# Patient Record
Sex: MALE | Race: WHITE | Employment: OTHER | URBAN - METROPOLITAN AREA
[De-identification: names, ages, dates, MRNs, and addresses within clinical notes are randomized per-mention and may not be internally consistent; named-entity substitution may affect disease eponyms.]

---

## 2017-04-03 PROBLEM — I10 ESSENTIAL HYPERTENSION WITH GOAL BLOOD PRESSURE LESS THAN 130/85: Status: ACTIVE | Noted: 2017-04-03

## 2017-04-03 PROBLEM — R07.9 CHEST PAIN: Status: ACTIVE | Noted: 2017-04-03

## 2017-04-03 PROBLEM — I25.10 CORONARY ARTERY DISEASE INVOLVING NATIVE HEART: Status: ACTIVE | Noted: 2017-04-03

## 2017-04-03 PROBLEM — Z87.09 HISTORY OF COPD: Status: ACTIVE | Noted: 2017-04-03

## 2017-05-11 PROBLEM — I25.10 CORONARY ARTERY DISEASE INVOLVING NATIVE CORONARY ARTERY OF NATIVE HEART WITHOUT ANGINA PECTORIS: Status: ACTIVE | Noted: 2017-05-11

## 2017-11-21 PROBLEM — Z82.49 FAMILY HISTORY OF MI (MYOCARDIAL INFARCTION): Status: ACTIVE | Noted: 2017-11-21

## 2017-11-21 PROBLEM — E78.2 MIXED HYPERLIPIDEMIA: Status: ACTIVE | Noted: 2017-11-21

## 2017-11-21 PROBLEM — R09.89 RIGHT CAROTID BRUIT: Status: ACTIVE | Noted: 2017-11-21

## 2021-06-28 ENCOUNTER — HOSPITAL ENCOUNTER (OUTPATIENT)
Dept: PHYSICAL THERAPY | Age: 71
Discharge: HOME | End: 2021-06-28
Payer: MEDICARE

## 2021-06-28 DIAGNOSIS — M17.0 BILATERAL PRIMARY OSTEOARTHRITIS OF KNEE: ICD-10-CM

## 2021-06-28 PROCEDURE — 97161 PT EVAL LOW COMPLEX 20 MIN: CPT

## 2021-06-28 PROCEDURE — 97110 THERAPEUTIC EXERCISES: CPT

## 2021-06-28 NOTE — THERAPY EVALUATION
Heather Tang  : 1950  [place]  Secondary: [address] at [facility]  [address] [place], [address] [place] [address], [place], [address] [place] [address]  [phone]   [phone]       OUTPATIENT PHYSICAL THERAPY:Initial Assessment and Discharge 2021    ICD-10: Treatment Diagnosis: Bilateral primary osteoarthritis of knee [M17.0]                Treatment Diagnosis 2: Knee pain [M25.569]                Treatment Diagnosis 3: other abnormalities of gait and mobility (R26.89)   Precautions: Hypertension   Allergies: Bee sting [sting, bee]; Keflex [cephalexin]; Pcn [penicillins]; and Sulfa (sulfonamide antibiotics)   TREATMENT PLAN:  Effective Dates: 2021 TO Today (2021). Frequency/Duration: daily for 1 Day(s) MEDICAL/REFERRING DIAGNOSIS:  Bilateral primary osteoarthritis of knee [M17.0]   DATE OF ONSET: Chronic (years long)   REFERRING PHYSICIAN: Richie Joseph MD MD Orders: Evaluate and Treat   Return MD Appointment:       INITIAL ASSESSMENT AND DISCHARGE SUMMARY:  Mr. Eunice Hernandez is a 72 y.o. male presenting to physical therapy with complaints of bilateral knee pain and is preparing for R TKA 2021 to be followed with L TKA later this year. Patient presents with increased pain, decreased strength, decreased ROM, decreased flexibility, impaired gait, impaired transfer ability, decreased activity tolerance, and overall impaired functional mobility. Patient is a good candidate for skilled physical therapy to provide home exercise program and review technique for exercises prior to surgery. Patient is now independent in home exercise program and does not require additional skilled physical therapy. PROBLEM LIST (Impacting functional limitations):  1. Decreased Strength  2. Decreased ADL/Functional Activities  3. Decreased Transfer Abilities  4. Decreased Ambulation Ability/Technique  5. Increased Pain  6.  Decreased Flexibility/Joint Mobility  7. Decreased [place] with Home Exercise Program INTERVENTIONS PLANNED: (Treatment may consist of any combination of the following)  1. Home Exercise Program (HEP)  2. Range of Motion (ROM)  3. Therapeutic Exercise/Strengthening     GOALS: (Goals have been discussed and agreed upon with patient.)  Short Term/Discharge Goals: Time Frame: 6/28/2021 to 06/28/2021  1. Patient will demonstrate understanding and perform home exercise program without external cues. 2. Patient will demonstrate understanding importance, rationale and purpose behind home exercise program.     Outcome Measure: Tool Used: Lower Extremity Functional Scale (LEFS)  Score:  Initial: 48/80 Most Recent: X/80 (Date: -- )   Interpretation of Score: 20 questions each scored on a 5 point scale with 0 representing \"extreme difficulty or unable to perform\" and 4 representing \"no difficulty\". The lower the score, the greater the functional disability. 80/80 represents no disability. Minimal detectable change is 9 points. Medical Necessity:   · Patient is expected to demonstrate progress in strength, range of motion and ability to perform HEP independently. to prepare for TKA. .  Reason for Services/Other Comments:  · Patient continues to require skilled intervention due to need for cues, instruction on technique for exercise program..  Total Evaluation Duration: 15 minutes    Rehabilitation Potential For Stated Goals: Excellent  Regarding Marian Carrasco's therapy, I certify that the treatment plan above will be carried out by a therapist or under their direction. Thank you for this referral,  Agustina Ochoa PT, DPT   Referring Physician Signature: Robbin Marcelino MD _______________________________ Date _____________             PAIN/SUBJECTIVE:    Initial: Pain Intensity 1: 5  Pain Location 1: Knee  Post Session:  5/10    HISTORY:    History of Injury/Illness (Reason for Referral):  Mr. Nirali Klein is a 72 y.o. male presenting to physical therapy with complaints of bilateral knee pain and is preparing for R TKA 07/23/2021 to be followed with L TKA later this year. Patient presents with increased pain, decreased strength, decreased ROM, decreased flexibility, impaired gait, impaired transfer ability, decreased activity tolerance, and overall impaired functional mobility. Patient is a good candidate for skilled physical therapy to provide home exercise program and review technique for exercises prior to surgery. Patient is now independent in home exercise program and does not require additional skilled physical therapy.    Past Medical History/Comorbidities:   Mr. Sherryle Isaacs  has a past medical history of Abnormal blood sugar, Abnormality of cortisol-binding globulin ([address] Actinic keratosis, Acute right flank pain, Anaphylactic reaction to bee sting, Anxiety disorder, Arthritis, Arthritis of left shoulder region, Asthma (7/20/2016), Backache (7/20/2016), Bilateral otitis media, BPH (benign prostatic hyperplasia) (7/20/2016), CAD (coronary artery disease), Cervical neck pain with evidence of disc disease, Chronic renal insufficiency (7/20/2016), Controlled diabetes mellitus ([address] (7/20/2016), COPD (chronic obstructive pulmonary disease) ([address] (7/20/2016), Corneal foreign body (6/13/14), Depressive disorder (7/20/2016), Diarrhea, Disorders of calcium metabolism, Dumping syndrome, Dyspepsia and other specified disorders of function of stomach (8/29/2012), ED (erectile dysfunction) (7/20/2016), Edema, Elevated CPK, Elevated prolactin level, Essential hypertension, benign (8/29/2012), GERD (gastroesophageal reflux disease), Hand pain, right, Hematuria, Hyperlipidemia, Hyperprolactinemia ([address] Hypertension, Hypogonadism in male, Hypothyroidism, Knee effusion, Knee pain, Low serum testosterone level, Neoplasm of uncertain behavior of skin of neck, Neuralgia, Nevus, non-neoplastic, Numbness and tingling in left arm, Open wound of finger without complication (3/61/73), IRINA on CPAP (8/22/2014), Osteoarthrosis, unspecified whether generalized or localized, involving lower leg (8/29/2012), Osteoarthrosis, unspecified whether generalized or localized, lower leg (8/29/2012), Other disorder of calcium metabolism (8/29/2012), Other malaise and fatigue (11/27/2012), Pituitary tumor (12/11/2013), Prolactin increased, Puncture wound (6/11/12), Pure hypercholesterolemia (8/29/2012), Right shoulder pain, RLS (restless legs syndrome) (7/20/2016), Seborrheic keratosis, Seizures ([facility] Shingles (herpes zoster) polyneuropathy, Shingles rash, and Sinusitis. Mr. Javi Aguilera  has a past surgical history that includes hx orthopaedic; hx tonsillectomy; pr chest surgery procedure unlisted; hx carpal tunnel release (Bilateral); hx bunionectomy (Right); and hx cervical fusion. Social History/Living Environment:    Patient lives with his family in a private home. 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 Dominant Side:         RIGHT    Ambulatory/Rehab Services H2 Model Falls Risk Assessment    Risk Factors:       (1)  Gender [Male] Ability to Rise from Chair:       (1)  Pushes up, successful in one attempt    Falls Prevention Plan:       No modifications necessary    Total: (5 or greater = High Risk): 2    ©2010 [facility]. All Rights Reserved. [facility] Patent #[id number]. Federal Law prohibits the replication, distribution or use without written permission from [facility]    Current Medications:        Current Outpatient Medications:     metFORMIN (GLUCOPHAGE) 500 mg tablet, Take  by mouth two (2) times daily (with meals). , Disp: , Rfl:     meloxicam (MOBIC) 7.5 mg tablet, Take 1 Tablet by mouth daily. , Disp: 30 Tablet, Rfl: 0    LORazepam (ATIVAN) 1 mg tablet, Take 1 Tab by mouth four (4) times daily as needed. Max Daily Amount: 4 mg.  Indications: anxiety, Disp: 120 Tab, Rfl: 2 (not taking per patient 06/28/2021)    pravastatin (PRAVACHOL) 40 mg tablet, Take 1 Tab by mouth daily. Indications: hyperlipidemia, Disp: 90 Tab, Rfl: 3    levothyroxine (SYNTHROID) 100 mcg tablet, Take 1 Tab by mouth Daily (before breakfast). , Disp: 90 Tab, Rfl: 3    amLODIPine (NORVASC) 5 mg tablet, Take 1 Tab by mouth daily. , Disp: 90 Tab, Rfl: 3    omeprazole (PRILOSEC) 40 mg capsule, Take 1 Cap by mouth daily. Indications: gastroesophageal reflux disease, Disp: 90 Cap, Rfl: 3    nabumetone (RELAFEN) 750 mg tablet, Take 1 Tab by mouth two (2) times a day., Disp: 180 Tab, Rfl: 3    hydroCHLOROthiazide (HYDRODIURIL) 12.5 mg tablet, Take 1 Tab by mouth daily. , Disp: 90 Tab, Rfl: 3    lisinopril (PRINIVIL, ZESTRIL) 20 mg tablet, Take 1 Tab by mouth daily. , Disp: 30 Tab, Rfl: 11    BROMOCRIPTINE MESYLATE (PARLODEL PO), Take 10 mg by mouth two (2) times a day., Disp: , Rfl:     sildenafil citrate (VIAGRA) 100 mg tablet, Take 1 Tab by mouth as needed. , Disp: 18 Tab, Rfl: 10    cyanocobalamin 1,000 mcg tablet, Take 1,000 mcg by mouth two (2) times a day., Disp: , Rfl:     budesonide-formoterol (SYMBICORT) 160-4.5 mcg/actuation HFA inhaler, Take 2 Puffs by inhalation two (2) times a day., Disp: 1 Inhaler, Rfl: 11    nitroglycerin (NITROSTAT) 0.4 mg SL tablet, 1 Tab by SubLINGual route as needed (CAD). Indications: ANGINA, Disp: 25 Tab, Rfl: 11    diphenoxylate-atropine (LOMOTIL) 2.5-0.025 mg per tablet, Take 1 Tab by mouth four (4) times daily as needed for Diarrhea. Max Daily Amount: 4 Tabs., Disp: 20 Tab, Rfl: 0    EPINEPHrine (EPIPEN) 0.3 mg/0.3 mL injection, 0.3 mg by IntraMUSCular route as needed. , Disp: , Rfl:     TESTOSTERONE CYPIONATE IM, by IntraMUSCular route.  200MG/ML, 2 ml every 2 weeks, Disp: , Rfl:     SYRINGE, DISPOSABLE, 20 ML (BD LUER-ARTHUR BULK SYRINGE), BD Luer-Arthur Syringe 25G X 1-1/2\"3 ML, 1 misc every 2 weeks, Disp: , Rfl:     DEXAMETHASONE SOD PHOSPHATE (DEXAMETHASONE SODIUM PHOSPHATE INJECTION), by Injection route., Disp: , Rfl:     magnesium oxide (MAG-OX) 400 mg tablet, Take 400 mg by mouth two (2) times a day., Disp: , Rfl:     testosterone enanthate 200 mg/mL Syrg, 50 mg by IntraMUSCular route., Disp: , Rfl:     aspirin 81 mg tablet, Take 81 mg by mouth.  , Disp: , Rfl:     CALCIUM CARBONATE/VITAMIN D2 (CALCIUM 600 WITH VITAMIN D2 PO), Take 1 Tab by mouth two (2) times a day., Disp: , Rfl:     Date Last Reviewed:  6/28/2021    Number of Personal Factors/Comorbidities that affect the Plan of Care:   0: LOW COMPLEXITY    EXAMINATION:    Patient denies any LE paresthesia. Patient denies any increase of symptoms with cough, sneeze or valsalva. Patient denies any saddle paresthesia or bowel/bladder deficits. Observation/Orthostatic Postural Assessment:          Patient ambulates with mild antalgic gait; no significant deviations. Palpation:    Anthropometric Measurements (cm) Left Right   Knee joint line 36cm 38cm      ROM:    AROM/ PROM Left (degrees) Right (degrees)   Knee Flexion 125 124   Knee Extension -5 0   Ankle Dorsiflexion (DF) -   knee extended 5 5     Strength: Motion Tested Left   (*/5) Right  (*/5)   Knee Extension 5 5   Knee Flexion 4+ 4+   Hip Flexion 5 5   Ankle DF 5 5   Ankle PF 4 4       Passive Accessory Motion:         Patellas are hypomobile in all directions bilaterally. Neurological Screen:              Neurological screen unremarkable. Functional Mobility:   Patient performs transfers without difficulty. Uses BUEs to push up. Balance: WNL       Body Structures Involved:  1. Bones  2. Joints  3. Muscles Body Functions Affected:  1. Sensory/Pain  2. Neuromusculoskeletal  3. Movement Related Activities and Participation Affected:  1. General Tasks and Demands  2.  Mobility    Number of elements (examined above) that affect the Plan of Care: 1-2: LOW COMPLEXITY    CLINICAL PRESENTATION:    Presentation:   Stable and uncomplicated: LOW COMPLEXITY    CLINICAL DECISION MAKING:    Use of outcome tool(s) and clinical judgement create a POC that gives a: Clear prediction of patient's progress: LOW COMPLEXITY

## 2021-06-28 NOTE — PROGRESS NOTES
Mary Anne Mcmillan  : 1950  [place]  Secondary: [address] at [facility]  [address], [address] [place] [address], Street, [address] [place] [address]  [phone]   [phone]      OUTPATIENT PHYSICAL THERAPY: Daily Treatment Note 2021    ICD-10: Treatment Diagnosis: Bilateral primary osteoarthritis of knee [M17.0]                Treatment Diagnosis 2: Knee pain [M25.569]                Treatment Diagnosis 3: other abnormalities of gait and mobility (R26.89)   Precautions: Hypertension   Allergies: Bee sting [sting, bee]; Keflex [cephalexin]; Pcn [penicillins]; and Sulfa (sulfonamide antibiotics)   TREATMENT PLAN:  Effective Dates: 2021 TO Today (2021). Frequency/Duration: daily for 1 Day(s) MEDICAL/REFERRING DIAGNOSIS:  Bilateral primary osteoarthritis of knee [M17.0]   DATE OF ONSET: Chronic (years long)   REFERRING PHYSICIAN: Rafael Sandoval MD MD Orders: Evaluate and Treat   Return MD Appointment:       Pre-treatment Symptoms/Complaints: I'm ready. Pain: Initial:  10 Post Session:  5/10   Medications Last Reviewed:  2021  Updated Objective Findings:  See evaluation note from today   TREATMENT:   THERAPEUTIC EXERCISE: (25 minutes):  Exercises per grid below to improve mobility, strength and coordination. Required mod visual, verbal, manual and tactile cues to promote proper body alignment, promote proper body posture and promote proper body mechanics. Progressed resistance, range, repetitions and complexity of movement as indicated.      Date:  2021 Date:   Date:     Activity/Exercise Parameters Parameters Parameters   Heel Slide with Strap 1 x 10 5 sec hold      Hamstring Stretch  4 x 30 seconds     Heel Prop  2 mins bilateral 3lb weight      Calf Stretch  With strap seated 4 x 30 seconds bilateral     Quad Set 1 x 10 10 second hold and with SLR      Straight Leg Raise  1 x 10 bilateral     Long Arc Quad  1 x 10      Chair Slides 4 x 30 seconds bilateral     Standing calf stretch  4 x 30 seconds bilateral        Time spent with patient reviewing proper muscle recruitment and technique with exercises. MANUAL THERAPY: (0 minutes): Soft tissue mobilization was utilized and necessary because of the patient's loss of articular motion   None today     MODALITIES: (0 minutes):      None today     HEP: As above; handouts given to patient for all exercises. Treatment/Session Summary:    · Response to Treatment:  Patient demonstrating understanding of exercises. .  · Baseline vitals (6/28/2021): BP: 145/77, HR: 70, SpO: 95  · Communication/Consultation:  Education on home exercise program, expectations. · Equipment provided today:  HEP handout  · Recommendations/Intent for next treatment session: Discharge to [facility].      Total Treatment Billable Duration:  40 minutes: 15 evaluation, 25 therapeutic exercise   PT Patient Time In/Time Out  Time In: 7667  Time Out: LIZETT Gayle

## 2021-07-12 ENCOUNTER — HOSPITAL ENCOUNTER (OUTPATIENT)
Dept: PHYSICAL THERAPY | Age: 71
Discharge: HOME | End: 2021-07-12
Attending: ORTHOPAEDIC SURGERY
Payer: MEDICARE

## 2021-07-12 ENCOUNTER — HOSPITAL ENCOUNTER (OUTPATIENT)
Dept: SURGERY | Age: 71
Discharge: HOME | End: 2021-07-12
Attending: ORTHOPAEDIC SURGERY
Payer: MEDICARE

## 2021-07-12 VITALS
SYSTOLIC BLOOD PRESSURE: 145 MMHG | OXYGEN SATURATION: 95 % | DIASTOLIC BLOOD PRESSURE: 78 MMHG | TEMPERATURE: 98 F | BODY MASS INDEX: 27.52 KG/M2 | RESPIRATION RATE: 16 BRPM | HEIGHT: 70 IN | HEART RATE: 66 BPM | WEIGHT: 192.2 LBS

## 2021-07-12 LAB
ANION GAP SERPL CALC-SCNC: 8 MMOL/L (ref 7–16)
APTT PPP: 30.1 SEC (ref 24.1–35.1)
ATRIAL RATE: 60 BPM
BACTERIA SPEC CULT: ABNORMAL
BASOPHILS # BLD: 0.1 K/UL (ref 0–0.2)
BASOPHILS NFR BLD: 1 % (ref 0–2)
BUN SERPL-MCNC: 15 MG/DL (ref 8–23)
CALCIUM SERPL-MCNC: 9.8 MG/DL (ref 8.3–10.4)
CALCULATED P AXIS, ECG09: 12 DEGREES
CALCULATED R AXIS, ECG10: 42 DEGREES
CALCULATED T AXIS, ECG11: 45 DEGREES
CHLORIDE SERPL-SCNC: 104 MMOL/L (ref 98–107)
CO2 SERPL-SCNC: 26 MMOL/L (ref 21–32)
CREAT SERPL-MCNC: 0.9 MG/DL (ref 0.8–1.5)
DIAGNOSIS, 93000: NORMAL
DIFFERENTIAL METHOD BLD: NORMAL
EOSINOPHIL # BLD: 0.1 K/UL (ref 0–0.8)
EOSINOPHIL NFR BLD: 1 % (ref 0.5–7.8)
ERYTHROCYTE [DISTWIDTH] IN BLOOD BY AUTOMATED COUNT: 13.1 % (ref 11.9–14.6)
EST. AVERAGE GLUCOSE BLD GHB EST-MCNC: 117 MG/DL
GLUCOSE SERPL-MCNC: 94 MG/DL (ref 65–100)
HBA1C MFR BLD: 5.7 % (ref 4.2–6.3)
HCT VFR BLD AUTO: 43.3 % (ref 41.1–50.3)
HGB BLD-MCNC: 14.3 G/DL (ref 13.6–17.2)
IMM GRANULOCYTES # BLD AUTO: 0 K/UL (ref 0–0.5)
IMM GRANULOCYTES NFR BLD AUTO: 0 % (ref 0–5)
INR PPP: 1
LYMPHOCYTES # BLD: 1.9 K/UL (ref 0.5–4.6)
LYMPHOCYTES NFR BLD: 25 % (ref 13–44)
MCH RBC QN AUTO: 32 PG (ref 26.1–32.9)
MCHC RBC AUTO-ENTMCNC: 33 G/DL (ref 31.4–35)
MCV RBC AUTO: 96.9 FL (ref 79.6–97.8)
MONOCYTES # BLD: 0.7 K/UL (ref 0.1–1.3)
MONOCYTES NFR BLD: 9 % (ref 4–12)
NEUTS SEG # BLD: 4.9 K/UL (ref 1.7–8.2)
NEUTS SEG NFR BLD: 64 % (ref 43–78)
NRBC # BLD: 0 K/UL (ref 0–0.2)
P-R INTERVAL, ECG05: 160 MS
PLATELET # BLD AUTO: 239 K/UL (ref 150–450)
PMV BLD AUTO: 9.9 FL (ref 9.4–12.3)
POTASSIUM SERPL-SCNC: 4.4 MMOL/L (ref 3.5–5.1)
PROTHROMBIN TIME: 13.4 SEC (ref 12.5–14.7)
Q-T INTERVAL, ECG07: 402 MS
QRS DURATION, ECG06: 86 MS
QTC CALCULATION (BEZET), ECG08: 402 MS
RBC # BLD AUTO: 4.47 M/UL (ref 4.23–5.6)
SERVICE CMNT-IMP: ABNORMAL
SODIUM SERPL-SCNC: 138 MMOL/L (ref 136–145)
VENTRICULAR RATE, ECG03: 60 BPM
WBC # BLD AUTO: 7.6 K/UL (ref 4.3–11.1)

## 2021-07-12 PROCEDURE — 85730 THROMBOPLASTIN TIME PARTIAL: CPT

## 2021-07-12 PROCEDURE — 87641 MR-STAPH DNA AMP PROBE: CPT

## 2021-07-12 PROCEDURE — 77030012341 HC CHMB SPCR OPTC MDI VYRM -A

## 2021-07-12 PROCEDURE — 83036 HEMOGLOBIN GLYCOSYLATED A1C: CPT

## 2021-07-12 PROCEDURE — 97161 PT EVAL LOW COMPLEX 20 MIN: CPT

## 2021-07-12 PROCEDURE — 93005 ELECTROCARDIOGRAM TRACING: CPT

## 2021-07-12 PROCEDURE — 94664 DEMO&/EVAL PT USE INHALER: CPT

## 2021-07-12 PROCEDURE — 85610 PROTHROMBIN TIME: CPT

## 2021-07-12 PROCEDURE — 36415 COLL VENOUS BLD VENIPUNCTURE: CPT

## 2021-07-12 PROCEDURE — 80048 BASIC METABOLIC PNL TOTAL CA: CPT

## 2021-07-12 PROCEDURE — 94760 N-INVAS EAR/PLS OXIMETRY 1: CPT

## 2021-07-12 PROCEDURE — 77030027138 HC INCENT SPIROMETER -A

## 2021-07-12 PROCEDURE — 85025 COMPLETE CBC W/AUTO DIFF WBC: CPT

## 2021-07-12 RX ORDER — HYDROCORTISONE 5 MG/1
5 TABLET ORAL
COMMUNITY
Start: 2021-07-07

## 2021-07-12 RX ORDER — CEFAZOLIN SODIUM/WATER 2 G/20 ML
2 SYRINGE (ML) INTRAVENOUS ONCE
Status: CANCELLED | OUTPATIENT
Start: 2021-07-12 | End: 2021-07-12

## 2021-07-12 NOTE — PERIOP NOTES
Patient verified name and . Order for consent  found in EHR and matches case posting; patient verified. Type 3 surgery,  Joint camp assessment complete. Labs per surgeon: CBC,BMP, PT/PTT, Hgb A1c ; results within anesthesia guidelines; routed via fax to pcp, Dr. Murcia and to surgeon Dr. Oscar Barker for review. Labs per anesthesia protocol: no additional  EKG:completed today per protocol and within anesthesia guidelines; stress 17; cath report 11 available for anesthesia reference in Chart review. MRSA/MSSA swab collected; pharmacy to review and dose antibiotic as appropriate. Hospital approved surgical skin cleanser and instructions to return bottle on DOS given per hospital policy. Patient provided with handouts including Guide to Surgery, Pain Management, Hand Hygiene, Blood Transfusion Education, and [facility] Brochure. Patient answered medical/surgical history questions at their best of ability. All prior to admission medications documented in [facility]. Original medication prescription bottle  visualized during patient appointment. Patient instructed to hold all vitamins 3 weeks prior to surgery and NSAIDS 5 days prior to surgery. Patient teach back successful and patient demonstrates knowledge of instruction. No

## 2021-07-12 NOTE — PROGRESS NOTES
Feliciano Hernandez  : [birth year](68 y.o.) Joint Darryl Massey at [address], [facility].  [phone]       Physical Therapy Prehab Plan of Treatment and Evaluation Summary:2021    ICD-10: Treatment Diagnosis:   · Pain in Right Knee (M25.561)  · Stiffness of Right Knee, Not elsewhere classified (M25.661)  Precautions/Allergies:   Bee sting [sting, bee]; Keflex [cephalexin]; Pcn [penicillins]; and Sulfa (sulfonamide antibiotics)  MEDICAL/REFERRING DIAGNOSIS:  Unilateral primary osteoarthritis, right knee [M17.11]  REFERRING PHYSICIAN: Coby Pressley MD  DATE OF SURGERY: 21    Assessment:   Comments:  Scheduled for R TKA. Independent with gait and ADLs. Plans to discharge home with support of spouse. PROBLEM LIST (Impacting functional limitations):  Mr. Jenny Parks presents with the following right lower extremity(s) problems:  1. Gait  2. Home Exercise Program  3. Pain   INTERVENTIONS PLANNED:  1. Home Exercise Program  2. Educational Discussion      TREATMENT PLAN: Effective Dates: 2021 TO 2021. Frequency/Duration: Patient to continue to perform home exercise program at least twice per day up until his surgery. GOALS: (Goals have been discussed and agreed upon with patient.)  Discharge Goals: Time Frame: 1 Day  1. Patient will demonstrate independence with a home exercise program designed to increase functional technique and pain control to minimize functional deficits and optimize patient for total joint replacement. Rehabilitation Potential For Stated Goals: Good  Regarding Aranza Carrasco's therapy, I certify that the treatment plan above will be carried out by a therapist or under their direction.   Thank you for this referral,  Rory Dickens, PT               HISTORY:   Present Symptoms:  Pain Intensity 1: 4  Pain Location 1: Knee  Pain Orientation 1: Right   History of Present Injury/Illness (Reason for Referral):  Medical/Referring Diagnosis: Unilateral primary osteoarthritis, right knee [M17.11]   Past Medical History/Comorbidities:   Mr. Nirali Klein  has a past medical history of Abnormal blood sugar, Abnormality of cortisol-binding globulin ([facility] [address] Actinic keratosis, Acute right flank pain, Anaphylactic reaction to bee sting, Anxiety, Anxiety disorder, Arthritis, Arthritis of left shoulder region, Asthma (7/20/2016), Backache (7/20/2016), Bilateral otitis media, BPH (benign prostatic hyperplasia) (7/20/2016), CAD (coronary artery disease) (04/20/2011), Cervical neck pain with evidence of disc disease, Chronic renal insufficiency (7/20/2016), Controlled diabetes mellitus ([address] (7/20/2016), COPD (chronic obstructive pulmonary disease) ([facility] [address] (7/20/2016), Corneal foreign body (6/13/14), COVID-19 vaccine series completed (03/22/2021), Depressive disorder (7/20/2016), Diarrhea, Disorders of calcium metabolism, Dumping syndrome, Dyspepsia and other specified disorders of function of stomach (8/29/2012), ED (erectile dysfunction) (7/20/2016), Edema, Elevated CPK, Elevated prolactin level, Essential hypertension, benign (8/29/2012), GERD (gastroesophageal reflux disease), Hand pain, right, Hematuria, Hyperlipidemia, Hyperprolactinemia ([address] Hypertension, Hypogonadism in male, Hypothyroidism, Knee effusion, Knee pain, Low serum testosterone level, Neoplasm of uncertain behavior of skin of neck, Neuralgia, Nevus, non-neoplastic, Numbness and tingling in left arm, Open wound of finger without complication (9/19/36), IRINA on CPAP (8/22/2014), Osteoarthrosis, unspecified whether generalized or localized, involving lower leg (8/29/2012), Osteoarthrosis, unspecified whether generalized or localized, lower leg (8/29/2012), Other disorder of calcium metabolism (8/29/2012), Other malaise and fatigue (11/27/2012), Pituitary tumor (12/11/2013), Prolactin increased, Puncture wound (6/11/12), Pure hypercholesterolemia (8/29/2012), Right shoulder pain, RLS (restless legs syndrome) (7/20/2016), Seborrheic keratosis, Seizures ([facility] [address] Shingles (herpes zoster) polyneuropathy, Shingles rash, and Sinusitis. He also has no past medical history of Difficult intubation, Malignant hyperthermia due to anesthesia, Nausea & vomiting, or Pseudocholinesterase deficiency. Mr. Dewayne Monzon  has a past surgical history that includes hx orthopaedic; hx tonsillectomy; pr chest surgery procedure unlisted; hx carpal tunnel release (Bilateral); hx bunionectomy (Right); hx cervical fusion (11/2007); and hx heart catheterization (04/20/2011). Social History/Living Environment:   Home Environment: Private residence  # Steps to Enter: 1  Rails to Enter: No  One/Two Story Residence: One story  Living Alone: No  Support Systems: Spouse/Significant Other/Partner  Patient Expects to be Discharged to[de-identified] [employer]  Current DME Used/Available at Home: Cane, straight;Walker, rolling  Tub or Shower Type: Tub/Shower combination    Work/Activity:  retired  Dominant Side:  RIGHT  Current Medications:  See [address] note   Number of Personal Factors/Comorbidities that affect the Plan of Care: 1-2: MODERATE COMPLEXITY   EXAMINATION:   ADLs (Current Functional Status):   Ambulation:  [x] Independent  [] Walk Indoors Only  [] Walk Outdoors  [] Use Assistive Device  [] Use Wheelchair Only Dressing:  [x] [address] from Someone for:  [] Sock/Shoes  [] Pants  [] Everything   Bathing/Showering:   [x] Independent  [] Requires Assistance from Someone  [] [address]:  [x] Routine house and yard work  [] Light Housework Only  [] None   Observation/Orthostatic Postural Assessment:       ROM/Flexibility:   AROM: Within functional limits (R knee 0-115)                           Strength:   Strength:  Within functional limits                  Functional Mobility:         Stand to Sit: Independent  Sit to Stand: Independent  Distance (ft): 150 Feet (ft)  Ambulation - Level of Assistance: Independent  Speed/Pearl: Slow  Stance: Right decreased  Gait Abnormalities: Antalgic          Balance:    Sitting: Intact  Standing: Intact   Body Structures Involved:  1. Bones  2. Joints  3. Muscles Body Functions Affected:  1. Neuromusculoskeletal  2. Movement Related Activities and Participation Affected:  1. General Tasks and Demands  2. Mobility   Number of elements that affect the Plan of Care: 3: MODERATE COMPLEXITY   CLINICAL PRESENTATION:   Presentation: Stable and uncomplicated: LOW COMPLEXITY   CLINICAL DECISION MAKING:   Outcome Measure: Tool Used: Lower Extremity Functional Scale (LEFS)  Score:  Initial: 57/80 Most Recent: X/80 (Date: -- )   Interpretation of Score: 20 questions each scored on a 5 point scale with 0 representing \"extreme difficulty or unable to perform\" and 4 representing \"no difficulty\". The lower the score, the greater the functional disability. 80/80 represents no disability. Minimal detectable change is 9 points. Medical Necessity:   · Mr. Neyda Naylor is expected to optimize his lower extremity strength and ROM in preparation for joint replacement surgery. Reason for Services/Other Comments:  · Achieve baseline assesment of musculoskeletal system, functional mobility and home environment. , educate in PT HEP in preparation for surgery, educate in hospital plan of care. Use of outcome tool(s) and clinical judgement create a POC that gives a: Clear prediction of patient's progress: LOW COMPLEXITY   TREATMENT:   Treatment/Session Assessment:  Patient was instructed in PT- HEP to increase strength and ROM in LEs. Answered all questions. · Post session pain:  4  · Compliance with Program/Exercises: anticipate compliance.   Total Treatment Duration:  PT Patient Time In/Time Out  Time In: 1230  Time Out: [address]

## 2021-07-12 NOTE — PERIOP NOTES
PLEASE CONTINUE TAKING ALL PRESCRIPTION MEDICATIONS UP TO THE DAY OF SURGERY UNLESS OTHERWISE DIRECTED BELOW. DISCONTINUE all vitamins and supplements 21 days prior to surgery. DISCONTINUE Non-Steriodal Anti-Inflammatory (NSAIDS) such as Advil and Aleve 5 days prior to surgery. Home Medications to take  the day of surgery    Pravastatin                   Symbicort inhaler   Levothyroxine   Lorazepam, if needed     Home Medications   to Hold           Comments    Covid test  @ [facility] [place]    On the day before surgery please take Acetaminophen 1000mg in the morning and then again before bed. You may substitute for Tylenol 650 mg. BRING in original bottle:  Omeprazole, Bromocriptine, inhaler and CPAP machine       Please do not bring home medications with you on the day of surgery unless otherwise directed by your nurse. If you are instructed to bring home medications, please give them to your nurse as they will be administered by the nursing staff. If you have any questions, please call [facility] [phone] or [facility] [phone]. A copy of this note was provided to the patient for reference.

## 2021-07-12 NOTE — PROGRESS NOTES
21 1200   Oxygen Therapy   O2 Sat (%) 95 %   Pulse via Oximetry 78 beats per minute   O2 Device None (Room air)   Pre-Treatment   Breath Sounds Bilateral Clear;Diminished   Pre FEV1 (liters) 2.3 liters   % Predicted 75     Initial respiratory Assessment completed with pt. Pt was interviewed and evaluated in Joint camp prior to surgery. Patient ID:  Lexi Prather  [id number]  82 y.o.  1950  Surgeon: Dr. Kallie Walsh  Date of Surgery: 2021  Procedure:  Total Right Knee Arthroplasty  Primary Care Physician: Melida Hull [phone]  Specialists: [facility]- [facility] 2021- CLEARED FOR SURGERY    Pt taught proper COUGH technique  DIAPHRAGMATIC BREATHING EXERCISE INSTRUCTIONS GIVEN    History of smokin PPD FOR 42-42 YEARS                 Quit date:   2009      Secondhand smoke:PARENTS    Past procedures with Oxygen desaturation or delayed awakening:DENIES    Past Medical History:   Diagnosis Date    Abnormal blood sugar     Abnormality of cortisol-binding globulin (HCC)     Actinic keratosis     Acute right flank pain     Anaphylactic reaction to bee sting     Anxiety     takes Lorazepam prn    Anxiety disorder     Arthritis     Arthritis of left shoulder region     Asthma 2016    followed by pulmonary; uses inhaler    Backache 2016    Bilateral otitis media     BPH (benign prostatic hyperplasia) 2016    CAD (coronary artery disease) 2011    He had mild nonobstructive CAD at cath by Dr. Addis Ballard several years ago; cardiac cath:  11    Cervical neck pain with evidence of disc disease     Chronic renal insufficiency 2016    Controlled diabetes mellitus ([address] 2016 :  hgb A1c:  5.8; daily fasting sqbs:  102    COPD (chronic obstructive pulmonary disease) ([address] 2016    Corneal foreign body 14    COVID-19 vaccine series completed 2021    Moderna    Depressive disorder 2016    clinical depression~resolved    Diarrhea     Disorders of calcium metabolism     Dumping syndrome     Dyspepsia and other specified disorders of function of stomach 8/29/2012    ED (erectile dysfunction) 7/20/2016    Edema     Elevated CPK     Elevated prolactin level     Essential hypertension, benign 8/29/2012    GERD (gastroesophageal reflux disease)     Hand pain, right     Hematuria     Hyperlipidemia     Hyperprolactinemia ([address]     followed by endocrinologist    Hypertension     Hypogonadism in male     Hypothyroidism     takes levothyroxine    Knee effusion     Knee pain     Low serum testosterone level     Neoplasm of uncertain behavior of skin of neck     Neuralgia     Nevus, non-neoplastic     Numbness and tingling in left arm     Open wound of finger without complication 3/55/20    IRINA on CPAP 8/22/2014    uses CPAP    Osteoarthrosis, unspecified whether generalized or localized, involving lower leg 8/29/2012    Osteoarthrosis, unspecified whether generalized or localized, lower leg 8/29/2012    Other disorder of calcium metabolism 8/29/2012    Other malaise and fatigue 11/27/2012    Pituitary tumor 12/11/2013    Prolactin increased     Puncture wound 6/11/12    pt stepped on a sarbjit nail    Pure hypercholesterolemia 8/29/2012    Right shoulder pain     RLS (restless legs syndrome) 7/20/2016    Seborrheic keratosis     Seizures ([address]     30 years ago 1971    Shingles (herpes zoster) polyneuropathy     Shingles rash     Sinusitis       MILD COPD  Respiratory history:DENIES SOB                                 SOB  ON EXERTION                                    Respiratory meds:  SYMBICORT- PT HAD NOT BEEN USING . INSTRUCTED PT TO USE SYMBICORT 2 PUFFS BID STARTING TO DAY AND MORNING OF SURGERY  PT HAS SYMBICORT. MDI instructions given verbally & written along with spacer.   Pt to use home MDI's morning of surgery & bring to [id number]:             WIFE PAST SLEEP STUDY:        YES      2014               HX OF IRINA:                        YES                    IRINA assessment:                                               SLEEPS ON SIDE        PHYSICAL EXAM   Body mass index is 27.58 kg/m². Visit Vitals  BP (!) 145/78 (BP 1 Location: Right upper arm, BP Patient Position: Sitting)   Pulse 66   Temp 98 °F (36.7 °C)   Resp 16   Ht 5' 10\" (1.778 m)   Wt 87.2 kg (192 lb 3.2 oz)   SpO2 95%   BMI 27.58 kg/m²     Neck circumference:   44   cm    Loud snoring:                                                 YES             Witnessed apnea or wakening gasping or choking:            APNEA  Awakens with headaches:                                              YES  Morning or daytime tiredness/ sleepiness:                            TIRED  Dry mouth or sore throat in morning:            YES                                               Loo stage:  4                                   SACS score:42  Stop Bang   STOP-BANG  Does the patient snore loudly (louder than talking or loud enough to be heard through closed doors)?: Yes  Does the patient often feel tired, fatigued, or sleepy during the daytime, even after a \"good\" night's sleep?: Yes  Has anyone ever observed the patient stop breathing during their sleep? : Yes  Does the patient have or are they being treated for high blood pressure?: Yes  Is the patient's BMI greater than 35?: No  Is your neck circumference greater than 17 inches (Male) or 16 inches (Female)?: Yes  Is the patient older than 48?: Yes  Is the patient male?: Yes  IRINA Score: 7  Has the patient been referred to Sleep Medicine?: No  Has the patient previously been diagnosed with Obstructive Sleep Apnea?: Yes  Treated or Untreated?: Treated                            Pt. Is positive for IRINA and uses HOME CPAP and will bring to Hospital day of surgery. PT WILL NEED ASSISTANCE PLACING CPAP ON HS DURING HOSPITALIZATION.    ALBUTEROL Q6 PRN Referrals:    Pt. Phone Number:

## 2021-07-13 NOTE — ADVANCED PRACTICE NURSE
Total Joint Surgery Preoperative Chart Review      Patient ID:  Feliciano Hernandez  [id number]  28 y.o.  1950  Surgeon: Dr. Mikael Fulton  Date of Surgery: 7/23/2021  Procedure: Total Right Knee Arthroplasty  Primary Care Physician: Oneil Chen [phone]  Specialty Physician(s):      Subjective:   Feliciano Hernandez is a 72 y.o. WHITE/NON- male who presents for preoperative evaluation for Total Right Knee arthroplasty. This is a preoperative chart review note based on data collected by the nurse at the surgical Pre-Assessment visit.     Past Medical History:   Diagnosis Date    Abnormal blood sugar     Abnormality of cortisol-binding globulin (HCC)     Actinic keratosis     Acute right flank pain     Anaphylactic reaction to bee sting     Anxiety     takes Lorazepam prn    Anxiety disorder     Arthritis     Arthritis of left shoulder region     Asthma 7/20/2016    followed by pulmonary; uses inhaler    Backache 7/20/2016    Bilateral otitis media     BPH (benign prostatic hyperplasia) 7/20/2016    CAD (coronary artery disease) 04/20/2011    He had mild nonobstructive CAD at cath by Dr. Myrtle Amos several years ago; cardiac cath:  4/20/11    Cervical neck pain with evidence of disc disease     Chronic renal insufficiency 7/20/2016    Controlled diabetes mellitus ([address] 7/20/2016 6/30/21 :  hgb A1c:  5.8; daily fasting sqbs:  102    COPD (chronic obstructive pulmonary disease) ([facility] 7/20/2016    Corneal foreign body 6/13/14    COVID-19 vaccine series completed 03/22/2021    Moderna    Depressive disorder 7/20/2016    clinical depression~resolved    Diarrhea     Disorders of calcium metabolism     Dumping syndrome     Dyspepsia and other specified disorders of function of stomach 8/29/2012    ED (erectile dysfunction) 7/20/2016    Edema     Elevated CPK     Elevated prolactin level     Essential hypertension, benign 8/29/2012    GERD (gastroesophageal reflux disease)     Hand pain, right     Hematuria     Hyperlipidemia     Hyperprolactinemia ([address]     followed by endocrinologist    Hypertension     Hypogonadism in male     Hypothyroidism     takes levothyroxine    Knee effusion     Knee pain     Low serum testosterone level     Neoplasm of uncertain behavior of skin of neck     Neuralgia     Nevus, non-neoplastic     Numbness and tingling in left arm     Open wound of finger without complication     IRINA on CPAP 2014    uses CPAP    Osteoarthrosis, unspecified whether generalized or localized, involving lower leg 2012    Osteoarthrosis, unspecified whether generalized or localized, lower leg 2012    Other disorder of calcium metabolism 2012    Other malaise and fatigue 2012    Pituitary tumor 2013    Prolactin increased     Puncture wound 12    pt stepped on a sarbjit nail    Pure hypercholesterolemia 2012    Right shoulder pain     RLS (restless legs syndrome) 2016    Seborrheic keratosis     Seizures ([address]     30 years ago 1971    Shingles (herpes zoster) polyneuropathy     Shingles rash     Sinusitis       Past Surgical History:   Procedure Laterality Date    HX BUNIONECTOMY Right     HX CARPAL TUNNEL RELEASE Bilateral     HX CERVICAL FUSION  2007    C3-C4-has metal screws     HX HEART CATHETERIZATION  2011    HX ORTHOPAEDIC      bilat carpel tunnel, bunion repair,neck surg foot surg    HX TONSILLECTOMY      OH CHEST SURGERY PROCEDURE UNLISTED      pt denies     Family History   Problem Relation Age of Onset    Cancer Mother         Kidney    Cancer Father         Lymphoma    Diabetes Maternal Grandfather       Social History     Tobacco Use    Smoking status: Former Smoker     Packs/day: 0.50     Quit date: 2009     Years since quittin.2    Smokeless tobacco: Former User     Types: Chew     Quit date: 1986   Substance Use Topics    Alcohol use:  No Prior to Admission medications    Medication Sig Start Date End Date Taking? Authorizing Provider   hydrocortisone (CORTEF) 5 mg tablet Take 5 mg by mouth daily as needed. Indications: decreased function of the adrenal gland 7/7/21  Yes Provider, Historical   metFORMIN (GLUCOPHAGE) 500 mg tablet Take 500 mg by mouth two (2) times daily (with meals). 2 tablets BID  Indications: type 2 diabetes mellitus   Yes Provider, Historical   meloxicam (MOBIC) 7.5 mg tablet Take 1 Tablet by mouth daily. Patient taking differently: Take 7.5 mg by mouth nightly. Indications: joint damage causing pain and loss of function 6/22/21  Yes Mihir Dan MD   LORazepam (ATIVAN) 1 mg tablet Take 1 Tab by mouth four (4) times daily as needed. Max Daily Amount: 4 mg. Indications: anxiety  Patient taking differently: Take 1 mg by mouth four (4) times daily as needed. Take / use AM day of surgery  per anesthesia protocols, if needed  Indications: anxious 1/19/18  Yes Charlene Staples MD   pravastatin (PRAVACHOL) 40 mg tablet Take 1 Tab by mouth daily. Indications: hyperlipidemia  Patient taking differently: Take 40 mg by mouth daily. Take / use AM day of surgery  per anesthesia protocols. Indications: excessive fat in the blood 1/19/18  Yes Charlene Staples MD   levothyroxine (SYNTHROID) 100 mcg tablet Take 1 Tab by mouth Daily (before breakfast). Patient taking differently: Take 100 mcg by mouth Daily (before breakfast). Take / use AM day of surgery  per anesthesia protocols. Indications: a condition with low thyroid hormone levels 1/19/18  Yes Charlene Staples MD   amLODIPine (NORVASC) 5 mg tablet Take 1 Tab by mouth daily. Patient taking differently: Take 5 mg by mouth nightly. Indications: high blood pressure 1/19/18  Yes Charlene Staples MD   omeprazole (PRILOSEC) 40 mg capsule Take 1 Cap by mouth daily. Indications: gastroesophageal reflux disease  Patient taking differently: Take 40 mg by mouth nightly. Indications: gastroesophageal reflux disease 1/19/18  Yes Ric Hopkins MD   lisinopril (PRINIVIL, ZESTRIL) 20 mg tablet Take 1 Tab by mouth daily. Patient taking differently: Take 40 mg by mouth nightly. Indications: high blood pressure 1/19/18  Yes Ric Hopkins MD   BROMOCRIPTINE MESYLATE (PARLODEL PO) Take 5 mg by mouth nightly. Takes 4 tablets at bedtime   Yes Provider, Historical   cyanocobalamin 1,000 mcg tablet Take 1,000 mcg by mouth two (2) times a day. Yes Provider, Historical   budesonide-formoterol (SYMBICORT) 160-4.5 mcg/actuation HFA inhaler Take 2 Puffs by inhalation two (2) times a day. Patient taking differently: Take 2 Puffs by inhalation daily. Take / use AM day of surgery  per anesthesia protocols. Indications: bronchospasm prevention with COPD 3/30/17  Yes Ric Hopkins MD   nitroglycerin (NITROSTAT) 0.4 mg SL tablet 1 Tab by SubLINGual route as needed (CAD). Indications: ANGINA 12/22/16  Yes Ric Hopkins MD   EPINEPHrine [facility]) 0.3 mg/0.3 mL injection 0.3 mg by IntraMUSCular route as needed. Prn bee stings  Indications: person at risk of anaphylaxis   Yes Provider, Historical   TESTOSTERONE CYPIONATE IM 50 mg by IntraMUSCular route Once every 2 weeks. 200MG/ML, 0.5 ml every 2 weeks   Yes Provider, Historical   SYRINGE, DISPOSABLE, 20 ML (BD LUER-ARTHUR BULK SYRINGE) BD Luer-Arthur Syringe 25G X 1-1/2\"3 ML, 1 misc every 2 weeks   Yes Provider, Historical   aspirin 81 mg tablet Take 81 mg by mouth. Indications: treatment to prevent a heart attack   Yes Provider, Historical   nabumetone (RELAFEN) 750 mg tablet Take 1 Tab by mouth two (2) times a day. 1/19/18   Ric Hopkins MD   hydroCHLOROthiazide (HYDRODIURIL) 12.5 mg tablet Take 1 Tab by mouth daily. 1/19/18   Ric Hopkins MD   sildenafil citrate (VIAGRA) 100 mg tablet Take 1 Tab by mouth as needed.  7/18/17   Ric Hopkins MD   DEXAMETHASONE SOD PHOSPHATE (DEXAMETHASONE SODIUM PHOSPHATE INJECTION) by Injection route. Provider, Historical     Allergies   Allergen Reactions    Bee Sting [Sting, Bee] Swelling    Keflex [Cephalexin] Hives    Pcn [Penicillins] Other (comments)     States causes him to pass out    Sulfa (Sulfonamide Antibiotics) Rash          Objective:     Physical Exam:   Patient Vitals for the past 24 hrs:   Temp Pulse Resp BP SpO2   07/12/21 1326 98 °F (36.7 °C) 66 16 (!) 145/78 95 %   07/12/21 1200     95 %       ECG:    EKG Results     Procedure 720 Value Units Date/Time    EKG, 12 LEAD, INITIAL [[id number]] Collected: 07/12/21 1230    Order Status: Completed Updated: 07/12/21 1510     Ventricular Rate 60 BPM      Atrial Rate 60 BPM      P-R Interval 160 ms      QRS Duration 86 ms      Q-T Interval 402 ms      QTC Calculation (Bezet) 402 ms      Calculated P Axis 12 degrees      Calculated R Axis 42 degrees      Calculated T Axis 45 degrees      Diagnosis --     Normal sinus rhythm  Normal ECG  When compared with ECG of 03-JUN-2014 00:04,  T wave amplitude has increased in Lateral leads  Confirmed by Sae Castellano ([id number]) on 7/12/2021 3:10:14 PM            Data Review:   Labs:   Recent Results (from the past 24 hour(s))   CBC WITH AUTOMATED DIFF    Collection Time: 07/12/21 12:13 PM   Result Value Ref Range    WBC 7.6 4.3 - 11.1 K/uL    RBC 4.47 4.23 - 5.6 M/uL    HGB 14.3 13.6 - 17.2 g/dL    HCT 43.3 41.1 - 50.3 %    MCV 96.9 79.6 - 97.8 FL    MCH 32.0 26.1 - 32.9 PG    MCHC 33.0 31.4 - 35.0 g/dL    RDW 13.1 11.9 - 14.6 %    PLATELET 631 984 - 161 K/uL    MPV 9.9 9.4 - 12.3 FL    ABSOLUTE NRBC 0.00 0.0 - 0.2 K/uL    DF AUTOMATED      NEUTROPHILS 64 43 - 78 %    LYMPHOCYTES 25 13 - 44 %    MONOCYTES 9 4.0 - 12.0 %    EOSINOPHILS 1 0.5 - 7.8 %    BASOPHILS 1 0.0 - 2.0 %    IMMATURE GRANULOCYTES 0 0.0 - 5.0 %    ABS. NEUTROPHILS 4.9 1.7 - 8.2 K/UL    ABS. LYMPHOCYTES 1.9 0.5 - 4.6 K/UL    ABS. MONOCYTES 0.7 0.1 - 1.3 K/UL    ABS. EOSINOPHILS 0.1 0.0 - 0.8 K/UL    ABS.  BASOPHILS 0.1 0.0 - 0.2 K/UL    ABS. IMM. GRANS. 0.0 0.0 - 0.5 K/UL   PROTHROMBIN TIME + INR    Collection Time: 07/12/21 12:13 PM   Result Value Ref Range    Prothrombin time 13.4 12.5 - 14.7 sec    INR 1.0     PTT    Collection Time: 07/12/21 12:13 PM   Result Value Ref Range    aPTT 30.1 24.1 - 89.0 SEC   METABOLIC PANEL, BASIC    Collection Time: 07/12/21 12:13 PM   Result Value Ref Range    Sodium 138 136 - 145 mmol/L    Potassium 4.4 3.5 - 5.1 mmol/L    Chloride 104 98 - 107 mmol/L    CO2 26 21 - 32 mmol/L    Anion gap 8 7 - 16 mmol/L    Glucose 94 65 - 100 mg/dL    BUN 15 8 - 23 MG/DL    Creatinine 0.90 0.8 - 1.5 MG/DL    GFR est AA >60 >60 ml/min/1.73m2    GFR est non-AA >60 >60 ml/min/1.73m2    Calcium 9.8 8.3 - 10.4 MG/DL   HEMOGLOBIN A1C WITH EAG    Collection Time: 07/12/21 12:13 PM   Result Value Ref Range    Hemoglobin A1c 5.7 4.2 - 6.3 %    Est. average glucose 117 mg/dL   EKG, 12 LEAD, INITIAL    Collection Time: 07/12/21 12:30 PM   Result Value Ref Range    Ventricular Rate 60 BPM    Atrial Rate 60 BPM    P-R Interval 160 ms    QRS Duration 86 ms    Q-T Interval 402 ms    QTC Calculation (Bezet) 402 ms    Calculated P Axis 12 degrees    Calculated R Axis 42 degrees    Calculated T Axis 45 degrees    Diagnosis       Normal sinus rhythm  Normal ECG  When compared with ECG of 03-JUN-2014 00:04,  T wave amplitude has increased in Lateral leads  Confirmed by Flakito Waldrop [id number]) on 7/12/2021 3:10:14 PM     MSSA/MRSA SC BY PCR, NASAL SWAB    Collection Time: 07/12/21  1:17 PM    Specimen: Nasal swab   Result Value Ref Range    Special Requests: NO SPECIAL REQUESTS      Culture result: (A)       MRSA target DNA not detected, SA target DNA detected. A MRSA negative, SA positive test result does not preclude MRSA nasal colonization.          Problem List:  )  Patient Active Problem List   Diagnosis Code    Other disorder of calcium metabolism E83.59    Osteoarthrosis, unspecified whether generalized or localized, involving lower leg M17.10    Essential hypertension, benign I10    Pure hypercholesterolemia E78.00    Dyspepsia and other specified disorders of function of stomach K31.89, R10.13    Other malaise and fatigue R53.81, R53.83    Pituitary tumor D49.7    IRINA on CPAP G47.33, Z99.89    GERD (gastroesophageal reflux disease) K21.9    Hyperlipidemia E78.5    Hypothyroidism E03.9    Arthritis of left shoulder region M19.012    Hypogonadism in male E29.1    Anxiety disorder F41.9    CAD (coronary artery disease) I25.10    COPD (chronic obstructive pulmonary disease) ([facility]) J44.9    ED (erectile dysfunction) N52.9    Asthma J45.909    RLS (restless legs syndrome) G25.81    BPH (benign prostatic hyperplasia) N40.0    Depressive disorder F32.9    Controlled diabetes mellitus ([facility]) E11.9    Chronic renal insufficiency N18.9    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    Cervical neck pain with evidence of disc disease M50.90    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    Knee pain M25.569    Chest pain R07.9    Essential hypertension with goal blood pressure less than 130/85 I10    Coronary artery disease involving native heart I25.10    History of COPD Z87.09    Coronary artery disease involving native coronary artery of native heart without angina pectoris I25.10    Right carotid bruit R09.89    Mixed hyperlipidemia E78.2    Family history of MI (myocardial infarction) Z82.49       Total Joint Surgery Pre-Assessment Recommendations:           Patient is to wear home CPAP during hospitalization.      Signed By: WILL Maldonado    July 13, 2021

## 2021-07-13 NOTE — PERIOP NOTES
Recent Results (from the past 24 hour(s))   CBC WITH AUTOMATED DIFF    Collection Time: 07/12/21 12:13 PM   Result Value Ref Range    WBC 7.6 4.3 - 11.1 K/uL    RBC 4.47 4.23 - 5.6 M/uL    HGB 14.3 13.6 - 17.2 g/dL    HCT 43.3 41.1 - 50.3 %    MCV 96.9 79.6 - 97.8 FL    MCH 32.0 26.1 - 32.9 PG    MCHC 33.0 31.4 - 35.0 g/dL    RDW 13.1 11.9 - 14.6 %    PLATELET 953 175 - 009 K/uL    MPV 9.9 9.4 - 12.3 FL    ABSOLUTE NRBC 0.00 0.0 - 0.2 K/uL    DF AUTOMATED      NEUTROPHILS 64 43 - 78 %    LYMPHOCYTES 25 13 - 44 %    MONOCYTES 9 4.0 - 12.0 %    EOSINOPHILS 1 0.5 - 7.8 %    BASOPHILS 1 0.0 - 2.0 %    IMMATURE GRANULOCYTES 0 0.0 - 5.0 %    ABS. NEUTROPHILS 4.9 1.7 - 8.2 K/UL    ABS. LYMPHOCYTES 1.9 0.5 - 4.6 K/UL    ABS. MONOCYTES 0.7 0.1 - 1.3 K/UL    ABS. EOSINOPHILS 0.1 0.0 - 0.8 K/UL    ABS. BASOPHILS 0.1 0.0 - 0.2 K/UL    ABS. IMM.  GRANS. 0.0 0.0 - 0.5 K/UL   PROTHROMBIN TIME + INR    Collection Time: 07/12/21 12:13 PM   Result Value Ref Range    Prothrombin time 13.4 12.5 - 14.7 sec    INR 1.0     PTT    Collection Time: 07/12/21 12:13 PM   Result Value Ref Range    aPTT 30.1 24.1 - 64.8 SEC   METABOLIC PANEL, BASIC    Collection Time: 07/12/21 12:13 PM   Result Value Ref Range    Sodium 138 136 - 145 mmol/L    Potassium 4.4 3.5 - 5.1 mmol/L    Chloride 104 98 - 107 mmol/L    CO2 26 21 - 32 mmol/L    Anion gap 8 7 - 16 mmol/L    Glucose 94 65 - 100 mg/dL    BUN 15 8 - 23 MG/DL    Creatinine 0.90 0.8 - 1.5 MG/DL    GFR est AA >60 >60 ml/min/1.73m2    GFR est non-AA >60 >60 ml/min/1.73m2    Calcium 9.8 8.3 - 10.4 MG/DL   HEMOGLOBIN A1C WITH EAG    Collection Time: 07/12/21 12:13 PM   Result Value Ref Range    Hemoglobin A1c 5.7 4.2 - 6.3 %    Est. average glucose 117 mg/dL   EKG, 12 LEAD, INITIAL    Collection Time: 07/12/21 12:30 PM   Result Value Ref Range    Ventricular Rate 60 BPM    Atrial Rate 60 BPM    P-R Interval 160 ms    QRS Duration 86 ms    Q-T Interval 402 ms    QTC Calculation (Bezet) 402 ms Calculated P Axis 12 degrees    Calculated R Axis 42 degrees    Calculated T Axis 45 degrees    Diagnosis       Normal sinus rhythm  Normal ECG  When compared with ECG of 03-JUN-2014 00:04,  T wave amplitude has increased in Lateral leads  Confirmed by Naomy Haines ([id number]) on 7/12/2021 3:10:14 PM     MSSA/MRSA SC BY PCR, NASAL SWAB    Collection Time: 07/12/21  1:17 PM    Specimen: Nasal swab   Result Value Ref Range    Special Requests: NO SPECIAL REQUESTS      Culture result: (A)       MRSA target DNA not detected, SA target DNA detected. A MRSA negative, SA positive test result does not preclude MRSA nasal colonization.

## 2021-07-14 NOTE — H&P (VIEW-ONLY)
[id number] [facility]  Pre Operative History and Physical Exam    Patient ID:  Chirag Tidwell  [id number]  53 y.o.  1950    Today: July 14, 2021           CC:  Right knee pain    HPI:   The patient has end stage arthritis of the right knee. The patient was evaluated and examined during a consultation prior to this office visit. There have been no changes to the patient's orthopedic condition since the initial consultation. The patient has failed previous conservative treatment for this condition including antiinflammatories , and lifestyle modifications. The necessity for joint replacement is present.  The patient will be admitted the day of surgery for right knee replacement    Past Medical/Surgical History:  Past Medical History:   Diagnosis Date    Abnormal blood sugar     Abnormality of cortisol-binding globulin (HCC)     Actinic keratosis     Acute right flank pain     Anaphylactic reaction to bee sting     Anxiety     takes Lorazepam prn    Anxiety disorder     Arthritis     Arthritis of left shoulder region     Asthma 7/20/2016    followed by pulmonary; uses inhaler    Backache 7/20/2016    Bilateral otitis media     BPH (benign prostatic hyperplasia) 7/20/2016    CAD (coronary artery disease) 04/20/2011    He had mild nonobstructive CAD at cath by Dr. Rudi Sousa several years ago; cardiac cath:  4/20/11    Cervical neck pain with evidence of disc disease     Chronic renal insufficiency 7/20/2016    Controlled diabetes mellitus ([address] 7/20/2016 6/30/21 :  hgb A1c:  5.8; daily fasting sqbs:  102    COPD (chronic obstructive pulmonary disease) ([address] 7/20/2016    Corneal foreign body 6/13/14    COVID-19 vaccine series completed 03/22/2021    Moderna    Depressive disorder 7/20/2016    clinical depression~resolved    Diarrhea     Disorders of calcium metabolism     Dumping syndrome     Dyspepsia and other specified disorders of function of stomach 8/29/2012   [employer] [facility] (erectile dysfunction) 7/20/2016    Edema     Elevated CPK     Elevated prolactin level     Essential hypertension, benign 8/29/2012    GERD (gastroesophageal reflux disease)     Hand pain, right     Hematuria     Hyperlipidemia     Hyperprolactinemia ([address]     followed by endocrinologist    Hypertension     Hypogonadism in male     Hypothyroidism     takes levothyroxine    Knee effusion     Knee pain     Low serum testosterone level     Neoplasm of uncertain behavior of skin of neck     Neuralgia     Nevus, non-neoplastic     Numbness and tingling in left arm     Open wound of finger without complication 9/13/21    IRINA on CPAP 8/22/2014    uses CPAP    Osteoarthrosis, unspecified whether generalized or localized, involving lower leg 8/29/2012    Osteoarthrosis, unspecified whether generalized or localized, lower leg 8/29/2012    Other disorder of calcium metabolism 8/29/2012    Other malaise and fatigue 11/27/2012    Pituitary tumor 12/11/2013    Prolactin increased     Puncture wound 6/11/12    pt stepped on a sarbjit nail    Pure hypercholesterolemia 8/29/2012    Right shoulder pain     RLS (restless legs syndrome) 7/20/2016    Seborrheic keratosis     Seizures ([address]     30 years ago 1971    Shingles (herpes zoster) polyneuropathy     Shingles rash     Sinusitis      Past Surgical History:   Procedure Laterality Date    HX BUNIONECTOMY Right     HX CARPAL TUNNEL RELEASE Bilateral     HX CERVICAL FUSION  11/2007    C3-C4-has metal screws     HX HEART CATHETERIZATION  04/20/2011    HX ORTHOPAEDIC      bilat carpel tunnel, bunion repair,neck surg foot surg    HX TONSILLECTOMY      DC CHEST SURGERY PROCEDURE UNLISTED      pt denies        Allergies:    Allergies   Allergen Reactions    Bee Sting [Sting, Bee] Swelling    Keflex [Cephalexin] Hives    Pcn [Penicillins] Other (comments)     States causes him to pass out    Sulfa (Sulfonamide Antibiotics) Rash        Physical Exam:   General: NAD, Alert, Oriented, Appears their stated age     [de-identified]: NC/AT    Skin: No rashes, lesions or wounds seen      Psych: normal affect      Heart: Regular Rate, Rhythm     Lungs: unlabored respirations, no wheezing    Abdomen: Soft and non-distended     Ortho: Pain with limited ROM of the right knee    Neuro: no focal defects, moving extremities equally    Lymph: no lymphadenopathy     Meds:   Current Outpatient Medications   Medication Sig    hydrocortisone (CORTEF) 5 mg tablet Take 5 mg by mouth daily as needed. Indications: decreased function of the adrenal gland    metFORMIN (GLUCOPHAGE) 500 mg tablet Take 500 mg by mouth two (2) times daily (with meals). 2 tablets BID  Indications: type 2 diabetes mellitus    meloxicam (MOBIC) 7.5 mg tablet Take 1 Tablet by mouth daily. (Patient taking differently: Take 7.5 mg by mouth nightly. Indications: joint damage causing pain and loss of function)    LORazepam (ATIVAN) 1 mg tablet Take 1 Tab by mouth four (4) times daily as needed. Max Daily Amount: 4 mg. Indications: anxiety (Patient taking differently: Take 1 mg by mouth four (4) times daily as needed. Take / use AM day of surgery  per anesthesia protocols, if needed  Indications: anxious)    pravastatin (PRAVACHOL) 40 mg tablet Take 1 Tab by mouth daily. Indications: hyperlipidemia (Patient taking differently: Take 40 mg by mouth daily. Take / use AM day of surgery  per anesthesia protocols. Indications: excessive fat in the blood)    levothyroxine (SYNTHROID) 100 mcg tablet Take 1 Tab by mouth Daily (before breakfast). (Patient taking differently: Take 100 mcg by mouth Daily (before breakfast). Take / use AM day of surgery  per anesthesia protocols. Indications: a condition with low thyroid hormone levels)    amLODIPine (NORVASC) 5 mg tablet Take 1 Tab by mouth daily. (Patient taking differently: Take 5 mg by mouth nightly.  Indications: high blood pressure)    omeprazole (PRILOSEC) 40 mg capsule Take 1 Cap by mouth daily. Indications: gastroesophageal reflux disease (Patient taking differently: Take 40 mg by mouth nightly. Indications: gastroesophageal reflux disease)    nabumetone (RELAFEN) 750 mg tablet Take 1 Tab by mouth two (2) times a day.  hydroCHLOROthiazide (HYDRODIURIL) 12.5 mg tablet Take 1 Tab by mouth daily.  lisinopril (PRINIVIL, ZESTRIL) 20 mg tablet Take 1 Tab by mouth daily. (Patient taking differently: Take 40 mg by mouth nightly. Indications: high blood pressure)    BROMOCRIPTINE MESYLATE (PARLODEL PO) Take 5 mg by mouth nightly. Takes 4 tablets at bedtime    sildenafil citrate (VIAGRA) 100 mg tablet Take 1 Tab by mouth as needed.  cyanocobalamin 1,000 mcg tablet Take 1,000 mcg by mouth two (2) times a day.  budesonide-formoterol (SYMBICORT) 160-4.5 mcg/actuation HFA inhaler Take 2 Puffs by inhalation two (2) times a day. (Patient taking differently: Take 2 Puffs by inhalation daily. Take / use AM day of surgery  per anesthesia protocols. Indications: bronchospasm prevention with COPD)    nitroglycerin (NITROSTAT) 0.4 mg SL tablet 1 Tab by SubLINGual route as needed (CAD). Indications: ANGINA    EPINEPHrine (EPIPEN) 0.3 mg/0.3 mL injection 0.3 mg by IntraMUSCular route as needed. Prn bee stings  Indications: person at risk of anaphylaxis    TESTOSTERONE CYPIONATE IM 50 mg by IntraMUSCular route Once every 2 weeks. 200MG/ML, 0.5 ml every 2 weeks    SYRINGE, DISPOSABLE, 20 ML (BD LUER-ARTHUR BULK SYRINGE) BD Luer-Arthur Syringe 25G X 1-1/2\"3 ML, 1 misc every 2 weeks    DEXAMETHASONE SOD PHOSPHATE (DEXAMETHASONE SODIUM PHOSPHATE INJECTION) by Injection route.  aspirin 81 mg tablet Take 81 mg by mouth. Indications: treatment to prevent a heart attack     No current facility-administered medications for this visit.          Labs:  Hospital Outpatient Visit on 07/12/2021   Component Date Value Ref Range Status    WBC 07/12/2021 7.6  4.3 - 11.1 K/uL Final    RBC 07/12/2021 4.47  4.23 - 5.6 M/uL Final    HGB 07/12/2021 14.3  13.6 - 17.2 g/dL Final    HCT 07/12/2021 43.3  41.1 - 50.3 % Final    MCV 07/12/2021 96.9  79.6 - 97.8 FL Final    MCH 07/12/2021 32.0  26.1 - 32.9 PG Final    MCHC 07/12/2021 33.0  31.4 - 35.0 g/dL Final    RDW 07/12/2021 13.1  11.9 - 14.6 % Final    PLATELET 89/55/7455 634  150 - 450 K/uL Final    MPV 07/12/2021 9.9  9.4 - 12.3 FL Final    ABSOLUTE NRBC 07/12/2021 0.00  0.0 - 0.2 K/uL Final    **Note: Absolute NRBC parameter is now reported with Hemogram**    DF 07/12/2021 AUTOMATED    Final    NEUTROPHILS 07/12/2021 64  43 - 78 % Final    LYMPHOCYTES 07/12/2021 25  13 - 44 % Final    MONOCYTES 07/12/2021 9  4.0 - 12.0 % Final    EOSINOPHILS 07/12/2021 1  0.5 - 7.8 % Final    BASOPHILS 07/12/2021 1  0.0 - 2.0 % Final    IMMATURE GRANULOCYTES 07/12/2021 0  0.0 - 5.0 % Final    ABS. NEUTROPHILS 07/12/2021 4.9  1.7 - 8.2 K/UL Final    ABS. LYMPHOCYTES 07/12/2021 1.9  0.5 - 4.6 K/UL Final    ABS. MONOCYTES 07/12/2021 0.7  0.1 - 1.3 K/UL Final    ABS. EOSINOPHILS 07/12/2021 0.1  0.0 - 0.8 K/UL Final    ABS. BASOPHILS 07/12/2021 0.1  0.0 - 0.2 K/UL Final    ABS. IMM.  GRANS. 07/12/2021 0.0  0.0 - 0.5 K/UL Final    Prothrombin time 07/12/2021 13.4  12.5 - 14.7 sec Final    INR 07/12/2021 1.0    Final    Comment: Suggested therapeutic INR range:  Venous thrombosis and embolus  2.0-3.0  Prosthetic heart valve         2.5-3.5  ** Note new reference range and method **      aPTT 07/12/2021 30.1  24.1 - 35.1 SEC Final    Comment: Heparin Therapeutic Range = 74 - 123 seconds  In addition to factor deficiency, monitoring heparin therapy, etc., evaluation of a prolonged aPTT result should include consideration of preanalytic variables such as heparin flush contamination, specimen integrity issues, etc.      Sodium 07/12/2021 138  136 - 145 mmol/L Final    Potassium 07/12/2021 4.4  3.5 - 5.1 mmol/L Final    Chloride 07/12/2021 104  98 - 107 mmol/L Final    CO2 07/12/2021 26  21 - 32 mmol/L Final    Anion gap 07/12/2021 8  7 - 16 mmol/L Final    Glucose 07/12/2021 94  65 - 100 mg/dL Final    Comment: 47 - 60 mg/dl Consistent with, but not fully diagnostic of hypoglycemia. 101 - 125 mg/dl Impaired fasting glucose/consistent with pre-diabetes mellitus  > 126 mg/dl Fasting glucose consistent with overt diabetes mellitus      BUN 07/12/2021 15  8 - 23 MG/DL Final    Creatinine 07/12/2021 0.90  0.8 - 1.5 MG/DL Final    GFR est AA 07/12/2021 >60  >60 ml/min/1.73m2 Final    GFR est non-AA 07/12/2021 >60  >60 ml/min/1.73m2 Final    Comment: (NOTE)  Estimated GFR is calculated using the Modification of Diet in Renal   Disease (MDRD) Study equation, reported for both  Americans   (GFRAA) and non- Americans (GFRNA), and normalized to 1.73m2   body surface area. The physician must decide which value applies to   the patient. The MDRD study equation should only be used in   individuals age 25 or older. It has not been validated for the   following: pregnant women, patients with serious comorbid conditions,   or on certain medications, or persons with extremes of body size,   muscle mass, or nutritional status.  Calcium 07/12/2021 9.8  8.3 - 10.4 MG/DL Final    Hemoglobin A1c 07/12/2021 5.7  4.2 - 6.3 % Final    Est. average glucose 07/12/2021 117  mg/dL Final    Comment: (NOTE)  The eAG should be interpreted with patient characteristics in mind   since ethnicity, interindividual differences, red cell lifespan,   variation in rates of glycation, etc. may affect the validity of the   calculation.  Special Requests: 07/12/2021 NO SPECIAL REQUESTS    Final    Culture result: 07/12/2021 MRSA target DNA not detected, SA target DNA detected. A MRSA negative, SA positive test result does not preclude MRSA nasal colonization. *   Final    Ventricular Rate 07/12/2021 60  BPM Final    Atrial Rate 07/12/2021 60  BPM Final    P-R Interval 07/12/2021 160  ms Final    QRS Duration 07/12/2021 86  ms Final    Q-T Interval 07/12/2021 402  ms Final    QTC Calculation (Bezet) 07/12/2021 402  ms Final    Calculated P Axis 07/12/2021 12  degrees Final    Calculated R Axis 07/12/2021 42  degrees Final    Calculated T Axis 07/12/2021 45  degrees Final    Diagnosis 07/12/2021    Final                    Value:Normal sinus rhythm  Normal ECG  When compared with ECG of 03-JUN-2014 00:04,  T wave amplitude has increased in Lateral leads  Confirmed by Romi Domingo ([id number]) on 7/12/2021 3:10:14 PM                   Patient Active Problem List   Diagnosis Code    Other disorder of calcium metabolism E83.59    Osteoarthrosis, unspecified whether generalized or localized, involving lower leg M17.10    Essential hypertension, benign I10    Pure hypercholesterolemia E78.00    Dyspepsia and other specified disorders of function of stomach K31.89, R10.13    Other malaise and fatigue R53.81, R53.83    Pituitary tumor D49.7    IRINA on CPAP G47.33, Z99.89    GERD (gastroesophageal reflux disease) K21.9    Hyperlipidemia E78.5    Hypothyroidism E03.9    Arthritis of left shoulder region M19.012    Hypogonadism in male E29.1    Anxiety disorder F41.9    CAD (coronary artery disease) I25.10    COPD (chronic obstructive pulmonary disease) ([facility]) J44.9    ED (erectile dysfunction) N52.9    Asthma J45.909    RLS (restless legs syndrome) G25.81    BPH (benign prostatic hyperplasia) N40.0    Depressive disorder F32.9    Controlled diabetes mellitus ([facility]) E11.9    Chronic renal insufficiency N18.9    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    Cervical neck pain with evidence of disc disease M50.90    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    Knee pain M25.569    Chest pain R07.9    Essential hypertension with goal blood pressure less than 130/85 I10    Coronary artery disease involving native heart I25.10    History of COPD Z87.09    Coronary artery disease involving native coronary artery of native heart without angina pectoris I25.10    Right carotid bruit R09.89    Mixed hyperlipidemia E78.2    Family history of MI (myocardial infarction) Z82.49         Assessment:   1. Arthritis of the right knee      Plan:    1. Proceed with scheduled right knee replacement      The patient was counseled at length about the risks of rafi Covid-19 during their perioperative period and any recovery window from their procedure. The patient was made aware that rafi Covid-19  may worsen their prognosis for recovering from their procedure and lend to a higher morbidity and/or mortality risk. All material risks, benefits, and reasonable alternatives including postponing the procedure were discussed. The patient does  wish to proceed with the procedure at this time.          Signed By: LORIE Vieira  July 14, 2021

## 2021-07-22 ENCOUNTER — ANESTHESIA EVENT (OUTPATIENT)
Dept: SURGERY | Age: 71
DRG: 470 | End: 2021-07-22
Payer: MEDICARE

## 2021-07-23 ENCOUNTER — ANESTHESIA (OUTPATIENT)
Dept: SURGERY | Age: 71
DRG: 470 | End: 2021-07-23
Payer: MEDICARE

## 2021-07-23 ENCOUNTER — HOME HEALTH ADMISSION (OUTPATIENT)
Dept: HOME HEALTH SERVICES | Facility: HOME HEALTH | Age: 71
End: 2021-07-23
Payer: MEDICARE

## 2021-07-23 ENCOUNTER — HOSPITAL ENCOUNTER (INPATIENT)
Age: 71
LOS: 1 days | Discharge: HOME HEALTH | DRG: 470 | End: 2021-07-24
Attending: ORTHOPAEDIC SURGERY | Admitting: ORTHOPAEDIC SURGERY
Payer: MEDICARE

## 2021-07-23 DIAGNOSIS — Z96.651 STATUS POST TOTAL RIGHT KNEE REPLACEMENT: Primary | ICD-10-CM

## 2021-07-23 PROBLEM — M17.11 OSTEOARTHRITIS OF RIGHT KNEE: Status: ACTIVE | Noted: 2021-07-23

## 2021-07-23 LAB
GLUCOSE BLD STRIP.AUTO-MCNC: 102 MG/DL (ref 65–100)
GLUCOSE BLD STRIP.AUTO-MCNC: 316 MG/DL (ref 65–100)
HGB BLD-MCNC: 13.2 G/DL (ref 13.6–17.2)
SERVICE CMNT-IMP: ABNORMAL
SERVICE CMNT-IMP: ABNORMAL

## 2021-07-23 PROCEDURE — 74011000250 HC RX REV CODE- 250: Performed by: PHYSICIAN ASSISTANT

## 2021-07-23 PROCEDURE — 97530 THERAPEUTIC ACTIVITIES: CPT

## 2021-07-23 PROCEDURE — 77030035236 HC SUT PDS STRATFX BARB J&J -B: Performed by: ORTHOPAEDIC SURGERY

## 2021-07-23 PROCEDURE — 74011250636 HC RX REV CODE- 250/636: Performed by: ANESTHESIOLOGY

## 2021-07-23 PROCEDURE — 97161 PT EVAL LOW COMPLEX 20 MIN: CPT

## 2021-07-23 PROCEDURE — 3E0T33Z INTRODUCTION OF ANTI-INFLAMMATORY INTO PERIPHERAL NERVES AND PLEXI, PERCUTANEOUS APPROACH: ICD-10-PCS | Performed by: ANESTHESIOLOGY

## 2021-07-23 PROCEDURE — 76010010054 HC POST OP PAIN BLOCK: Performed by: ORTHOPAEDIC SURGERY

## 2021-07-23 PROCEDURE — 94660 CPAP INITIATION&MGMT: CPT

## 2021-07-23 PROCEDURE — 97535 SELF CARE MNGMENT TRAINING: CPT

## 2021-07-23 PROCEDURE — 74011250637 HC RX REV CODE- 250/637: Performed by: PHYSICIAN ASSISTANT

## 2021-07-23 PROCEDURE — 27447 TOTAL KNEE ARTHROPLASTY: CPT | Performed by: PHYSICIAN ASSISTANT

## 2021-07-23 PROCEDURE — 74011250636 HC RX REV CODE- 250/636: Performed by: PHYSICIAN ASSISTANT

## 2021-07-23 PROCEDURE — 74011000250 HC RX REV CODE- 250: Performed by: ORTHOPAEDIC SURGERY

## 2021-07-23 PROCEDURE — C1776 JOINT DEVICE (IMPLANTABLE): HCPCS | Performed by: ORTHOPAEDIC SURGERY

## 2021-07-23 PROCEDURE — 74011250636 HC RX REV CODE- 250/636: Performed by: NURSE ANESTHETIST, CERTIFIED REGISTERED

## 2021-07-23 PROCEDURE — 77030040922 HC BLNKT HYPOTHRM STRY -A: Performed by: ANESTHESIOLOGY

## 2021-07-23 PROCEDURE — 85018 HEMOGLOBIN: CPT

## 2021-07-23 PROCEDURE — 74011000250 HC RX REV CODE- 250: Performed by: NURSE ANESTHETIST, CERTIFIED REGISTERED

## 2021-07-23 PROCEDURE — 77030008462 HC STPLR SKN PROX J&J -A: Performed by: ORTHOPAEDIC SURGERY

## 2021-07-23 PROCEDURE — 77030019557 HC ELECTRD VES SEAL MEDT -F: Performed by: ORTHOPAEDIC SURGERY

## 2021-07-23 PROCEDURE — 3E0T3BZ INTRODUCTION OF ANESTHETIC AGENT INTO PERIPHERAL NERVES AND PLEXI, PERCUTANEOUS APPROACH: ICD-10-PCS | Performed by: ANESTHESIOLOGY

## 2021-07-23 PROCEDURE — 74011250636 HC RX REV CODE- 250/636: Performed by: ORTHOPAEDIC SURGERY

## 2021-07-23 PROCEDURE — 8E0Y0CZ ROBOTIC ASSISTED PROCEDURE OF LOWER EXTREMITY, OPEN APPROACH: ICD-10-PCS | Performed by: ORTHOPAEDIC SURGERY

## 2021-07-23 PROCEDURE — 2709999900 HC NON-CHARGEABLE SUPPLY: Performed by: ORTHOPAEDIC SURGERY

## 2021-07-23 PROCEDURE — 77030003602 HC NDL NRV BLK BBMI -B: Performed by: ANESTHESIOLOGY

## 2021-07-23 PROCEDURE — 77030002912 HC SUT ETHBND J&J -A: Performed by: ORTHOPAEDIC SURGERY

## 2021-07-23 PROCEDURE — 74011000258 HC RX REV CODE- 258: Performed by: ORTHOPAEDIC SURGERY

## 2021-07-23 PROCEDURE — 77030029828 HC FEM TIB CKPNT KT DISP STRY -B: Performed by: ORTHOPAEDIC SURGERY

## 2021-07-23 PROCEDURE — 76060000035 HC ANESTHESIA 2 TO 2.5 HR: Performed by: ORTHOPAEDIC SURGERY

## 2021-07-23 PROCEDURE — 77030012935 HC DRSG AQUACEL BMS -B: Performed by: ORTHOPAEDIC SURGERY

## 2021-07-23 PROCEDURE — 65270000029 HC RM PRIVATE

## 2021-07-23 PROCEDURE — 20985 CPTR-ASST DIR MS PX: CPT | Performed by: ORTHOPAEDIC SURGERY

## 2021-07-23 PROCEDURE — 74011250637 HC RX REV CODE- 250/637: Performed by: INTERNAL MEDICINE

## 2021-07-23 PROCEDURE — 76210000006 HC OR PH I REC 0.5 TO 1 HR: Performed by: ORTHOPAEDIC SURGERY

## 2021-07-23 PROCEDURE — 76010000171 HC OR TIME 2 TO 2.5 HR INTENSV-TIER 1: Performed by: ORTHOPAEDIC SURGERY

## 2021-07-23 PROCEDURE — 99218 HC RM OBSERVATION: CPT

## 2021-07-23 PROCEDURE — 77030029820: Performed by: ORTHOPAEDIC SURGERY

## 2021-07-23 PROCEDURE — 77030031139 HC SUT VCRL2 J&J -A: Performed by: ORTHOPAEDIC SURGERY

## 2021-07-23 PROCEDURE — 77030007880 HC KT SPN EPDRL BBMI -B: Performed by: ANESTHESIOLOGY

## 2021-07-23 PROCEDURE — 77030003665 HC NDL SPN BBMI -A: Performed by: ANESTHESIOLOGY

## 2021-07-23 PROCEDURE — 36415 COLL VENOUS BLD VENIPUNCTURE: CPT

## 2021-07-23 PROCEDURE — 77030038149 HC BLD SAW SAG STRY -D: Performed by: ORTHOPAEDIC SURGERY

## 2021-07-23 PROCEDURE — 77030006720 HC BLD PAT RMR ZIMM -B: Performed by: ORTHOPAEDIC SURGERY

## 2021-07-23 PROCEDURE — 74011636637 HC RX REV CODE- 636/637: Performed by: INTERNAL MEDICINE

## 2021-07-23 PROCEDURE — 0SRC0JA REPLACEMENT OF RIGHT KNEE JOINT WITH SYNTHETIC SUBSTITUTE, UNCEMENTED, OPEN APPROACH: ICD-10-PCS | Performed by: ORTHOPAEDIC SURGERY

## 2021-07-23 PROCEDURE — 82962 GLUCOSE BLOOD TEST: CPT

## 2021-07-23 PROCEDURE — 27447 TOTAL KNEE ARTHROPLASTY: CPT | Performed by: ORTHOPAEDIC SURGERY

## 2021-07-23 PROCEDURE — 76942 ECHO GUIDE FOR BIOPSY: CPT | Performed by: ORTHOPAEDIC SURGERY

## 2021-07-23 PROCEDURE — 94760 N-INVAS EAR/PLS OXIMETRY 1: CPT

## 2021-07-23 PROCEDURE — 74011000250 HC RX REV CODE- 250: Performed by: ANESTHESIOLOGY

## 2021-07-23 PROCEDURE — 74011250637 HC RX REV CODE- 250/637: Performed by: ANESTHESIOLOGY

## 2021-07-23 PROCEDURE — 97165 OT EVAL LOW COMPLEX 30 MIN: CPT

## 2021-07-23 PROCEDURE — 77030039760: Performed by: ORTHOPAEDIC SURGERY

## 2021-07-23 PROCEDURE — 77030034696 HC CATH URETH FOL 2W BARD -A: Performed by: ORTHOPAEDIC SURGERY

## 2021-07-23 PROCEDURE — C1713 ANCHOR/SCREW BN/BN,TIS/BN: HCPCS | Performed by: ORTHOPAEDIC SURGERY

## 2021-07-23 DEVICE — KNEE K2 TOT HEMI ADV CMTLS -- IMPL CAPPED K2: Type: IMPLANTABLE DEVICE | Status: FUNCTIONAL

## 2021-07-23 DEVICE — COMPONENT PAT DIA35MM THK10MM SUP INFERIOR ASYM TRIATHLON: Type: IMPLANTABLE DEVICE | Site: KNEE | Status: FUNCTIONAL

## 2021-07-23 DEVICE — COMPNT FEM CR TRIATHLN 5 R PA --: Type: IMPLANTABLE DEVICE | Site: KNEE | Status: FUNCTIONAL

## 2021-07-23 DEVICE — INSERT TIB CS 6 10 MM ARTC KNEE BEAR TECHNOLOGY TRIATHLON: Type: IMPLANTABLE DEVICE | Site: KNEE | Status: FUNCTIONAL

## 2021-07-23 DEVICE — CEMENT BNE 20GM HALF DOSE PMMA VISC RADPQ FAST: Type: IMPLANTABLE DEVICE | Site: KNEE | Status: FUNCTIONAL

## 2021-07-23 DEVICE — BASEPLATE TIB SZ 6 AP52MM ML77MM KNEE TRITANIUM 4 CRUCFRM: Type: IMPLANTABLE DEVICE | Site: KNEE | Status: FUNCTIONAL

## 2021-07-23 RX ORDER — SILDENAFIL 100 MG/1
100 TABLET, FILM COATED ORAL DAILY
Status: DISCONTINUED | OUTPATIENT
Start: 2021-07-24 | End: 2021-07-24 | Stop reason: HOSPADM

## 2021-07-23 RX ORDER — OXYCODONE HYDROCHLORIDE 5 MG/1
10 TABLET ORAL
Status: DISCONTINUED | OUTPATIENT
Start: 2021-07-23 | End: 2021-07-24 | Stop reason: HOSPADM

## 2021-07-23 RX ORDER — LORAZEPAM 0.5 MG/1
1 TABLET ORAL
Status: DISCONTINUED | OUTPATIENT
Start: 2021-07-23 | End: 2021-07-24 | Stop reason: HOSPADM

## 2021-07-23 RX ORDER — LIDOCAINE HYDROCHLORIDE 10 MG/ML
0.1 INJECTION INFILTRATION; PERINEURAL AS NEEDED
Status: DISCONTINUED | OUTPATIENT
Start: 2021-07-23 | End: 2021-07-23 | Stop reason: HOSPADM

## 2021-07-23 RX ORDER — LISINOPRIL 20 MG/1
40 TABLET ORAL DAILY
Status: CANCELLED | OUTPATIENT
Start: 2021-07-23

## 2021-07-23 RX ORDER — MIDAZOLAM HYDROCHLORIDE 1 MG/ML
2 INJECTION, SOLUTION INTRAMUSCULAR; INTRAVENOUS
Status: COMPLETED | OUTPATIENT
Start: 2021-07-23 | End: 2021-07-23

## 2021-07-23 RX ORDER — ACETAMINOPHEN 325 MG/1
975 TABLET ORAL ONCE
Status: DISCONTINUED | OUTPATIENT
Start: 2021-07-23 | End: 2021-07-23 | Stop reason: SDUPTHER

## 2021-07-23 RX ORDER — BUDESONIDE AND FORMOTEROL FUMARATE DIHYDRATE 160; 4.5 UG/1; UG/1
2 AEROSOL RESPIRATORY (INHALATION) DAILY
Status: DISCONTINUED | OUTPATIENT
Start: 2021-07-23 | End: 2021-07-24 | Stop reason: HOSPADM

## 2021-07-23 RX ORDER — SODIUM CHLORIDE 9 MG/ML
100 INJECTION, SOLUTION INTRAVENOUS CONTINUOUS
Status: DISCONTINUED | OUTPATIENT
Start: 2021-07-23 | End: 2021-07-24 | Stop reason: HOSPADM

## 2021-07-23 RX ORDER — TRANEXAMIC ACID 100 MG/ML
INJECTION, SOLUTION INTRAVENOUS AS NEEDED
Status: DISCONTINUED | OUTPATIENT
Start: 2021-07-23 | End: 2021-07-23 | Stop reason: HOSPADM

## 2021-07-23 RX ORDER — BROMOCRIPTINE MESYLATE 2.5 MG/1
20 TABLET ORAL DAILY
Status: DISCONTINUED | OUTPATIENT
Start: 2021-07-24 | End: 2021-07-24 | Stop reason: HOSPADM

## 2021-07-23 RX ORDER — HYDROCORTISONE 5 MG/1
5 TABLET ORAL
Status: DISCONTINUED | OUTPATIENT
Start: 2021-07-23 | End: 2021-07-24 | Stop reason: HOSPADM

## 2021-07-23 RX ORDER — ASPIRIN 81 MG/1
81 TABLET ORAL EVERY 12 HOURS
Status: DISCONTINUED | OUTPATIENT
Start: 2021-07-23 | End: 2021-07-24 | Stop reason: HOSPADM

## 2021-07-23 RX ORDER — FENTANYL CITRATE 50 UG/ML
100 INJECTION, SOLUTION INTRAMUSCULAR; INTRAVENOUS ONCE
Status: COMPLETED | OUTPATIENT
Start: 2021-07-23 | End: 2021-07-23

## 2021-07-23 RX ORDER — LISINOPRIL 20 MG/1
40 TABLET ORAL
Status: DISCONTINUED | OUTPATIENT
Start: 2021-07-23 | End: 2021-07-24 | Stop reason: HOSPADM

## 2021-07-23 RX ORDER — DEXAMETHASONE SODIUM PHOSPHATE 4 MG/ML
INJECTION, SOLUTION INTRA-ARTICULAR; INTRALESIONAL; INTRAMUSCULAR; INTRAVENOUS; SOFT TISSUE
Status: COMPLETED | OUTPATIENT
Start: 2021-07-23 | End: 2021-07-23

## 2021-07-23 RX ORDER — AMOXICILLIN 250 MG
2 CAPSULE ORAL DAILY
Status: DISCONTINUED | OUTPATIENT
Start: 2021-07-24 | End: 2021-07-24 | Stop reason: HOSPADM

## 2021-07-23 RX ORDER — SODIUM CHLORIDE, SODIUM LACTATE, POTASSIUM CHLORIDE, CALCIUM CHLORIDE 600; 310; 30; 20 MG/100ML; MG/100ML; MG/100ML; MG/100ML
50 INJECTION, SOLUTION INTRAVENOUS CONTINUOUS
Status: DISCONTINUED | OUTPATIENT
Start: 2021-07-23 | End: 2021-07-23 | Stop reason: HOSPADM

## 2021-07-23 RX ORDER — PROPOFOL 10 MG/ML
INJECTION, EMULSION INTRAVENOUS
Status: DISCONTINUED | OUTPATIENT
Start: 2021-07-23 | End: 2021-07-23 | Stop reason: HOSPADM

## 2021-07-23 RX ORDER — BUDESONIDE AND FORMOTEROL FUMARATE DIHYDRATE 160; 4.5 UG/1; UG/1
2 AEROSOL RESPIRATORY (INHALATION) DAILY
Status: CANCELLED | OUTPATIENT
Start: 2021-07-23

## 2021-07-23 RX ORDER — SODIUM CHLORIDE 0.9 % (FLUSH) 0.9 %
5-40 SYRINGE (ML) INJECTION EVERY 8 HOURS
Status: DISCONTINUED | OUTPATIENT
Start: 2021-07-23 | End: 2021-07-24 | Stop reason: HOSPADM

## 2021-07-23 RX ORDER — ALBUTEROL SULFATE 0.83 MG/ML
2.5 SOLUTION RESPIRATORY (INHALATION) AS NEEDED
Status: DISCONTINUED | OUTPATIENT
Start: 2021-07-23 | End: 2021-07-23 | Stop reason: HOSPADM

## 2021-07-23 RX ORDER — CEFAZOLIN SODIUM/WATER 2 G/20 ML
2 SYRINGE (ML) INTRAVENOUS ONCE
Status: COMPLETED | OUTPATIENT
Start: 2021-07-23 | End: 2021-07-23

## 2021-07-23 RX ORDER — INSULIN LISPRO 100 [IU]/ML
INJECTION, SOLUTION INTRAVENOUS; SUBCUTANEOUS
Status: DISCONTINUED | OUTPATIENT
Start: 2021-07-23 | End: 2021-07-24 | Stop reason: HOSPADM

## 2021-07-23 RX ORDER — OXYCODONE HYDROCHLORIDE 5 MG/1
5 TABLET ORAL
Status: DISCONTINUED | OUTPATIENT
Start: 2021-07-23 | End: 2021-07-23 | Stop reason: HOSPADM

## 2021-07-23 RX ORDER — EPHEDRINE SULFATE/0.9% NACL/PF 50 MG/5 ML
SYRINGE (ML) INTRAVENOUS AS NEEDED
Status: DISCONTINUED | OUTPATIENT
Start: 2021-07-23 | End: 2021-07-23 | Stop reason: HOSPADM

## 2021-07-23 RX ORDER — CEFAZOLIN SODIUM/WATER 2 G/20 ML
2 SYRINGE (ML) INTRAVENOUS EVERY 8 HOURS
Status: COMPLETED | OUTPATIENT
Start: 2021-07-23 | End: 2021-07-24

## 2021-07-23 RX ORDER — AMLODIPINE BESYLATE 5 MG/1
5 TABLET ORAL
Status: DISCONTINUED | OUTPATIENT
Start: 2021-07-23 | End: 2021-07-24 | Stop reason: HOSPADM

## 2021-07-23 RX ORDER — ACETAMINOPHEN 650 MG/1
650 SUPPOSITORY RECTAL ONCE
Status: DISCONTINUED | OUTPATIENT
Start: 2021-07-23 | End: 2021-07-23 | Stop reason: SDUPTHER

## 2021-07-23 RX ORDER — DEXAMETHASONE SODIUM PHOSPHATE 100 MG/10ML
10 INJECTION INTRAMUSCULAR; INTRAVENOUS ONCE
Status: DISCONTINUED | OUTPATIENT
Start: 2021-07-24 | End: 2021-07-24 | Stop reason: HOSPADM

## 2021-07-23 RX ORDER — ONDANSETRON 2 MG/ML
INJECTION INTRAMUSCULAR; INTRAVENOUS AS NEEDED
Status: DISCONTINUED | OUTPATIENT
Start: 2021-07-23 | End: 2021-07-23 | Stop reason: HOSPADM

## 2021-07-23 RX ORDER — OXYCODONE HYDROCHLORIDE 5 MG/1
5-10 TABLET ORAL
Qty: 60 TABLET | Refills: 0 | Status: SHIPPED | OUTPATIENT
Start: 2021-07-23 | End: 2021-07-28

## 2021-07-23 RX ORDER — HYDROMORPHONE HYDROCHLORIDE 1 MG/ML
1 INJECTION, SOLUTION INTRAMUSCULAR; INTRAVENOUS; SUBCUTANEOUS
Status: DISCONTINUED | OUTPATIENT
Start: 2021-07-23 | End: 2021-07-24 | Stop reason: HOSPADM

## 2021-07-23 RX ORDER — SODIUM CHLORIDE, SODIUM LACTATE, POTASSIUM CHLORIDE, CALCIUM CHLORIDE 600; 310; 30; 20 MG/100ML; MG/100ML; MG/100ML; MG/100ML
75 INJECTION, SOLUTION INTRAVENOUS CONTINUOUS
Status: DISCONTINUED | OUTPATIENT
Start: 2021-07-23 | End: 2021-07-23 | Stop reason: HOSPADM

## 2021-07-23 RX ORDER — LANOLIN ALCOHOL/MO/W.PET/CERES
1000 CREAM (GRAM) TOPICAL 2 TIMES DAILY
Status: DISCONTINUED | OUTPATIENT
Start: 2021-07-23 | End: 2021-07-24 | Stop reason: HOSPADM

## 2021-07-23 RX ORDER — MIDAZOLAM HYDROCHLORIDE 1 MG/ML
2 INJECTION, SOLUTION INTRAMUSCULAR; INTRAVENOUS ONCE
Status: DISCONTINUED | OUTPATIENT
Start: 2021-07-23 | End: 2021-07-23 | Stop reason: HOSPADM

## 2021-07-23 RX ORDER — ONDANSETRON 4 MG/1
4 TABLET, ORALLY DISINTEGRATING ORAL
Status: DISCONTINUED | OUTPATIENT
Start: 2021-07-23 | End: 2021-07-24 | Stop reason: HOSPADM

## 2021-07-23 RX ORDER — PANTOPRAZOLE SODIUM 40 MG/1
40 TABLET, DELAYED RELEASE ORAL
Status: DISCONTINUED | OUTPATIENT
Start: 2021-07-23 | End: 2021-07-24 | Stop reason: HOSPADM

## 2021-07-23 RX ORDER — ACETAMINOPHEN 500 MG
1000 TABLET ORAL EVERY 6 HOURS
Status: DISCONTINUED | OUTPATIENT
Start: 2021-07-23 | End: 2021-07-24 | Stop reason: HOSPADM

## 2021-07-23 RX ORDER — NALOXONE HYDROCHLORIDE 0.4 MG/ML
.2-.4 INJECTION, SOLUTION INTRAMUSCULAR; INTRAVENOUS; SUBCUTANEOUS
Status: DISCONTINUED | OUTPATIENT
Start: 2021-07-23 | End: 2021-07-24 | Stop reason: HOSPADM

## 2021-07-23 RX ORDER — LEVOTHYROXINE SODIUM 100 UG/1
100 TABLET ORAL
Status: DISCONTINUED | OUTPATIENT
Start: 2021-07-24 | End: 2021-07-24 | Stop reason: HOSPADM

## 2021-07-23 RX ORDER — PRAVASTATIN SODIUM 20 MG/1
40 TABLET ORAL DAILY
Status: DISCONTINUED | OUTPATIENT
Start: 2021-07-23 | End: 2021-07-24 | Stop reason: HOSPADM

## 2021-07-23 RX ORDER — SODIUM CHLORIDE, SODIUM LACTATE, POTASSIUM CHLORIDE, CALCIUM CHLORIDE 600; 310; 30; 20 MG/100ML; MG/100ML; MG/100ML; MG/100ML
INJECTION, SOLUTION INTRAVENOUS
Status: DISCONTINUED | OUTPATIENT
Start: 2021-07-23 | End: 2021-07-23 | Stop reason: HOSPADM

## 2021-07-23 RX ORDER — HYDROMORPHONE HYDROCHLORIDE 2 MG/ML
0.5 INJECTION, SOLUTION INTRAMUSCULAR; INTRAVENOUS; SUBCUTANEOUS
Status: DISCONTINUED | OUTPATIENT
Start: 2021-07-23 | End: 2021-07-23 | Stop reason: HOSPADM

## 2021-07-23 RX ORDER — KETOROLAC TROMETHAMINE 30 MG/ML
INJECTION, SOLUTION INTRAMUSCULAR; INTRAVENOUS AS NEEDED
Status: DISCONTINUED | OUTPATIENT
Start: 2021-07-23 | End: 2021-07-23 | Stop reason: HOSPADM

## 2021-07-23 RX ORDER — ACETAMINOPHEN 500 MG
1000 TABLET ORAL ONCE
Status: COMPLETED | OUTPATIENT
Start: 2021-07-23 | End: 2021-07-23

## 2021-07-23 RX ORDER — METFORMIN HYDROCHLORIDE 500 MG/1
500 TABLET ORAL 2 TIMES DAILY WITH MEALS
Status: DISCONTINUED | OUTPATIENT
Start: 2021-07-23 | End: 2021-07-24 | Stop reason: HOSPADM

## 2021-07-23 RX ORDER — ASPIRIN 81 MG/1
81 TABLET ORAL EVERY 12 HOURS
Qty: 70 TABLET | Refills: 0 | Status: SHIPPED | OUTPATIENT
Start: 2021-07-23 | End: 2021-08-27

## 2021-07-23 RX ORDER — DIPHENHYDRAMINE HCL 25 MG
25 CAPSULE ORAL
Status: DISCONTINUED | OUTPATIENT
Start: 2021-07-23 | End: 2021-07-24 | Stop reason: HOSPADM

## 2021-07-23 RX ORDER — ROPIVACAINE HYDROCHLORIDE 2 MG/ML
INJECTION, SOLUTION EPIDURAL; INFILTRATION; PERINEURAL AS NEEDED
Status: DISCONTINUED | OUTPATIENT
Start: 2021-07-23 | End: 2021-07-23 | Stop reason: HOSPADM

## 2021-07-23 RX ORDER — BUPIVACAINE HYDROCHLORIDE 7.5 MG/ML
INJECTION, SOLUTION INTRASPINAL
Status: COMPLETED | OUTPATIENT
Start: 2021-07-23 | End: 2021-07-23

## 2021-07-23 RX ORDER — SODIUM CHLORIDE 0.9 % (FLUSH) 0.9 %
5-40 SYRINGE (ML) INJECTION AS NEEDED
Status: DISCONTINUED | OUTPATIENT
Start: 2021-07-23 | End: 2021-07-24 | Stop reason: HOSPADM

## 2021-07-23 RX ADMIN — INSULIN LISPRO 8 UNITS: 100 INJECTION, SOLUTION INTRAVENOUS; SUBCUTANEOUS at 22:40

## 2021-07-23 RX ADMIN — PHENYLEPHRINE HYDROCHLORIDE 100 MCG: 10 INJECTION INTRAVENOUS at 08:44

## 2021-07-23 RX ADMIN — CEFAZOLIN SODIUM 2 G: 10 INJECTION, POWDER, FOR SOLUTION INTRAVENOUS at 17:28

## 2021-07-23 RX ADMIN — SODIUM CHLORIDE, SODIUM LACTATE, POTASSIUM CHLORIDE, AND CALCIUM CHLORIDE: 600; 310; 30; 20 INJECTION, SOLUTION INTRAVENOUS at 09:30

## 2021-07-23 RX ADMIN — PHENYLEPHRINE HYDROCHLORIDE 100 MCG: 10 INJECTION INTRAVENOUS at 09:30

## 2021-07-23 RX ADMIN — Medication 1 AMPULE: at 22:21

## 2021-07-23 RX ADMIN — SODIUM CHLORIDE, SODIUM LACTATE, POTASSIUM CHLORIDE, AND CALCIUM CHLORIDE 75 ML/HR: 600; 310; 30; 20 INJECTION, SOLUTION INTRAVENOUS at 06:38

## 2021-07-23 RX ADMIN — PHENYLEPHRINE HYDROCHLORIDE 100 MCG: 10 INJECTION INTRAVENOUS at 09:40

## 2021-07-23 RX ADMIN — TRANEXAMIC ACID 1000 MG: 100 INJECTION, SOLUTION INTRAVENOUS at 08:21

## 2021-07-23 RX ADMIN — OXYCODONE 10 MG: 5 TABLET ORAL at 14:53

## 2021-07-23 RX ADMIN — OXYCODONE 10 MG: 5 TABLET ORAL at 22:22

## 2021-07-23 RX ADMIN — PANTOPRAZOLE SODIUM 40 MG: 40 TABLET, DELAYED RELEASE ORAL at 22:22

## 2021-07-23 RX ADMIN — Medication 10 ML: at 17:32

## 2021-07-23 RX ADMIN — PHENYLEPHRINE HYDROCHLORIDE 100 MCG: 10 INJECTION INTRAVENOUS at 08:54

## 2021-07-23 RX ADMIN — Medication 10 ML: at 23:36

## 2021-07-23 RX ADMIN — PHENYLEPHRINE HYDROCHLORIDE 100 MCG: 10 INJECTION INTRAVENOUS at 09:12

## 2021-07-23 RX ADMIN — CEFAZOLIN 2 G: 1 INJECTION, POWDER, FOR SOLUTION INTRAVENOUS at 08:21

## 2021-07-23 RX ADMIN — LISINOPRIL 40 MG: 20 TABLET ORAL at 22:22

## 2021-07-23 RX ADMIN — DEXAMETHASONE SODIUM PHOSPHATE 4 MG: 4 INJECTION, SOLUTION INTRAMUSCULAR; INTRAVENOUS at 08:05

## 2021-07-23 RX ADMIN — PHENYLEPHRINE HYDROCHLORIDE 100 MCG: 10 INJECTION INTRAVENOUS at 08:57

## 2021-07-23 RX ADMIN — DEXAMETHASONE SODIUM PHOSPHATE 10 MG: 4 INJECTION, SOLUTION INTRAMUSCULAR; INTRAVENOUS at 08:36

## 2021-07-23 RX ADMIN — Medication 3 AMPULE: at 06:29

## 2021-07-23 RX ADMIN — PHENYLEPHRINE HYDROCHLORIDE 100 MCG: 10 INJECTION INTRAVENOUS at 09:04

## 2021-07-23 RX ADMIN — ONDANSETRON 4 MG: 2 INJECTION INTRAMUSCULAR; INTRAVENOUS at 08:36

## 2021-07-23 RX ADMIN — AMLODIPINE BESYLATE 5 MG: 5 TABLET ORAL at 22:22

## 2021-07-23 RX ADMIN — PHENYLEPHRINE HYDROCHLORIDE 100 MCG: 10 INJECTION INTRAVENOUS at 09:19

## 2021-07-23 RX ADMIN — Medication 81 MG: at 22:22

## 2021-07-23 RX ADMIN — Medication 5 MG: at 09:59

## 2021-07-23 RX ADMIN — PHENYLEPHRINE HYDROCHLORIDE 100 MCG: 10 INJECTION INTRAVENOUS at 09:05

## 2021-07-23 RX ADMIN — BROMOCRIPTINE MESYLATE 20 MG: 2.5 TABLET ORAL at 22:00

## 2021-07-23 RX ADMIN — ACETAMINOPHEN 1000 MG: 500 TABLET, FILM COATED ORAL at 06:29

## 2021-07-23 RX ADMIN — ROPIVACAINE HYDROCHLORIDE 20 ML: 2 INJECTION, SOLUTION EPIDURAL; INFILTRATION at 08:05

## 2021-07-23 RX ADMIN — CYANOCOBALAMIN TAB 1000 MCG 1000 MCG: 1000 TAB at 22:21

## 2021-07-23 RX ADMIN — BUPIVACAINE HYDROCHLORIDE IN DEXTROSE 15 MG: 7.5 INJECTION, SOLUTION SUBARACHNOID at 08:25

## 2021-07-23 RX ADMIN — MIDAZOLAM 2 MG: 1 INJECTION INTRAMUSCULAR; INTRAVENOUS at 08:03

## 2021-07-23 RX ADMIN — ACETAMINOPHEN 1000 MG: 500 TABLET, FILM COATED ORAL at 17:27

## 2021-07-23 RX ADMIN — PHENYLEPHRINE HYDROCHLORIDE 100 MCG: 10 INJECTION INTRAVENOUS at 09:47

## 2021-07-23 RX ADMIN — BUDESONIDE AND FORMOTEROL FUMARATE DIHYDRATE 2 PUFF: 160; 4.5 AEROSOL RESPIRATORY (INHALATION) at 14:51

## 2021-07-23 RX ADMIN — METFORMIN HYDROCHLORIDE 500 MG: 500 TABLET ORAL at 22:22

## 2021-07-23 RX ADMIN — OXYCODONE 10 MG: 5 TABLET ORAL at 18:40

## 2021-07-23 RX ADMIN — PHENYLEPHRINE HYDROCHLORIDE 100 MCG: 10 INJECTION INTRAVENOUS at 08:33

## 2021-07-23 RX ADMIN — FENTANYL CITRATE 50 MCG: 50 INJECTION INTRAMUSCULAR; INTRAVENOUS at 08:03

## 2021-07-23 RX ADMIN — Medication 10 MG: at 09:21

## 2021-07-23 RX ADMIN — ACETAMINOPHEN 1000 MG: 500 TABLET, FILM COATED ORAL at 22:39

## 2021-07-23 RX ADMIN — PROPOFOL 100 MCG/KG/MIN: 10 INJECTION, EMULSION INTRAVENOUS at 08:27

## 2021-07-23 RX ADMIN — PHENYLEPHRINE HYDROCHLORIDE 100 MCG: 10 INJECTION INTRAVENOUS at 09:59

## 2021-07-23 RX ADMIN — SODIUM CHLORIDE, SODIUM LACTATE, POTASSIUM CHLORIDE, AND CALCIUM CHLORIDE: 600; 310; 30; 20 INJECTION, SOLUTION INTRAVENOUS at 08:07

## 2021-07-23 NOTE — ANESTHESIA PROCEDURE NOTES
Spinal Block    Start time: 7/23/2021 8:18 AM  End time: 7/23/2021 8:25 AM  Performed by: Patricia Logan MD  Authorized by: Patricia Logan MD     Pre-procedure:   Indications: primary anesthetic  Preanesthetic Checklist: patient identified, risks and benefits discussed, anesthesia consent, patient being monitored and timeout performed    Timeout Time: 08:18 EDT          Spinal Block:   Patient Position:  Seated  Prep Region:  Lumbar  Prep: chlorhexidine and patient draped      Location:  L3-4  Technique:  Single shot    Local Dose (mL):  3    Needle:   Needle Type:  Pencan  Needle Gauge:  25 G  Attempts:  1      Events: CSF confirmed, no blood with aspiration and no paresthesia        Assessment:  Insertion:  Uncomplicated  Patient tolerance:  Patient tolerated the procedure well with no immediate complications

## 2021-07-23 NOTE — PROGRESS NOTES
Problem: Mobility Impaired (Adult and Pediatric)  Goal: *Acute Goals and Plan of Care (Insert Text)  Description: GOALS (1-4 days):  (1.)Mr. Nohemi Coto will move from supine to sit and sit to supine  in bed with INDEPENDENT. (2.)Mr. Nohemi Coto will transfer from bed to chair and chair to bed with SUPERVISION using the least restrictive device. (3.)Mr. Nohemi Coto will ambulate with SUPERVISION for 300 feet with the least restrictive device. (4.)Mr. Nohemi Coto will ambulate up/down 1 steps with No railing with STAND BY ASSIST with walker. (5.)Mr. Nohemi Coto will increase right knee ROM to 5°-80°.  ________________________________________________________________________________________________   Outcome: Progressing Towards Goal     PHYSICAL THERAPY JOINT CAMP TKA: Initial Assessment, Treatment Day: Day of Assessment, and PM 7/23/2021  OBSERVATION: Hospital Day: 1  [place] / Product Type: Medicare /      NAME/AGE/GENDER: Vesta Glover is a 72 y.o. male   PRIMARY DIAGNOSIS:  Primary osteoarthritis of right knee [M17.11]   Procedure(s) and Anesthesia Type:     * RIGHT DENEEN KNEE ARTHROPLASTY TOTAL ROBOTIC ASSISTED/ CLAYTON/ ADDUCTOR CANAL BLOCK - Spinal (Right)  ICD-10: Treatment Diagnosis:    · Pain in Right Knee (M25.561)  · Stiffness of Right Knee, Not elsewhere classified (M25.661)  · Difficulty in walking, Not elsewhere classified (R26.2)      ASSESSMENT:     Mr. Nohemi Coto presents with impaired strength & mobility s/p right TKA. Pt also had decreased stability during out of bed activity. This pt will benefit from follow up therapy to help restore safe function prior to returning home with caregiver. This section established at most recent assessment   PROBLEM LIST (Impairments causing functional limitations):  1. Decreased Strength  2. Decreased ADL/Functional Activities  3. Decreased Transfer Abilities  4. Decreased Ambulation Ability/Technique  5. Decreased Balance  6.  Increased Pain  7. Decreased Activity Tolerance  8. Decreased Flexibility/Joint Mobility  9. Decreased Seanor with Home Exercise Program   INTERVENTIONS PLANNED: (Benefits and precautions of physical therapy have been discussed with the patient.)  1. Bed Mobility  2. Cold  3. Gait Training  4. Home Exercise Program (HEP)  5. Range of Motion (ROM)  6. Therapeutic Activites  7. Therapeutic Exercise/Strengthening  8. Transfer Training     TREATMENT PLAN: Frequency/Duration: Follow patient BID for duration of hospital stay to address above goals. Rehabilitation Potential For Stated Goals: Good     RECOMMENDED REHABILITATION/EQUIPMENT: (at time of discharge pending progress): Continue Skilled Therapy and Home Health: Physical Therapy.               HISTORY:   History of Present Injury/Illness (Reason for Referral):  Right TKA  Past Medical History/Comorbidities:   Mr. Adelso Grant  has a past medical history of Abnormal blood sugar, Abnormality of cortisol-binding globulin ([facility] [address] Actinic keratosis, Acute right flank pain, Anaphylactic reaction to bee sting, Anxiety, Anxiety disorder, Arthritis, Arthritis of left shoulder region, Asthma (7/20/2016), Backache (7/20/2016), Bilateral otitis media, BPH (benign prostatic hyperplasia) (7/20/2016), CAD (coronary artery disease) (04/20/2011), Cervical neck pain with evidence of disc disease, Chronic renal insufficiency (7/20/2016), Controlled diabetes mellitus ([address] (7/20/2016), COPD (chronic obstructive pulmonary disease) ([facility] [address] (7/20/2016), Corneal foreign body (6/13/14), COVID-19 vaccine series completed (03/22/2021), Depressive disorder (7/20/2016), Diarrhea, Disorders of calcium metabolism, Dumping syndrome, Dyspepsia and other specified disorders of function of stomach (8/29/2012), ED (erectile dysfunction) (7/20/2016), Edema, Elevated CPK, Elevated prolactin level, Essential hypertension, benign (8/29/2012), GERD (gastroesophageal reflux disease), Hand pain, right, Hematuria, Hyperlipidemia, Hyperprolactinemia ([facility] [address] Hypertension, Hypogonadism in male, Hypothyroidism, Knee effusion, Knee pain, Low serum testosterone level, Neoplasm of uncertain behavior of skin of neck, Neuralgia, Nevus, non-neoplastic, Numbness and tingling in left arm, Open wound of finger without complication (9/51/14), IRINA on CPAP (8/22/2014), Osteoarthrosis, unspecified whether generalized or localized, involving lower leg (8/29/2012), Osteoarthrosis, unspecified whether generalized or localized, lower leg (8/29/2012), Other disorder of calcium metabolism (8/29/2012), Other malaise and fatigue (11/27/2012), Pituitary tumor (12/11/2013), Prolactin increased, Puncture wound (6/11/12), Pure hypercholesterolemia (8/29/2012), Right shoulder pain, RLS (restless legs syndrome) (7/20/2016), Seborrheic keratosis, Seizures ([facility] [address] Shingles (herpes zoster) polyneuropathy, Shingles rash, and Sinusitis. He also has no past medical history of Difficult intubation, Malignant hyperthermia due to anesthesia, Nausea & vomiting, or Pseudocholinesterase deficiency. Mr. Wendy Bartholomew  has a past surgical history that includes hx orthopaedic; hx tonsillectomy; pr chest surgery procedure unlisted; hx carpal tunnel release (Bilateral); hx bunionectomy (Right); hx cervical fusion (11/2007); and hx heart catheterization (04/20/2011).   Social History/Living Environment:   Home Environment: Private residence  # Steps to Enter: 1  Rails to Enter: No  One/Two Story Residence: One story  Living Alone: No  Support Systems: Spouse/Significant Other/Partner  Patient Expects to be Discharged to[de-identified] [employer]  Current DME Used/Available at Home: Walker, rolling  Tub or Shower Type: Tub/Shower combination    Prior Level of Function/Work/Activity:  Pt was independent without a device   Number of Personal Factors/Comorbidities that affect the Plan of Care: 3+: HIGH COMPLEXITY   EXAMINATION:   Most Recent Physical Functioning:      Gross Assessment  AROM: Within functional limits (left LE)  Strength: Within functional limits (left LE)  Coordination: Within functional limits (left LE)        RLE AROM  R Knee Flexion: 70 (~post op)  R Knee Extension: -10 (~post op)            Bed Mobility  Supine to Sit: Contact guard assistance  Sit to Supine:  (NT)  Scooting: Contact guard assistance    Transfers  Sit to Stand: Contact guard assistance  Stand to Sit: Contact guard assistance  Bed to Chair: Contact guard assistance (with walker)    Balance  Sitting: Intact; Without support  Standing: Impaired; With support (walker)              Weight Bearing Status  Right Side Weight Bearing: As tolerated  Distance (ft):  (additional 150ft after an initial 50ft gait assessment)  Ambulation - Level of Assistance: Contact guard assistance  Assistive Device: Walker, rolling  Speed/Pearl: Delayed  Step Length: Left shortened  Stance: Right decreased  Gait Abnormalities: Antalgic;Decreased step clearance        Braces/Orthotics: none    Right Knee Cold  Type: Cryocuff      Body Structures Involved:  1. Joints  2. Muscles Body Functions Affected:  1. Sensory/Pain  2. Movement Related Activities and Participation Affected:  1. General Tasks and Demands  2. Mobility   Number of elements that affect the Plan of Care: 4+: HIGH COMPLEXITY   CLINICAL PRESENTATION:   Presentation: Stable and uncomplicated: LOW COMPLEXITY   CLINICAL DECISION MAKING:   [employer] AM-PAC 6 Clicks   Basic Mobility Inpatient Short Form  How much difficulty does the patient currently have. .. Unable A Lot A Little None   1. Turning over in bed (including adjusting bedclothes, sheets and blankets)? [] 1   [] 2   [x] 3   [] 4   2. Sitting down on and standing up from a chair with arms ( e.g., wheelchair, bedside commode, etc.)   [] 1   [] 2   [x] 3   [] 4   3. Moving from lying on back to sitting on the side of the bed?    [] 1   [] 2   [x] 3   [] 4   How much help from another person does the patient currently need... Total A Lot A Little None   4. Moving to and from a bed to a chair (including a wheelchair)? [] 1   [] 2   [x] 3   [] 4   5. Need to walk in hospital room? [] 1   [] 2   [x] 3   [] 4   6. Climbing 3-5 steps with a railing? [x] 1   [] 2   [] 3   [] 4   © 2007, Trustees of [address] [place] [address] [address] [id number], under license to [employer]. All rights reserved     Score:  Initial: 16 Most Recent: X (Date: -- )    Interpretation of Tool:  Represents activities that are increasingly more difficult (i.e. Bed mobility, Transfers, Gait). Medical Necessity:     · Patient is expected to demonstrate progress in   · strength, range of motion, balance, coordination, and functional technique  ·  to   · decrease assistance required with with bed mobility, transfers & gait  · .  Reason for Services/Other Comments:  · Patient continues to require skilled intervention due to   · Pt not independent with functional mobility  · . Use of outcome tool(s) and clinical judgement create a POC that gives a: Clear prediction of patient's progress: LOW COMPLEXITY            TREATMENT:   (In addition to Assessment/Re-Assessment sessions the following treatments were rendered)     Pre-treatment Symptoms/Complaints:  none  Pain Initial: numeric scale  Pain Intensity 1: 2  Pain Location 1: Knee  Pain Orientation 1: Right  Pain Intervention(s) 1: Ambulation/Increased Activity, Cold pack  Post Session:  2/10     Therapeutic Activity: (    23 min (extra time to work through activity noted): Therapeutic activities including TKA exercises, progressive gait training an additional 150 ft after an initial 50 ft gait assessment to improve mobility, strength, balance, coordination, and dynamic movement of leg - right to improve functional endurance & stability .     Assessment     Date:  7/23 Date:   Date:     ACTIVITY/EXERCISE AM PM AM PM AM PM   GROUP THERAPY  []  []  []  []  []  []   Ankle Pumps  15       Quad Sets  15       Gluteal Sets  15       Hip ABd/ADduction  15       Straight Leg Raises  15       Knee Slides  15       Short Arc Quads  15       Long Arc Quads         Chair Slides                  B = bilateral; AA = active assistive; A = active; P = passive      Treatment/Session Assessment:     Response to Treatment:  tolerated well    Education:  [x] Home Exercises  [x] Fall Precautions  [x] Use of Cold Therapy Unit [x] D/C Instruction Review  [] Knee Prosthesis Review  [x] Walker Management/Safety [] Adaptive Equipment as Needed       Interdisciplinary Collaboration:   o Registered Nurse    After treatment position/precautions:   o Up in chair  o Bed/Chair-wheels locked  o Caregiver at bedside  o Call light within reach  o RN notified  o Family at bedside    Compliance with Program/Exercises: Will assess as treatment progresses. Recommendations/Intent for next treatment session:  Treatment next visit will focus on increasing Mr. Johan Pena independence with bed mobility, transfers, gait training, strength/ROM exercises, modalities for pain, and patient education.       Total Treatment Duration:  PT Patient Time In/Time Out  Time In: 1220  Time Out: [facility] Vanessa Dong, PT

## 2021-07-23 NOTE — INTERVAL H&P NOTE
Update History & Physical    The Patient's History and Physical of July 14,   H&P was reviewed with the patient and I examined the patient. There was no change. The surgical site was confirmed by the patient and me. Plan:  The risk, benefits, expected outcome, and alternative to the recommended procedure have been discussed with the patient. Patient understands and wants to proceed with the procedure.     Electronically signed by Michael Fulton MD on 7/23/2021 at 6:46 AM

## 2021-07-23 NOTE — PROGRESS NOTES
07/23/21 1454   Oxygen Therapy   O2 Sat (%) 95 %   Pulse via Oximetry 103 beats per minute   O2 Device None (Room air)   O2 Flow Rate (L/min) 0 l/min   Joint Camp Notes Reviewed. Pt working on IS. Pt encouraged to do 10 breaths per hour while awake on IS. Good NPC. No respiratory distress noted at this time. No complications noted at this time. Pt. Has home med and home CPAP[ at bedside and spacer.

## 2021-07-23 NOTE — PROGRESS NOTES
Problem: Self Care Deficits Care Plan (Adult)  Goal: *Acute Goals and Plan of Care (Insert Text)  Outcome: Progressing Towards Goal  Note: GOALS:   DISCHARGE GOALS (in preparation for going home/rehab):  3 days  1. Mr. Hermelinda Calvert will perform one lower body dressing activity with minimal assistance required to demonstrate improved functional mobility and safety. 2.  Mr. Hermelinda Calvert will perform one lower body bathing activity with minimal assistance required to demonstrate improved functional mobility and safety. 3.  Mr. Hermelinda Calvert will perform toileting/toilet transfer with contact guard assistance to demonstrate improved functional mobility and safety. 4.  Mr. Hermelinda Calvert will perform shower transfer with contact guard assistance to demonstrate improved functional mobility and safety. JOINT CAMP OCCUPATIONAL THERAPY TKA: Initial Assessment and PM 7/23/2021  OBSERVATION: Hospital Day: 1  [place] / Product Type: Medicare /      NAME/AGE/GENDER: Yosvany Wolf is a 72 y.o. male   PRIMARY DIAGNOSIS:  Primary osteoarthritis of right knee [M17.11]   Procedure(s) and Anesthesia Type:     * RIGHT DENEEN KNEE ARTHROPLASTY TOTAL ROBOTIC ASSISTED/ CLAYTON/ ADDUCTOR CANAL BLOCK - Spinal (Right)  ICD-10: Treatment Diagnosis:    Pain in Right Knee (M25.561)  Stiffness of Right Knee, Not elsewhere classified (M25.661)  Other lack of cordination (R27.8)  Difficulty in walking, Not elsewhere classified (R26.2)  Other abnormalities of gait and mobility (R26.89)      ASSESSMENT:     Mr. Hermelinda Calvert is s/p right TKA and presents with decreased weight bearing on right LE and decreased independence with functional mobility and activities of daily living as compared to baseline level of function and safety. Patient would benefit from skilled Occupational Therapy to maximize independence and safety with self-care task and functional mobility.   Pt would also benefit from education on adaptive equipment and safety precautions in preparation for going home. He dressed and was set up with all needs in reach. Educated on OT poc and TKA protocol. Wife and sister present. Will see in am for full ADL session. This section established at most recent assessment   PROBLEM LIST (Impairments causing functional limitations):  Decreased Strength  Decreased ADL/Functional Activities  Decreased Transfer Abilities  Increased Pain  Increased Fatigue  Decreased Flexibility/Joint Mobility  Decreased Knowledge of Precautions   INTERVENTIONS PLANNED: (Benefits and precautions of occupational therapy have been discussed with the patient.)  Activities of daily living training  Adaptive equipment training  Balance training  Clothing management  Donning&doffing training  Theraputic activity     TREATMENT PLAN: Frequency/Duration: Follow patient 1-2 times to address above goals. Rehabilitation Potential For Stated Goals: Good     RECOMMENDED REHABILITATION/EQUIPMENT: (at time of discharge pending progress): Continue Skilled Therapy. OCCUPATIONAL PROFILE AND HISTORY:   History of Present Injury/Illness (Reason for Referral): Pt presents this date s/p (right) TKA.     Past Medical History/Comorbidities:   Mr. Wendy Bartholomew  has a past medical history of Abnormal blood sugar, Abnormality of cortisol-binding globulin ([address] Actinic keratosis, Acute right flank pain, Anaphylactic reaction to bee sting, Anxiety, Anxiety disorder, Arthritis, Arthritis of left shoulder region, Asthma (7/20/2016), Backache (7/20/2016), Bilateral otitis media, BPH (benign prostatic hyperplasia) (7/20/2016), CAD (coronary artery disease) (04/20/2011), Cervical neck pain with evidence of disc disease, Chronic renal insufficiency (7/20/2016), Controlled diabetes mellitus ([address] (7/20/2016), COPD (chronic obstructive pulmonary disease) ([address] (7/20/2016), Corneal foreign body (6/13/14), COVID-19 vaccine series completed (03/22/2021), Depressive disorder (7/20/2016), Diarrhea, Disorders of calcium metabolism, Dumping syndrome, Dyspepsia and other specified disorders of function of stomach (8/29/2012), ED (erectile dysfunction) (7/20/2016), Edema, Elevated CPK, Elevated prolactin level, Essential hypertension, benign (8/29/2012), GERD (gastroesophageal reflux disease), Hand pain, right, Hematuria, Hyperlipidemia, Hyperprolactinemia ([facility] [address] Hypertension, Hypogonadism in male, Hypothyroidism, Knee effusion, Knee pain, Low serum testosterone level, Neoplasm of uncertain behavior of skin of neck, Neuralgia, Nevus, non-neoplastic, Numbness and tingling in left arm, Open wound of finger without complication (3/40/94), IRINA on CPAP (8/22/2014), Osteoarthrosis, unspecified whether generalized or localized, involving lower leg (8/29/2012), Osteoarthrosis, unspecified whether generalized or localized, lower leg (8/29/2012), Other disorder of calcium metabolism (8/29/2012), Other malaise and fatigue (11/27/2012), Pituitary tumor (12/11/2013), Prolactin increased, Puncture wound (6/11/12), Pure hypercholesterolemia (8/29/2012), Right shoulder pain, RLS (restless legs syndrome) (7/20/2016), Seborrheic keratosis, Seizures ([facility] [address] Shingles (herpes zoster) polyneuropathy, Shingles rash, and Sinusitis. He also has no past medical history of Difficult intubation, Malignant hyperthermia due to anesthesia, Nausea & vomiting, or Pseudocholinesterase deficiency. Mr. Dani Villafana  has a past surgical history that includes hx orthopaedic; hx tonsillectomy; pr chest surgery procedure unlisted; hx carpal tunnel release (Bilateral); hx bunionectomy (Right); hx cervical fusion (11/2007); and hx heart catheterization (04/20/2011).   Social History/Living Environment:   Home Environment: Private residence  # Steps to Enter: 1  Rails to Enter: No  One/Two Story Residence: One story  Living Alone: No  Support Systems: Spouse/Significant Other/Partner  Patient Expects to be Discharged to[employer]  Current DME Used/Available at Home: Walker, rolling  Tub or Shower Type: Tub/Shower combination    Prior Level of Function/Work/Activity:  Mod I     Number of Personal Factors/Comorbidities that affect the Plan of Care: Brief history (0):  LOW COMPLEXITY   ASSESSMENT OF OCCUPATIONAL PERFORMANCE[de-identified]   Most Recent Physical Functioning:   Balance  Sitting: Intact  Standing: With support       Gross Assessment  AROM: Within functional limits (left LE)  Strength: Within functional limits (left LE)  Coordination: Within functional limits (left LE)          LLE Assessment  LLE Assessment (WDL): Within defined limits Coordination  Fine Motor Skills-Upper: Left Intact; Right Intact  Gross Motor Skills-Upper: Left Intact; Right Intact         Mental Status  Neurologic State: Alert; Appropriate for age  Orientation Level: Appropriate for age  Cognition: Appropriate decision making; Appropriate for age attention/concentration; Appropriate safety awareness; Follows commands  Perception: Appears intact  Perseveration: No perseveration noted  Safety/Judgement: Awareness of environment; Fall prevention          RLE Assessment  RLE Assessment (WDL): Exceptions to WDL  RLE AROM  R Knee Flexion: 70 (~post op)  R Knee Extension: -10 (~post op)     Basic ADLs (From Assessment) Complex ADLs (From Assessment)   Basic ADL  Feeding: Independent  Oral Facial Hygiene/Grooming: Supervision  Bathing: Minimum assistance  Upper Body Dressing: Supervision  Lower Body Dressing: Minimum assistance, Moderate assistance  Toileting: Contact guard assistance, Minimum assistance     Grooming/Bathing/Dressing Activities of Daily Living     Cognitive Retraining  Safety/Judgement: Awareness of environment; Fall prevention                 Functional Transfers  Toilet Transfer : Contact guard assistance;Minimum assistance  Shower Transfer: Contact guard assistance;Minimum assistance     Bed/Mat Mobility  Supine to Sit: Contact guard assistance  Sit to Supine:  (NT)  Sit to Stand: Contact guard assistance  Stand to Sit: Contact guard assistance  Bed to Chair: Contact guard assistance (with walker)  Scooting: Contact guard assistance         Physical Skills Involved:  Balance  Strength  Activity Tolerance Cognitive Skills Affected (resulting in the inability to perform in a timely and safe manner):  none Psychosocial Skills Affected:  none   Number of elements that affect the Plan of Care: 1-3:  LOW COMPLEXITY   CLINICAL DECISION MAKING:   [facility] AM-PAC 6 Clicks   Daily Activity Inpatient Short Form  How much help from another person does the patient currently need. .. Total A Lot A Little None   1. Putting on and taking off regular lower body clothing? [] 1   [x] 2   [] 3   [] 4   2. Bathing (including washing, rinsing, drying)? [] 1   [] 2   [x] 3   [] 4   3. Toileting, which includes using toilet, bedpan or urinal?   [] 1   [] 2   [x] 3   [] 4   4. Putting on and taking off regular upper body clothing? [] 1   [] 2   [] 3   [x] 4   5. Taking care of personal grooming such as brushing teeth? [] 1   [] 2   [] 3   [x] 4   6. Eating meals? [] 1   [] 2   [] 3   [x] 4   © 2007, Trustees of [facility], under license to [employer]. All rights reserved     Score:  Initial:20 Most Recent: X (Date: -- )    Interpretation of Tool:  Represents activities that are increasingly more difficult (i.e. Bed mobility, Transfers, Gait).    Medical Necessity:     · Patient is expected to demonstrate progress in   · Self care skills and functional mobility  ·     Reason for Services/Other Comments:  · Patient continues to require skilled intervention due to   · Above listed deficits     Use of outcome tool(s) and clinical judgement create a POC that gives a: LOW COMPLEXITY            TREATMENT:   (In addition to Assessment/Re-Assessment sessions the following treatments were rendered)     Pre-treatment Symptoms/Complaints:    Pain: Initial:   Pain Intensity 1: 2  Pain Location 1: Knee  Pain Orientation 1: Right  Pain Intervention(s) 1: Repositioned  Post Session:  2/10 iceman in place, educated to call nursing for pain meds     Self Care: (10): Procedure(s) (per grid) utilized to improve and/or restore self-care/home management as related to dressing and functional transfers . Required minimal visual, verbal, manual, and tactile cueing to facilitate activities of daily living skills and compensatory activities. Assessment/Reassessment complete    Treatment/Session Assessment:     Response to Treatment:  tolerated well. Education:  [] Home Exercises  [x] Fall Precautions  [] Hip Precautions [] Going Home Video  [x] Knee/Hip Prosthesis Review  [x] Walker Management/Safety [x] Adaptive Equipment as Needed       Interdisciplinary Collaboration:   Physical Therapist  Occupational Therapist  Registered Nurse    After treatment position/precautions:   Up in chair  Bed/Chair-wheels locked  Bed in low position  Call light within reach  RN notified  Family at bedside     Compliance with Program/Exercises: Will assess as treatment progresses. Recommendations/Intent for next treatment session:  Treatment next visit will focus on increasing Mr. Valerie Martin independence with bed mobility, transfers, self care, functional mobility, modalities for pain, and patient education.       Total Treatment Duration:10  OT Patient Time In/Time Out  Time In: 0822  Time Out: [facility],

## 2021-07-23 NOTE — ANESTHESIA PREPROCEDURE EVALUATION
Relevant Problems   No relevant active problems       Anesthetic History   No history of anesthetic complications            Review of Systems / Medical History  Patient summary reviewed, nursing notes reviewed and pertinent labs reviewed    Pulmonary    COPD: mild    Sleep apnea: CPAP    Asthma        Neuro/Psych         Psychiatric history     Cardiovascular    Hypertension: well controlled          CAD and hyperlipidemia    Exercise tolerance: >4 METS  Comments: Cath 2011- normal EF, mild non-0bstructive dz   GI/Hepatic/Renal     GERD: well controlled           Endo/Other    Diabetes: well controlled, type 2  Hypothyroidism: well controlled  Arthritis     Other Findings              Physical Exam    Airway  Mallampati: II    Neck ROM: normal range of motion   Mouth opening: Normal     Cardiovascular  Regular rate and rhythm,  S1 and S2 normal,  no murmur, click, rub, or gallop             Dental  No notable dental hx       Pulmonary  Breath sounds clear to auscultation               Abdominal         Other Findings            Anesthetic Plan    ASA: 3  Anesthesia type: spinal - femoral single shot      Post-op pain plan if not by surgeon: peripheral nerve block single    Induction: Intravenous  Anesthetic plan and risks discussed with: Patient, Spouse and Family

## 2021-07-23 NOTE — PROGRESS NOTES
Care Management Interventions  PCP Verified by CM: Yes  Mode of Transport at Discharge: Self  Transition of Care Consult (CM Consult): [address]: Yes  Discharge Durable Medical Equipment: No  Physical Therapy Consult: Yes  Occupational Therapy Consult: Yes  Current Support Network: Own Home, Lives with Spouse  Confirm Follow Up Transport: Family  The Plan for Transition of Care is Related to the Following Treatment Goals : Return to independent functionl. The Patient and/or Patient Representative was Provided with a Choice of Provider and Agrees with the Discharge Plan?: Yes  Freedom of Choice List was Provided with Basic Dialogue that Supports the Patient's Individualized Plan of Care/Goals, Treatment Preferences and Shares the Quality Data Associated with the Providers?: Yes  Discharge Location  Discharge Placement: Home with home health    Patient is a 72y.o. year old male admitted for Right TKA . Patient plans to return home on discharge. Order received to arrange home health. Patient without preference towards agency. Referral sent to [facility]. Patient denies any equipment needs as patient has a walker. He plans on borrowing a BSC from a friend. Will follow until discharge.

## 2021-07-23 NOTE — ANESTHESIA POSTPROCEDURE EVALUATION
Procedure(s):  RIGHT DENEEN KNEE ARTHROPLASTY TOTAL ROBOTIC ASSISTED/ CLAYTON/ ADDUCTOR CANAL BLOCK.    spinal    Anesthesia Post Evaluation      Multimodal analgesia: multimodal analgesia used between 6 hours prior to anesthesia start to PACU discharge  Patient location during evaluation: PACU  Patient participation: complete - patient participated  Level of consciousness: awake  Pain management: adequate  Airway patency: patent  Anesthetic complications: no  Cardiovascular status: acceptable  Respiratory status: acceptable, spontaneous ventilation and nonlabored ventilation  Hydration status: acceptable  Post anesthesia nausea and vomiting:  none      INITIAL Post-op Vital signs:   Vitals Value Taken Time   /68 07/23/21 1040   Temp 36.7 °C (98 °F) 07/23/21 1023   Pulse 86 07/23/21 1041   Resp 16 07/23/21 1040   SpO2 93 % 07/23/21 1041   Vitals shown include unvalidated device data.

## 2021-07-23 NOTE — PROGRESS NOTES
TRANSFER - IN REPORT:    Verbal report received from [facility]) on [facility]  being received from pacu(unit) for routine post - op      Report consisted of patients Situation, Background, Assessment and   Recommendations(SBAR). Information from the following report(s) SBAR and Procedure Summary was reviewed with the receiving nurse. Opportunity for questions and clarification was provided. Assessment completed upon patients arrival to unit and care assumed.

## 2021-07-23 NOTE — CONSULTS
[id number] [facility]       Name:  Dede Joaquin  Age:70 y.o. Sex:male   :  1950    MRN:  [id number]   PCP:  Gaby Chung MD      Admit Date:  2021  5:43 AM     Reason for Admission:   Primary osteoarthritis of right knee [M17.11]  Osteoarthritis of right knee [M17.11]    Reason for Consult:  I was asked to consult on this patient at the kind request of Ancel Duane, MD for medical mgmt    Assessment & Plan:     #Right TKA  - doing well postoperatively. - px mgmt, dvt prophy and post op mgmt as per primary, orthopedics    # pituitary tumor  - resume bromocriptine    # adrenal insuff  - takes cortef prn \"stressful activity\"  - scheduled daily while postop. Likely ok to stop at DC    # HTN  - monitor on lisinopril, norvasc    # DM2  - glucophage  - add SSI while IP  - Diabetic diet    # hypothyroidism  - synthroid      History of Presenting Illness:     Dede Joaquin is a 72 y.o. male with medical history of pituitary tumor, adrenal insufficiency, hypertension, hypothyroidism, DM type II non-insulin-dependent who presented to [facility] for elective right TKA. No postoperative complications today. Hospitalist consulted for medical management. Patient denies chest pain, pressure, dyspnea, nausea, vomiting, chills, palpitations or pain apart from right knee. Review of Systems:  A 14 point review of systems was taken and pertinent positive as per HPI.         Past Medical History:   Diagnosis Date    Abnormal blood sugar     Abnormality of cortisol-binding globulin (HCC)     Actinic keratosis     Acute right flank pain     Anaphylactic reaction to bee sting     Anxiety     takes Lorazepam prn    Anxiety disorder     Arthritis     Arthritis of left shoulder region     Asthma 2016    followed by pulmonary; uses inhaler    Backache 2016    Bilateral otitis media     BPH (benign prostatic hyperplasia) 2016    CAD (coronary artery disease) 04/20/2011    He had mild nonobstructive CAD at cath by Dr. Anna García several years ago; cardiac cath:  4/20/11    Cervical neck pain with evidence of disc disease     Chronic renal insufficiency 7/20/2016    Controlled diabetes mellitus ([address] 7/20/2016 6/30/21 :  hgb A1c:  5.8; daily fasting sqbs:  102    COPD (chronic obstructive pulmonary disease) ([address] 7/20/2016    Corneal foreign body 6/13/14    COVID-19 vaccine series completed 03/22/2021    Moderna    Depressive disorder 7/20/2016    clinical depression~resolved    Diarrhea     Disorders of calcium metabolism     Dumping syndrome     Dyspepsia and other specified disorders of function of stomach 8/29/2012    ED (erectile dysfunction) 7/20/2016    Edema     Elevated CPK     Elevated prolactin level     Essential hypertension, benign 8/29/2012    GERD (gastroesophageal reflux disease)     Hand pain, right     Hematuria     Hyperlipidemia     Hyperprolactinemia ([address]     followed by endocrinologist    Hypertension     Hypogonadism in male     Hypothyroidism     takes levothyroxine    Knee effusion     Knee pain     Low serum testosterone level     Neoplasm of uncertain behavior of skin of neck     Neuralgia     Nevus, non-neoplastic     Numbness and tingling in left arm     Open wound of finger without complication 3/07/28    [place] on CPAP 8/22/2014    uses CPAP    Osteoarthrosis, unspecified whether generalized or localized, involving lower leg 8/29/2012    Osteoarthrosis, unspecified whether generalized or localized, lower leg 8/29/2012    Other disorder of calcium metabolism 8/29/2012    Other malaise and fatigue 11/27/2012    Pituitary tumor 12/11/2013    Prolactin increased     Puncture wound 6/11/12    pt stepped on a sarbjit nail    Pure hypercholesterolemia 8/29/2012    Right shoulder pain     RLS (restless legs syndrome) 7/20/2016    Seborrheic keratosis     Seizures ([address]     30 years ago 1971    Shingles (herpes zoster) polyneuropathy     Shingles rash     Sinusitis        Past Surgical History:   Procedure Laterality Date    HX BUNIONECTOMY Right     HX CARPAL TUNNEL RELEASE Bilateral     HX CERVICAL FUSION  2007    C3-C4-has metal screws     HX HEART CATHETERIZATION  2011    HX ORTHOPAEDIC      bilat carpel tunnel, bunion repair,neck surg foot surg    HX TONSILLECTOMY      WA CHEST SURGERY PROCEDURE UNLISTED      pt denies       Family History : reviewed  Family History   Problem Relation Age of Onset    Cancer Mother         Kidney    Cancer Father         Lymphoma    Diabetes Maternal Grandfather         Social History     Tobacco Use    Smoking status: Former Smoker     Packs/day: 0.50     Quit date: 2009     Years since quittin.2    Smokeless tobacco: Former User     Types: Chew     Quit date: 1986   Substance Use Topics    Alcohol use: No       Allergies   Allergen Reactions    Bee Sting [Sting, Bee] Swelling    Keflex [Cephalexin] Hives    Pcn [Penicillins] Other (comments)     States causes him to pass out    Sulfa (Sulfonamide Antibiotics) Rash       Immunization History   Administered Date(s) Administered    Influenza High Dose Vaccine PF 2016    Influenza Vaccine 10/22/2015    Pneumococcal Conjugate (PCV-13) 2017    Td 2012         PTA Medications:  Current Outpatient Medications   Medication Instructions    amLODIPine (NORVASC) 5 mg, Oral, DAILY    aspirin delayed-release 81 mg, Oral, EVERY 12 HOURS    aspirin 81 mg, Oral    BROMOCRIPTINE MESYLATE (PARLODEL PO) 5 mg, Oral, EVERY BEDTIME, Takes 4 tablets at bedtime    budesonide-formoterol (SYMBICORT) 160-4.5 mcg/actuation HFA inhaler 2 Puffs, Inhalation, 2 TIMES DAILY    cyanocobalamin 1,000 mcg, Oral, 2 TIMES DAILY    DEXAMETHASONE SOD PHOSPHATE (DEXAMETHASONE SODIUM PHOSPHATE INJECTION) Injection    EPINEPHrine (EPIPEN) 0.3 mg, AS NEEDED    hydroCHLOROthiazide (HYDRODIURIL) 12.5 mg, Oral, DAILY    hydrocortisone (CORTEF) 5 mg, DAILY AS NEEDED    levothyroxine (SYNTHROID) 100 mcg, Oral, DAILY BEFORE BREAKFAST    lisinopriL (PRINIVIL, ZESTRIL) 20 mg, Oral, DAILY    LORazepam (ATIVAN) 1 mg, Oral, 4 TIMES DAILY AS NEEDED    meloxicam (MOBIC) 7.5 mg, Oral, DAILY    metFORMIN (GLUCOPHAGE) 500 mg, Oral, 2 TIMES DAILY WITH MEALS, 2 tablets BID    nabumetone (RELAFEN) 750 mg, Oral, 2 TIMES DAILY    nitroglycerin (NITROSTAT) 0.4 mg, SubLINGual, AS NEEDED    omeprazole (PRILOSEC) 40 mg, Oral, DAILY    oxyCODONE IR (ROXICODONE) 5-10 mg, Oral, EVERY 4 HOURS AS NEEDED    pravastatin (PRAVACHOL) 40 mg, Oral, DAILY    sildenafil citrate (VIAGRA) 100 mg, Oral, AS NEEDED    SYRINGE, DISPOSABLE, 20 ML (BD LUER-ARTHUR BULK SYRINGE) BD Luer-Arthur Syringe 25G X 1-1/2\"3 ML, 1 misc every 2 weeks     TESTOSTERONE CYPIONATE IM 50 mg, IntraMUSCular, EVERY 2 WEEKS, 200MG/ML, 0.5 ml every 2 weeks       Objective:     Patient Vitals for the past 24 hrs:   Temp Pulse Resp BP SpO2   07/23/21 1531 98.2 °F (36.8 °C) 98 18 128/76 90 %   07/23/21 1454     95 %   07/23/21 1141 98 °F (36.7 °C) 84 18 129/84 93 %   07/23/21 1055  81 18 (!) 115/56 96 %   07/23/21 1053  80 18 (!) 120/57 95 %   07/23/21 1050  84 18 (!) 120/57 95 %   07/23/21 1045  84 18 128/64 95 %   07/23/21 1040  79 16 126/68 93 %   07/23/21 1035  81 16 123/70 92 %   07/23/21 1030  85 16 122/68 94 %   07/23/21 1025  91 16 120/62 93 %   07/23/21 1023 98 °F (36.7 °C) 92 16 129/63 92 %   07/23/21 0805  74 16 125/66 96 %   07/23/21 0737  82 16 (!) 152/73 96 %   07/23/21 0555 97.9 °F (36.6 °C) 87 18 (!) 157/84 94 %       Oxygen Therapy  O2 Sat (%): 90 % (07/23/21 1531)  Pulse via Oximetry: 103 beats per minute (07/23/21 1454)  O2 Device: None (Room air) (07/23/21 1454)  O2 Flow Rate (L/min): 0 l/min (07/23/21 1454)    Body mass index is 27.69 kg/m².     Physical Exam:    General:  No acute distress, speaking in full sentences  HEENT:  Pupils equal and reactive to light and accommodation, oropharynx is clear   Neck:   Supple, no lymphadenopathy, no JVD   Lungs:  Clear to auscultation bilaterally   CV:   Regular rate and rhythm with normal S1 and S2   Abdomen:  Soft, nontender, nondistended, normoactive bowel sounds   Extremities:  No cyanosis clubbing or edema right knee in immobilizer  Neuro:  Nonfocal, A&O x3   Psych:  Normal mood and affect       Data Reviewed: I have reviewed all labs, meds, and studies. Recent Results (from the past 24 hour(s))   GLUCOSE, POC    Collection Time: 07/23/21  6:40 AM   Result Value Ref Range    Glucose (POC) 102 (H) 65 - 100 mg/dL    Performed by Bj        EKG Results     None          All Micro Results     None          Other Studies:  No results found.       Medications:  Medications Administered      Medications Administered     acetaminophen (TYLENOL) tablet 1,000 mg     Admin Date  07/23/2021 Action  Given Dose  1,000 mg Route  Oral Administered By  Niko Chavez RN           Admin Date  07/23/2021 Action  Given Dose  1,000 mg Route  Oral Administered By  Alexander Lynch RN          alcohol 62% (NOZIN) nasal  3 Ampule     Admin Date  07/23/2021 Action  Given Dose  3 Ampule Route  Topical Administered By  Niko Cahvez RN          budesonide-formoteroL (SYMBICORT) 160-4.5 mcg/actuation HFA inhaler 2 Puff     Admin Date  07/23/2021 Action  Patient/Family Admin Dose  2 Puff Route  Inhalation Administered By  Derik FINN          ceFAZolin (ANCEF) 2 g/20 mL in sterile water IV syringe     Admin Date  07/23/2021 Action  New Bag Dose  2 g Route  IntraVENous Administered By  Alexander Lynch RN          fentaNYL citrate (PF) injection 100 mcg     Admin Date  07/23/2021 Action  Given Dose  50 mcg Route  IntraVENous Administered By  Jen Kim RN          lactated Ringers infusion     Admin Date  07/23/2021 Action  New Bag Dose  75 mL/hr Rate  75 mL/hr Route  IntraVENous Administered By  Alcon Collins RN          midazolam (VERSED) injection 2 mg     Admin Date  07/23/2021 Action  Given Dose  2 mg Route  IntraVENous Administered By  Mariam Goodwin RN          oxyCODONE IR (ROXICODONE) tablet 10 mg     Admin Date  07/23/2021 Action  Given Dose  10 mg Route  Oral Administered By  Gopi Dooley RN          sodium chloride (NS) flush 5-40 mL     Admin Date  07/23/2021 Action  Given Dose  10 mL Route  IntraVENous Administered By  Gopi Dooley RN                  Problem List:     Hospital Problems as of 7/23/2021 Date Reviewed: 6/28/2021        Codes Class Noted - Resolved POA    Osteoarthritis of right knee ICD-10-CM: M17.11  ICD-9-CM: 715.96  7/23/2021 - Present Unknown        * (Principal) Status post right knee replacement ICD-10-CM: M34.240  ICD-9-CM: V43.65  7/23/2021 - Present Unknown             Thank you Michael Fulton MD for allowing us to participate in the care of this interesting patient. We shall follow with you.     Signed By: DO Cliff MontanoUNM [facility]    July 23, 2021  4:22 PM

## 2021-07-23 NOTE — PERIOP NOTES
TRANSFER - OUT REPORT:    Verbal report given to JoseG Gonzalez RN on Yosvany Wolf  being transferred to  for routine post - op       Report consisted of patients Situation, Background, Assessment and   Recommendations(SBAR). Information from the following report(s) SBAR, OR Summary, Intake/Output and MAR was reviewed with the receiving nurse. Lines:   Peripheral IV 07/23/21 Posterior;Right Hand (Active)   Site Assessment Clean, dry, & intact 07/23/21 1053   Phlebitis Assessment 0 07/23/21 1053   Infiltration Assessment 0 07/23/21 1053   Dressing Status Clean, dry, & intact; Occlusive 07/23/21 1053   Dressing Type Transparent;Tape 07/23/21 1053        Opportunity for questions and clarification was provided. Patient transported with:   O2 @ 2 liters  Tech    VTE prophylaxis orders have been written for Yosvany Wolf. Patient and family given floor number and nurses name. Family updated re: pt status after security code verified.

## 2021-07-23 NOTE — ANESTHESIA PROCEDURE NOTES
Peripheral Block    Start time: 7/23/2021 8:03 AM  End time: 7/23/2021 8:05 AM  Performed by: Mak Cowan MD  Authorized by: Mak Cowan MD       Pre-procedure: Indications: at surgeon's request and post-op pain management    Preanesthetic Checklist: patient identified, risks and benefits discussed, site marked, timeout performed, anesthesia consent given and patient being monitored    Timeout Time: 08:03 EDT          Block Type:   Block Type: Adductor canal  Laterality:  Right  Monitoring:  Responsive to questions, continuous pulse ox, oxygen, frequent vital sign checks and heart rate  Injection Technique:  Single shot  Procedures: ultrasound guided    Patient Position: supine  Prep: chlorhexidine    Location:  Upper thigh  Needle Type:  Stimuplex  Needle Gauge:  20 G  Needle Localization:  Ultrasound guidance  Medication Injected:  Ropivacaine 0.2% with epinephrine 1:200,000 injection, 20 mL (Mixture components: ropivacaine 2 mg/mL (0.2 %) Soln, 1 mL; EPINEPHrine HCl (PF) 1 mg/mL (1 mL) Soln, . 005 mL)  dexamethasone (DECADRON) 4 mg/mL injection, 4 mg  Med Admin Time: 7/23/2021 8:05 AM    Assessment:  Number of attempts:  1  Injection Assessment:  Incremental injection every 5 mL, negative aspiration for CSF, no paresthesia, ultrasound image on chart, no intravascular symptoms, negative aspiration for blood and local visualized surrounding nerve on ultrasound  Patient tolerance:  Patient tolerated the procedure well with no immediate complications

## 2021-07-24 VITALS
TEMPERATURE: 98.2 F | HEART RATE: 63 BPM | OXYGEN SATURATION: 95 % | WEIGHT: 193 LBS | SYSTOLIC BLOOD PRESSURE: 138 MMHG | RESPIRATION RATE: 16 BRPM | DIASTOLIC BLOOD PRESSURE: 74 MMHG | HEIGHT: 70 IN | BODY MASS INDEX: 27.63 KG/M2

## 2021-07-24 LAB
GLUCOSE BLD STRIP.AUTO-MCNC: 138 MG/DL (ref 65–100)
HGB BLD-MCNC: 12.8 G/DL (ref 13.6–17.2)
SERVICE CMNT-IMP: ABNORMAL

## 2021-07-24 PROCEDURE — 85018 HEMOGLOBIN: CPT

## 2021-07-24 PROCEDURE — 97535 SELF CARE MNGMENT TRAINING: CPT

## 2021-07-24 PROCEDURE — 74011250637 HC RX REV CODE- 250/637: Performed by: INTERNAL MEDICINE

## 2021-07-24 PROCEDURE — 97116 GAIT TRAINING THERAPY: CPT

## 2021-07-24 PROCEDURE — 74011250636 HC RX REV CODE- 250/636: Performed by: PHYSICIAN ASSISTANT

## 2021-07-24 PROCEDURE — 82962 GLUCOSE BLOOD TEST: CPT

## 2021-07-24 PROCEDURE — 74011250637 HC RX REV CODE- 250/637: Performed by: PHYSICIAN ASSISTANT

## 2021-07-24 PROCEDURE — 36415 COLL VENOUS BLD VENIPUNCTURE: CPT

## 2021-07-24 PROCEDURE — 97110 THERAPEUTIC EXERCISES: CPT

## 2021-07-24 PROCEDURE — 99024 POSTOP FOLLOW-UP VISIT: CPT | Performed by: ORTHOPAEDIC SURGERY

## 2021-07-24 RX ADMIN — CEFAZOLIN SODIUM 2 G: 10 INJECTION, POWDER, FOR SOLUTION INTRAVENOUS at 01:00

## 2021-07-24 RX ADMIN — ACETAMINOPHEN 1000 MG: 500 TABLET, FILM COATED ORAL at 06:25

## 2021-07-24 RX ADMIN — LEVOTHYROXINE SODIUM 100 MCG: 0.1 TABLET ORAL at 06:25

## 2021-07-24 RX ADMIN — PRAVASTATIN SODIUM 40 MG: 20 TABLET ORAL at 09:13

## 2021-07-24 RX ADMIN — Medication 10 ML: at 06:26

## 2021-07-24 RX ADMIN — CYANOCOBALAMIN TAB 1000 MCG 1000 MCG: 1000 TAB at 09:13

## 2021-07-24 RX ADMIN — HYDROCORTISONE 5 MG: 5 TABLET ORAL at 06:25

## 2021-07-24 RX ADMIN — METFORMIN HYDROCHLORIDE 500 MG: 500 TABLET ORAL at 09:13

## 2021-07-24 RX ADMIN — OXYCODONE 10 MG: 5 TABLET ORAL at 06:25

## 2021-07-24 RX ADMIN — Medication 81 MG: at 09:13

## 2021-07-24 RX ADMIN — DOCUSATE SODIUM 50MG AND SENNOSIDES 8.6MG 2 TABLET: 8.6; 5 TABLET, FILM COATED ORAL at 09:13

## 2021-07-24 RX ADMIN — BUDESONIDE AND FORMOTEROL FUMARATE DIHYDRATE 2 PUFF: 160; 4.5 AEROSOL RESPIRATORY (INHALATION) at 09:00

## 2021-07-24 RX ADMIN — OXYCODONE 10 MG: 5 TABLET ORAL at 10:27

## 2021-07-24 RX ADMIN — Medication 1 AMPULE: at 09:24

## 2021-07-24 RX ADMIN — HYDROMORPHONE HYDROCHLORIDE 1 MG: 1 INJECTION, SOLUTION INTRAMUSCULAR; INTRAVENOUS; SUBCUTANEOUS at 03:40

## 2021-07-24 NOTE — PROGRESS NOTES
Problem: Mobility Impaired (Adult and Pediatric)  Goal: *Acute Goals and Plan of Care (Insert Text)  Description: GOALS (1-4 days):  (1.)Mr. Leo Willett will move from supine to sit and sit to supine  in bed with INDEPENDENT. (2.)Mr. Leo Willett will transfer from bed to chair and chair to bed with SUPERVISION using the least restrictive device. (3.)Mr. Leo Willett will ambulate with SUPERVISION for 300 feet with the least restrictive device. Met 7/24/21  (4.)Mr. Leo Willett will ambulate up/down 1 steps with No railing with STAND BY ASSIST with walker. Met 7/24/21  (5.)Mr. Leo Willett will increase right knee ROM to 5°-80°. Met 7/24/21  ________________________________________________________________________________________________   Outcome: Progressing Towards Goal     PHYSICAL THERAPY JOINT CAMP TKA: Daily Note and AM 7/24/2021  INPATIENT: Hospital Day: 2  [place] / [place] / Product Type: Medicare /      NAME/AGE/GENDER: Vaishnavi Gonzalez is a 72 y.o. male   PRIMARY DIAGNOSIS:  Primary osteoarthritis of right knee [M17.11]   Procedure(s) and Anesthesia Type:     * RIGHT DENEEN KNEE ARTHROPLASTY TOTAL ROBOTIC ASSISTED/ CLAYTON/ ADDUCTOR CANAL BLOCK - Spinal (Right)  ICD-10: Treatment Diagnosis:    · Pain in Right Knee (M25.561)  · Stiffness of Right Knee, Not elsewhere classified (M25.661)  · Difficulty in walking, Not elsewhere classified (R26.2)      ASSESSMENT:     Mr. Leo Willett presents with impaired strength & mobility s/p right TKA. Pt also had decreased stability during out of bed activity. This pt will benefit from follow up therapy to help restore safe function prior to returning home with caregiver. Patient participated well this am.  Good demonstration of all exercises with great ROM. Ambulating well and able to negotiate steps without difficulty. No concerns prior to d/c home.     This section established at most recent assessment   PROBLEM LIST (Impairments causing functional limitations):  1. Decreased Strength  2. Decreased ADL/Functional Activities  3. Decreased Transfer Abilities  4. Decreased Ambulation Ability/Technique  5. Decreased Balance  6. Increased Pain  7. Decreased Activity Tolerance  8. Decreased Flexibility/Joint Mobility  9. Decreased [place] with Home Exercise Program   INTERVENTIONS PLANNED: (Benefits and precautions of physical therapy have been discussed with the patient.)  1. Bed Mobility  2. Cold  3. Gait Training  4. Home Exercise Program (HEP)  5. Range of Motion (ROM)  6. Therapeutic Activites  7. Therapeutic Exercise/Strengthening  8. Transfer Training     TREATMENT PLAN: Frequency/Duration: Follow patient BID for duration of hospital stay to address above goals. Rehabilitation Potential For Stated Goals: Good     RECOMMENDED REHABILITATION/EQUIPMENT: (at time of discharge pending progress): Continue Skilled Therapy and Home Health: Physical Therapy.               HISTORY:   History of Present Injury/Illness (Reason for Referral):  Right TKA  Past Medical History/Comorbidities:   Mr. Americo Rios  has a past medical history of Abnormal blood sugar, Abnormality of cortisol-binding globulin ([address] Actinic keratosis, Acute right flank pain, Anaphylactic reaction to bee sting, Anxiety, Anxiety disorder, Arthritis, Arthritis of left shoulder region, Asthma (7/20/2016), Backache (7/20/2016), Bilateral otitis media, BPH (benign prostatic hyperplasia) (7/20/2016), CAD (coronary artery disease) (04/20/2011), Cervical neck pain with evidence of disc disease, Chronic renal insufficiency (7/20/2016), Controlled diabetes mellitus ([address] (7/20/2016), COPD (chronic obstructive pulmonary disease) ([address] (7/20/2016), Corneal foreign body (6/13/14), COVID-19 vaccine series completed (03/22/2021), Depressive disorder (7/20/2016), Diarrhea, Disorders of calcium metabolism, Dumping syndrome, Dyspepsia and other specified disorders of function of stomach (8/29/2012), ED (erectile dysfunction) (7/20/2016), Edema, Elevated CPK, Elevated prolactin level, Essential hypertension, benign (8/29/2012), GERD (gastroesophageal reflux disease), Hand pain, right, Hematuria, Hyperlipidemia, Hyperprolactinemia ([facility] [address] Hypertension, Hypogonadism in male, Hypothyroidism, Knee effusion, Knee pain, Low serum testosterone level, Neoplasm of uncertain behavior of skin of neck, Neuralgia, Nevus, non-neoplastic, Numbness and tingling in left arm, Open wound of finger without complication (2/86/78), IRINA on CPAP (8/22/2014), Osteoarthrosis, unspecified whether generalized or localized, involving lower leg (8/29/2012), Osteoarthrosis, unspecified whether generalized or localized, lower leg (8/29/2012), Other disorder of calcium metabolism (8/29/2012), Other malaise and fatigue (11/27/2012), Pituitary tumor (12/11/2013), Prolactin increased, Puncture wound (6/11/12), Pure hypercholesterolemia (8/29/2012), Right shoulder pain, RLS (restless legs syndrome) (7/20/2016), Seborrheic keratosis, Seizures ([facility] [address] Shingles (herpes zoster) polyneuropathy, Shingles rash, and Sinusitis. He also has no past medical history of Difficult intubation, Malignant hyperthermia due to anesthesia, Nausea & vomiting, or Pseudocholinesterase deficiency. Mr. Dewayne Monzon  has a past surgical history that includes hx orthopaedic; hx tonsillectomy; pr chest surgery procedure unlisted; hx carpal tunnel release (Bilateral); hx bunionectomy (Right); hx cervical fusion (11/2007); and hx heart catheterization (04/20/2011).   Social History/Living Environment:   Home Environment: Private residence  # Steps to Enter: 1  Rails to Enter: No  One/Two Story Residence: One story  Living Alone: No  Support Systems: Spouse/Significant Other/Partner  Patient Expects to be Discharged to[de-identified] [employer]  Current DME Used/Available at Home: Walker, rolling  Tub or Shower Type: Tub/Shower combination    Prior Level of Function/Work/Activity:  Pt was independent without a device Number of Personal Factors/Comorbidities that affect the Plan of Care: 3+: HIGH COMPLEXITY   EXAMINATION:   Most Recent Physical Functioning:      Gross Assessment  AROM: Within functional limits (L LE)  Strength: Within functional limits (L LE)  Coordination: Within functional limits        RLE AROM  R Knee Flexion: 107  R Knee Extension: 1       RLE Strength  R Hip Flexion: 2+  R Knee Extension: 3-  R Ankle Dorsiflexion: 4    Bed Mobility  Supine to Sit: Supervision    Transfers  Sit to Stand: Supervision  Stand to Sit: Supervision  Bed to Chair: Supervision    Balance  Sitting: Intact  Standing: With support              Weight Bearing Status  Right Side Weight Bearing: As tolerated  Distance (ft): 400 Feet (ft)  Ambulation - Level of Assistance: Supervision  Assistive Device: Walker, rolling  Speed/Pearl: Pace decreased (<100 feet/min)  Step Length: Left shortened;Right shortened  Stance: Right decreased  Gait Abnormalities: Antalgic  Number of Stairs Trained: 5  Stairs - Level of Assistance: Stand-by assistance  Rail Use: Both (or RW)  Interventions: Safety awareness training;Verbal cues     Braces/Orthotics: none    Right Knee Cold  Type: Cryocuff      Body Structures Involved:  1. Joints  2. Muscles Body Functions Affected:  1. Sensory/Pain  2. Movement Related Activities and Participation Affected:  1. General Tasks and Demands  2. Mobility   Number of elements that affect the Plan of Care: 4+: HIGH COMPLEXITY   CLINICAL PRESENTATION:   Presentation: Stable and uncomplicated: LOW COMPLEXITY   CLINICAL DECISION MAKIN [facility] [address] [id number] AM-PAC 6 Clicks   Basic Mobility Inpatient Short Form  How much difficulty does the patient currently have. .. Unable A Lot A Little None   1. Turning over in bed (including adjusting bedclothes, sheets and blankets)? [] 1   [] 2   [x] 3   [] 4   2.   Sitting down on and standing up from a chair with arms ( e.g., wheelchair, bedside commode, etc.)   [] 1   [] 2   [x] 3   [] 4   3. Moving from lying on back to sitting on the side of the bed? [] 1   [] 2   [x] 3   [] 4   How much help from another person does the patient currently need. .. Total A Lot A Little None   4. Moving to and from a bed to a chair (including a wheelchair)? [] 1   [] 2   [x] 3   [] 4   5. Need to walk in hospital room? [] 1   [] 2   [x] 3   [] 4   6. Climbing 3-5 steps with a railing? [x] 1   [] 2   [] 3   [] 4   © 2007, Trustees of [address] [place] [address] [address] [id number], under license to [employer]. All rights reserved     Score:  Initial: 16 Most Recent: X (Date: -- )    Interpretation of Tool:  Represents activities that are increasingly more difficult (i.e. Bed mobility, Transfers, Gait). Medical Necessity:     · Patient is expected to demonstrate progress in   · strength, range of motion, balance, coordination, and functional technique  ·  to   · decrease assistance required with with bed mobility, transfers & gait  · .  Reason for Services/Other Comments:  · Patient continues to require skilled intervention due to   · Pt not independent with functional mobility  · . Use of outcome tool(s) and clinical judgement create a POC that gives a: Clear prediction of patient's progress: LOW COMPLEXITY            TREATMENT:   (In addition to Assessment/Re-Assessment sessions the following treatments were rendered)     Pre-treatment Symptoms/Complaints:  Patient agreeable. Pain Initial: numeric scale  Pain Intensity 1: 3  Post Session:  3/10     Gait Training (10 Minutes):  Gait training to improve and/or restore physical functioning as related to mobility, balance and coordination. Ambulated 400 Feet (ft) with Supervision using a Walker, rolling and minimal Safety awareness training;Verbal cues related to their stance phase and stride length to promote proper body alignment. Instruction in performance of stairs to correct sequencing.   Therapeutic Exercise: (15 Minutes):  Exercises per grid below to improve strength. Required minimal verbal and tactile cues to promote proper body alignment. Progressed range and repetitions as indicated. Date:  7/23 Date:  7/24/21 Date:     ACTIVITY/EXERCISE AM PM AM PM AM PM   GROUP THERAPY  []  []  []  []  []  []   Ankle Pumps  15 20      Quad Sets  15 20      Gluteal Sets  15 20      Hip ABd/ADduction  15 20      Straight Leg Raises  15 20      Knee Slides  15 20      Short Arc Quads  15 20      Long Arc Quads         Chair Slides   20               B = bilateral; AA = active assistive; A = active; P = passive      Treatment/Session Assessment:     Response to Treatment:  Participated well and moving very well. Education:  [x] Home Exercises  [x] Fall Precautions  [x] Use of Cold Therapy Unit [x] D/C Instruction Review  [] Knee Prosthesis Review  [x] Walker Management/Safety [] Adaptive Equipment as Needed       Interdisciplinary Collaboration:   o Physical Therapist  o Registered Nurse    After treatment position/precautions:   o Up in chair  o Bed/Chair-wheels locked  o Caregiver at bedside  o Call light within reach    Compliance with Program/Exercises: Will assess as treatment progresses. Recommendations/Intent for next treatment session:  Treatment next visit will focus on increasing Mr. Radha Mata independence with bed mobility, transfers, gait training, strength/ROM exercises, modalities for pain, and patient education.       Total Treatment Duration:  PT Patient Time In/Time Out  Time In: 0835  Time Out: 0900    Som Askew PT

## 2021-07-24 NOTE — PROGRESS NOTES
Problem: Patient Education: Go to Patient Education Activity  Goal: Patient/Family Education  7/24/2021 0603 by Ivis Mitchell  Outcome: Progressing Towards Goal  7/24/2021 0603 by Ivis Mitchell  Outcome: Progressing Towards Goal     Problem: Patient Education: Go to Patient Education Activity  Goal: Patient/Family Education  7/24/2021 0603 by Ivis Mitchell  Outcome: Progressing Towards Goal  7/24/2021 0603 by Ivis Mitchell  Outcome: Progressing Towards Goal     Problem: Falls - Risk of  Goal: *Absence of Falls  Description: Document Edith Guadarrama Fall Risk and appropriate interventions in the flowsheet.   7/24/2021 0603 by Ivis Mitchell  Outcome: Progressing Towards Goal  Note: Fall Risk Interventions:  Mobility Interventions: Communicate number of staff needed for ambulation/transfer, OT consult for ADLs, Patient to call before getting OOB, PT Consult for mobility concerns, PT Consult for assist device competence, Strengthening exercises (ROM-active/passive), Utilize walker, cane, or other assistive device, Bed/chair exit alarm         Medication Interventions: Patient to call before getting OOB, Teach patient to arise slowly    Elimination Interventions: Call light in reach, Elevated toilet seat, Patient to call for help with toileting needs, Urinal in reach           7/24/2021 0603 by Ivis Mitchell  Outcome: Progressing Towards Goal  Note: Fall Risk Interventions:  Mobility Interventions: Communicate number of staff needed for ambulation/transfer, OT consult for ADLs, Patient to call before getting OOB, PT Consult for mobility concerns, PT Consult for assist device competence, Strengthening exercises (ROM-active/passive), Utilize walker, cane, or other assistive device, Bed/chair exit alarm         Medication Interventions: Patient to call before getting OOB, Teach patient to arise slowly    Elimination Interventions: Call light in reach, Elevated toilet seat, Patient to call for help with toileting needs, Urinal in reach              Problem: Patient Education: Go to Patient Education Activity  Goal: Patient/Family Education  7/24/2021 0603 by March Boxer  Outcome: Progressing Towards Goal  7/24/2021 0603 by March Boxer  Outcome: Progressing Towards Goal     Problem: Patient Education: Go to Patient Education Activity  Goal: Patient/Family Education  7/24/2021 0603 by March Boxer  Outcome: Progressing Towards Goal  7/24/2021 0603 by March Boxer  Outcome: Progressing Towards Goal     Problem: Knee Replacement: Day of Surgery/Unit  Goal: Off Pathway (Use only if patient is Off Pathway)  7/24/2021 0603 by March Boxer  Outcome: Progressing Towards Goal  7/24/2021 0603 by March Boxer  Outcome: Progressing Towards Goal  Goal: Activity/Safety  7/24/2021 0603 by March Boxer  Outcome: Progressing Towards Goal  7/24/2021 0603 by March Boxer  Outcome: Progressing Towards Goal  Goal: Consults, if ordered  7/24/2021 0603 by March Boxer  Outcome: Progressing Towards Goal  7/24/2021 0603 by March Boxer  Outcome: Progressing Towards Goal  Goal: Diagnostic Test/Procedures  7/24/2021 0603 by March Boxer  Outcome: Progressing Towards Goal  7/24/2021 0603 by March Boxer  Outcome: Progressing Towards Goal  Goal: Nutrition/Diet  7/24/2021 0603 by March Boxer  Outcome: Progressing Towards Goal  7/24/2021 0603 by March Boxer  Outcome: Progressing Towards Goal  Goal: Medications  7/24/2021 0603 by March Boxer  Outcome: Progressing Towards Goal  7/24/2021 0603 by March Boxer  Outcome: Progressing Towards Goal  Goal: Respiratory  7/24/2021 0603 by March Boxer  Outcome: Progressing Towards Goal  7/24/2021 0603 by March Boxer  Outcome: Progressing Towards Goal  Goal: Treatments/Interventions/Procedures  7/24/2021 0603 by March Boxer  Outcome: Progressing Towards Goal  7/24/2021 0603 by March Boxer  Outcome: Progressing Towards Goal  Goal: Psychosocial  7/24/2021 0603 by Rashid Perdomo  Outcome: Progressing Towards Goal  7/24/2021 0603 by Rashid Perdomo  Outcome: Progressing Towards Goal  Goal: *Initiate mobility  7/24/2021 0603 by Rashid Perdomo  Outcome: Progressing Towards Goal  7/24/2021 0603 by Rashid Perdomo  Outcome: Progressing Towards Goal  Goal: *Optimal pain control at patient's stated goal  7/24/2021 0603 by Rashid Perdomo  Outcome: Progressing Towards Goal  7/24/2021 0603 by Rashid Perdomo  Outcome: Progressing Towards Goal  Goal: *Hemodynamically stable  7/24/2021 0603 by Rashid Perdomo  Outcome: Progressing Towards Goal  7/24/2021 0603 by Rashid Perdomo  Outcome: Progressing Towards Goal     Problem: Knee Replacement: Post-Op Day 1  Goal: Off Pathway (Use only if patient is Off Pathway)  7/24/2021 0603 by Rashid Perdomo  Outcome: Progressing Towards Goal  7/24/2021 0603 by Rashid Perdomo  Outcome: Progressing Towards Goal  Goal: Activity/Safety  7/24/2021 0603 by Rashid Perdomo  Outcome: Progressing Towards Goal  7/24/2021 0603 by Rashid Perdomo  Outcome: Progressing Towards Goal  Goal: Diagnostic Test/Procedures  7/24/2021 0603 by Rashid Perdomo  Outcome: Progressing Towards Goal  7/24/2021 0603 by Rashid Perdomo  Outcome: Progressing Towards Goal  Goal: Nutrition/Diet  7/24/2021 0603 by Rashid Perdomo  Outcome: Progressing Towards Goal  7/24/2021 0603 by Rashid Perdomo  Outcome: Progressing Towards Goal  Goal: Medications  7/24/2021 0603 by Rashid Perdomo  Outcome: Progressing Towards Goal  7/24/2021 0603 by Rashid Perdomo  Outcome: Progressing Towards Goal  Goal: Respiratory  7/24/2021 0603 by Rashid Perdomo  Outcome: Progressing Towards Goal  7/24/2021 0603 by Rashid Perdomo  Outcome: Progressing Towards Goal  Goal: Treatments/Interventions/Procedures  7/24/2021 0603 by Rashid Perdomo  Outcome: Progressing Towards Goal  7/24/2021 0603 by Rashid Perdomo  Outcome: Progressing Towards Goal  Goal: Psychosocial  7/24/2021 0603 by Kelsie Hatchet, [place]  Outcome: Progressing Towards Goal  7/24/2021 0603 by Dianna Hatchet  Outcome: Progressing Towards Goal  Goal: Discharge Planning  7/24/2021 0603 by Dianna Hatchet  Outcome: Progressing Towards Goal  7/24/2021 0603 by Dianna Hatchet  Outcome: Progressing Towards Goal  Goal: *Demonstrates progressive activity  7/24/2021 0603 by Dianna Hatchet  Outcome: Progressing Towards Goal  7/24/2021 0603 by Dianna Hatchet  Outcome: Progressing Towards Goal  Goal: *Optimal pain control at patient's stated goal  7/24/2021 0603 by Dianna Hatchet  Outcome: Progressing Towards Goal  7/24/2021 0603 by Dianna Hatchet  Outcome: Progressing Towards Goal  Goal: *Hemodynamically stable  7/24/2021 0603 by Dianna Hatchet  Outcome: Progressing Towards Goal  7/24/2021 0603 by Dianna Hatchet  Outcome: Progressing Towards Goal  Goal: *Discharge plan identified  7/24/2021 0603 by Dianna Hatchet  Outcome: Progressing Towards Goal  7/24/2021 0603 by Dianna Hatchet  Outcome: Progressing Towards Goal     Problem: Knee Replacement: Post-Op Day 2  Goal: Off Pathway (Use only if patient is Off Pathway)  7/24/2021 0603 by Dianna Hatchet  Outcome: Progressing Towards Goal  7/24/2021 0603 by Dianna Hatchet  Outcome: Progressing Towards Goal  Goal: Activity/Safety  7/24/2021 0603 by Dianna Hatchet  Outcome: Progressing Towards Goal  7/24/2021 0603 by Dianna Hatchet  Outcome: Progressing Towards Goal  Goal: Diagnostic Test/Procedures  7/24/2021 0603 by Dianna Hatchet  Outcome: Progressing Towards Goal  7/24/2021 0603 by Dianna Hatchet  Outcome: Progressing Towards Goal  Goal: Medications  7/24/2021 0603 by Dianna Hatchet  Outcome: Progressing Towards Goal  7/24/2021 0603 by Dianna Hatchet  Outcome: Progressing Towards Goal  Goal: Respiratory  7/24/2021 0603 by Dianna Hatchet  Outcome: Progressing Towards Goal  7/24/2021 0603 by Dianna Hatchet  Outcome: Progressing Towards Goal  Goal: Treatments/Interventions/Procedures  7/24/2021 1919 by Brennan Mccormicks  Outcome: Progressing Towards Goal  7/24/2021 0603 by Brennan Aures  Outcome: Progressing Towards Goal  Goal: Psychosocial  7/24/2021 0603 by Brennan Mccormicks  Outcome: Progressing Towards Goal  7/24/2021 0603 by Brennan Aures  Outcome: Progressing Towards Goal  Goal: *Met physical therapy criteria for discharge to the next level of care  7/24/2021 0603 by Brennan Aures  Outcome: Progressing Towards Goal  7/24/2021 0603 by Brennan Mccormicks  Outcome: Progressing Towards Goal  Goal: *Optimal pain control with oral analgesia  7/24/2021 0603 by Brennan Aures  Outcome: Progressing Towards Goal  7/24/2021 0603 by Brennan Mccormicks  Outcome: Progressing Towards Goal  Goal: *Hemodynamically stable  7/24/2021 0603 by Brennan Aures  Outcome: Progressing Towards Goal  7/24/2021 0603 by Brennan Mccormicks  Outcome: Progressing Towards Goal  Goal: *Tolerating diet  7/24/2021 0603 by Brennan Mccormicks  Outcome: Progressing Towards Goal  7/24/2021 0603 by Brennan Mccormicks  Outcome: Progressing Towards Goal  Goal: *Patient verbalizes understanding of discharge instructions  7/24/2021 0603 by Brennan Mccormicks  Outcome: Progressing Towards Goal  7/24/2021 0603 by Brennan Mccormicks  Outcome: Progressing Towards Goal

## 2021-07-24 NOTE — DISCHARGE INSTRUCTIONS
[facility]   Patient Discharge Instructions    Mary Anne Mcmillan / [id number] : 1950    Admitted 2021 Discharged: 2021     IF YOU HAVE ANY PROBLEMS ONCE YOU ARE AT HOME CALL THE FOLLOWING NUMBERS:   Main office number: [phone]      Medications    · The medications you are to continue on are listed on the medication reconciliation sheet. · Narcotic pain medications as well as supplemental iron can cause constipation. If this occurs try stopping the narcotic pain medication and/or the iron. · It is important that you take the medication exactly as they are prescribed. · Medications which increase your risk of blood clots are listed to stop for 5 weeks after surgery as well as medications or supplements which increase your risk of bleeding complications. · Keep your medication in the bottles provided by the pharmacist and keep a list of the medication names, dosages, and times to be taken in your wallet. · Do not take other medications without consulting your doctor. Important Information    Do NOT smoke as this will greatly increase your risk of infection! Resume your prehospital diet. If you have excessive nausea or vomitting call your doctor's office     Leg swelling and warmth is normal for 6 months after surgery. If you experience swelling in your leg elevate you leg while laying down with your toes above your heart. If you have sudden onset severe swelling with leg pain call our office. Use Keith Hose stockings until we see you in the office for your follow up appointment. The stitches deep inside take approximately 6 months to dissolve. There will be sharp shooting, stinging and burning pain. This is normal and will resolve between 3-6 months after surgery. Difficulty sleeping is normal following total Knee and Hip replacement. You may try melatonin, an over-the-counter sleep aid or benadryl to help with sleep.  Most patients will resume sleeping through the night 8 weeks after surgery. Home Physical Therapy is arranged. Home Health will contact you within 48 hrs of discharge that you have chosen. If you have not received a call within this time frame please contact that provider you chose. You should be given this information before you leave the hospital.     You are at a risk for falls. Use the rolling walker when walking. Patients who have had a joint replacement should not drive if they are still taking narcotic pain mediation during the daytime hours. Most patients wean themselves off of pain medication within 2-5 weeks after surgery. When to Call the office    - If you have a temperature greater then 101  - Uncontrolled vomiting   - Loose control of your bladder or bowel function  - Are unable to bear any wieght   - Need a pain medication refill     Information obtained by :  I understand that if any problems occur once I am at home I am to contact my physician. I understand and acknowledge receipt of the instructions indicated above. [de-identified] or R.N.'s Signature                                                                  Date/Time                                                                                                                                              Patient or Representative Signature                                                          Date/Time    Patient Education        Total Knee Replacement: What to Expect at [address] [place] [address] Drive had a total knee replacement. The doctor replaced the worn ends of the bones that connect to your knee (thighbone and lower leg bone) with plastic and metal parts. When you leave the hospital, you should be able to move around with a walker or crutches.  But you will need someone to help you at home for the next few weeks or until you have more energy and can move around better. You will go home with a bandage and stitches, staples, skin glue, or tape strips. Change the bandage as your doctor tells you to. If you have stitches or staples, your doctor will remove them 10 to 21 days after your surgery. Glue or tape strips will fall off on their own over time. You may still have some mild pain, and the area may be swollen for 3 to 6 months after surgery. Your knee will continue to improve for 6 to 12 months. You will probably use a walker for 1 to 3 weeks and then use crutches. When you are ready, you can use a cane. You will probably be able to walk on your own in 4 to 8 weeks. You will need to do months of physical rehabilitation (rehab) after a knee replacement. Rehab will help you strengthen the muscles of the knee and help you regain movement. After you recover, your artificial knee will allow you to do normal daily activities with less pain or no pain at all. You may be able to hike, dance, ride a bike, and play golf. Talk to your doctor about whether you can do more strenuous activities. Always tell your caregivers that you have an artificial knee. How long it will take to walk on your own, return to normal activities, and go back to work depends on your health and how well your rehabilitation (rehab) program goes. The better you do with your rehab exercises, the quicker you will get your strength and movement back. This care sheet gives you a general idea about how long it will take for you to recover. But each person recovers at a different pace. Follow the steps below to get better as quickly as possible. How can you care for yourself at home? Activity    · Rest when you feel tired. You may take a nap, but don't stay in bed all day. When you sit, use a chair with arms. You can use the arms to help you stand up.     · Work with your physical therapist to find the best way to exercise.  What you can do as your knee heals will depend on whether your new knee is cemented or uncemented. You may not be able to do certain things for a while if your new knee is uncemented.     · After your knee has healed enough, you can do more strenuous activities with caution. ? You can golf, but use a golf cart. And don't wear shoes with spikes. ? You can bike on a flat road or on a stationary bike. Avoid biking up hills. ? Your doctor may suggest that you stay away from activities that put stress on your knee. These include tennis, badminton, contact sports like football, jumping (such as in basketball), jogging, and running. ? Avoid activities where you might fall.     · Do not sit for more than 1 hour at a time. Get up and walk around for a while before you sit again. If you must sit for a long time, prop up your leg with a chair or footstool. This will help you avoid swelling.     · Ask your doctor when you can drive again. It may take up to 8 weeks after knee replacement surgery before it's safe for you to drive.     · When you get into a car, sit on the edge of the seat. Then pull in your legs, and turn to face the front.     · You should be able to do many everyday activities 3 to 6 weeks after your surgery. You will probably need to take 4 to 16 weeks off from work. When you can go back to work depends on the type of work you do and how you feel.     · Ask your doctor when it is okay for you to have sex.     · For 12 weeks, do not lift anything heavier than 10 pounds and do not lift weights. Diet    · By the time you leave the hospital, you should be eating your normal diet. If your stomach is upset, try bland, low-fat foods like plain rice, broiled chicken, toast, and yogurt. Your doctor may suggest that you take iron and vitamin supplements.     · Drink plenty of fluids (unless your doctor tells you not to).   · Eat healthy foods, and watch your portion sizes. Try to stay at your ideal weight.  Too much weight puts more stress on your new knee.     · You may notice that your bowel movements are not regular right after your surgery. This is common. Try to avoid constipation and straining with bowel movements. You may want to take a fiber supplement every day. If you have not had a bowel movement after a couple of days, ask your doctor about taking a mild laxative. Medicines    · Your doctor will tell you if and when you can restart your medicines. You will also get instructions about taking any new medicines.     · If you take aspirin or some other blood thinner, ask your doctor if and when to start taking it again. Make sure that you understand exactly what your doctor wants you to do.     · Your doctor may give you a blood-thinning medicine to prevent blood clots. If you take a blood thinner, be sure you get instructions about how to take your medicine safely. Blood thinners can cause serious bleeding problems. This medicine could be in pill form or as a shot (injection). If a shot is needed, your doctor will tell you how to do this.     · Be safe with medicines. Take pain medicines exactly as directed. ? If the doctor gave you a prescription medicine for pain, take it as prescribed. ? If you are not taking a prescription pain medicine, ask your doctor if you can take an over-the-counter medicine. ? Plan to take your pain medicine 30 minutes before exercises. It is easier to prevent pain before it starts than to stop it after it has started.     · If you think your pain medicine is making you sick to your stomach:  ? Take your medicine after meals (unless your doctor has told you not to). ? Ask your doctor for a different pain medicine.     · If your doctor prescribed antibiotics, take them as directed. Do not stop taking them just because you feel better. You need to take the full course of antibiotics. Incision care    · If your doctor told you how to care for your cut (incision), follow your doctor's instructions.  You will have a dressing over the cut. A dressing helps the incision heal and protects it. Your doctor will tell you how to take care of this.     · If you did not get instructions, follow this general advice:  ? If you have strips of tape on the cut the doctor made, leave the tape on for a week or until it falls off.  ? If you have stitches or staples, your doctor will tell you when to come back to have them removed. ? If you have skin glue on the cut, leave it on until it falls off. Skin glue is also called skin adhesive or liquid stitches. ? Change the bandage every day. ? Wash the area daily with warm water, and pat it dry. Don't use hydrogen peroxide or alcohol. They can slow healing. ? You may cover the area with a gauze bandage if it oozes fluid or rubs against clothing. ? You may shower 24 to 48 hours after surgery. Pat the incision dry. Don't swim or take a bath for the first 2 weeks, or until your doctor tells you it is okay. Exercise    · Your rehab program will give you a number of exercises to do to help you get back your knee's range of motion and strength. Always do them as your therapist tells you. Ice    · For pain and swelling, put ice or a cold pack on the area for 10 to 20 minutes at a time. Put a thin cloth between the ice and your skin. Other instructions    · Keep wearing your compression stockings as your doctor says. These help to prevent blood clots. How long you'll have to wear them depends on your activity level and the amount of swelling.     · Carry a medical alert card that says you have an artificial joint. You have metal pieces in your knee. These may set off some airport metal detectors. Follow-up care is a key part of your treatment and safety. Be sure to make and go to all appointments, and call your doctor if you are having problems. It's also a good idea to know your test results and keep a list of the medicines you take. When should you call for help?    Call 911 anytime you think you may need emergency care. For example, call if:    · You passed out (lost consciousness).     · You have severe trouble breathing.     · You have sudden chest pain and shortness of breath, or you cough up blood. Call your doctor now or seek immediate medical care if:    · You have signs of infection, such as:  ? Increased pain, swelling, warmth, or redness. ? Red streaks leading from the incision. ? Pus draining from the incision. ? A fever.     · You have signs of a blood clot, such as:  ? Pain in your calf, back of the knee, thigh, or groin. ? Redness and swelling in your leg or groin.     · Your incision comes open and begins to bleed, or the bleeding increases.     · You have pain that does not get better after you take pain medicine. Watch closely for changes in your health, and be sure to contact your doctor if:    · You do not have a bowel movement after taking a laxative. Where can you learn more? Go to http://www.gray.com/  Enter T054 in the search box to learn more about \"Total Knee Replacement: What to Expect at Home. \"  Current as of: November 16, 2020               Content Version: 12.8  © 8859-0492 [employer]. Care instructions adapted under license by [employer] (which disclaims liability or warranty for this information). If you have questions about a medical condition or this instruction, always ask your healthcare professional. [place] [address] any warranty or liability for your use of this information.

## 2021-07-24 NOTE — PROGRESS NOTES
Care Management Interventions  PCP Verified by CM: Yes  Mode of Transport at Discharge: Self  Transition of Care Consult (CM Consult): [address]: Yes  Discharge Durable Medical Equipment: No  Physical Therapy Consult: Yes  Occupational Therapy Consult: Yes  Current Support Network: Own Home, Lives with Spouse  Confirm Follow Up Transport: Family  The Plan for Transition of Care is Related to the Following Treatment Goals : Return to independent functionl.   The Patient and/or Patient Representative was Provided with a Choice of Provider and Agrees with the Discharge Plan?: Yes  Freedom of Choice List was Provided with Basic Dialogue that Supports the Patient's Individualized Plan of Care/Goals, Treatment Preferences and Shares the Quality Data Associated with the Providers?: Yes  Discharge Location  Discharge Placement: Home with home health

## 2021-07-24 NOTE — PROGRESS NOTES
[id number] [facility]       Name:  Contreras Ashraf  Age:70 y.o. Sex:male   :  1950    MRN:  [id number]   PCP:  Mendy Minaya MD      Admit Date:  2021  5:43 AM     Reason for Admission:   Primary osteoarthritis of right knee [M17.11]  Osteoarthritis of right knee [M17.11]    Reason for Consult:  I was asked to consult on this patient at the kind request of David Light MD for medical mgmt    Assessment & Plan: This is a 60-year-old male with:     #Right TKA- post-op day 1  - doing well postoperatively. - px mgmt, dvt prophy and post op mgmt as per primary, orthopedics    # pituitary tumor  - resume bromocriptine    # adrenal insuff  - takes cortef prn \"stressful activity\" prior to admission.  -Home dosing Cortef as needed    # HTN  - monitor on lisinopril, norvasc    # DM2  - glucophage  - add SSI while IP  - Diabetic diet    # hypothyroidism  - synthroid    Disposition Home today per primary orthopedics. History of Presenting Illness:     Contreras Ashraf is a 72 y.o. male with medical history of pituitary tumor, adrenal insufficiency, hypertension, hypothyroidism, DM type II non-insulin-dependent who presented to [facility] for elective right TKA. No postoperative complications today. Hospitalist consulted for medical management. Patient denies chest pain, pressure, dyspnea, nausea, vomiting, chills, palpitations or pain apart from right knee. Subjective   patient is doing well postop. Pain in the right knee improved. No fever no chills. No nausea no vomiting. Review of Systems:  A 14 point review of systems was taken and pertinent positive as per HPI.         Past Medical History:   Diagnosis Date    Abnormal blood sugar     Abnormality of cortisol-binding globulin (HCC)     Actinic keratosis     Acute right flank pain     Anaphylactic reaction to bee sting     Anxiety     takes Lorazepam prn    Anxiety disorder     Arthritis     Arthritis of left shoulder region     Asthma 7/20/2016    followed by pulmonary; uses inhaler    Backache 7/20/2016    Bilateral otitis media     BPH (benign prostatic hyperplasia) 7/20/2016    CAD (coronary artery disease) 04/20/2011    He had mild nonobstructive CAD at cath by Dr. Jayson Merlin several years ago; cardiac cath:  4/20/11    Cervical neck pain with evidence of disc disease     Chronic renal insufficiency 7/20/2016    Controlled diabetes mellitus ([address] 7/20/2016 6/30/21 :  hgb A1c:  5.8; daily fasting sqbs:  102    COPD (chronic obstructive pulmonary disease) ([address] 7/20/2016    Corneal foreign body 6/13/14    COVID-19 vaccine series completed 03/22/2021    Moderna    Depressive disorder 7/20/2016    clinical depression~resolved    Diarrhea     Disorders of calcium metabolism     Dumping syndrome     Dyspepsia and other specified disorders of function of stomach 8/29/2012    ED (erectile dysfunction) 7/20/2016    Edema     Elevated CPK     Elevated prolactin level     Essential hypertension, benign 8/29/2012    GERD (gastroesophageal reflux disease)     Hand pain, right     Hematuria     Hyperlipidemia     Hyperprolactinemia ([address]     followed by endocrinologist    Hypertension     Hypogonadism in male     Hypothyroidism     takes levothyroxine    Knee effusion     Knee pain     Low serum testosterone level     Neoplasm of uncertain behavior of skin of neck     Neuralgia     Nevus, non-neoplastic     Numbness and tingling in left arm     Open wound of finger without complication 9/08/89    IRINA on CPAP 8/22/2014    uses CPAP    Osteoarthrosis, unspecified whether generalized or localized, involving lower leg 8/29/2012    Osteoarthrosis, unspecified whether generalized or localized, lower leg 8/29/2012    Other disorder of calcium metabolism 8/29/2012    Other malaise and fatigue 11/27/2012    Pituitary tumor 12/11/2013    Prolactin increased     Puncture wound 6/11/12    pt stepped on a sarbjit nail    Pure hypercholesterolemia 2012    Right shoulder pain     RLS (restless legs syndrome) 2016    Seborrheic keratosis     Seizures ([facility] [address]     30 years ago 1971    Shingles (herpes zoster) polyneuropathy     Shingles rash     Sinusitis        Past Surgical History:   Procedure Laterality Date    HX BUNIONECTOMY Right     HX CARPAL TUNNEL RELEASE Bilateral     HX CERVICAL FUSION  2007    C3-C4-has metal screws     HX HEART CATHETERIZATION  2011    HX ORTHOPAEDIC      bilat carpel tunnel, bunion repair,neck surg foot surg    HX TONSILLECTOMY      [place] CHEST SURGERY PROCEDURE UNLISTED      pt denies       Family History : reviewed  Family History   Problem Relation Age of Onset    Cancer Mother         Kidney    Cancer Father         Lymphoma    Diabetes Maternal Grandfather         Social History     Tobacco Use    Smoking status: Former Smoker     Packs/day: 0.50     Quit date: 2009     Years since quittin.2    Smokeless tobacco: Former User     Types: Chew     Quit date: 1986   Substance Use Topics    Alcohol use: No       Allergies   Allergen Reactions    Bee Sting [Sting, Bee] Swelling    Keflex [Cephalexin] Hives    Pcn [Penicillins] Other (comments)     States causes him to pass out    Sulfa (Sulfonamide Antibiotics) Rash       Immunization History   Administered Date(s) Administered    Influenza High Dose Vaccine PF 2016    Influenza Vaccine 10/22/2015    Pneumococcal Conjugate (PCV-13) 2017    Td 2012         PTA Medications:  Current Outpatient Medications   Medication Instructions    amLODIPine (NORVASC) 5 mg, Oral, DAILY    aspirin delayed-release 81 mg, Oral, EVERY 12 HOURS    BROMOCRIPTINE MESYLATE (PARLODEL PO) 5 mg, Oral, EVERY BEDTIME, Takes 4 tablets at bedtime    budesonide-formoterol (SYMBICORT) 160-4.5 mcg/actuation HFA inhaler 2 Puffs, Inhalation, 2 TIMES DAILY    cyanocobalamin 1,000 mcg, Oral, 2 TIMES DAILY    DEXAMETHASONE SOD PHOSPHATE (DEXAMETHASONE SODIUM PHOSPHATE INJECTION) Injection    EPINEPHrine (EPIPEN) 0.3 mg, AS NEEDED    hydroCHLOROthiazide (HYDRODIURIL) 12.5 mg, Oral, DAILY    hydrocortisone (CORTEF) 5 mg, DAILY AS NEEDED    levothyroxine (SYNTHROID) 100 mcg, Oral, DAILY BEFORE BREAKFAST    lisinopriL (PRINIVIL, ZESTRIL) 20 mg, Oral, DAILY    LORazepam (ATIVAN) 1 mg, Oral, 4 TIMES DAILY AS NEEDED    meloxicam (MOBIC) 7.5 mg, Oral, DAILY    metFORMIN (GLUCOPHAGE) 500 mg, Oral, 2 TIMES DAILY WITH MEALS, 2 tablets BID    nitroglycerin (NITROSTAT) 0.4 mg, SubLINGual, AS NEEDED    omeprazole (PRILOSEC) 40 mg, Oral, DAILY    oxyCODONE IR (ROXICODONE) 5-10 mg, Oral, EVERY 4 HOURS AS NEEDED    pravastatin (PRAVACHOL) 40 mg, Oral, DAILY    sildenafil citrate (VIAGRA) 100 mg, Oral, AS NEEDED    SYRINGE, DISPOSABLE, 20 ML (BD LUER-ARTHUR BULK SYRINGE) BD Luer-Arthur Syringe 25G X 1-1/2\"3 ML, 1 misc every 2 weeks     TESTOSTERONE CYPIONATE IM 50 mg, IntraMUSCular, EVERY 2 WEEKS, 200MG/ML, 0.5 ml every 2 weeks       Objective:     Patient Vitals for the past 24 hrs:   Temp Pulse Resp BP SpO2   07/24/21 0837 98.2 °F (36.8 °C) 63 16 138/74 95 %   07/24/21 0252 97.7 °F (36.5 °C) 68 16 136/72 95 %   07/23/21 2308 97.7 °F (36.5 °C) 74 16 128/74 93 %   07/23/21 2143     94 %   07/23/21 1902 97.4 °F (36.3 °C) 94 16 133/73 91 %       Oxygen Therapy  O2 Sat (%): 95 % (07/24/21 0837)  Pulse via Oximetry: 66 beats per minute (07/24/21 0837)  O2 Device: None (Room air) (07/24/21 0837)  O2 Flow Rate (L/min): 0 l/min (07/23/21 1454)    Body mass index is 27.69 kg/m².     Physical Exam:    General:  No acute distress, speaking in full sentences  HEENT:  Pupils equal and reactive to light and accommodation, oropharynx is clear   Neck:   Supple, no lymphadenopathy, no JVD   Lungs:  Clear to auscultation bilaterally   CV:   Regular rate and rhythm with normal S1 and S2   Abdomen:  Soft, nontender, nondistended, normoactive bowel sounds   Extremities:  No cyanosis clubbing or edema right knee in immobilizer  Neuro:  Nonfocal, A&O x3   Psych:  Normal mood and affect       Data Reviewed: I have reviewed all labs, meds, and studies. Recent Results (from the past 24 hour(s))   HEMOGLOBIN    Collection Time: 07/23/21  7:25 PM   Result Value Ref Range    HGB 13.2 (L) 13.6 - 17.2 g/dL   GLUCOSE, POC    Collection Time: 07/23/21  9:46 PM   Result Value Ref Range    Glucose (POC) 316 (H) 65 - 100 mg/dL    Performed by Mik    HEMOGLOBIN    Collection Time: 07/24/21  5:36 AM   Result Value Ref Range    HGB 12.8 (L) 13.6 - 17.2 g/dL   GLUCOSE, POC    Collection Time: 07/24/21  6:30 AM   Result Value Ref Range    Glucose (POC) 138 (H) 65 - 100 mg/dL    Performed by Dayanna        EKG Results     None          All Micro Results     None          Other Studies:  No results found.       Medications:  Medications Administered      Medications Administered     acetaminophen (TYLENOL) tablet 1,000 mg     Admin Date  07/23/2021 Action  Given Dose  1,000 mg Route  Oral Administered By  Didi You RN           Admin Date  07/23/2021 Action  Given Dose  1,000 mg Route  Oral Administered By  Praful Lentz RN          alcohol 62% (NOZIN) nasal  3 Ampule     Admin Date  07/23/2021 Action  Given Dose  3 Ampule Route  Topical Administered By  Didi You RN          budesonide-formoteroL (SYMBICORT) 160-4.5 mcg/actuation HFA inhaler 2 Puff     Admin Date  07/23/2021 Action  Patient/Family Admin Dose  2 Puff Route  Inhalation Administered By  Luis FINN          ceFAZolin (ANCEF) 2 g/20 mL in sterile water IV syringe     Admin Date  07/23/2021 Action  New Bag Dose  2 g Route  IntraVENous Administered By  Praful Lentz RN          fentaNYL citrate (PF) injection 100 mcg     Admin Date  07/23/2021 Action  Given Dose  50 mcg Route  IntraVENous Administered By  Katiana Heard RN lactated Ringers infusion     Admin Date  07/23/2021 Action  New Bag Dose  75 mL/hr Rate  75 mL/hr Route  IntraVENous Administered By  Adelia Delacruz RN          midazolam (VERSED) injection 2 mg     Admin Date  07/23/2021 Action  Given Dose  2 mg Route  IntraVENous Administered By  Antony Roque RN          oxyCODONE IR (ROXICODONE) tablet 10 mg     Admin Date  07/23/2021 Action  Given Dose  10 mg Route  Oral Administered By  Bridget Strauss RN          sodium chloride (NS) flush 5-40 mL     Admin Date  07/23/2021 Action  Given Dose  10 mL Route  IntraVENous Administered By  Bridget Strauss RN                  Problem List:     Hospital Problems as of 7/24/2021 Date Reviewed: 6/28/2021        Codes Class Noted - Resolved POA    Osteoarthritis of right knee ICD-10-CM: M17.11  ICD-9-CM: 715.96  7/23/2021 - Present Unknown        * (Principal) Status post right knee replacement ICD-10-CM: Z25.733  ICD-9-CM: V43.65  7/23/2021 - Present Unknown        RLS (restless legs syndrome) ICD-10-CM: G25.81  ICD-9-CM: 333.94  7/20/2016 - Present Yes        IRINA on CPAP ICD-10-CM: G47.33, Z99.89  ICD-9-CM: 327.23, V46.8  8/22/2014 - Present Yes        Pituitary tumor ICD-10-CM: D49.7  ICD-9-CM: 239.7  12/11/2013 - Present Yes        Pure hypercholesterolemia ICD-10-CM: E78.00  ICD-9-CM: 272.0  8/29/2012 - Present Yes             Thank you Marylu Figueroa MD for allowing us to participate in the care of this interesting patient    Signed By: Miguel Trevino MD   [facility] - [facility] Hospitalist Service    July 24, 2021

## 2021-07-24 NOTE — PROGRESS NOTES
2021         Post Op day: 1 Day Post-OpProcedure(s) (LRB):  RIGHT DENEEN KNEE ARTHROPLASTY TOTAL ROBOTIC ASSISTED/ CLAYTON/ ADDUCTOR CANAL BLOCK (Right)      Admit Date: 2021  Admit Diagnosis: Primary osteoarthritis of right knee [M17.11]; Osteoarthritis of right knee [M17.11]    LAB:    Recent Results (from the past 24 hour(s))   HEMOGLOBIN    Collection Time: 21  7:25 PM   Result Value Ref Range    HGB 13.2 (L) 13.6 - 17.2 g/dL   GLUCOSE, POC    Collection Time: 21  9:46 PM   Result Value Ref Range    Glucose (POC) 316 (H) 65 - 100 mg/dL    Performed by Mik    HEMOGLOBIN    Collection Time: 21  5:36 AM   Result Value Ref Range    HGB 12.8 (L) 13.6 - 17.2 g/dL   GLUCOSE, POC    Collection Time: 21  6:30 AM   Result Value Ref Range    Glucose (POC) 138 (H) 65 - 100 mg/dL    Performed by Dayanna      Vital Signs:    Patient Vitals for the past 8 hrs:   BP Temp Pulse Resp SpO2   21 0252 136/72 97.7 °F (36.5 °C) 68 16 95 %     Temp (24hrs), Av.8 °F (36.6 °C), Min:97.4 °F (36.3 °C), Max:98.2 °F (36.8 °C)    Body mass index is 27.69 kg/m².   Pain Control:   Pain Assessment  Pain Scale 1: Numeric (0 - 10)  Pain Intensity 1: 5  Pain Location 1: Knee  Pain Orientation 1: Right  Pain Intervention(s) 1: Medication (see MAR), Accupressure    Subjective: Doing well, No complaints, No SOB, No Chest Pain, No nausea or vomiting     Objective: Vital Signs are Stable, No Acute Distress, Alert and Oriented, Dressing is dry,  Neurovascular exam is normal.       PT/OT:            Assistive Device: Walker (comment), Fall prevention device  RLE AROM  R Knee Flexion: 70 (~post op)  R Knee Extension: -10 (~post op)             Wieght Bearing Status: WBAT    Meds:  [unfilled]  [unfilled]  [unfilled]    Assessment:   Patient Active Problem List   Diagnosis Code    Other disorder of calcium metabolism E83.59    Osteoarthrosis, unspecified whether generalized or localized, involving lower leg M17.10    Essential hypertension, benign I10    Pure hypercholesterolemia E78.00    Dyspepsia and other specified disorders of function of stomach K31.89, R10.13    Other malaise and fatigue R53.81, R53.83    Pituitary tumor D49.7    IRINA on CPAP G47.33, Z99.89    GERD (gastroesophageal reflux disease) K21.9    Hyperlipidemia E78.5    Hypothyroidism E03.9    Arthritis of left shoulder region M19.012    Hypogonadism in male E29.1    Anxiety disorder F41.9    CAD (coronary artery disease) I25.10    COPD (chronic obstructive pulmonary disease) ([facility]) J44.9    ED (erectile dysfunction) N52.9    Asthma J45.909    RLS (restless legs syndrome) G25.81    BPH (benign prostatic hyperplasia) N40.0    Depressive disorder F32.9    Controlled diabetes mellitus ([facility]) E11.9    Chronic renal insufficiency N18.9    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    Cervical neck pain with evidence of disc disease M50.90    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    Knee pain M25.569    Chest pain R07.9    Essential hypertension with goal blood pressure less than 130/85 I10    Coronary artery disease involving native heart I25.10    History of COPD Z87.09    Coronary artery disease involving native coronary artery of native heart without angina pectoris I25.10    Right carotid bruit R09.89    Mixed hyperlipidemia E78.2    Family history of MI (myocardial infarction) Z82.49    Osteoarthritis of right knee M17.11    Status post right knee replacement Z96.651             Plan: Continue Physical Therapy, Monitor labs, home today        Signed By: Tj Almonte MD

## 2021-07-24 NOTE — PROGRESS NOTES
07/23/21 2404   Oxygen Therapy   O2 Sat (%) 94 %   Pulse via Oximetry 76 beats per minute   O2 Device None (Room air)     Pt cpap at bed side and ready for use.

## 2021-07-24 NOTE — PROGRESS NOTES
Problem: Self Care Deficits Care Plan (Adult)  Goal: *Acute Goals and Plan of Care (Insert Text)  Outcome: Progressing Towards Goal  Note: GOALS:   DISCHARGE GOALS (in preparation for going home/rehab):  3 days  1. Mr. Severiano Garces will perform one lower body dressing activity with minimal assistance required to demonstrate improved functional mobility and safety. GOAL MET 7/24/2021  2. Mr. Severiano Garces will perform one lower body bathing activity with minimal assistance required to demonstrate improved functional mobility and safety. GOAL MET 7/24/2021  3. Mr. Severiano Garces will perform toileting/toilet transfer with contact guard assistance to demonstrate improved functional mobility and safety. GOAL MET 7/24/2021  4. Mr. Severiano Garces will perform shower transfer with contact guard assistance to demonstrate improved functional mobility and safety. GOAL MET 7/24/2021       JOINT CAMP OCCUPATIONAL THERAPY TKA: Daily Note, Discharge, Treatment Day: 2nd and AM 7/24/2021  INPATIENT: Hospital Day: 2  [place] / [place] / Product Type: Medicare /      NAME/AGE/GENDER: Candice Vuong is a 72 y.o. male   PRIMARY DIAGNOSIS:  Primary osteoarthritis of right knee [M17.11]   Procedure(s) and Anesthesia Type:     * RIGHT DENEEN KNEE ARTHROPLASTY TOTAL ROBOTIC ASSISTED/ CLAYTON/ ADDUCTOR CANAL BLOCK - Spinal (Right)  ICD-10: Treatment Diagnosis:    · Pain in Right Knee (M25.561)  · Stiffness of Right Knee, Not elsewhere classified (M25.661)  · Other lack of cordination (R27.8)  · Difficulty in walking, Not elsewhere classified (R26.2)  · Other abnormalities of gait and mobility (R26.89)      ASSESSMENT:      Mr. Severiano Garces is s/p R TKA and presents with decreased weight bearing on R LE and decreased independence with functional mobility and activities of daily living. Patient completed shower and dressing as charted below in ADL grid and is ambulating with rolling walker with supervision.   Patient has met 4/4 goals and plans to return home with good family support. Family able to provide patient with appropriate level of assistance at this time. OT reviewed safety precautions throughout session and therapy schedule for the remainder of today. Patient instructed to call for assistance when needing to get up from recliner and all needs in reach. Patient verbalized understanding of call light. This section established at most recent assessment   PROBLEM LIST (Impairments causing functional limitations):  1. Decreased Strength  2. Decreased ADL/Functional Activities  3. Decreased Transfer Abilities  4. Increased Pain  5. Increased Fatigue  6. Decreased Flexibility/Joint Mobility  7. Decreased Knowledge of Precautions   INTERVENTIONS PLANNED: (Benefits and precautions of occupational therapy have been discussed with the patient.)  1. Activities of daily living training  2. Adaptive equipment training  3. Balance training  4. Clothing management  5. Donning&doffing training  6. Theraputic activity     TREATMENT PLAN: Frequency/Duration: Follow patient 1-2 times to address above goals. Rehabilitation Potential For Stated Goals: Good     RECOMMENDED REHABILITATION/EQUIPMENT: (at time of discharge pending progress): Continue Skilled Therapy. OCCUPATIONAL PROFILE AND HISTORY:   History of Present Injury/Illness (Reason for Referral): Pt presents this date s/p (right) TKA.     Past Medical History/Comorbidities:   Mr. Nirali Klein  has a past medical history of Abnormal blood sugar, Abnormality of cortisol-binding globulin ([facility] [address] Actinic keratosis, Acute right flank pain, Anaphylactic reaction to bee sting, Anxiety, Anxiety disorder, Arthritis, Arthritis of left shoulder region, Asthma (7/20/2016), Backache (7/20/2016), Bilateral otitis media, BPH (benign prostatic hyperplasia) (7/20/2016), CAD (coronary artery disease) (04/20/2011), Cervical neck pain with evidence of disc disease, Chronic renal insufficiency (7/20/2016), Controlled diabetes mellitus ([facility] [address] (7/20/2016), COPD (chronic obstructive pulmonary disease) ([address] (7/20/2016), Corneal foreign body (6/13/14), COVID-19 vaccine series completed (03/22/2021), Depressive disorder (7/20/2016), Diarrhea, Disorders of calcium metabolism, Dumping syndrome, Dyspepsia and other specified disorders of function of stomach (8/29/2012), ED (erectile dysfunction) (7/20/2016), Edema, Elevated CPK, Elevated prolactin level, Essential hypertension, benign (8/29/2012), GERD (gastroesophageal reflux disease), Hand pain, right, Hematuria, Hyperlipidemia, Hyperprolactinemia ([address] Hypertension, Hypogonadism in male, Hypothyroidism, Knee effusion, Knee pain, Low serum testosterone level, Neoplasm of uncertain behavior of skin of neck, Neuralgia, Nevus, non-neoplastic, Numbness and tingling in left arm, Open wound of finger without complication (8/07/19), IRINA on CPAP (8/22/2014), Osteoarthrosis, unspecified whether generalized or localized, involving lower leg (8/29/2012), Osteoarthrosis, unspecified whether generalized or localized, lower leg (8/29/2012), Other disorder of calcium metabolism (8/29/2012), Other malaise and fatigue (11/27/2012), Pituitary tumor (12/11/2013), Prolactin increased, Puncture wound (6/11/12), Pure hypercholesterolemia (8/29/2012), Right shoulder pain, RLS (restless legs syndrome) (7/20/2016), Seborrheic keratosis, Seizures ([facility] [address] Shingles (herpes zoster) polyneuropathy, Shingles rash, and Sinusitis. He also has no past medical history of Difficult intubation, Malignant hyperthermia due to anesthesia, Nausea & vomiting, or Pseudocholinesterase deficiency. Mr. Paddy Pina  has a past surgical history that includes hx orthopaedic; hx tonsillectomy; pr chest surgery procedure unlisted; hx carpal tunnel release (Bilateral); hx bunionectomy (Right); hx cervical fusion (11/2007); and hx heart catheterization (04/20/2011).   Social History/Living Environment:   Home Environment: Private residence  # Steps to Enter: 1  Rails to [facility]: No  One/Two Story Residence: One story  Living Alone: No  Support Systems: Spouse/Significant Other/Partner  Patient Expects to be Discharged to[de-identified] [employer]  Current DME Used/Available at Home: Walker, rolling  Tub or Shower Type: Tub/Shower combination    Prior Level of Function/Work/Activity:  Mod I     Number of Personal Factors/Comorbidities that affect the Plan of Care: Brief history (0):  LOW COMPLEXITY   ASSESSMENT OF OCCUPATIONAL PERFORMANCE[de-identified]   Most Recent Physical Functioning:   Balance  Sitting: Intact  Standing: Intact                              Mental Status  Neurologic State: Alert  Orientation Level: Oriented X4  Cognition: Appropriate decision making; Appropriate for age attention/concentration  Perception: Appears intact  Perseveration: No perseveration noted  Safety/Judgement: Fall prevention                Basic ADLs (From Assessment) Complex ADLs (From Assessment)   Basic ADL  Feeding: Independent  Oral Facial Hygiene/Grooming: Supervision  Bathing: Minimum assistance  Type of Bath: Chlorhexidine (CHG), Full, Shower  Upper Body Dressing: Supervision  Lower Body Dressing: Minimum assistance, Moderate assistance  Toileting: Contact guard assistance, Minimum assistance     Grooming/Bathing/Dressing Activities of Daily Living   Grooming  Washing Face: Independent Cognitive Retraining  Safety/Judgement: Fall prevention   Upper Body Bathing  Bathing Assistance: Modified independent  Position Performed: Seated in chair  Adaptive Equipment: Shower chair     Lower Body Bathing  Bathing Assistance: Modified independent  Perineal  : Independent  Lower Body : Modified independent  Position Performed: Seated in chair     Upper Body Dressing Assistance  Dressing Assistance: Independent  Pullover Shirt: Independent Functional Transfers  Toilet Transfer : Modified independent  Shower Transfer: Modified independent   Lower Body Dressing Assistance  Dressing Assistance: Minimum assistance  Underpants: Independent  Pants With Elastic Waist: Independent  Socks: Independent  Shoes with Cloth Laces: Minimum assistance Bed/Mat Mobility  Supine to Sit: Supervision  Sit to Stand: Supervision  Bed to Chair: Supervision         Physical Skills Involved:  1. Balance  2. Strength  3. Activity Tolerance Cognitive Skills Affected (resulting in the inability to perform in a timely and safe manner): 1. none Psychosocial Skills Affected:  1. none   Number of elements that affect the Plan of Care: 1-3:  LOW COMPLEXITY   CLINICAL DECISION MAKING:   M [employer] AM-PAC 6 Clicks   Daily Activity Inpatient Short Form  How much help from another person does the patient currently need. .. Total A Lot A Little None   1. Putting on and taking off regular lower body clothing? [] 1   [x] 2   [] 3   [] 4   2. Bathing (including washing, rinsing, drying)? [] 1   [] 2   [x] 3   [] 4   3. Toileting, which includes using toilet, bedpan or urinal?   [] 1   [] 2   [x] 3   [] 4   4. Putting on and taking off regular upper body clothing? [] 1   [] 2   [] 3   [x] 4   5. Taking care of personal grooming such as brushing teeth? [] 1   [] 2   [] 3   [x] 4   6. Eating meals? [] 1   [] 2   [] 3   [x] 4   © 2007, Trustees of [facility] [employer], under license to [employer]. All rights reserved     Score:  Initial:20 Most Recent: X (Date: -- )    Interpretation of Tool:  Represents activities that are increasingly more difficult (i.e. Bed mobility, Transfers, Gait).         Use of outcome tool(s) and clinical judgement create a POC that gives a: LOW COMPLEXITY            TREATMENT:   (In addition to Assessment/Re-Assessment sessions the following treatments were rendered)     Pre-treatment Symptoms/Complaints:    Pain: Initial:   Pain Intensity 1: 0  Post Session:  2/10 iceman in place, educated to call nursing for pain meds     Self Care: (25): Procedure(s) (per grid) utilized to improve and/or restore self-care/home management as related to dressing, bathing, toileting, grooming and functional transfers. Required minimal visual, verbal, manual, and tactile cueing to facilitate activities of daily living skills and compensatory activities. Treatment/Session Assessment:     Response to Treatment:  tolerated well. Education:  [] Home Exercises  [x] Fall Precautions  [] Hip Precautions [] Going Home Video  [x] Knee/Hip Prosthesis Review  [x] Walker Management/Safety [x] Adaptive Equipment as Needed       Interdisciplinary Collaboration:   o Physical Therapist  o Certified Occupational Therapy Assistant  o Registered Nurse    After treatment position/precautions:   o Up in chair  o Bed/Chair-wheels locked  o Bed in low position  o Call light within reach  o RN notified     Compliance with Program/Exercises: Will assess as treatment progresses. Pt doing well all goals met and will do well at home with support from family. Patient will be discharged home with home health PT. No further Occupational Therapy warranted, will discharge Occupational Therapy services.       Total Treatment Duration:  OT Patient Time In/Time Out  Time In: 0643  Time Out: 7[address]

## 2021-07-25 ENCOUNTER — HOME CARE VISIT (OUTPATIENT)
Dept: SCHEDULING | Facility: HOME HEALTH | Age: 71
End: 2021-07-25
Payer: MEDICARE

## 2021-07-25 VITALS
HEART RATE: 84 BPM | SYSTOLIC BLOOD PRESSURE: 150 MMHG | TEMPERATURE: 98.9 F | RESPIRATION RATE: 16 BRPM | DIASTOLIC BLOOD PRESSURE: 70 MMHG

## 2021-07-25 PROCEDURE — 400018 HH-NO PAY CLAIM PROCEDURE

## 2021-07-25 PROCEDURE — 3331090002 HH PPS REVENUE DEBIT

## 2021-07-25 PROCEDURE — 3331090001 HH PPS REVENUE CREDIT

## 2021-07-25 PROCEDURE — G0151 HHCP-SERV OF PT,EA 15 MIN: HCPCS

## 2021-07-25 PROCEDURE — 400013 HH SOC

## 2021-07-26 PROCEDURE — 3331090001 HH PPS REVENUE CREDIT

## 2021-07-26 PROCEDURE — 3331090002 HH PPS REVENUE DEBIT

## 2021-07-26 NOTE — ADDENDUM NOTE
Addendum  created 07/26/21 0718 by Sabrina Urban CRNA    Flowsheet accepted, Intraprocedure Flowsheets edited

## 2021-07-27 PROCEDURE — 3331090001 HH PPS REVENUE CREDIT

## 2021-07-27 PROCEDURE — 3331090002 HH PPS REVENUE DEBIT

## 2021-07-28 PROCEDURE — 3331090002 HH PPS REVENUE DEBIT

## 2021-07-28 PROCEDURE — 3331090001 HH PPS REVENUE CREDIT

## 2021-07-29 ENCOUNTER — HOME CARE VISIT (OUTPATIENT)
Dept: SCHEDULING | Facility: HOME HEALTH | Age: 71
End: 2021-07-29
Payer: MEDICARE

## 2021-07-29 VITALS
SYSTOLIC BLOOD PRESSURE: 180 MMHG | HEART RATE: 90 BPM | RESPIRATION RATE: 18 BRPM | TEMPERATURE: 98.6 F | DIASTOLIC BLOOD PRESSURE: 88 MMHG

## 2021-07-29 PROCEDURE — G0151 HHCP-SERV OF PT,EA 15 MIN: HCPCS

## 2021-07-29 PROCEDURE — 3331090002 HH PPS REVENUE DEBIT

## 2021-07-29 PROCEDURE — 3331090001 HH PPS REVENUE CREDIT

## 2021-07-30 ENCOUNTER — HOME CARE VISIT (OUTPATIENT)
Dept: SCHEDULING | Facility: HOME HEALTH | Age: 71
End: 2021-07-30
Payer: MEDICARE

## 2021-07-30 VITALS
OXYGEN SATURATION: 96 % | TEMPERATURE: 98.4 F | SYSTOLIC BLOOD PRESSURE: 148 MMHG | RESPIRATION RATE: 15 BRPM | DIASTOLIC BLOOD PRESSURE: 70 MMHG | HEART RATE: 91 BPM

## 2021-07-30 PROCEDURE — 3331090001 HH PPS REVENUE CREDIT

## 2021-07-30 PROCEDURE — G0157 HHC PT ASSISTANT EA 15: HCPCS

## 2021-07-30 PROCEDURE — 3331090002 HH PPS REVENUE DEBIT

## 2021-07-30 NOTE — CASE COMMUNICATION
Please update the medication profile with the following medication prescribed by DR Estrella Jacobson on 7.23.21. Oxycodone 5 mg tablets. Instructions to take 1 to 2 tablets by mouth every 4 hours as needed for pain. Maximum daily amount 60 mg.

## 2021-07-31 PROCEDURE — 3331090002 HH PPS REVENUE DEBIT

## 2021-07-31 PROCEDURE — 3331090001 HH PPS REVENUE CREDIT

## 2021-08-01 PROCEDURE — 3331090002 HH PPS REVENUE DEBIT

## 2021-08-01 PROCEDURE — 3331090001 HH PPS REVENUE CREDIT

## 2021-08-02 ENCOUNTER — HOME CARE VISIT (OUTPATIENT)
Dept: SCHEDULING | Facility: HOME HEALTH | Age: 71
End: 2021-08-02
Payer: MEDICARE

## 2021-08-02 VITALS
RESPIRATION RATE: 15 BRPM | HEART RATE: 93 BPM | OXYGEN SATURATION: 95 % | SYSTOLIC BLOOD PRESSURE: 138 MMHG | TEMPERATURE: 98 F | DIASTOLIC BLOOD PRESSURE: 70 MMHG

## 2021-08-02 PROCEDURE — G0157 HHC PT ASSISTANT EA 15: HCPCS

## 2021-08-02 PROCEDURE — 3331090002 HH PPS REVENUE DEBIT

## 2021-08-02 PROCEDURE — 3331090001 HH PPS REVENUE CREDIT

## 2021-08-03 ENCOUNTER — HOME CARE VISIT (OUTPATIENT)
Dept: SCHEDULING | Facility: HOME HEALTH | Age: 71
End: 2021-08-03
Payer: MEDICARE

## 2021-08-03 VITALS
SYSTOLIC BLOOD PRESSURE: 138 MMHG | HEART RATE: 86 BPM | DIASTOLIC BLOOD PRESSURE: 70 MMHG | TEMPERATURE: 98.2 F | RESPIRATION RATE: 15 BRPM

## 2021-08-03 PROBLEM — I25.10 CORONARY ARTERY DISEASE INVOLVING NATIVE HEART: Status: RESOLVED | Noted: 2017-04-03 | Resolved: 2021-08-03

## 2021-08-03 PROCEDURE — 3331090002 HH PPS REVENUE DEBIT

## 2021-08-03 PROCEDURE — G0157 HHC PT ASSISTANT EA 15: HCPCS

## 2021-08-03 PROCEDURE — 3331090001 HH PPS REVENUE CREDIT

## 2021-08-03 NOTE — CASE COMMUNICATION
Email messgae sent to Sanju Fernández RN Ortho Navigator for Outpatient order to be faxed to [address] at  [facility]. Patient will need appointment for 8.16.21.

## 2021-08-04 ENCOUNTER — HOME CARE VISIT (OUTPATIENT)
Dept: HOME HEALTH SERVICES | Facility: HOME HEALTH | Age: 71
End: 2021-08-04
Payer: MEDICARE

## 2021-08-04 PROCEDURE — 3331090002 HH PPS REVENUE DEBIT

## 2021-08-04 PROCEDURE — 3331090001 HH PPS REVENUE CREDIT

## 2021-08-04 NOTE — CASE COMMUNICATION
Adriana Hartman Ortho Nurse Navigator faxed Outpatient order to [facility] on 8.2.21 to [phone]. Patient will appointment for 8.16.21. Phone number is [phone].

## 2021-08-05 PROCEDURE — 3331090001 HH PPS REVENUE CREDIT

## 2021-08-05 PROCEDURE — 3331090002 HH PPS REVENUE DEBIT

## 2021-08-06 ENCOUNTER — HOME CARE VISIT (OUTPATIENT)
Dept: SCHEDULING | Facility: HOME HEALTH | Age: 71
End: 2021-08-06
Payer: MEDICARE

## 2021-08-06 VITALS
DIASTOLIC BLOOD PRESSURE: 76 MMHG | OXYGEN SATURATION: 96 % | TEMPERATURE: 98.7 F | RESPIRATION RATE: 15 BRPM | HEART RATE: 87 BPM | SYSTOLIC BLOOD PRESSURE: 158 MMHG

## 2021-08-06 PROCEDURE — 3331090001 HH PPS REVENUE CREDIT

## 2021-08-06 PROCEDURE — G0151 HHCP-SERV OF PT,EA 15 MIN: HCPCS

## 2021-08-06 PROCEDURE — 3331090002 HH PPS REVENUE DEBIT

## 2021-08-07 PROCEDURE — 3331090002 HH PPS REVENUE DEBIT

## 2021-08-07 PROCEDURE — 3331090001 HH PPS REVENUE CREDIT

## 2021-08-08 PROCEDURE — 3331090001 HH PPS REVENUE CREDIT

## 2021-08-08 PROCEDURE — 3331090002 HH PPS REVENUE DEBIT

## 2021-08-09 ENCOUNTER — HOME CARE VISIT (OUTPATIENT)
Dept: HOME HEALTH SERVICES | Facility: HOME HEALTH | Age: 71
End: 2021-08-09
Payer: MEDICARE

## 2021-08-09 ENCOUNTER — HOME CARE VISIT (OUTPATIENT)
Dept: SCHEDULING | Facility: HOME HEALTH | Age: 71
End: 2021-08-09
Payer: MEDICARE

## 2021-08-09 VITALS
SYSTOLIC BLOOD PRESSURE: 160 MMHG | DIASTOLIC BLOOD PRESSURE: 80 MMHG | HEART RATE: 93 BPM | OXYGEN SATURATION: 94 % | RESPIRATION RATE: 15 BRPM | TEMPERATURE: 98.6 F

## 2021-08-09 PROCEDURE — G0157 HHC PT ASSISTANT EA 15: HCPCS

## 2021-08-09 PROCEDURE — 3331090002 HH PPS REVENUE DEBIT

## 2021-08-09 PROCEDURE — 3331090001 HH PPS REVENUE CREDIT

## 2021-08-10 PROCEDURE — 3331090002 HH PPS REVENUE DEBIT

## 2021-08-10 PROCEDURE — 3331090001 HH PPS REVENUE CREDIT

## 2021-08-10 NOTE — CASE COMMUNICATION
1117am - Email message sent to Briana Means RN [facility] nurse navigator) concerning patients complaints of difficulty sleeping and need for pain medication refill. Also to report patients increased BP of 160/80 on arrival. After 5 minutes of seated rest /80.    1245pm - Received email message in return from [address] Reports patient is going to try Melatonin 5mg for sleep. Also request for pain medication refill sent to Dr Tatiana Alpers.

## 2021-08-11 PROCEDURE — 3331090002 HH PPS REVENUE DEBIT

## 2021-08-11 PROCEDURE — 3331090001 HH PPS REVENUE CREDIT

## 2021-08-12 PROCEDURE — 3331090001 HH PPS REVENUE CREDIT

## 2021-08-12 PROCEDURE — 3331090002 HH PPS REVENUE DEBIT

## 2021-08-13 ENCOUNTER — HOME CARE VISIT (OUTPATIENT)
Dept: SCHEDULING | Facility: HOME HEALTH | Age: 71
End: 2021-08-13
Payer: MEDICARE

## 2021-08-13 VITALS
SYSTOLIC BLOOD PRESSURE: 158 MMHG | RESPIRATION RATE: 15 BRPM | DIASTOLIC BLOOD PRESSURE: 70 MMHG | TEMPERATURE: 98.1 F | HEART RATE: 90 BPM

## 2021-08-13 PROCEDURE — G0151 HHCP-SERV OF PT,EA 15 MIN: HCPCS

## 2021-08-13 PROCEDURE — 3331090002 HH PPS REVENUE DEBIT

## 2021-08-13 PROCEDURE — 3331090001 HH PPS REVENUE CREDIT

## 2021-08-16 ENCOUNTER — HOSPITAL ENCOUNTER (OUTPATIENT)
Dept: PHYSICAL THERAPY | Age: 71
Discharge: HOME | End: 2021-08-16
Payer: MEDICARE

## 2021-08-16 DIAGNOSIS — Z96.651 S/P TOTAL KNEE ARTHROPLASTY, RIGHT: ICD-10-CM

## 2021-08-16 PROCEDURE — 97110 THERAPEUTIC EXERCISES: CPT

## 2021-08-16 PROCEDURE — 97140 MANUAL THERAPY 1/> REGIONS: CPT

## 2021-08-16 PROCEDURE — 97162 PT EVAL MOD COMPLEX 30 MIN: CPT

## 2021-08-16 NOTE — PROGRESS NOTES
Contreras Pascale  : 1950  [place]  Secondary:  [facility] at [facility]  [address], [address], [place], [address]  [phone]   [phone]      OUTPATIENT PHYSICAL THERAPY: Daily Treatment Note 2021    ICD-10: Treatment Diagnosis: S/P total knee arthroplasty, right (z96.651)              Treatment Diagnosis 2: Muscle Weakness (M62.81)               Treatment Diagnosis 3: Pain in L knee (M25.562)               Treatment Diagnosis 4: other abnormalities of gait and mobility (R26.89)    Precautions: None   Allergies: Bee sting [sting, bee]; Keflex [cephalexin]; Pcn [penicillins]; and Sulfa (sulfonamide antibiotics)   TREATMENT PLAN:  Effective Dates: 2021 TO 10/15/2021 (60 days). Frequency/Duration: 1-2x for 60 Day(s) MEDICAL/REFERRING DIAGNOSIS:  S/P total knee arthroplasty, right [Z96.651]  DATE OF ONSET: Patient is s/p right total knee arthroplasty on 2021  REFERRING PHYSICIAN: Daphne Quintanilla MD MD Orders: Evaluate and Treat   Return MD Appointment: 2021     Pre-treatment Symptoms/Complaints:  Patient is eager to ambulate independently and to continue working with therapy. Pain: Initial: Pain Intensity 1: 3  Pain Location 1: Knee  Pain Orientation 1: Right  Post Session:  2/10   Medications Last Reviewed:  2021  Updated Objective Findings:  See evaluation note from today   TREATMENT:   THERAPEUTIC EXERCISE: (15 minutes):  Exercises per grid below to improve mobility, strength and balance. Required minimal verbal cues to promote proper body alignment. Progressed repetitions as indicated.      Date:  2021 Date:   Date:     Activity/Exercise Parameters Parameters Parameters   Heel Prop 3x30 seconds  Cuing for deep breathing     Calf Stretch 3x30 seconds cuing for terminal extension     Quad Set x20      Leg raise flexion 3x12 cuing to initiate with quad contraction     Heel Slide x25 cuing for deep breathing     Sit to stand 3x10       Time spent with patient reviewing proper muscle recruitment and technique with exercises. MANUAL THERAPY: (10 minutes): Joint mobilization was utilized and necessary because of the patient's restricted joint motion   10 minutes grade 2-3 mobilization of patellofemoral joint     MODALITIES: (0 minutes):      None added     HEP: As above; handouts given to patient for all exercises. Treatment/Session Summary:    · Response to Treatment:  Patient tolerated session well and was able to integrade some closed chain strengthening with sit to stand. Patient responded well to manual interventions reporting decreased discomfort. .  · Communication/Consultation:  Patient provided HEP. Discussed POC with patient which he was able to verbalize understanding.    · Equipment provided today:  None today  · Recommendations/Intent for next treatment session: Next visit will focus on continued progression of range of motion exercises, strength, balance, and ambulation through graded exercise program.    Total Treatment Billable Duration:  55 minutes: 30 evaluation, 15 therapeutic exercise, 10 manual interventions   PT Patient Time In/Time Out  Time In: 1101  Time Out: [address], PT

## 2021-08-16 NOTE — THERAPY EVALUATION
Vaishnavi Gonzalez  : 1950  [place]  Secondary:  [facility] [place] [id number]  [employer] [place], [address] [place] [address], Jason castro, [address] [place] [address]  [phone]   [phone]       OUTPATIENT PHYSICAL THERAPY:Initial Assessment 2021    ICD-10: Treatment Diagnosis: S/P total knee arthroplasty, right (z96.651)              Treatment Diagnosis 2: Muscle Weakness (M62.81)               Treatment Diagnosis 3: Pain in L knee (M25.562)               Treatment Diagnosis 4: other abnormalities of gait and mobility (R26.89)    Precautions: None   Allergies: Bee sting [sting, bee]; Keflex [cephalexin]; Pcn [penicillins]; and Sulfa (sulfonamide antibiotics)   TREATMENT PLAN:  Effective Dates: 2021 TO 10/15/2021 (60 days). Frequency/Duration: 1-2x for 60 Day(s) MEDICAL/REFERRING DIAGNOSIS:  S/P total knee arthroplasty, right [Z96.651]  DATE OF ONSET: Patient is s/p right total knee arthroplasty on 2021  REFERRING PHYSICIAN: Enid Conway MD MD Orders: Evaluate and Treat   Return MD Appointment: 2021     INITIAL ASSESSMENT:  Mr. Leo Willett is a 72 y.o. male presenting to physical therapy with complaints of R knee pain s/p total knee arthroplasty that occurred on 2021. Patient reports chronic pain dating back 3-4 years in bilateral knees with R being greater than left. Patient had surgery on his R knee on 2021 but reports he will likely need to do a knee replacement on his L side as well in the near future. Patient has been working with home health physical therapy twice a week and is currently pleased with his progress but still ambulated with use of single point cane. Patient is eager to return to ambulating without assistive device as well as being able to squat and raise 25 pounds in order to help him work on a car with his grandson.  Patient presents with increased pain, decreased strength, decreased ROM, decreased flexibility, impaired gait, impaired posture, impaired transfer ability, decreased activity tolerance, and overall impaired functional mobility. Patient is a good candidate for skilled physical therapy interventions to include manual therapy, therapeutic exercise, balance training, gait training, transfer training, postural re-education, body mechanics training, and pain modalities as needed in order to address deficits and improve functional independence. PROBLEM LIST (Impacting functional limitations):  1. Decreased Strength  2. Decreased ADL/Functional Activities  3. Decreased Transfer Abilities  4. Decreased Ambulation Ability/Technique  5. Decreased Balance  6. Increased Pain  7. Decreased Activity Tolerance  8. Decreased Flexibility/Joint Mobility  9. Edema/Girth  10. Decreased [place] with Home Exercise Program INTERVENTIONS PLANNED: (Treatment may consist of any combination of the following)  1. Balance Exercise  2. Bed Mobility  3. Cold  4. Cryotherapy  5. Electrical Stimulation  6. Gait Training  7. Heat  8. Home Exercise Program (HEP)  9. Manual Therapy  10. Neuromuscular Re-education/Strengthening  11. Range of Motion (ROM)  12. Therapeutic Activites  13. Therapeutic Exercise/Strengthening  14. Transcutaneous Electrical Nerve Stimulation (TENS)  15. Transfer Training     GOALS: (Goals have been discussed and agreed upon with patient.)  Short-Term Functional Goals: Time Frame: 8/16/2021 to 9/13/2021  1. Patient demonstrates independence with home exercise program without verbal cueing provided by therapist.   2. Patient will report no more than 2/10 knee pain at rest in order to demonstrate improved self pain control and tolerance. 3. Patient will reach 0 degrees knee extension in order to demonstrate improved ambulation. 4. Patient will be educated in and demonstrate proper squat lift technique in order to reduce stress on on his LE, improve safety, and reduce risk of injury.   Discharge Goals: Time Frame: 8/16/2021 to 10/15/2021  1. Patient will demonstrate 120 degrees of knee flexion in L knee in order to be able to get in and out of the bathtub. 2. Patient will improve Lower Extremity Functional Scale score to greater than 70/80 from 41/80. 3. Patient will demonstrate 5/5 gross LE strength in order to squat and raise 25 pounds while working on cars with his grandchildren. 4. Patient will ambulate greater than 30 minutes without pain in order to shop around the grocery store without pain or limitation. Outcome Measure: Tool Used: Lower Extremity Functional Scale (LEFS)  Score:  Initial: 41/80 Most Recent: X/80 (Date: -- )   Interpretation of Score: 20 questions each scored on a 5 point scale with 0 representing \"extreme difficulty or unable to perform\" and 4 representing \"no difficulty\". The lower the score, the greater the functional disability. 80/80 represents no disability. Minimal detectable change is 9 points. Medical Necessity:   · Patient is expected to demonstrate progress in strength, range of motion, balance and coordination to be able to perform all ADLs, ambulate independently, and squat and raise weight while working on cars with his family. · Skilled intervention continues to be required due to deficits in strength, range of motion, balance as well as increased pain. Reason for Services/Other Comments:  · Patient continues to require skilled intervention due to increasing complexity of exercises. Total Evaluation Duration: 30 minutes    Rehabilitation Potential For Stated Goals: Good  Regarding Gilmar Carrasco's therapy, I certify that the treatment plan above will be carried out by a therapist or under their direction.   Thank you for this referral,  Bimal Salazar PT     Referring Physician Signature: Richie Joseph MD _______________________________ Date _____________             PAIN/SUBJECTIVE:    Initial: Pain Intensity 1: 3  Pain Location 1: Knee  Pain Orientation 1: Right Post Session:  2/10    HISTORY:    History of Injury/Illness (Reason for Referral):  Mr. Wendy Bartholomew is a 72 y.o. male presenting to physical therapy with complaints of R knee pain s/p total knee arthroplasty that occurred on 7/23/2021. Patient reports chronic pain dating back 3-4 years in bilateral knees with R being greater than left. Patient had surgery on his R knee on 7/23/2021 but reports he will likely need to do a knee replacement on his L side as well in the near future. Patient has been working with home health physical therapy twice a week and is currently pleased with his progress but still ambulated with use of single point cane. Patient is eager to return to ambulating without assistive device as well as being able to squat and raise 25 pounds in order to help him work on a car with his grandson.    Past Medical History/Comorbidities:   Mr. Wendy Bartholomew  has a past medical history of Abnormal blood sugar, Abnormality of cortisol-binding globulin ([facility] Actinic keratosis, Acute right flank pain, Anaphylactic reaction to bee sting, Anxiety, Anxiety disorder, Arthritis, Arthritis of left shoulder region, Asthma (7/20/2016), Backache (7/20/2016), Bilateral otitis media, BPH (benign prostatic hyperplasia) (7/20/2016), CAD (coronary artery disease) (04/20/2011), Cervical neck pain with evidence of disc disease, Chronic renal insufficiency (7/20/2016), Controlled diabetes mellitus ([address] (7/20/2016), COPD (chronic obstructive pulmonary disease) ([address] (7/20/2016), Corneal foreign body (6/13/14), COVID-19 vaccine series completed (03/22/2021), Depressive disorder (7/20/2016), Diarrhea, Disorders of calcium metabolism, Dumping syndrome, Dyspepsia and other specified disorders of function of stomach (8/29/2012), ED (erectile dysfunction) (7/20/2016), Edema, Elevated CPK, Elevated prolactin level, Essential hypertension, benign (8/29/2012), GERD (gastroesophageal reflux disease), Hand pain, right, Hematuria, Hyperlipidemia, Hyperprolactinemia ([facility] [address] Hypertension, Hypogonadism in male, Hypothyroidism, Knee effusion, Knee pain, Low serum testosterone level, Neoplasm of uncertain behavior of skin of neck, Neuralgia, Nevus, non-neoplastic, Numbness and tingling in left arm, Open wound of finger without complication (1/79/34), IRINA on CPAP (8/22/2014), Osteoarthrosis, unspecified whether generalized or localized, involving lower leg (8/29/2012), Osteoarthrosis, unspecified whether generalized or localized, lower leg (8/29/2012), Other disorder of calcium metabolism (8/29/2012), Other malaise and fatigue (11/27/2012), Pituitary tumor (12/11/2013), Prolactin increased, Puncture wound (6/11/12), Pure hypercholesterolemia (8/29/2012), Right shoulder pain, RLS (restless legs syndrome) (7/20/2016), Seborrheic keratosis, Seizures ([facility] Shingles (herpes zoster) polyneuropathy, Shingles rash, and Sinusitis. He also has no past medical history of Difficult intubation, Malignant hyperthermia due to anesthesia, Nausea & vomiting, or Pseudocholinesterase deficiency. Mr. Dewayne Monzon  has a past surgical history that includes hx orthopaedic; hx tonsillectomy; pr chest surgery procedure unlisted; hx carpal tunnel release (Bilateral); hx bunionectomy (Right); hx cervical fusion (11/2007); and hx heart catheterization (04/20/2011). Social History/Living Environment:    Patient lives with his wife and has one step to enter his home. Prior Level of Function/Work/Activity:  Patient was able to perform all ADLs without pain, ambulate independently, and squat and raise weight while working on vehicles. Dominant Side:         RIGHT    Ambulatory/Rehab Services H2 Model Falls Risk Assessment    Risk Factors:       (1)  Gender [Male] Ability to Rise from Chair:       (0)  Ability to rise in a single movement    Falls Prevention Plan:       No modifications necessary    Total: (5 or greater = High Risk): 1    ©2010 [employer]. All Rights Reserved.  [place] Patent #[id number]. Federal Law prohibits the replication, distribution or use without written permission from [facility]    Current Medications:        Current Outpatient Medications:     acetaminophen (TYLENOL) 500 mg tablet, Take 1-2 Tablets by mouth every six (6) hours as needed for Pain., Disp: , Rfl:     docusate sodium (Stool Softener) 100 mg tab, Take 2 Tablets by mouth daily as needed (constipation). , Disp: , Rfl:     aspirin delayed-release 81 mg tablet, Take 1 Tablet by mouth every twelve (12) hours for 35 days. Indications: DVT prophylaxis, Disp: 70 Tablet, Rfl: 0    hydrocortisone (CORTEF) 5 mg tablet, Take 5 mg by mouth daily as needed (adrenal deficiency). Indications: decreased function of the adrenal gland, Disp: , Rfl:     metFORMIN (GLUCOPHAGE) 500 mg tablet, Take 1,000 mg by mouth two (2) times daily (with meals). 2 tablets BID  Indications: type 2 diabetes mellitus, Disp: , Rfl:     meloxicam (MOBIC) 7.5 mg tablet, Take 1 Tablet by mouth daily. (Patient taking differently: Take 7.5 mg by mouth nightly. Indications: joint damage causing pain and loss of function), Disp: 30 Tablet, Rfl: 0    LORazepam (ATIVAN) 1 mg tablet, Take 1 Tab by mouth four (4) times daily as needed. Max Daily Amount: 4 mg. Indications: anxiety (Patient taking differently: Take 1 mg by mouth four (4) times daily as needed for Anxiety. Indications: anxious), Disp: 120 Tab, Rfl: 2    pravastatin (PRAVACHOL) 40 mg tablet, Take 1 Tab by mouth daily. Indications: hyperlipidemia, Disp: 90 Tab, Rfl: 3    levothyroxine (SYNTHROID) 100 mcg tablet, Take 1 Tab by mouth Daily (before breakfast). (Patient taking differently: Take 100 mcg by mouth Daily (before breakfast). Indications: a condition with low thyroid hormone levels), Disp: 90 Tab, Rfl: 3    amLODIPine (NORVASC) 5 mg tablet, Take 1 Tab by mouth daily. (Patient taking differently: Take 5 mg by mouth nightly.  Indications: high blood pressure), Disp: 90 Tab, Rfl: 3    omeprazole (PRILOSEC) 40 mg capsule, Take 1 Cap by mouth daily. Indications: gastroesophageal reflux disease (Patient taking differently: Take 40 mg by mouth nightly. Indications: gastroesophageal reflux disease), Disp: 90 Cap, Rfl: 3    hydroCHLOROthiazide (HYDRODIURIL) 12.5 mg tablet, Take 1 Tab by mouth daily. (Patient not taking: Reported on 7/23/2021), Disp: 90 Tab, Rfl: 3    lisinopril (PRINIVIL, ZESTRIL) 20 mg tablet, Take 1 Tab by mouth daily. (Patient taking differently: Take 40 mg by mouth nightly. Indications: high blood pressure), Disp: 30 Tab, Rfl: 11    BROMOCRIPTINE MESYLATE (PARLODEL PO), Take 5 mg by mouth nightly. Takes 4 tablets at bedtime, Disp: , Rfl:     sildenafil citrate (VIAGRA) 100 mg tablet, Take 1 Tab by mouth as needed. , Disp: 18 Tab, Rfl: 10    budesonide-formoterol (SYMBICORT) 160-4.5 mcg/actuation HFA inhaler, Take 2 Puffs by inhalation two (2) times a day. (Patient taking differently: Take 2 Puffs by inhalation daily. Indications: bronchospasm prevention with COPD), Disp: 1 Inhaler, Rfl: 11    nitroglycerin (NITROSTAT) 0.4 mg SL tablet, 1 Tab by SubLINGual route as needed (CAD). Indications: ANGINA (Patient taking differently: 1 Tablet by SubLINGual route as needed (CAD). max doses of 3  Indications: angina, a type of chest pain), Disp: 25 Tab, Rfl: 11    EPINEPHrine (EPIPEN) 0.3 mg/0.3 mL injection, 0.3 mg by IntraMUSCular route as needed. Prn bee stings  Indications: person at risk of anaphylaxis (Patient not taking: Reported on 7/23/2021), Disp: , Rfl:     TESTOSTERONE CYPIONATE IM, 50 mg by IntraMUSCular route Once every 2 weeks.  200MG/ML, 0.5 ml every 2 weeks, Disp: , Rfl:     SYRINGE, DISPOSABLE, 20 ML (BD LUER-ARTHUR BULK SYRINGE), BD Luer-Arthur Syringe 25G X 1-1/2\"3 ML, 1 misc every 2 weeks, Disp: , Rfl:     DEXAMETHASONE SOD PHOSPHATE (DEXAMETHASONE SODIUM PHOSPHATE INJECTION), by Injection route., Disp: , Rfl:     Date Last Reviewed:  8/16/2021 Number of Personal Factors/Comorbidities that affect the Plan of Care:  Patient is moderate complexity secondary to Comorbidities. 1-2: MODERATE COMPLEXITY    EXAMINATION:    Patient denies any LE paresthesia. Patient denies any increase of symptoms with cough, sneeze or valsalva. Patient denies any saddle paresthesia or bowel/bladder deficits. Observation/Orthostatic Postural Assessment:          Patient stands with slight forward flexion and uses single point cane for ambulation. Incision appears to be healing well with minimal erythema and no bruising. No signs of infection or DVT noted       at this time. Palpation:          Patient tender to palpation in distal quad tendon as well as diffusely through anterior knee. ROM:            AROM/ PROM Left (degrees) Right (degrees)   Knee Flexion 132 115   Knee Extension 3 degrees deficit 5 degrees deficit    Ankle Dorsiflexion (DF) -   knee extended 10 5   Ankle Dorsiflexion (DF) - knee flexed 12 7   Ankle Plantarflexion (PF) 30 30     Strength: Motion Tested Left   (*/5) Right  (*/5)   Knee Extension 5 4   Knee Flexion 4 4   Hip Flexion 4 4   Hip Extension 4 3   Hip Abduction 4 3   Ankle DF 5 4   Ankle PF 4 4       Passive Accessory Motion:         Decreased patellofemoral mobility on R LE  Neurological Screen:              Myotomes: Key muscle strength testing through bilateral LE is [place]. Dermatomes: Sensation to light touch for bilateral LE is intact and WFL. Reflexes: Patellar (L3/ L4): 2                 Achilles (S1/ S2): 2           Functional Mobility:         Gait/Ambulation:  Patient ambulates with use of single point cane and decreased stance on R LE        Transfers:  Patient able to transfer from sit to stand independently but reports pain with movement. Balance:          Patient unable to stand greater than 10 seconds in single limb stance bilaterally. Body Structures Involved:  1. Bones  2. Joints  3.  Muscles Body Functions Affected:  1. Neuromusculoskeletal  2. Movement Related Activities and Participation Affected:  1. Mobility  2. Interpersonal Interactions and Relationships  3.  Community, Social and [place]    Number of elements (examined above) that affect the Plan of Care: 3: MODERATE COMPLEXITY    CLINICAL PRESENTATION:    Presentation:   Evolving clinical presentation with changing clinical characteristics: MODERATE COMPLEXITY    CLINICAL DECISION MAKING:    Use of outcome tool(s) and clinical judgement create a POC that gives a: Questionable prediction of patient's progress: MODERATE COMPLEXITY

## 2021-08-18 ENCOUNTER — HOSPITAL ENCOUNTER (OUTPATIENT)
Dept: PHYSICAL THERAPY | Age: 71
Discharge: HOME | End: 2021-08-18
Payer: MEDICARE

## 2021-08-18 NOTE — PROGRESS NOTES
[address]  at [facility] 8/18/2021    Patient entered facility at 11:00 and reported feeling as though his heart had been racing for two days. Vital were taken twice and recorded at 171/92 mmhg blood pressure and 128 bpm restring heart rate as well as 160/92 and 129 bpm resting heart rate. Heart rate was also taken manually and found to be 125 bpm. Patient reported new onset of diarrhea starting today. Patient denies any shortness of breath, difficulty breathing, or new pain anywhere in the body. Physical Therapist Fito Núñez called PCP and an appointment was scheduled for him to follow up with his nurse practitioner at 1:00pm on 8/18/2021. Patient was educated to monitor any changes and alert medical professional if anything changed prior to appointment this afternoon.

## 2021-08-23 ENCOUNTER — HOSPITAL ENCOUNTER (OUTPATIENT)
Dept: PHYSICAL THERAPY | Age: 71
Discharge: HOME | End: 2021-08-23
Payer: MEDICARE

## 2021-08-23 PROCEDURE — 97140 MANUAL THERAPY 1/> REGIONS: CPT

## 2021-08-23 PROCEDURE — 97016 VASOPNEUMATIC DEVICE THERAPY: CPT

## 2021-08-23 PROCEDURE — 97110 THERAPEUTIC EXERCISES: CPT

## 2021-08-23 NOTE — PROGRESS NOTES
Doy Dys  : 1950  [place]  Secondary:  [facility]  [email or web address], [address] [facility], [address]  [phone]   [phone]      OUTPATIENT PHYSICAL THERAPY: Daily Treatment Note 2021    ICD-10: Treatment Diagnosis: S/P total knee arthroplasty, right (z96.651)              Treatment Diagnosis 2: Muscle Weakness (M62.81)               Treatment Diagnosis 3: Pain in L knee (M25.562)               Treatment Diagnosis 4: other abnormalities of gait and mobility (R26.89)    Precautions: None   Allergies: Bee sting [sting, bee]; Keflex [cephalexin]; Pcn [penicillins]; and Sulfa (sulfonamide antibiotics)   TREATMENT PLAN:  Effective Dates: 2021 TO 10/15/2021 (60 days). Frequency/Duration: 1-2x for 60 Day(s) MEDICAL/REFERRING DIAGNOSIS:  S/P total knee arthroplasty, right [Z96.651]  DATE OF ONSET: Patient is s/p right total knee arthroplasty on 2021  REFERRING PHYSICIAN: Essie Silverman MD MD Orders: Evaluate and Treat   Return MD Appointment: 2021     Pre-treatment Symptoms/Complaints:  Patient's chief complaint was his right knee felt tight, achy, and swollen. Pt. Reported performing HEP 2-3 times a day followed by elevating and icing knee. Pain: Initial: Pain Intensity 1: 4  Pain Location 1: Knee  Pain Orientation 1: Right  Post Session:  2/10 less pain and tightness   Medications Last Reviewed:  2021  Updated Objective Findings:  Pt.'s initial blood pressure 147/84 pulse 97. TREATMENT:   THERAPEUTIC EXERCISE: (25 minutes):  Exercises per grid below to improve mobility, strength and balance. Required minimal verbal cues to promote proper body alignment. Progressed repetitions as indicated.      Date:  2021 Date:  21 Date:     Activity/Exercise Parameters Parameters Parameters   Nu step  X 10 mins level 4    Standing hip flexion   X 20 reps at bar BLE's     Standing hamstring curls  X 20 reps at bar BLE's     Stranding hip abduction   2x10 reps standing at bar BLE's     Standing heel toe raises  X 20 reps BLE's standing at bar          Heel Prop 3x30 seconds  Cuing for deep breathing 3x30 sec hold     Calf Stretch 3x30 seconds cuing for terminal extension 4x30 sec hold on incline    Quad Set x20  X 20 reps     Leg raise flexion 3x12 cuing to initiate with quad contraction X 20 reps with quad set    Heel Slide x25 cuing for deep breathing X 25 reps    Sit to stand 3x10 3x10     Chair slides  X 10 reps x 10 sec hold       Time spent with patient reviewing proper muscle recruitment and technique with exercises. MANUAL THERAPY: (15 minutes): Joint mobilization was utilized and necessary because of the patient's restricted joint motion   Pt. Received soft tissue mobilization to right knee to decrease pain and tightness in right knee. MODALITIES: (15 minutes):      Pt. received vaso pneumatic compression device with elevation on incline to decrease swelling     HEP: As above; handouts given to patient for all exercises. Treatment/Session Summary:    · Response to Treatment:  Pt.'s right knee range of motion was 0-115 degrees. Pt. was compliant with all exercises and reported less pain after session. · Communication/Consultation:  Patient provided HEP. Discussed POC with patient which he was able to verbalize understanding.    · Equipment provided today:  None today  · Recommendations/Intent for next treatment session: Next visit will focus on continued progression of range of motion exercises, strength, balance, and ambulation through graded exercise program.    Total Treatment Billable Duration:  55 minutes  PT Patient Time In/Time Out  Time In: 0900  Time Out: [facility]

## 2021-08-25 ENCOUNTER — HOSPITAL ENCOUNTER (OUTPATIENT)
Dept: PHYSICAL THERAPY | Age: 71
Discharge: HOME | End: 2021-08-25
Payer: MEDICARE

## 2021-08-25 PROCEDURE — 97140 MANUAL THERAPY 1/> REGIONS: CPT

## 2021-08-25 PROCEDURE — 97110 THERAPEUTIC EXERCISES: CPT

## 2021-08-25 NOTE — PROGRESS NOTES
Candice Vuong  : 1950  [place]  Secondary:  [facility]  [address], [address], [place], [address]  [phone]   [phone]      OUTPATIENT PHYSICAL THERAPY: Daily Treatment Note 2021    ICD-10: Treatment Diagnosis: S/P total knee arthroplasty, right (z96.651)              Treatment Diagnosis 2: Muscle Weakness (M62.81)               Treatment Diagnosis 3: Pain in L knee (M25.562)               Treatment Diagnosis 4: other abnormalities of gait and mobility (R26.89)    Precautions: None   Allergies: Bee sting [sting, bee]; Keflex [cephalexin]; Pcn [penicillins]; and Sulfa (sulfonamide antibiotics)   TREATMENT PLAN:  Effective Dates: 2021 TO 10/15/2021 (60 days). Frequency/Duration: 1-2x for 60 Day(s) MEDICAL/REFERRING DIAGNOSIS:  S/P total knee arthroplasty, right [Z96.651]  DATE OF ONSET: Patient is s/p right total knee arthroplasty on 2021  REFERRING PHYSICIAN: Vishnu Ibarra MD MD Orders: Evaluate and Treat   Return MD Appointment: 2021     Pre-treatment Symptoms/Complaints:  Patient feels like he is progressing well. He reports having a sharp pain yesterday     Pain: Initial: Pain Intensity 1: 4  Pain Orientation 1: Right  Post Session:  2/10 less pain and tightness   Medications Last Reviewed:  2021  Updated Objective Findings:  Pt.'s initial blood pressure 137/90 pulse 110. TREATMENT:   THERAPEUTIC EXERCISE: (45 minutes):  Exercises per grid below to improve mobility, strength and balance. Required minimal verbal cues to promote proper body alignment. Progressed repetitions as indicated.      Date:  2021 Date:  21 Date:  21   Activity/Exercise Parameters Parameters Parameters   Nu step -- X 10 mins level 4 --   Standing hip flexion  -- X 20 reps at bar BLE's  2x15   Standing hamstring curls -- X 20 reps at bar BLE's  2x15   Stranding hip abduction  -- 2x10 reps standing at bar BLE's  2x15 red loop   Standing heel toe raises -- X 20 reps BLE's standing at bar 2x15   Heel Prop 3x30 seconds  Cuing for deep breathing 3x30 sec hold  3x60 seconds    Calf Stretch 3x30 seconds cuing for terminal extension 4x30 sec hold on incline 4x30 sec hold with strap   Quad Set x20  X 20 reps  x10   Leg raise flexion 3x12 cuing to initiate with quad contraction X 20 reps with quad set 3x15 with quad set    Heel Slide x25 cuing for deep breathing X 25 reps 2x20 on physio ball   Sit to stand 3x10 3x10  3x10   Chair slides -- X 10 reps x 10 sec hold  --   Shuttle Press -- -- 3x15 with 2 black and 1 tan band bilateral press     Time spent with patient reviewing proper muscle recruitment and technique with exercises. MANUAL THERAPY: (15 minutes): Joint mobilization was utilized and necessary because of the patient's restricted joint motion   Pt. Received soft tissue mobilization to right knee to decrease pain and tightness in right knee. MODALITIES: (0):      None Today    HEP: As above; handouts given to patient for all exercises. Treatment/Session Summary:    · Response to Treatment:  Patient continues to demonstrate improved activity tolerance with ability to increase volume of exercises today. Added resistance to hip abduction exercise as well as added shuttle press with cuing for terminal extension. · Communication/Consultation:  Patient provided HEP. Discussed POC with patient which he was able to verbalize understanding. · Equipment provided today:  None today  · Recommendations/Intent for next treatment session: Next visit will focus on continued progression of range of motion exercises, strength, balance, and ambulation through graded exercise program. Consider adding single leg stance on airrex pad to progress single leg weight bearing as well as terminal extension.      Total Treatment Billable Duration:  60 minutes  PT Patient Time In/Time Out  Time In: 1101  Time Out: Lala. Mayte 21

## 2021-08-30 ENCOUNTER — HOSPITAL ENCOUNTER (OUTPATIENT)
Dept: PHYSICAL THERAPY | Age: 71
Discharge: HOME | End: 2021-08-30
Payer: MEDICARE

## 2021-08-30 PROCEDURE — 97140 MANUAL THERAPY 1/> REGIONS: CPT

## 2021-08-30 PROCEDURE — 97110 THERAPEUTIC EXERCISES: CPT

## 2021-08-30 NOTE — PROGRESS NOTES
Naomi Ramirez  : 1950  [place]  Secondary:  [facility]  [address], [address], [place], [address]  [phone]   [phone]      OUTPATIENT PHYSICAL THERAPY: Daily Treatment Note 2021    ICD-10: Treatment Diagnosis: S/P total knee arthroplasty, right (z96.651)              Treatment Diagnosis 2: Muscle Weakness (M62.81)               Treatment Diagnosis 3: Pain in L knee (M25.562)               Treatment Diagnosis 4: other abnormalities of gait and mobility (R26.89)    Precautions: None   Allergies: Bee sting [sting, bee]; Keflex [cephalexin]; Pcn [penicillins]; and Sulfa (sulfonamide antibiotics)   TREATMENT PLAN:  Effective Dates: 2021 TO 10/15/2021 (60 days). Frequency/Duration: 1-2x for 60 Day(s) MEDICAL/REFERRING DIAGNOSIS:  S/P total knee arthroplasty, right [Z96.651]  DATE OF ONSET: Patient is s/p right total knee arthroplasty on 2021  REFERRING PHYSICIAN: Andi Portillo MD MD Orders: Evaluate and Treat   Return MD Appointment: 2021     Pre-treatment Symptoms/Complaints:  Patient reported not as many sharp pains during the day. Pt. Stated having trouble sleeping due to not being able to get comfortable. Pain: Initial: Pain Intensity 1: 2  Pain Location 1: Knee  Pain Orientation 1: Right  Post Session:  0/10 less pain and tightness   Medications Last Reviewed:  2021  Updated Objective Findings:  Right knee range of motion 0-120 degrees   TREATMENT:   THERAPEUTIC EXERCISE: (45 minutes):  Exercises per grid below to improve mobility, strength and balance. Required minimal verbal cues to promote proper body alignment. Progressed repetitions as indicated. Date:  2021 Date:  21 Date:  21   Activity/Exercise Parameters Parameters Parameters   Nu step X 10 mins level 4  X 10 mins level 4 --   Standing hip flexion  2 lb wt. s x 20 reps  X 20 reps at bar BLE's  2x15 Standing hamstring curls 2 lb wt. s x 20 reps  X 20 reps at bar BLE's  2x15   Stranding hip abduction  2lb wt. s x 20  2x10 reps standing at bar BLE's  2x15 red loop   Standing heel toe raises X 2 lb wt. s BLE's  X 20 reps BLE's standing at bar 2x15   Heel Prop 3x60 seconds  Cuing for deep breathing 3x30 sec hold  3x60 seconds    Calf Stretch 3x30 seconds cuing for terminal extension 4x30 sec hold on incline 4x30 sec hold with strap   Quad Set x20  X 20 reps  x10   Leg raise flexion 3x12 cuing to initiate with quad contraction X 20 reps with quad set 3x15 with quad set    Heel Slide x25 cuing for deep breathing X 25 reps 2x20 on physio ball   Sit to stand 3x10 3x10  3x10   Shuttle Press 62 lbs bilateral press 3x15 reps  -- 3x15 with 2 black and 1 tan band bilateral press     Time spent with patient reviewing proper muscle recruitment and technique with exercises. MANUAL THERAPY: (15 minutes): Joint mobilization was utilized and necessary because of the patient's restricted joint motion   Pt. Received soft tissue mobilization to right knee anteriorly and posteriorly to decrease tightness    MODALITIES: (0):      None Today    HEP: As above; handouts given to patient for all exercises. Treatment/Session Summary:    · Response to Treatment:  Patient was compliant with all exercises and presents increased knee range of motion. · Communication/Consultation:  Patient provided HEP. Discussed POC with patient which he was able to verbalize understanding. · Equipment provided today:  None today  · Recommendations/Intent for next treatment session: Next visit will focus on continued progression of range of motion exercises, strength, balance, and ambulation through graded exercise program. Consider adding single leg stance on airrex pad to progress single leg weight bearing as well as terminal extension.      Total Treatment Billable Duration:  60 minutes  PT Patient Time In/Time Out  Time In: 1100  Time Out: [address], PTA

## 2021-08-30 NOTE — PROGRESS NOTES
Pepper Renteria  : 1950 [facility]  [address], [address]  [phone]   [phone]      OUTPATIENT PHYSICAL THERAPY: PHYSICIAN COMMUNICATION    REFERRING PHYSICIAN: Peter Virk MD  Return Physician Appointment:   MEDICAL/REFERRING DIAGNOSIS:  · Presence of right artificial knee joint [Z96.651]  ATTENDANCE: Pepper Renteria has attended 4 sessions of therapy from 21 to 21    ASSESSMENT:DATE: 2021    PROGRESS: Pepper Renteria Pt. Is independent with gait and exercises. Pt. Ambulates with no assisted device independently. Pt.'s right knee range of motion 0-120 degrees. Pt.'s gross right LE strength 4+/5. Pt.'s average pain 2/10. Pt. Did report having difficulty sleeping at night due to not being able to get comfortable. Please advise any specific activities or exercises you would like patient to perform or avoid. RECOMMENDATIONS: Continue therapy two times a week through certification period. Thank you for this referral, and please do not hesitate to contact me at the number listed above if you have any questions.     Elodia Harris PTA,

## 2021-09-01 ENCOUNTER — HOSPITAL ENCOUNTER (OUTPATIENT)
Dept: PHYSICAL THERAPY | Age: 71
Discharge: HOME | End: 2021-09-01
Payer: MEDICARE

## 2021-09-01 PROCEDURE — 97110 THERAPEUTIC EXERCISES: CPT

## 2021-09-01 PROCEDURE — 97140 MANUAL THERAPY 1/> REGIONS: CPT

## 2021-09-01 NOTE — PROGRESS NOTES
Pepper Renteria  : 1950  [place]  Secondary:  [facility] [place] [id number] [place], [address] [place] [address], [place], [address] [place] [address]  [phone]   [phone]      OUTPATIENT PHYSICAL THERAPY: Daily Treatment Note 2021    ICD-10: Treatment Diagnosis: S/P total knee arthroplasty, right (z96.651)              Treatment Diagnosis 2: Muscle Weakness (M62.81)               Treatment Diagnosis 3: Pain in L knee (M25.562)               Treatment Diagnosis 4: other abnormalities of gait and mobility (R26.89)    Precautions: None   Allergies: Bee sting [sting, bee]; Keflex [cephalexin]; Pcn [penicillins]; and Sulfa (sulfonamide antibiotics)   TREATMENT PLAN:  Effective Dates: 2021 TO 10/15/2021 (60 days). Frequency/Duration: 1-2x for 60 Day(s) MEDICAL/REFERRING DIAGNOSIS:  S/P total knee arthroplasty, right [Z96.651]  DATE OF ONSET: Patient is s/p right total knee arthroplasty on 2021  REFERRING PHYSICIAN: Peter Virk MD MD Orders: Evaluate and Treat   Return MD Appointment: Unknown at this time. Pre-treatment Symptoms/Complaints:  Patient reports continued progress at home and reports his appointment with MD went well yesterday. He is pleased with progress and would like to start ambulating at home. Pain: Initial: Pain Intensity 1: 2  Pain Location 1: Knee  Pain Orientation 1: Right  Post Session:  0/10 less pain and tightness   Medications Last Reviewed:  2021  Updated Objective Findings:  Right knee range of motion 0-123 degrees   TREATMENT:   THERAPEUTIC EXERCISE: (45 minutes):  Exercises per grid below to improve mobility, strength and balance. Required minimal verbal cues to promote proper body alignment. Progressed repetitions as indicated.      Date:  2021  Date:  2021 Date:  21   Activity/Exercise Parameters Parameters Parameters   Nu step -- X 10 mins level 4  X 10 mins level 4   Standing hip flexion  -- 2 lb wt. s x 20 reps  X 20 reps at bar BLE's    Standing hamstring curls -- 2 lb wt. s x 20 reps  X 20 reps at bar BLE's    Stranding hip abduction  3x15 with yellow loop 2lb wt. s x 20  2x10 reps standing at bar BLE's    Standing heel toe raises 3x20 X 2 lb wt. s BLE's  X 20 reps BLE's standing at bar   Heel Prop 3x60 seconds  3x60 seconds  Cuing for deep breathing 3x30 sec hold    Calf Stretch 3x30 seconds on physio ball cuing for deep breathing at terminal extension 3x30 seconds cuing for terminal extension 4x30 sec hold on incline   Quad Set -- x20  X 20 reps    Leg raise flexion 3x12 cuing for isometric quad contracttion 3x12 cuing to initiate with quad contraction X 20 reps with quad set   Heel Slide x30 cuing for deep breathing  x25 cuing for deep breathing X 25 reps   Sit to stand 3x15 with 10 pound weight 3x10 3x10    Shuttle Press 3x20 with 75 pounds 62 lbs bilateral press 3x15 reps  --   Step Up 3x10 on 8 inch step cuing for terminal extension and quad contraction  -- --   Single limb stance 2x30 seconds  performed on airrex pad -- --     Time spent with patient reviewing proper muscle recruitment and technique with exercises. MANUAL THERAPY: (12 minutes): Joint mobilization was utilized and necessary because of the patient's restricted joint motion   Patient received soft tissue mobilization to right knee anteriorly and posteriorly to decrease tightness with emphasis on distal quad tendon     MODALITIES: (0):      None Today    HEP: As above; handouts given to patient for all exercises. Treatment/Session Summary:    · Response to Treatment:  Patient tolerated session well with ability to increase resistance and volume in session today. Patient also improving with ability to demonstrate improved knee flexion 0-123 degrees today. Progressed squatting motion to be performed per sit to stand with resistance as well as progressed balance to perform single limb stance on airrex. · Communication/Consultation:  Patient provided HEP. Discussed POC with patient which he was able to verbalize understanding. · Equipment provided today:  None today  · Recommendations/Intent for next treatment session: Next visit will focus on continued progression of range of motion exercises, strength, balance, and ambulation through graded exercise program. Continue with step ups as well as add volume to single limb stand at the end of session.      Total Treatment Billable Duration:  57 minutes  PT Patient Time In/Time Out  Time In: 1000  Time Out: [address], PT

## 2021-09-08 ENCOUNTER — HOSPITAL ENCOUNTER (OUTPATIENT)
Dept: PHYSICAL THERAPY | Age: 71
Discharge: HOME | End: 2021-09-08
Payer: MEDICARE

## 2021-09-08 PROCEDURE — 97110 THERAPEUTIC EXERCISES: CPT

## 2021-09-08 PROCEDURE — 97140 MANUAL THERAPY 1/> REGIONS: CPT

## 2021-09-08 NOTE — PROGRESS NOTES
Ashli Oh  : 1950  [place]  Secondary:  [facility] [place] [facility] [id number], [address] [place] [address], [place], [address] [place] [address]  [phone]   [phone]      OUTPATIENT PHYSICAL THERAPY: Daily Treatment Note 2021    ICD-10: Treatment Diagnosis: S/P total knee arthroplasty, right (z96.651)              Treatment Diagnosis 2: Muscle Weakness (M62.81)               Treatment Diagnosis 3: Pain in L knee (M25.562)               Treatment Diagnosis 4: other abnormalities of gait and mobility (R26.89)    Precautions: None   Allergies: Bee sting [sting, bee]; Keflex [cephalexin]; Pcn [penicillins]; and Sulfa (sulfonamide antibiotics)   TREATMENT PLAN:  Effective Dates: 2021 TO 10/15/2021 (60 days). Frequency/Duration: 1-2x for 60 Day(s) MEDICAL/REFERRING DIAGNOSIS:  S/P total knee arthroplasty, right [Z96.651]  DATE OF ONSET: Patient is s/p right total knee arthroplasty on 2021  REFERRING PHYSICIAN: Maikol Mohamud MD MD Orders: Evaluate and Treat   Return MD Appointment: Unknown at this time. Pre-treatment Symptoms/Complaints:  Patient reports feeling sore in his posterior knee this morning. Pain: Initial: Pain Intensity 1: 3  Pain Location 1: Knee  Pain Orientation 1: Right, Posterior  Post Session:  3/10    Medications Last Reviewed:  2021  Updated Objective Findings:  Right knee range of motion 0-120 degrees  Vital signs: 116/67mmHg 95% SpO2 102 bpm    TREATMENT:   THERAPEUTIC EXERCISE: (45 minutes):  Exercises per grid below to improve mobility, strength and balance. Required minimal verbal cues to promote proper body alignment. Progressed repetitions as indicated. Date:  2021  Date:  2021 Date:  21   Activity/Exercise Parameters Parameters Parameters   Nu step -- X 10 mins level 4  X 10 mins level 4   Standing hip flexion  -- 2 lb wt. s x 20 reps  ---   Standing hamstring curls -- 2 lb wt. s x 20 reps 3 x 10 R only    Standing hip abduction  3x15 with yellow loop 2lb wt. s x 20  3 x 10 Red bilateral     Standing heel toe raises 3x20 X 2 lb wt. s BLE's  3 x 10 incline    Heel Prop 3x60 seconds  3x60 seconds  Cuing for deep breathing 5 minutes with manual   Calf Stretch 3x30 seconds on physio ball cuing for deep breathing at terminal extension 3x30 seconds cuing for terminal extension 4 x 30 sec hold on incline   Quad Set -- x20  With SLR    Leg raise flexion 3x12 cuing for isometric quad contracttion 3x12 cuing to initiate with quad contraction 3 x 10 with quad set    Heel Slide x30 cuing for deep breathing  x25 cuing for deep breathing 1 x 10 strap 5 second holds    1 x 10 active no strap    Sit to stand 3x15 with 10 pound weight 3x10 3 x 10 15lb kettlebell from [employer] 3x20 with 75 pounds 62 lbs bilateral press 3x15 reps  125 pounds 3 x 15   Step Up 3x10 on 8 inch step cuing for terminal extension and quad contraction  -- 3 x 10 8 inch step unilateral UE support    Single limb stance 2x30 seconds  performed on airrex pad -- 3 x 30 sec airex      Time spent with patient reviewing proper muscle recruitment and technique with exercises. MANUAL THERAPY: (10 minutes): Joint mobilization was utilized and necessary because of the patient's restricted joint motion   Patient received soft tissue mobilization to right knee anteriorly and posteriorly to decrease tightness with emphasis on distal quad tendon and iliotibial band. MODALITIES: (0):      None Today    HEP: As above; handouts given to patient for all exercises. Treatment/Session Summary:    · Response to Treatment:  Patient tolerated exercise progressions well; cues to needed to avoid locking knees on shuttle press. · Communication/Consultation:  Patient provided HEP. Discussed POC with patient which he was able to verbalize understanding.    · Equipment provided today:  None today  · Recommendations/Intent for next treatment session: Next visit will focus on continued progression of range of motion exercises, strength, balance, and ambulation through graded exercise program. Continue with step ups as well as add volume to single limb stand at the end of session.      Total Treatment Billable Duration:  55 minutes  PT Patient Time In/Time Out  Time In: 1053  Time Out: [address], PT

## 2021-09-10 ENCOUNTER — HOSPITAL ENCOUNTER (OUTPATIENT)
Dept: PHYSICAL THERAPY | Age: 71
Discharge: HOME | End: 2021-09-10
Payer: MEDICARE

## 2021-09-10 PROCEDURE — 97140 MANUAL THERAPY 1/> REGIONS: CPT

## 2021-09-10 PROCEDURE — 97110 THERAPEUTIC EXERCISES: CPT

## 2021-09-10 NOTE — PROGRESS NOTES
Alejandra Weinberg  : 1950  [place]  Secondary:  [facility] [place] [facility] [id number] [place], [address] [place] [address], [address]  [phone]   [phone]      OUTPATIENT PHYSICAL THERAPY: Daily Treatment Note 9/10/2021    ICD-10: Treatment Diagnosis: S/P total knee arthroplasty, right (z96.651)              Treatment Diagnosis 2: Muscle Weakness (M62.81)               Treatment Diagnosis 3: Pain in L knee (M25.562)               Treatment Diagnosis 4: other abnormalities of gait and mobility (R26.89)    Precautions: None   Allergies: Bee sting [sting, bee]; Keflex [cephalexin]; Pcn [penicillins]; and Sulfa (sulfonamide antibiotics)   TREATMENT PLAN:  Effective Dates: 2021 TO 10/15/2021 (60 days). Frequency/Duration: 1-2x for 60 Day(s) MEDICAL/REFERRING DIAGNOSIS:  S/P total knee arthroplasty, right [Z96.651]  DATE OF ONSET: Patient is s/p right total knee arthroplasty on 2021  REFERRING PHYSICIAN: Tha Salas MD MD Orders: Evaluate and Treat   Return MD Appointment: Unknown at this time. Pre-treatment Symptoms/Complaints:  Patient reported right knee tightness posteriorly and laterally with mild edema     Pain: Initial: Pain Intensity 1: 3  Pain Location 1: Knee  Pain Orientation 1: Lateral, Posterior, Right  Post Session:  2/10    Medications Last Reviewed:  9/10/2021  Updated Objective Findings:  Right knee range of motion 0-125 degrees     TREATMENT:   THERAPEUTIC EXERCISE: (45 minutes):  Exercises per grid below to improve mobility, strength and balance. Required minimal verbal cues to promote proper body alignment. Progressed repetitions as indicated. Date:  9/10/2021  Date:  2021 Date:  21   Activity/Exercise Parameters Parameters Parameters   Nu step X 12 mins level 4  X 10 mins level 4  X 10 mins level 4   Standing hip flexion  2.5 lb wt. s x 30 reps  2 lb wt. s x 20 reps  ---   Standing hamstring curls 2.5 lb wt. s x 30 reps  2 lb wt. s x 20 reps  3 x 10 R only    Standing hip abduction  2.5 lb wt. s x 30 reps 2lb wt. s x 20  3 x 10 Red bilateral     Standing heel toe raises 3x10 reps incline X 2 lb wt. s BLE's  3 x 10 incline    Heel Prop 3x60 seconds  3x60 seconds  Cuing for deep breathing 5 minutes with manual   Calf Stretch 4x30 sec hold on incline  3x30 seconds cuing for terminal extension 4 x 30 sec hold on incline   Quad Set X 20 reps with SLR x20  With SLR    Leg raise flexion 3x12 cuing for isometric quad contracttion 3x12 cuing to initiate with quad contraction 3 x 10 with quad set    Heel Slide x30 cuing for deep breathing  x25 cuing for deep breathing 1 x 10 strap 5 second holds    1 x 10 active no strap    Sit to stand 3x15 with 10 pound weight 3x10 3 x 10 15 lb kettlebell from [employer] 3x15 reps with 125 lbs 62 lbs bilateral press 3x15 reps  125 pounds 3 x 15   Step Up 3x10 on 8 inch step  -- 3 x 10 8 inch step unilateral UE support    Single limb stance 2x30 seconds  performed on airrex pad -- 3 x 30 sec airex      Time spent with patient reviewing proper muscle recruitment and technique with exercises. MANUAL THERAPY: (10 minutes): Joint mobilization was utilized and necessary because of the patient's restricted joint motion   Patient received soft tissue mobilization to right knee anteriorly and posteriorly to decrease tightness with emphasis on distal quad tendon and iliotibial band. MODALITIES: (0):      None Today    HEP: As above; handouts given to patient for all exercises. Treatment/Session Summary:    · Response to Treatment:  Patient was compliant with all exercises and had increased knee flexion   · Communication/Consultation:  Patient provided HEP. Discussed POC with patient which he was able to verbalize understanding.    · Equipment provided today:  None today  · Recommendations/Intent for next treatment session: Next visit will focus on continued progression of range of motion exercises, strength, balance, and ambulation through graded exercise program. Continue with step ups as well as add volume to single limb stand at the end of session.      Total Treatment Billable Duration:  55 minutes  PT Patient Time In/Time Out  Time In: 1100  Time Out: [address] [facility]

## 2021-09-15 ENCOUNTER — HOSPITAL ENCOUNTER (OUTPATIENT)
Dept: PHYSICAL THERAPY | Age: 71
Discharge: HOME | End: 2021-09-15
Payer: MEDICARE

## 2021-09-15 PROCEDURE — 97140 MANUAL THERAPY 1/> REGIONS: CPT

## 2021-09-15 PROCEDURE — 97110 THERAPEUTIC EXERCISES: CPT

## 2021-09-15 NOTE — PROGRESS NOTES
Jeyson Dhaliwalfern  : 1950  [place]  Secondary:  Therapy Center at [address] [place] [facility], [address] [place] [address], [address] [place] [address], [address] [place] [address]  [phone]   [phone]       OUTPATIENT PHYSICAL THERAPY:Progress Report 9/15/2021    ICD-10: Treatment Diagnosis: S/P total knee arthroplasty, right (z96.651)              Treatment Diagnosis 2: Muscle Weakness (M62.81)               Treatment Diagnosis 3: Pain in L knee (M25.562)               Treatment Diagnosis 4: other abnormalities of gait and mobility (R26.89)    Precautions: None   Allergies: Bee sting [sting, bee]; Keflex [cephalexin]; Pcn [penicillins]; and Sulfa (sulfonamide antibiotics)   TREATMENT PLAN:  Effective Dates: 2021 TO 10/15/2021 (60 days). Frequency/Duration: 1-2 times a week for 60 Day(s) MEDICAL/REFERRING DIAGNOSIS:  Presence of right artificial knee joint [Z96.651]  DATE OF ONSET: Patient is s/p right total knee arthroplasty on 2021  REFERRING PHYSICIAN: Renay Chaney MD MD Orders: Evaluate and Treat   Return MD Appointment: Unknown. INITIAL ASSESSMENT:  Mr. Douglas Palma is a 80 y.o. male presenting to physical therapy with complaints of R knee pain s/p total knee arthroplasty that occurred on 2021. Patient reports chronic pain dating back 3-4 years in bilateral knees with R being greater than left. Patient had surgery on his R knee on 2021 but reports he will likely need to do a knee replacement on his L side as well in the near future. Patient has been working with home health physical therapy twice a week and is currently pleased with his progress but still ambulated with use of single point cane. Patient is eager to return to ambulating without assistive device as well as being able to squat and raise 25 pounds in order to help him work on a car with his grandson.  Patient presents with increased pain, decreased strength, decreased ROM, decreased flexibility, impaired gait, impaired posture, impaired transfer ability, decreased activity tolerance, and overall impaired functional mobility. Patient is a good candidate for skilled physical therapy interventions to include manual therapy, therapeutic exercise, balance training, gait training, transfer training, postural re-education, body mechanics training, and pain modalities as needed in order to address deficits and improve functional independence. PROGRESS UPDATE (9/15/2021): Patient was seen for 8 sessions of Physical Therapy from 8/16/2021 to 9/15/2021. Patient reports feeling 50% better with pain in his R LE s/p R TKA on 7/23/2021. Patient has demonstrated improvement in range of motion in R LE per goniometer, strength in R LE per MMT, and activity tolerance per exercise selection and progression as well as standardized outcome measure. Despite improvements, patient continues to be functionally limited in strength of R LE, balance, and activity tolerance impacting ability to ambulate as well as perform all ADLs independently without pain, fear, or hesitation. Patient will benefit from continued skilled Physical Therapy in order to address remaining deficits and progress functional independence. PROBLEM LIST (Impacting functional limitations):  1. Decreased Strength  2. Decreased ADL/Functional Activities  3. Decreased Transfer Abilities  4. Decreased Ambulation Ability/Technique  5. Decreased Balance  6. Increased Pain  7. Decreased Activity Tolerance  8. Decreased Flexibility/Joint Mobility  9. Edema/Girth  10. Decreased Mark with Home Exercise Program INTERVENTIONS PLANNED: (Treatment may consist of any combination of the following)  1. Balance Exercise  2. Bed Mobility  3. Cold  4. Cryotherapy  5. Electrical Stimulation  6. Gait Training  7. Heat  8. Home Exercise Program (HEP)  9. Manual Therapy  10. Neuromuscular Re-education/Strengthening  11. Range of Motion (ROM)  12.  Therapeutic Activites  13. Therapeutic Exercise/Strengthening  14. Transcutaneous Electrical Nerve Stimulation (TENS)  15. Transfer Training     GOALS: (Goals have been discussed and agreed upon with patient.)  Short-Term Functional Goals: Time Frame: 8/16/2021 to 9/13/2021  1. Patient demonstrates independence with home exercise program without verbal cueing provided by therapist. (GOAL MET)  2. Patient will report no more than 2/10 knee pain at rest in order to demonstrate improved self pain control and tolerance. (GOAL MET)  3. Patient will reach 0 degrees knee extension in order to demonstrate improved ambulation. (GOAL MET)  4. Patient will be educated in and demonstrate proper squat lift technique in order to reduce stress on on his LE, improve safety, and reduce risk of injury. (GOAL MET)  Discharge Goals: Time Frame: 8/16/2021 to 10/15/2021  1. Patient will demonstrate 120 degrees of knee flexion in L knee in order to be able to get in and out of the bathtub. (GOAL MET)  2. Patient will improve Lower Extremity Functional Scale score to greater than 70/80 from 41/80. (ONGOING)  3. Patient will demonstrate 5/5 gross LE strength in order to squat and raise 25 pounds while working on cars with his grandchildren. (ONGOING)  4. Patient will ambulate greater than 30 minutes without pain in order to shop around the grocery store without pain or limitation. (ONGOING)    Outcome Measure: Tool Used: Lower Extremity Functional Scale (LEFS)  Score:  Initial: 41/80 Most Recent: 58/80 (Date: 9/16/2021 )   Interpretation of Score: 20 questions each scored on a 5 point scale with 0 representing \"extreme difficulty or unable to perform\" and 4 representing \"no difficulty\". The lower the score, the greater the functional disability. 80/80 represents no disability. Minimal detectable change is 9 points. Patient denies any LE paresthesia. Patient denies any increase of symptoms with cough, sneeze or valsalva.  Patient denies any saddle paresthesia or bowel/bladder deficits. Observation/Orthostatic Postural Assessment:          Patient stands with slight forward flexion and uses single point cane for ambulation. Incision appears to be healing well with minimal erythema and no bruising. No signs of infection or DVT noted at evaluation. Patient improving with ambulation without use of cane at progress, no sign of DVT and minimal swelling at progress note. Palpation:          Patient tender to palpation in distal quad tendon as well as diffusely through anterior knee at progress and evaluation. ROM:            AROM/ PROM Left (degrees) Right (degrees)   Knee Flexion 132 125 (from 115)   Knee Extension 3 degrees deficit 0 (from 5 degrees deficit)    Ankle Dorsiflexion (DF) -   knee extended 10 5 at progress and eval   Ankle Dorsiflexion (DF) - knee flexed 12 7 at progress and eval   Ankle Plantarflexion (PF) 30 30     Strength: Motion Tested Left   (*/5) Right  (*/5)   Knee Extension 5 4+ (from 4)   Knee Flexion 4+ (from 4) 4+ (from 4)   Hip Flexion 4+ (from 4) 4+ (from 4)   Hip Extension 4+ (from 4) 4 (from 3)   Hip Abduction 4 at progress and eval 4 (from 3)   Ankle DF 5 4 + (from 4)   Ankle PF 4 at progress and eval 4 at progress and eval     Passive Accessory Motion:         Decreased patellofemoral mobility on R LE at eval. Unremarkable at progress. Neurological Screen:              Myotomes: Key muscle strength testing through bilateral LE is [facility]. Dermatomes: Sensation to light touch for bilateral LE is intact and WFL. Reflexes: Patellar (L3/ L4): 2                 Achilles (S1/ S2): 2           Functional Mobility:         Gait/Ambulation:  Patient ambulates with use of single point cane and decreased stance on R LE at evaluation. Patient ambulating without cane but carries it for apprehension at progress.           Transfers:  Patient able to transfer from sit to stand independently but reports pain with movement at progress and eval.   Balance:          Patient unable to stand greater than 10 seconds in single limb stance bilaterally at progress and eval.            Medical Necessity:   · Patient is expected to demonstrate progress in strength, range of motion, balance and coordination to be able to perform all ADLs, ambulate independently, and squat and raise weight while working on cars with his family. · Skilled intervention continues to be required due to deficits in strength, range of motion, balance as well as increased pain. Reason for Services/Other Comments:  · Patient continues to require skilled intervention due to increasing complexity of exercises. Rehabilitation Potential For Stated Goals: Good  Regarding Aubrey Carrasco's therapy, I certify that the treatment plan above will be carried out by a therapist or under their direction.   Thank you for this referral,  David Carnes, PT

## 2021-09-15 NOTE — PROGRESS NOTES
Contreras Pascale  : 1950  [place]  Secondary:  [facility] at [place] [id number] [facility] [employer] [place], [address], [place] castro, [address] [place] [address]  [phone]   [phone]      OUTPATIENT PHYSICAL THERAPY: Daily Treatment Note 9/15/2021    ICD-10: Treatment Diagnosis: S/P total knee arthroplasty, right (z96.651)              Treatment Diagnosis 2: Muscle Weakness (M62.81)               Treatment Diagnosis 3: Pain in L knee (M25.562)               Treatment Diagnosis 4: other abnormalities of gait and mobility (R26.89)    Precautions: None   Allergies: Bee sting [sting, bee]; Keflex [cephalexin]; Pcn [penicillins]; and Sulfa (sulfonamide antibiotics)   TREATMENT PLAN:  Effective Dates: 2021 TO 10/15/2021 (60 days). Frequency/Duration: 1-2 times a week for 60 Day(s) MEDICAL/REFERRING DIAGNOSIS:  S/P total knee arthroplasty, right [Z96.651]  DATE OF ONSET: Patient is s/p right total knee arthroplasty on 2021  REFERRING PHYSICIAN: Daphne Quintanilla MD MD Orders: Evaluate and Treat   Return MD Appointment: Unknown at this time. Pre-treatment Symptoms/Complaints:  Patient reported some increased swelling but he was pleased with progress. Pain: Initial: Pain Intensity 1: 1  Pain Location 1: Knee  Pain Orientation 1: Right  Post Session:  1/10    Medications Last Reviewed:  9/15/2021  Updated Objective Findings:  Right knee range of motion 0-125 degrees     TREATMENT:   THERAPEUTIC EXERCISE: (45 minutes):  Exercises per grid below to improve mobility, strength and balance. Required minimal verbal cues to promote proper body alignment. Progressed repetitions as indicated. Date:  9/15/2021 Date:  9/10/2021  Date:  21   Activity/Exercise Parameters Parameters Parameters   Nu step X 10 min level 4  X 12 mins level 4  X 10 mins level 4   Standing hip flexion  -- 2.5 lb wt. s x 30 reps  ---   Standing hamstring curls -- 2.5 lb wt. s x 30 reps  3 x 10 R only    Standing hip abduction  -- 2.5 lb wt. s x 30 reps 3 x 10 Red bilateral     Standing heel toe raises -- 3x10 reps incline 3 x 10 incline    Heel Prop 4x60 sec 3x60 seconds  5 minutes with manual   Calf Stretch 3x45 sec 4x30 sec hold on incline  4 x 30 sec hold on incline   Quad Set -- X 20 reps with SLR With SLR    Leg raise flexion 3x12 cuing for isometric quad contraction 3x12 cuing for isometric quad contracttion 3 x 10 with quad set    Heel Slide x30 cuing for deep breathing x30 cuing for deep breathing  1 x 10 strap 5 second holds    1 x 10 active no strap    Sit to stand 3x15 with 10 pound weight 3x15 with 10 pound weight 3 x 10 15 lb kettlebell from [employer] 3x20 with 125 lbs  3x15 reps with 125 lbs 125 pounds 3 x 15   Step Up 3x12 on 8 inch step  3x10 on 8 inch step  3 x 10 8 inch step unilateral UE support    Single limb stance 3x45 seconds on airrex pad  2x30 seconds  performed on airrex pad 3 x 30 sec airex    Bridge 3x12 -- --   Hip Abduction 3x10 steps side step with red theraband loop -- --     Time spent with patient reviewing proper muscle recruitment and technique with exercises. MANUAL THERAPY: (14 minutes): Joint mobilization was utilized and necessary because of the patient's restricted joint motion   Patient received soft tissue mobilization to right knee anteriorly and posteriorly to decrease tightness with emphasis on distal quad tendon and iliotibial band. MODALITIES: (0):      None Today    HEP: As above; handouts given to patient for all exercises. Treatment/Session Summary:    · Response to Treatment:  Patient was compliant with all exercises and was able to progress overall volume in session today. Added sidestepping to progress hip abduction strength as well as single limb stance to progress balance tolerance. · Communication/Consultation:  Patient provided HEP. Discussed POC with patient which he was able to verbalize understanding.    · Equipment provided today:  None today  · Recommendations/Intent for next treatment session: Next visit will focus on continued progression of range of motion exercises, strength, balance, and ambulation through graded exercise program. Continue to emphasize unilateral loading with single limb shuttle press.      Total Treatment Billable Duration:  58 minutes  PT Patient Time In/Time Out  Time In: 1103  Time Out: 15-A [address],

## 2021-09-17 ENCOUNTER — HOSPITAL ENCOUNTER (OUTPATIENT)
Dept: PHYSICAL THERAPY | Age: 71
Discharge: HOME | End: 2021-09-17
Payer: MEDICARE

## 2021-09-17 PROCEDURE — 97110 THERAPEUTIC EXERCISES: CPT

## 2021-09-17 PROCEDURE — 97140 MANUAL THERAPY 1/> REGIONS: CPT

## 2021-09-17 NOTE — PROGRESS NOTES
Nabil Fragoso  : 1950  [place]  Secondary:  [facility] at [place] [id number]  [employer] [place], [address] [place] [address], [place] castro, [address] [place] [address]  [phone]   [phone]      OUTPATIENT PHYSICAL THERAPY: Daily Treatment Note 2021    ICD-10: Treatment Diagnosis: S/P total knee arthroplasty, right (z96.651)              Treatment Diagnosis 2: Muscle Weakness (M62.81)               Treatment Diagnosis 3: Pain in L knee (M25.562)               Treatment Diagnosis 4: other abnormalities of gait and mobility (R26.89)    Precautions: None   Allergies: Bee sting [sting, bee]; Keflex [cephalexin]; Pcn [penicillins]; and Sulfa (sulfonamide antibiotics)   TREATMENT PLAN:  Effective Dates: 2021 TO 10/15/2021 (60 days). Frequency/Duration: 1-2 times a week for 60 Day(s) MEDICAL/REFERRING DIAGNOSIS:  S/P total knee arthroplasty, right [Z96.651]  DATE OF ONSET: Patient is s/p right total knee arthroplasty on 2021  REFERRING PHYSICIAN: Jude Lopez MD MD Orders: Evaluate and Treat   Return MD Appointment: Unknown at this time. Pre-treatment Symptoms/Complaints:  Patient reports main C/O is posterior knee pain with sit to stand     Pain: Initial: Pain Intensity 1: 2  Pain Location 1: Knee  Pain Orientation 1: Right, Posterior  Post Session:  1/10    Medications Last Reviewed:  2021  Updated Objective Findings:  Right knee range of motion 0-125 degrees     TREATMENT:   THERAPEUTIC EXERCISE: (45 minutes):  Exercises per grid below to improve mobility, strength and balance. Required minimal verbal cues to promote proper body alignment. Progressed repetitions as indicated. Date:  9/15/2021 Date:  9/10/2021  Date:  21   Activity/Exercise Parameters Parameters Parameters   Nu step X 10 min level 4  X 12 mins level 4  X 10 mins level 4   Standing hip flexion  -- 2.5 lb wt. s x 30 reps  ---   Standing hamstring curls -- 2.5 lb wt. s x 30 reps Standing hip abduction  -- 2.5 lb wt. s x 30 reps    Standing heel toe raises -- 3x10 reps incline 3 x 10 incline    Heel Prop 4x60 sec 3x60 seconds  10 minutes with manual   Calf Stretch 3x45 sec 4x30 sec hold on incline  5 x 30 sec hold on incline   Quad Set -- X 20 reps with SLR    Leg raise flexion 3x12 cuing for isometric quad contraction 3x12 cuing for isometric quad contracttion 2 x 20 with quad set   1 lb 1 x 20   Heel Slide x30 cuing for deep breathing x30 cuing for deep breathing     Sit to stand 3x15 with 10 pound weight 3x15 with 10 pound weight 1 x 10 0 lbs  3 x 15 15 lb kettlebell chair/2 pads    Shuttle Press 3x20 with 125 lbs  3x15 reps with 125 lbs 125 pounds 2 x 15  1 x 20   Step Up 3x12 on 8 inch step  3x10 on 8 inch step  3 x 10 8 inch FWD  2 x 10 6 inch lateral   Single limb stance 3x45 seconds on airrex pad  2x30 seconds  performed on airrex pad 3 x 45 sec airex    Bridge 3x12 -- --   Hip Abduction 3x10 steps side step with red theraband loop -- 3 x 30 ft red loop     Time spent with patient reviewing proper muscle recruitment and technique with exercises. MANUAL THERAPY: (10 minutes): Joint mobilization was utilized and necessary because of the patient's restricted joint motion   Patient received soft tissue mobilization to right knee anteriorly and posteriorly to decrease tightness with emphasis on distal quad tendon and iliotibial band. MODALITIES: (0):      None Today    HEP: As above; handouts given to patient for all exercises. Treatment/Session Summary:    · Response to Treatment:  Patient was compliant with all exercises and was able to progress overall volume in session today. Added sidestepping to progress hip abduction strength as well as single limb stance to progress balance tolerance.     · Communication/Consultation:  HEP  · Equipment provided today:  None today  · Recommendations/Intent for next treatment session: Next visit will focus on continued progression of range of motion exercises, strength, balance, and ambulation through graded exercise program. Continue to emphasize unilateral loading with single limb shuttle press.      Total Treatment Billable Duration:  55 minutes  PT Patient Time In/Time Out  Time In: 1058  Time Out: Mikhail 79, PTA

## 2021-09-20 ENCOUNTER — HOSPITAL ENCOUNTER (OUTPATIENT)
Dept: PHYSICAL THERAPY | Age: 71
Discharge: HOME | End: 2021-09-20
Payer: MEDICARE

## 2021-09-20 PROCEDURE — 97110 THERAPEUTIC EXERCISES: CPT

## 2021-09-20 NOTE — PROGRESS NOTES
Richard Alas  : 1950  [place]  Secondary:  [facility] at [place] [id number]  [employer] [place], [address] [place] [address], [place] castro, [address] [place] [address]  [phone]   [phone]      OUTPATIENT PHYSICAL THERAPY: Daily Treatment Note 2021    ICD-10: Treatment Diagnosis: S/P total knee arthroplasty, right (z96.651)              Treatment Diagnosis 2: Muscle Weakness (M62.81)               Treatment Diagnosis 3: Pain in L knee (M25.562)               Treatment Diagnosis 4: other abnormalities of gait and mobility (R26.89)    Precautions: None   Allergies: Bee sting [sting, bee]; Keflex [cephalexin]; Pcn [penicillins]; and Sulfa (sulfonamide antibiotics)   TREATMENT PLAN:  Effective Dates: 2021 TO 10/15/2021 (60 days). Frequency/Duration: 1-2 times a week for 60 Day(s) MEDICAL/REFERRING DIAGNOSIS:  S/P total knee arthroplasty, right [Z96.651]  DATE OF ONSET: Patient is s/p right total knee arthroplasty on 2021  REFERRING PHYSICIAN: Jackie Vazquez MD MD Orders: Evaluate and Treat   Return MD Appointment: Unknown at this time. Pre-treatment Symptoms/Complaints:  Patient reported no pain today and stated he had a 50% improvement with functional mobility and strength. Pain: Initial: Pain Intensity 1: 0  Pain Location 1: Knee  Pain Orientation 1: Right  Post Session:  0/10 no pain   Medications Last Reviewed:  2021  Updated Objective Findings:  Right knee range of motion 0-125 degrees. TREATMENT:   THERAPEUTIC EXERCISE: (55 minutes):  Exercises per grid below to improve mobility, strength and balance. Required minimal verbal cues to promote proper body alignment. Progressed repetitions as indicated. Date:  9/15/2021 Date:  2021  Date:  21   Activity/Exercise Parameters Parameters Parameters   Nu step X 10 min level 4  X 12 mins level 4  X 10 mins level 4   Standing hip flexion  -- 3 lb wt. s x 30 reps  ---   Standing hamstring curls -- 3 lb wt. s x 30 reps     Standing hip abduction  -- 3 lb wt. s x 30 reps    Standing heel toe raises -- 3x10 reps incline 3 x 10 incline    Heel Prop 4x60 sec 3x60 seconds  10 minutes with manual   Calf Stretch 3x45 sec 4x30 sec hold on incline  5 x 30 sec hold on incline   Quad Set -- X 20 reps with SLR    Leg raise flexion 3x12 cuing for isometric quad contraction X 30 reps  2 x 20 with quad set   1 lb 1 x 20   Heel Slide x30 cuing for deep breathing x30 cuing for deep breathing     Sit to stand 3x15 with 10 pound weight 3x15 with 10 pound weight with no cushion 1 x 10 0 lbs  3 x 15 15 lb kettlebell chair/2 pads    Shuttle Press 3x20 with 125 lbs  90 reps with 125 lbs 125 pounds 3x15   Step Up 3x12 on 8 inch step  3x10 on 8 inch step fwd & lateral 3 x 10 8 inch FWD   Single limb stance 3x45 seconds on airrex pad  3x45 seconds  performed on air jorge luis pad 3 x 45 sec airex    Bridge 3x12 3x12 reps  --   Hip Abduction 3x10 steps side step with red theraband loop  3 x 30 ft red loop     Time spent with patient reviewing proper muscle recruitment and technique with exercises. MANUAL THERAPY: (0 minutes): Joint mobilization was utilized and necessary because of the patient's restricted joint motion   None today    MODALITIES: (0):      None Today    HEP: As above; handouts given to patient for all exercises. Treatment/Session Summary:    · Response to Treatment:  Patient was compliant with all exercises and continues to progress with range of motion, strength, and mobility. · Communication/Consultation:  HEP  · Equipment provided today:  None today  · Recommendations/Intent for next treatment session: Next visit will focus on continued progression of range of motion exercises, strength, balance, and ambulation through graded exercise program. Continue to emphasize unilateral loading with single limb shuttle press.      Total Treatment Billable Duration:  55 minutes  PT Patient Time In/Time Out  Time In: 1100  Time Out: 3200 [facility], [facility]

## 2021-09-23 ENCOUNTER — HOSPITAL ENCOUNTER (OUTPATIENT)
Dept: PHYSICAL THERAPY | Age: 71
Discharge: HOME | End: 2021-09-23
Payer: MEDICARE

## 2021-09-23 PROCEDURE — 97110 THERAPEUTIC EXERCISES: CPT

## 2021-09-23 NOTE — PROGRESS NOTES
Feliciano Hernandez  : 1950  [place]  Secondary:  [facility]  [address] [place], [address] [place] [address], [place], [address] [place] [address]  [phone]   [phone]      OUTPATIENT PHYSICAL THERAPY: Daily Treatment Note 2021    ICD-10: Treatment Diagnosis: S/P total knee arthroplasty, right (z96.651)              Treatment Diagnosis 2: Muscle Weakness (M62.81)               Treatment Diagnosis 3: Pain in L knee (M25.562)               Treatment Diagnosis 4: other abnormalities of gait and mobility (R26.89)    Precautions: None   Allergies: Bee sting [sting, bee]; Keflex [cephalexin]; Pcn [penicillins]; and Sulfa (sulfonamide antibiotics)   TREATMENT PLAN:  Effective Dates: 2021 TO 10/15/2021 (60 days). Frequency/Duration: 1-2 times a week for 60 Day(s) MEDICAL/REFERRING DIAGNOSIS:  S/P total knee arthroplasty, right [Z96.651]  DATE OF ONSET: Patient is s/p right total knee arthroplasty on 2021  REFERRING PHYSICIAN: Coby Pressley MD MD Orders: Evaluate and Treat   Return MD Appointment: Unknown at this time. Pre-treatment Symptoms/Complaints:  Patient reported minimal knee discomfort today. Pt. Reported getting along well with no issues. Pain: Initial: Pain Intensity 1: 1  Pain Location 1: Knee  Pain Orientation 1: Right  Post Session:  0/10 no pain   Medications Last Reviewed:  2021  Updated Objective Findings:  Right knee range of motion 0-125 degrees. TREATMENT:   THERAPEUTIC EXERCISE: (55 minutes):  Exercises per grid below to improve mobility, strength and balance. Required minimal verbal cues to promote proper body alignment. Progressed repetitions as indicated. Date:  9/15/2021 Date:  2021  Date:  21   Activity/Exercise Parameters Parameters Parameters   Nu step X 10 min level 4  X 12 mins level 4  X 10 mins level 4   Standing hip flexion  -- 3 lb wt. s x 30 reps  X 30 reps 3 lb wt. s    Standing hamstring curls -- 3 lb wt. s x 30 reps  X 30 reps 3 lb wt. s    Standing hip abduction  -- 3 lb wt. s x 30 reps X 30 reps 3 lb wt. s   Standing heel toe raises -- 3x10 reps incline 3 x 10 incline    Heel Prop 4x60 sec 3x60 seconds  10 minutes with manual   Calf Stretch 3x45 sec 4x30 sec hold on incline  4 x 30 sec hold on incline   Quad Set -- X 20 reps with SLR X 20 reps with SLR   Leg raise flexion 3x12 cuing for isometric quad contraction X 30 reps  2 x 20 with quad set   1 lb 1 x 20   Heel Slide x30 cuing for deep breathing x30 cuing for deep breathing  X 30 reps   Sit to stand 3x15 with 10 pound weight 3x15 with 10 pound weight with no cushion 3 x 15 15 lb kettlebell chair one pad     Shuttle Press 3x20 with 125 lbs  90 reps with 125 lbs 125 pounds 2x45 reps    Step Up 3x12 on 8 inch step  3x10 on 8 inch step fwd & lateral 3 x 10 8 inch FWD   Single limb stance 3x45 seconds on airrex pad  3x45 seconds  performed on air jorge luis pad 3 x 45 sec airex    Bridge 3x12 3x12 reps  3x15 reps    Hip Abduction 3x10 steps side step with red theraband loop  X 30 reps 3 lb wt.s      Time spent with patient reviewing proper muscle recruitment and technique with exercises. MANUAL THERAPY: (0 minutes): Joint mobilization was utilized and necessary because of the patient's restricted joint motion   None today    MODALITIES: (0):      None Today    HEP: As above; handouts given to patient for all exercises. Treatment/Session Summary:    · Response to Treatment:  Patient was compliant with all exercises and continues to progress with range of motion, strength, and mobility. · Communication/Consultation:  HEP  · Equipment provided today:  None today  · Recommendations/Intent for next treatment session: Next visit will focus on continued progression of range of motion exercises, strength, balance, and ambulation through graded exercise program. Continue to emphasize unilateral loading with single limb shuttle press.      Total Treatment Billable Duration:  55 minutes  PT Patient Time In/Time Out  Time In: 1100  Time Out: 3200 [facility]

## 2021-09-27 ENCOUNTER — HOSPITAL ENCOUNTER (OUTPATIENT)
Dept: PHYSICAL THERAPY | Age: 71
Discharge: HOME | End: 2021-09-27
Payer: MEDICARE

## 2021-09-27 PROCEDURE — 97110 THERAPEUTIC EXERCISES: CPT

## 2021-09-27 PROCEDURE — 97140 MANUAL THERAPY 1/> REGIONS: CPT

## 2021-09-27 NOTE — PROGRESS NOTES
Nicole Crabtree  : 1950  [place]  Secondary:  [facility] [place] [id number]  [address] [place], [address] [place] [address], [place], [address] [place] [address]  [phone]   [phone]      OUTPATIENT PHYSICAL THERAPY: Daily Treatment Note 2021    ICD-10: Treatment Diagnosis: S/P total knee arthroplasty, right (z96.651)              Treatment Diagnosis 2: Muscle Weakness (M62.81)               Treatment Diagnosis 3: Pain in L knee (M25.562)               Treatment Diagnosis 4: other abnormalities of gait and mobility (R26.89)    Precautions: None   Allergies: Bee sting [sting, bee]; Keflex [cephalexin]; Pcn [penicillins]; and Sulfa (sulfonamide antibiotics)   TREATMENT PLAN:  Effective Dates: 2021 TO 10/15/2021 (60 days). Frequency/Duration: 1-2 times a week for 60 Day(s) MEDICAL/REFERRING DIAGNOSIS:  S/P total knee arthroplasty, right [Z96.651]  DATE OF ONSET: Patient is s/p right total knee arthroplasty on 2021  REFERRING PHYSICIAN: Romina Bojorquez MD MD Orders: Evaluate and Treat   Return MD Appointment: Unknown at this time. Pre-treatment Symptoms/Complaints:  Patient reports some tightness behind his knee but overall feels good. Pain: Initial: Pain Intensity 1: 1  Pain Location 1: Knee  Pain Orientation 1: Right  Post Session:  0/10 no pain   Medications Last Reviewed:  2021  Updated Objective Findings:  Right knee range of motion 0-120 degrees with tenderness in distal R hamstring. TREATMENT:   THERAPEUTIC EXERCISE: (43 minutes):  Exercises per grid below to improve mobility, strength and balance. Required minimal verbal cues to promote proper body alignment. Progressed repetitions as indicated. Date:  2021  Date:  21 Date:  2021   Activity/Exercise Parameters Parameters Parameters    Nu step X 12 mins level 4  X 10 mins level 4 --   Standing hip flexion  3 lb wt. s x 30 reps  X 30 reps 3 lb wt. s  --   Standing hamstring curls 3 lb wt. s x 30 reps  X 30 reps 3 lb wt. s  --   Standing hip abduction  3 lb wt. s x 30 reps X 30 reps 3 lb wt. s --   Standing heel toe raises 3x10 reps incline 3 x 10 incline  3x10 elevated heel raise   Heel Prop 3x60 seconds  10 minutes with manual 5 minutes with manual overpressure   Calf Stretch 4x30 sec hold on incline  4 x 30 sec hold on incline 3x60 seconds on incline    Quad Set X 20 reps with SLR X 20 reps with SLR --   Leg raise flexion X 30 reps  2 x 20 with quad set   1 lb 1 x 20 --   Heel Slide x30 cuing for deep breathing  X 30 reps x20 reps   Sit to stand 3x15 with 10 pound weight with no cushion 3 x 15 15 lb kettlebell chair one pad   3x15 with 15 lb kettlebell from chair with one pad    Shuttle Press 90 reps with 125 lbs 125 pounds 2x45 reps  125 pounds 4x25 reps   Step Up 3x10 on 8 inch step fwd & lateral 3 x 10 8 inch FWD 3x12 on 8 inch step forward    Single limb stance 3x45 seconds  performed on air jorge luis pad 3 x 45 sec airex  3x60 seconds airrex pad    Bridge 3x12 reps  3x15 reps  3x15 cuing for more hamstring dominant movement with foot position   Hip Abduction  X 30 reps 3 lb wt. s  3x15 with yellow loop     Time spent with patient reviewing proper muscle recruitment and technique with exercises. MANUAL THERAPY: (14 minutes): Joint mobilization was utilized and necessary because of the patient's restricted joint motion   Soft tissue mobilization to distal quad and hamstring tendon    MODALITIES: (0):      None Today    HEP: As above; handouts given to patient for all exercises. Treatment/Session Summary:    · Response to Treatment:  Patient continues to demonstrate improvement with activity tolerance. Progressed squatting movement pattern and step up with additional volume and range of motion.    · Communication/Consultation:  HEP  · Equipment provided today:  None today  · Recommendations/Intent for next treatment session: Next visit will focus on continued progression of range of motion exercises, strength, balance, and ambulation through graded exercise program. Continue to emphasize unilateral loading with single limb shuttle press.      Total Treatment Billable Duration:  57 minutes  PT Patient Time In/Time Out  Time In: 1100  Time Out: [address] PT

## 2021-09-29 ENCOUNTER — HOSPITAL ENCOUNTER (OUTPATIENT)
Dept: PHYSICAL THERAPY | Age: 71
Discharge: HOME | End: 2021-09-29
Payer: MEDICARE

## 2021-09-29 PROCEDURE — 97110 THERAPEUTIC EXERCISES: CPT

## 2021-09-29 PROCEDURE — 97140 MANUAL THERAPY 1/> REGIONS: CPT

## 2021-09-29 NOTE — PROGRESS NOTES
Julia Velasquez  : 1950  [place]  Secondary:  [facility] [place] [id number]  [address] [place], [address] [place] [address], [place], [address] [place] [address]  [phone]   [phone]      OUTPATIENT PHYSICAL THERAPY: Daily Treatment Note 2021    ICD-10: Treatment Diagnosis: S/P total knee arthroplasty, right (z96.651)              Treatment Diagnosis 2: Muscle Weakness (M62.81)               Treatment Diagnosis 3: Pain in L knee (M25.562)               Treatment Diagnosis 4: other abnormalities of gait and mobility (R26.89)    Precautions: None   Allergies: Bee sting [sting, bee]; Keflex [cephalexin]; Pcn [penicillins]; and Sulfa (sulfonamide antibiotics)   TREATMENT PLAN:  Effective Dates: 2021 TO 10/15/2021 (60 days). Frequency/Duration: 1-2 times a week for 60 Day(s) MEDICAL/REFERRING DIAGNOSIS:  S/P total knee arthroplasty, right [Z96.651]  DATE OF ONSET: Patient is s/p right total knee arthroplasty on 2021  REFERRING PHYSICIAN: Kush Barnes MD MD Orders: Evaluate and Treat   Return MD Appointment: Unknown at this time. Pre-treatment Symptoms/Complaints:  Patient reports feeling good but is a little apprehensive about handling a motorcycle. He leaves - for a few days in [place] where he will be riding motorcycle. Pain: Initial: Pain Intensity 1: 0  Pain Location 1: Knee  Pain Orientation 1: Right  Post Session:  0/10 no pain   Medications Last Reviewed:  2021  Updated Objective Findings:  Right knee range of motion 0-120 degrees with tenderness in distal R hamstring. TREATMENT:   THERAPEUTIC EXERCISE: (45 minutes):  Exercises per grid below to improve mobility, strength and balance. Required minimal verbal cues to promote proper body alignment. Progressed repetitions as indicated.      Date:  21 Date:  2021 Date:  2021   Activity/Exercise Parameters Parameters  Parameters    Nu step X 10 mins level 4 -- --   Standing hip flexion  X 30 reps 3 lb wt. s  -- --   Standing hamstring curls X 30 reps 3 lb wt. s  -- --   Standing hip abduction  X 30 reps 3 lb wt. s -- --   Standing heel toe raises 3 x 10 incline  3x10 elevated heel raise    Heel Prop 10 minutes with manual 5 minutes with manual overpressure 5 min with manual over pressure   Calf Stretch 4 x 30 sec hold on incline 3x60 seconds on incline     Leg raise flexion 2 x 20 with quad set   1 lb 1 x 20 --    Heel Slide X 30 reps x20 reps    Sit to stand 3 x 15 15 lb kettlebell chair one pad   3x15 with 15 lb kettlebell from chair with one pad  3x12 with 20 pound dumbbell from plinth    Shuttle Press 125 pounds 2x45 reps  125 pounds 4x25 reps 125 pounds 3x20 reps    75 pounds 3x20 single leg    Step Up 3 x 10 8 inch FWD 3x12 on 8 inch step forward  3x10 with 8 inch step and 10 pound weight in contralateral UE   Single limb stance 3 x 45 sec airex  3x60 seconds airrex pad  3x60 seconds airrex pad   Bridge 3x15 reps  3x15 cuing for more hamstring dominant movement with foot position 3x10 gradually progressing into straight leg bridge with each set    Hip Abduction X 30 reps 3 lb wt. s  3x15 with yellow loop 4x15 with yellow loop   Hamstring Stretch -- -- x20 with strap in supine    x10 with addition of IT band in supine with strap     Time spent with patient reviewing proper muscle recruitment and technique with exercises. MANUAL THERAPY: (15 minutes): Joint mobilization was utilized and necessary because of the patient's restricted joint motion   Soft tissue mobilization to distal quad and hamstring tendon    MODALITIES: (0):      None Today    HEP: As above; handouts given to patient for all exercises. Treatment/Session Summary:    · Response to Treatment:  Continued to progress resistance and intensity throughout session. Patient is responding well to treatment with ability to demonstrate improved ROM and activity tolerance.    · Communication/Consultation: HEP  · Equipment provided today:  None today  · Recommendations/Intent for next treatment session: Next visit will focus on continued progression of range of motion exercises, strength, balance, and ambulation through graded exercise program. Continue to emphasize unilateral loading, add unilateral hinge movement.      Total Treatment Billable Duration:  60 minutes  PT Patient Time In/Time Out  Time In: 1100  Time Out: [facility],

## 2021-10-05 ENCOUNTER — HOSPITAL ENCOUNTER (OUTPATIENT)
Dept: PHYSICAL THERAPY | Age: 71
Discharge: HOME | End: 2021-10-05
Payer: MEDICARE

## 2021-10-05 PROCEDURE — 97140 MANUAL THERAPY 1/> REGIONS: CPT

## 2021-10-05 PROCEDURE — 97110 THERAPEUTIC EXERCISES: CPT

## 2021-10-05 NOTE — PROGRESS NOTES
Sena Sabillon  : 1950  [place]  Secondary:  [facility] [place] [id number] [place], [address] [place] [address], [place], [address] [place] [address]  [phone]   [phone]      OUTPATIENT PHYSICAL THERAPY: Daily Treatment Note 10/5/2021    ICD-10: Treatment Diagnosis: S/P total knee arthroplasty, right (z96.651)              Treatment Diagnosis 2: Muscle Weakness (M62.81)               Treatment Diagnosis 3: Pain in L knee (M25.562)               Treatment Diagnosis 4: other abnormalities of gait and mobility (R26.89)    Precautions: None   Allergies: Bee sting [sting, bee]; Keflex [cephalexin]; Pcn [penicillins]; and Sulfa (sulfonamide antibiotics)   TREATMENT PLAN:  Effective Dates: 2021 TO 10/15/2021 (60 days). Frequency/Duration: 1-2 times a week for 60 Day(s) MEDICAL/REFERRING DIAGNOSIS:  S/P total knee arthroplasty, right [Z96.651]  DATE OF ONSET: Patient is s/p right total knee arthroplasty on 2021  REFERRING PHYSICIAN: Annetta Ward MD MD Orders: Evaluate and Treat   Return MD Appointment: Unknown at this time. Pre-treatment Symptoms/Complaints:  Patient reported getting along well. Pain: Initial: Pain Intensity 1: 0  Pain Location 1: Knee  Pain Orientation 1: Right  Post Session:  0/10 no pain   Medications Last Reviewed:  10/5/2021  Updated Objective Findings:  Right knee range of motion 0-125 degrees. TREATMENT:   THERAPEUTIC EXERCISE: (45 minutes):  Exercises per grid below to improve mobility, strength and balance. Required minimal verbal cues to promote proper body alignment. Progressed repetitions as indicated. Date:  21 Date:  10/5/2021 Date:  2021   Activity/Exercise Parameters Parameters  Parameters    Nu step X 10 mins level 4 Level 5 x 10 mins  --   Standing hip flexion  X 30 reps 3 lb wt. s  X 40 reps 3 lb wt. s --   Standing hamstring curls X 30 reps 3 lb wt. s  X 40 reps 3 lb wt. s  --   Standing hip abduction  X 30 reps 3 lb wt. s X 40 reps 3 lb wt. s  --   Standing heel toe raises 3 x 10 incline  3x10 elevated heel raise    Heel Prop 10 minutes with manual 5 minutes with manual overpressure 5 min with manual over pressure   Calf Stretch 4 x 30 sec hold on incline 3x60 seconds on incline     Leg raise flexion 2 x 20 with quad set   1 lb 1 x 20 ----------    Heel Slide X 30 reps -----------    Sit to stand 3 x 15 15 lb kettlebell chair one pad   3x15 with 15 lb kettlebell from chair with one pad  3x12 with 20 pound dumbbell from plinth    Shuttle Press 125 pounds 2x45 reps  150 pounds 2x30  reps 125 pounds 3x20 reps    75 pounds 3x20 single leg    Step Up 3 x 10 8 inch FWD 3x15 on 8 inch step forward  3x10 with 8 inch step and 10 pound weight in contralateral UE   Single limb stance 3 x 45 sec airex  3x60 seconds airrex pad  3x60 seconds airrex pad   Bridge 3x15 reps  3x15 cuing for more hamstring dominant movement with foot position 3x10 gradually progressing into straight leg bridge with each set    Hip Abduction X 30 reps 3 lb wt. s  3x15 with yellow loop 4x15 with yellow loop   Hamstring Stretch -- -- x20 with strap in supine    x10 with addition of IT band in supine with strap     Time spent with patient reviewing proper muscle recruitment and technique with exercises. MANUAL THERAPY: (15 minutes): Joint mobilization was utilized and necessary because of the patient's restricted joint motion   Soft tissue mobilization to distal quad and hamstring tendon    MODALITIES: (0):      None Today    HEP: As above; handouts given to patient for all exercises. Treatment/Session Summary:    · Response to Treatment:  Pt. Continues to demonstrate improvements with strength and range of motion.    · Communication/Consultation:  HEP  · Equipment provided today:  None today  · Recommendations/Intent for next treatment session: Next visit will focus on continued progression of range of motion exercises, strength, balance, and ambulation through graded exercise program. Continue to emphasize unilateral loading, add unilateral hinge movement.      Total Treatment Billable Duration:  60 minutes  PT Patient Time In/Time Out  Time In: 1100  Time Out: 3[address] [facility]

## 2021-10-07 ENCOUNTER — HOSPITAL ENCOUNTER (OUTPATIENT)
Dept: PHYSICAL THERAPY | Age: 71
Discharge: HOME | End: 2021-10-07
Payer: MEDICARE

## 2021-10-07 PROCEDURE — 97140 MANUAL THERAPY 1/> REGIONS: CPT

## 2021-10-07 PROCEDURE — 97110 THERAPEUTIC EXERCISES: CPT

## 2021-10-07 NOTE — PROGRESS NOTES
Azeb Wisdom  : 1950  [place]  Secondary:  [facility]  [address], [address], [place], [address]  [phone]   [phone]      OUTPATIENT PHYSICAL THERAPY: Daily Treatment Note 10/7/2021    ICD-10: Treatment Diagnosis: S/P total knee arthroplasty, right (z96.651)              Treatment Diagnosis 2: Muscle Weakness (M62.81)               Treatment Diagnosis 3: Pain in L knee (M25.562)               Treatment Diagnosis 4: other abnormalities of gait and mobility (R26.89)    Precautions: None   Allergies: Bee sting [sting, bee]; Keflex [cephalexin]; Pcn [penicillins]; and Sulfa (sulfonamide antibiotics)   TREATMENT PLAN:  Effective Dates: 2021 TO 10/15/2021 (60 days). Frequency/Duration: 1-2 times a week for 60 Day(s) MEDICAL/REFERRING DIAGNOSIS:  S/P total knee arthroplasty, right [Z96.651]  DATE OF ONSET: Patient is s/p right total knee arthroplasty on 2021  REFERRING PHYSICIAN: Steffany Roberts MD MD Orders: Evaluate and Treat   Return MD Appointment: Unknown at this time. Pre-treatment Symptoms/Complaints:  Patient feels great and feels like he is ready to discharge and continue treatment per self with HEP. Pain: Initial: Pain Intensity 1: 0  Pain Location 1: Knee  Pain Orientation 1: Right  Post Session:  0/10 no pain   Medications Last Reviewed:  10/7/2021  Updated Objective Findings:  Right knee range of motion 0-125 degrees. TREATMENT:   THERAPEUTIC EXERCISE: (43 minutes):  Exercises per grid below to improve mobility, strength and balance. Required minimal verbal cues to promote proper body alignment. Progressed repetitions as indicated. Date:  10/7/2021 Date:  10/5/2021 Date:  2021   Activity/Exercise Parameters Parameters  Parameters    Nu step Level 3.5 8 min Level 5 x 10 mins  --   Standing hip flexion  -- X 40 reps 3 lb wt. s --   Standing hamstring curls -- X 40 reps 3 lb wt. s  --   Standing hip abduction  5x10 with green loop X 40 reps 3 lb wt. s  --   Standing heel toe raises 4x10 with UE support  3x10 elevated heel raise    Heel Prop -- 5 minutes with manual overpressure 5 min with manual over pressure   Calf Stretch 4 x 30 sec hold on incline 3x60 seconds on incline  --   Leg raise flexion 3x5  ---------- --   Sit to stand 3 x 15 15 lb kettlebell chair one pad   3x15 with 15 lb kettlebell from chair with one pad  3x12 with 20 pound dumbbell from plint    Shuttle Press 150 pounds     3x25 reps  150 pounds 2x30  reps 125 pounds 3x20 reps    75 pounds 3x20 single leg    Step Up 3x12 on 8 inch step 3x15 on 8 inch step forward  3x10 with 8 inch step and 10 pound weight in contralateral UE   Single limb stance 3 x 60 sec airex  3x60 seconds airrex pad  3x60 seconds airrex pad   Bridge 3x15 reps  3x15 cuing for more hamstring dominant movement with foot position 3x10 gradually progressing into straight leg bridge with each set    Hamstring Stretch -- -- x20 with strap in supine    x10 with addition of IT band in supine with strap     Time spent with patient reviewing proper muscle recruitment and technique with exercises. MANUAL THERAPY: (15 minutes): Joint mobilization was utilized and necessary because of the patient's restricted joint motion   Soft tissue mobilization to distal quad and hamstring tendon   Joint mobilization grade 2-3 at patellofemoral joint     MODALITIES: (0):      None Today    HEP: As above; handouts given to patient for all exercises. Treatment/Session Summary:    · Response to Treatment:  Patient continues to tolerate treatment well with increasing volume and intensity of exercises. Patient reporting no difficulty with exercises as well as no difficulty performing tasks at home. Reviewed HEP with patient which he was able to verbalize and demonstrate understanding.   · Communication/Consultation:  HEP  · Equipment provided today:  None today  · Recommendations/Intent for next treatment session: Patient is discharging after session today.      Total Treatment Billable Duration:  58 minutes  PT Patient Time In/Time Out  Time In: 1330  Time Out: [address] [facility],

## 2021-10-07 NOTE — THERAPY DISCHARGE
Kailey Irby  : 1950  [place]  Secondary:  [facility] [id number]  [facility], [address] [place] [address], [address] [place] [address] Street  [phone]   [phone]       OUTPATIENT PHYSICAL THERAPY:Discharge 10/7/2021    ICD-10: Treatment Diagnosis: S/P total knee arthroplasty, right (z96.651)              Treatment Diagnosis 2: Muscle Weakness (M62.81)               Treatment Diagnosis 3: Pain in L knee (M25.562)               Treatment Diagnosis 4: other abnormalities of gait and mobility (R26.89)    Precautions: None   Allergies: Bee sting [sting, bee]; Keflex [cephalexin]; Pcn [penicillins]; and Sulfa (sulfonamide antibiotics)   TREATMENT PLAN:  Effective Dates: 2021 TO 10/15/2021 (60 days). Frequency/Duration: 1-2 times a week for 60 Day(s) MEDICAL/REFERRING DIAGNOSIS:  Presence of right artificial knee joint [Z96.651]  DATE OF ONSET: Patient is s/p right total knee arthroplasty on 2021  REFERRING PHYSICIAN: Kailee Alfaro MD MD Orders: Evaluate and Treat   Return MD Appointment: Unknown. INITIAL ASSESSMENT:  Mr. Julio Banks is a 80 y.o. male presenting to physical therapy with complaints of R knee pain s/p total knee arthroplasty that occurred on 2021. Patient reports chronic pain dating back 3-4 years in bilateral knees with R being greater than left. Patient had surgery on his R knee on 2021 but reports he will likely need to do a knee replacement on his L side as well in the near future. Patient has been working with home health physical therapy twice a week and is currently pleased with his progress but still ambulated with use of single point cane. Patient is eager to return to ambulating without assistive device as well as being able to squat and raise 25 pounds in order to help him work on a car with his grandson.  Patient presents with increased pain, decreased strength, decreased ROM, decreased flexibility, impaired gait, impaired posture, impaired transfer ability, decreased activity tolerance, and overall impaired functional mobility. Patient is a good candidate for skilled physical therapy interventions to include manual therapy, therapeutic exercise, balance training, gait training, transfer training, postural re-education, body mechanics training, and pain modalities as needed in order to address deficits and improve functional independence. DISCHARGE UPDATE (10/7/2021): Patient was seen for 15 sessions of Physical Therapy from 8/16/2021 to 10/7/2021. Patient reports feeling 90-95% better with pain in his R LE s/p R TKA on 7/23/2021. Patient has demonstrated improvements in strength per MMT, ROM per goniometer, and activity tolerance per exercise selection and tolerance as well as standardized outcome assessment. Patient is now fully independent and compliant with walking program as well as HEP and is being discharged at this time with instruction to continue HEP. Patient is also being discharged as he has met all predetermined goals at this time. PROBLEM LIST (Impacting functional limitations):  1. Decreased Strength  2. Decreased ADL/Functional Activities  3. Decreased Transfer Abilities  4. Decreased Ambulation Ability/Technique  5. Decreased Balance  6. Increased Pain  7. Decreased Activity Tolerance  8. Decreased Flexibility/Joint Mobility  9. Edema/Girth  10. Decreased Heidrick with Home Exercise Program INTERVENTIONS PLANNED: (Treatment may consist of any combination of the following)  1. Balance Exercise  2. Bed Mobility  3. Cold  4. Cryotherapy  5. Electrical Stimulation  6. Gait Training  7. Heat  8. Home Exercise Program (HEP)  9. Manual Therapy  10. Neuromuscular Re-education/Strengthening  11. Range of Motion (ROM)  12. Therapeutic Activites  13. Therapeutic Exercise/Strengthening  14. Transcutaneous Electrical Nerve Stimulation (TENS)  15.  Transfer Training     GOALS: (Goals have been discussed and agreed upon with patient.)  Short-Term Functional Goals: Time Frame: 8/16/2021 to 9/13/2021  1. Patient demonstrates independence with home exercise program without verbal cueing provided by therapist. (GOAL MET)  2. Patient will report no more than 2/10 knee pain at rest in order to demonstrate improved self pain control and tolerance. (GOAL MET)  3. Patient will reach 0 degrees knee extension in order to demonstrate improved ambulation. (GOAL MET)  4. Patient will be educated in and demonstrate proper squat lift technique in order to reduce stress on on his LE, improve safety, and reduce risk of injury. (GOAL MET)  Discharge Goals: Time Frame: 8/16/2021 to 10/15/2021  1. Patient will demonstrate 120 degrees of knee flexion in L knee in order to be able to get in and out of the bathtub. (GOAL MET)  2. Patient will improve Lower Extremity Functional Scale score to greater than 70/80 from 41/80. (GOAL MET)  3. Patient will demonstrate 5/5 gross LE strength in order to squat and raise 25 pounds while working on cars with his grandchildren. (GOAL MET)  4. Patient will ambulate greater than 30 minutes without pain in order to shop around the grocery store without pain or limitation. (GOAL)    Outcome Measure: Tool Used: Lower Extremity Functional Scale (LEFS)  Score:  Initial: 41/80 Most Recent: 70/80 (Date: 10/7/2021 )   Interpretation of Score: 20 questions each scored on a 5 point scale with 0 representing \"extreme difficulty or unable to perform\" and 4 representing \"no difficulty\". The lower the score, the greater the functional disability. 80/80 represents no disability. Minimal detectable change is 9 points. Patient denies any LE paresthesia. Patient denies any increase of symptoms with cough, sneeze or valsalva. Patient denies any saddle paresthesia or bowel/bladder deficits.      Observation/Orthostatic Postural Assessment:         Patient stands independently without assistive device with reciprocal loading through bilat LE. (from Patient stands with slight forward flexion and uses single point cane for ambulation no sign of DVT and minimal swelling)  Palpation:          Unremarkable (from Patient tender to palpation in distal quad tendon as well as diffusely through anterior knee at progress and evaluation)    ROM:            AROM/ PROM Left (degrees) Right (degrees)   Knee Flexion 132 125 (from 115)   Knee Extension 3 degrees deficit 0 (from 5 degrees deficit)    Ankle Dorsiflexion (DF) -   knee extended 10 10 (from 5)   Ankle Dorsiflexion (DF) - knee flexed 12 8 (from 7)   Ankle Plantarflexion (PF) 30 30     Strength: Motion Tested Left   (*/5) Right  (*/5)   Knee Extension 5 5 (from 4)   Knee Flexion 4+ (from 4) 5 (from 4)   Hip Flexion 4+ (from 4) 5 (from 4)   Hip Extension 4+ (from 4) 5 (from 3)   Hip Abduction 4+ (from 4) 5 (from 3)   Ankle DF 5 5 (from 4)   Ankle PF 4+ (from 4) 5 (from 4)     Passive Accessory Motion:         Unremarkable (from Decreased patellofemoral mobility on R LE)  Neurological Screen:              Myotomes: Key muscle strength testing through bilateral LE is [facility]. Dermatomes: Sensation to light touch for bilateral LE is intact and WFL.    Reflexes: Patellar (L3/ L4): 2+                 Achilles (S1/ S2): 2 +          Functional Mobility:         Gait/Ambulation: Patient ambulating with reciprocal loading pattern and no assistive device (from Patient ambulates with use of single point cane and decreased stance on R LE)         Transfers: Unremarkable (from patient able to transfer from sit to stand independently but reports pain with movement at progress and eval)  Balance:          Patient able to perform single limb stance greater than 10 seconds (form Patient unable to stand greater than 10 seconds in single limb stance bilaterally at progress and eval)       Reason for Services/Other Comments:  · Patient is being discharged at this time secondary to independence with HEP and all goals being met within POC. Rehabilitation Potential For Stated Goals: Good  Regarding Sharon Carrasco's therapy, I certify that the treatment plan above will be carried out by a therapist or under their direction.   Thank you for this referral,  Hemant Calvillo, PT

## 2022-03-18 PROBLEM — R07.9 CHEST PAIN: Status: ACTIVE | Noted: 2017-04-03

## 2022-03-18 PROBLEM — Z87.09 HISTORY OF COPD: Status: ACTIVE | Noted: 2017-04-03

## 2022-03-19 PROBLEM — E78.2 MIXED HYPERLIPIDEMIA: Status: ACTIVE | Noted: 2017-11-21

## 2022-03-19 PROBLEM — I25.10 CORONARY ARTERY DISEASE INVOLVING NATIVE CORONARY ARTERY OF NATIVE HEART WITHOUT ANGINA PECTORIS: Status: ACTIVE | Noted: 2017-05-11

## 2022-03-19 PROBLEM — R09.89 RIGHT CAROTID BRUIT: Status: ACTIVE | Noted: 2017-11-21

## 2022-03-19 PROBLEM — Z82.49 FAMILY HISTORY OF MI (MYOCARDIAL INFARCTION): Status: ACTIVE | Noted: 2017-11-21

## 2022-03-19 PROBLEM — I10 ESSENTIAL HYPERTENSION WITH GOAL BLOOD PRESSURE LESS THAN 130/85: Status: ACTIVE | Noted: 2017-04-03

## 2022-03-19 PROBLEM — Z96.651 STATUS POST RIGHT KNEE REPLACEMENT: Status: ACTIVE | Noted: 2021-07-23

## 2022-03-20 PROBLEM — M17.11 OSTEOARTHRITIS OF RIGHT KNEE: Status: ACTIVE | Noted: 2021-07-23

## 2022-06-01 ENCOUNTER — APPOINTMENT (OUTPATIENT)
Dept: CT IMAGING | Age: 72
DRG: 086 | End: 2022-06-01
Payer: MEDICARE

## 2022-06-01 ENCOUNTER — HOSPITAL ENCOUNTER (INPATIENT)
Age: 72
LOS: 2 days | Discharge: HOME | DRG: 086 | End: 2022-06-03
Attending: EMERGENCY MEDICINE | Admitting: STUDENT IN AN ORGANIZED HEALTH CARE EDUCATION/TRAINING PROGRAM
Payer: MEDICARE

## 2022-06-01 ENCOUNTER — HOSPITAL ENCOUNTER (EMERGENCY)
Dept: CT IMAGING | Age: 72
Discharge: HOME | DRG: 086 | End: 2022-06-04
Payer: MEDICARE

## 2022-06-01 DIAGNOSIS — R55 SYNCOPE AND COLLAPSE: Primary | ICD-10-CM

## 2022-06-01 DIAGNOSIS — S02.30XA ORBITAL FLOOR (BLOW-OUT) CLOSED FRACTURE (HCC): ICD-10-CM

## 2022-06-01 DIAGNOSIS — S01.81XA FACIAL LACERATION, INITIAL ENCOUNTER: ICD-10-CM

## 2022-06-01 DIAGNOSIS — S01.512A LACERATION OF TONGUE, INITIAL ENCOUNTER: ICD-10-CM

## 2022-06-01 DIAGNOSIS — S02.831A CLOSED FRACTURE OF MEDIAL WALL OF RIGHT ORBIT, INITIAL ENCOUNTER (HCC): ICD-10-CM

## 2022-06-01 DIAGNOSIS — S02.401A CLOSED FRACTURE OF MAXILLARY SINUS, INITIAL ENCOUNTER (HCC): ICD-10-CM

## 2022-06-01 PROBLEM — I10 ESSENTIAL HYPERTENSION WITH GOAL BLOOD PRESSURE LESS THAN 130/85: Chronic | Status: ACTIVE | Noted: 2017-04-03

## 2022-06-01 PROBLEM — I10 ESSENTIAL HYPERTENSION WITH GOAL BLOOD PRESSURE LESS THAN 130/85: Status: ACTIVE | Noted: 2017-04-03

## 2022-06-01 PROBLEM — S02.85XA CLOSED FRACTURE OF ORBIT (HCC): Status: ACTIVE | Noted: 2022-06-01

## 2022-06-01 LAB
ALBUMIN SERPL-MCNC: 3.9 G/DL (ref 3.2–4.6)
ALBUMIN/GLOB SERPL: 1.1 {RATIO} (ref 1.2–3.5)
ALP SERPL-CCNC: 54 U/L (ref 50–136)
ALT SERPL-CCNC: 48 U/L (ref 12–65)
ANION GAP SERPL CALC-SCNC: 7 MMOL/L (ref 7–16)
AST SERPL-CCNC: 26 U/L (ref 15–37)
BILIRUB SERPL-MCNC: 0.5 MG/DL (ref 0.2–1.1)
BUN SERPL-MCNC: 17 MG/DL (ref 8–23)
CALCIUM SERPL-MCNC: 9.8 MG/DL (ref 8.3–10.4)
CHLORIDE SERPL-SCNC: 110 MMOL/L (ref 98–107)
CO2 SERPL-SCNC: 23 MMOL/L (ref 21–32)
CREAT SERPL-MCNC: 1.1 MG/DL (ref 0.8–1.5)
ERYTHROCYTE [DISTWIDTH] IN BLOOD BY AUTOMATED COUNT: 13.2 % (ref 11.9–14.6)
GLOBULIN SER CALC-MCNC: 3.5 G/DL (ref 2.3–3.5)
GLUCOSE BLD STRIP.AUTO-MCNC: 113 MG/DL (ref 65–100)
GLUCOSE BLD STRIP.AUTO-MCNC: 116 MG/DL (ref 65–100)
GLUCOSE BLD-MCNC: 113 MG/DL
GLUCOSE SERPL-MCNC: 120 MG/DL (ref 65–100)
HCT VFR BLD AUTO: 42.9 % (ref 41.1–50.3)
HGB BLD-MCNC: 14.2 G/DL (ref 13.6–17.2)
MCH RBC QN AUTO: 31.6 PG (ref 26.1–32.9)
MCHC RBC AUTO-ENTMCNC: 33.1 G/DL (ref 31.4–35)
MCV RBC AUTO: 95.5 FL (ref 79.6–97.8)
NRBC # BLD: 0 K/UL (ref 0–0.2)
PLATELET # BLD AUTO: 208 K/UL (ref 150–450)
PMV BLD AUTO: 10.9 FL (ref 9.4–12.3)
POTASSIUM SERPL-SCNC: 4.1 MMOL/L (ref 3.5–5.1)
PROT SERPL-MCNC: 7.4 G/DL (ref 6.3–8.2)
RBC # BLD AUTO: 4.49 M/UL (ref 4.23–5.6)
SERVICE CMNT-IMP: ABNORMAL
SERVICE CMNT-IMP: ABNORMAL
SODIUM SERPL-SCNC: 140 MMOL/L (ref 136–145)
WBC # BLD AUTO: 9.1 K/UL (ref 4.3–11.1)

## 2022-06-01 PROCEDURE — 0HQ1XZZ REPAIR FACE SKIN, EXTERNAL APPROACH: ICD-10-PCS | Performed by: EMERGENCY MEDICINE

## 2022-06-01 PROCEDURE — 70486 CT MAXILLOFACIAL W/O DYE: CPT

## 2022-06-01 PROCEDURE — 90714 TD VACC NO PRESV 7 YRS+ IM: CPT | Performed by: EMERGENCY MEDICINE

## 2022-06-01 PROCEDURE — 1100000003 HC PRIVATE W/ TELEMETRY

## 2022-06-01 PROCEDURE — 2580000003 HC RX 258: Performed by: INTERNAL MEDICINE

## 2022-06-01 PROCEDURE — 94640 AIRWAY INHALATION TREATMENT: CPT

## 2022-06-01 PROCEDURE — 12014 RPR F/E/E/N/L/M 5.1-7.5 CM: CPT

## 2022-06-01 PROCEDURE — 6370000000 HC RX 637 (ALT 250 FOR IP): Performed by: INTERNAL MEDICINE

## 2022-06-01 PROCEDURE — 70450 CT HEAD/BRAIN W/O DYE: CPT

## 2022-06-01 PROCEDURE — 85027 COMPLETE CBC AUTOMATED: CPT

## 2022-06-01 PROCEDURE — 1100000000 HC RM PRIVATE

## 2022-06-01 PROCEDURE — 5A09357 ASSISTANCE WITH RESPIRATORY VENTILATION, LESS THAN 24 CONSECUTIVE HOURS, CONTINUOUS POSITIVE AIRWAY PRESSURE: ICD-10-PCS | Performed by: INTERNAL MEDICINE

## 2022-06-01 PROCEDURE — 0CQ7XZZ REPAIR TONGUE, EXTERNAL APPROACH: ICD-10-PCS | Performed by: EMERGENCY MEDICINE

## 2022-06-01 PROCEDURE — 80053 COMPREHEN METABOLIC PANEL: CPT

## 2022-06-01 PROCEDURE — 96374 THER/PROPH/DIAG INJ IV PUSH: CPT

## 2022-06-01 PROCEDURE — 99285 EMERGENCY DEPT VISIT HI MDM: CPT

## 2022-06-01 PROCEDURE — 96375 TX/PRO/DX INJ NEW DRUG ADDON: CPT

## 2022-06-01 PROCEDURE — 82962 GLUCOSE BLOOD TEST: CPT

## 2022-06-01 PROCEDURE — 90471 IMMUNIZATION ADMIN: CPT | Performed by: EMERGENCY MEDICINE

## 2022-06-01 PROCEDURE — 94760 N-INVAS EAR/PLS OXIMETRY 1: CPT

## 2022-06-01 PROCEDURE — 6360000002 HC RX W HCPCS: Performed by: EMERGENCY MEDICINE

## 2022-06-01 RX ORDER — SODIUM CHLORIDE 0.9 % (FLUSH) 0.9 %
5-40 SYRINGE (ML) INJECTION EVERY 12 HOURS SCHEDULED
Status: DISCONTINUED | OUTPATIENT
Start: 2022-06-01 | End: 2022-06-03 | Stop reason: HOSPADM

## 2022-06-01 RX ORDER — ACETAMINOPHEN 325 MG/1
650 TABLET ORAL EVERY 6 HOURS PRN
Status: DISCONTINUED | OUTPATIENT
Start: 2022-06-01 | End: 2022-06-03 | Stop reason: HOSPADM

## 2022-06-01 RX ORDER — LEVOTHYROXINE SODIUM 0.05 MG/1
100 TABLET ORAL
Status: DISCONTINUED | OUTPATIENT
Start: 2022-06-02 | End: 2022-06-03 | Stop reason: HOSPADM

## 2022-06-01 RX ORDER — POTASSIUM CHLORIDE 7.45 MG/ML
10 INJECTION INTRAVENOUS PRN
Status: DISCONTINUED | OUTPATIENT
Start: 2022-06-01 | End: 2022-06-03 | Stop reason: HOSPADM

## 2022-06-01 RX ORDER — SODIUM PHOSPHATE, DIBASIC AND SODIUM PHOSPHATE, MONOBASIC 7; 19 G/133ML; G/133ML
1 ENEMA RECTAL AS NEEDED
Status: DISCONTINUED | OUTPATIENT
Start: 2022-06-01 | End: 2022-06-02

## 2022-06-01 RX ORDER — LISINOPRIL 20 MG/1
20 TABLET ORAL DAILY
Status: DISCONTINUED | OUTPATIENT
Start: 2022-06-01 | End: 2022-06-03 | Stop reason: HOSPADM

## 2022-06-01 RX ORDER — BISACODYL 10 MG
10 SUPPOSITORY, RECTAL RECTAL DAILY PRN
Status: DISCONTINUED | OUTPATIENT
Start: 2022-06-01 | End: 2022-06-03 | Stop reason: HOSPADM

## 2022-06-01 RX ORDER — MORPHINE SULFATE 4 MG/ML
4 INJECTION INTRAVENOUS
Status: COMPLETED | OUTPATIENT
Start: 2022-06-01 | End: 2022-06-01

## 2022-06-01 RX ORDER — MAGNESIUM HYDROXIDE/ALUMINUM HYDROXICE/SIMETHICONE 120; 1200; 1200 MG/30ML; MG/30ML; MG/30ML
30 SUSPENSION ORAL EVERY 6 HOURS PRN
Status: DISCONTINUED | OUTPATIENT
Start: 2022-06-01 | End: 2022-06-03 | Stop reason: HOSPADM

## 2022-06-01 RX ORDER — FAMOTIDINE 20 MG/1
10 TABLET, FILM COATED ORAL DAILY PRN
Status: DISCONTINUED | OUTPATIENT
Start: 2022-06-01 | End: 2022-06-03 | Stop reason: HOSPADM

## 2022-06-01 RX ORDER — INSULIN LISPRO 100 [IU]/ML
0-4 INJECTION, SOLUTION INTRAVENOUS; SUBCUTANEOUS NIGHTLY
Status: DISCONTINUED | OUTPATIENT
Start: 2022-06-01 | End: 2022-06-03 | Stop reason: HOSPADM

## 2022-06-01 RX ORDER — AMLODIPINE BESYLATE 10 MG/1
5 TABLET ORAL DAILY
Status: DISCONTINUED | OUTPATIENT
Start: 2022-06-01 | End: 2022-06-03 | Stop reason: HOSPADM

## 2022-06-01 RX ORDER — HYDROCODONE BITARTRATE AND ACETAMINOPHEN 5; 325 MG/1; MG/1
1 TABLET ORAL EVERY 4 HOURS PRN
Status: DISCONTINUED | OUTPATIENT
Start: 2022-06-01 | End: 2022-06-03 | Stop reason: HOSPADM

## 2022-06-01 RX ORDER — GUAIFENESIN/DEXTROMETHORPHAN 100-10MG/5
10 SYRUP ORAL EVERY 4 HOURS PRN
Status: DISCONTINUED | OUTPATIENT
Start: 2022-06-01 | End: 2022-06-03 | Stop reason: HOSPADM

## 2022-06-01 RX ORDER — PRAVASTATIN SODIUM 20 MG
40 TABLET ORAL DAILY
Status: DISCONTINUED | OUTPATIENT
Start: 2022-06-01 | End: 2022-06-03 | Stop reason: HOSPADM

## 2022-06-01 RX ORDER — ONDANSETRON 2 MG/ML
4 INJECTION INTRAMUSCULAR; INTRAVENOUS EVERY 6 HOURS PRN
Status: DISCONTINUED | OUTPATIENT
Start: 2022-06-01 | End: 2022-06-03 | Stop reason: HOSPADM

## 2022-06-01 RX ORDER — POTASSIUM CHLORIDE 20 MEQ/1
40 TABLET, EXTENDED RELEASE ORAL PRN
Status: DISCONTINUED | OUTPATIENT
Start: 2022-06-01 | End: 2022-06-03 | Stop reason: HOSPADM

## 2022-06-01 RX ORDER — DEXTROSE MONOHYDRATE 50 MG/ML
100 INJECTION, SOLUTION INTRAVENOUS PRN
Status: DISCONTINUED | OUTPATIENT
Start: 2022-06-01 | End: 2022-06-03 | Stop reason: HOSPADM

## 2022-06-01 RX ORDER — ONDANSETRON 2 MG/ML
4 INJECTION INTRAMUSCULAR; INTRAVENOUS
Status: COMPLETED | OUTPATIENT
Start: 2022-06-01 | End: 2022-06-01

## 2022-06-01 RX ORDER — LANOLIN ALCOHOL/MO/W.PET/CERES
6 CREAM (GRAM) TOPICAL NIGHTLY PRN
Status: DISCONTINUED | OUTPATIENT
Start: 2022-06-01 | End: 2022-06-03 | Stop reason: HOSPADM

## 2022-06-01 RX ORDER — HYDRALAZINE HYDROCHLORIDE 20 MG/ML
20 INJECTION INTRAMUSCULAR; INTRAVENOUS EVERY 4 HOURS PRN
Status: DISCONTINUED | OUTPATIENT
Start: 2022-06-01 | End: 2022-06-03 | Stop reason: HOSPADM

## 2022-06-01 RX ORDER — HYDROCODONE BITARTRATE AND ACETAMINOPHEN 10; 325 MG/1; MG/1
1 TABLET ORAL EVERY 4 HOURS PRN
Status: DISCONTINUED | OUTPATIENT
Start: 2022-06-01 | End: 2022-06-03 | Stop reason: HOSPADM

## 2022-06-01 RX ORDER — MELOXICAM 7.5 MG/1
7.5 TABLET ORAL DAILY
Status: DISCONTINUED | OUTPATIENT
Start: 2022-06-01 | End: 2022-06-03 | Stop reason: HOSPADM

## 2022-06-01 RX ORDER — ACETAMINOPHEN 650 MG/1
650 SUPPOSITORY RECTAL EVERY 6 HOURS PRN
Status: DISCONTINUED | OUTPATIENT
Start: 2022-06-01 | End: 2022-06-03 | Stop reason: HOSPADM

## 2022-06-01 RX ORDER — MAGNESIUM SULFATE IN WATER 40 MG/ML
2000 INJECTION, SOLUTION INTRAVENOUS PRN
Status: DISCONTINUED | OUTPATIENT
Start: 2022-06-01 | End: 2022-06-03 | Stop reason: HOSPADM

## 2022-06-01 RX ORDER — LORAZEPAM 1 MG/1
1 TABLET ORAL EVERY 6 HOURS PRN
Status: DISCONTINUED | OUTPATIENT
Start: 2022-06-01 | End: 2022-06-03 | Stop reason: HOSPADM

## 2022-06-01 RX ORDER — PANTOPRAZOLE SODIUM 40 MG/1
40 TABLET, DELAYED RELEASE ORAL
Status: DISCONTINUED | OUTPATIENT
Start: 2022-06-02 | End: 2022-06-03 | Stop reason: HOSPADM

## 2022-06-01 RX ORDER — TETANUS AND DIPHTHERIA TOXOIDS ADSORBED 2; 2 [LF]/.5ML; [LF]/.5ML
0.5 INJECTION INTRAMUSCULAR
Status: COMPLETED | OUTPATIENT
Start: 2022-06-01 | End: 2022-06-01

## 2022-06-01 RX ORDER — POLYETHYLENE GLYCOL 3350 17 G/17G
17 POWDER, FOR SOLUTION ORAL DAILY PRN
Status: DISCONTINUED | OUTPATIENT
Start: 2022-06-01 | End: 2022-06-03 | Stop reason: HOSPADM

## 2022-06-01 RX ORDER — SODIUM CHLORIDE 9 MG/ML
INJECTION, SOLUTION INTRAVENOUS PRN
Status: DISCONTINUED | OUTPATIENT
Start: 2022-06-01 | End: 2022-06-03 | Stop reason: HOSPADM

## 2022-06-01 RX ORDER — SODIUM CHLORIDE 0.9 % (FLUSH) 0.9 %
5-40 SYRINGE (ML) INJECTION PRN
Status: DISCONTINUED | OUTPATIENT
Start: 2022-06-01 | End: 2022-06-03 | Stop reason: HOSPADM

## 2022-06-01 RX ORDER — INSULIN LISPRO 100 [IU]/ML
0-8 INJECTION, SOLUTION INTRAVENOUS; SUBCUTANEOUS
Status: DISCONTINUED | OUTPATIENT
Start: 2022-06-01 | End: 2022-06-03 | Stop reason: HOSPADM

## 2022-06-01 RX ORDER — ONDANSETRON 4 MG/1
4 TABLET, ORALLY DISINTEGRATING ORAL EVERY 8 HOURS PRN
Status: DISCONTINUED | OUTPATIENT
Start: 2022-06-01 | End: 2022-06-03 | Stop reason: HOSPADM

## 2022-06-01 RX ORDER — HYDROCHLOROTHIAZIDE 25 MG/1
12.5 TABLET ORAL DAILY
Status: DISCONTINUED | OUTPATIENT
Start: 2022-06-01 | End: 2022-06-03 | Stop reason: HOSPADM

## 2022-06-01 RX ADMIN — HYDROCODONE BITARTRATE AND ACETAMINOPHEN 1 TABLET: 10; 325 TABLET ORAL at 21:11

## 2022-06-01 RX ADMIN — MOMETASONE FUROATE AND FORMOTEROL FUMARATE DIHYDRATE 2 PUFF: 200; 5 AEROSOL RESPIRATORY (INHALATION) at 19:54

## 2022-06-01 RX ADMIN — LISINOPRIL 20 MG: 20 TABLET ORAL at 12:55

## 2022-06-01 RX ADMIN — HYDROCODONE BITARTRATE AND ACETAMINOPHEN 1 TABLET: 10; 325 TABLET ORAL at 12:59

## 2022-06-01 RX ADMIN — HYDROCHLOROTHIAZIDE 12.5 MG: 25 TABLET ORAL at 12:56

## 2022-06-01 RX ADMIN — SODIUM CHLORIDE, PRESERVATIVE FREE 10 ML: 5 INJECTION INTRAVENOUS at 21:18

## 2022-06-01 RX ADMIN — TETANUS AND DIPHTHERIA TOXOIDS ADSORBED 0.5 ML: 2; 2 INJECTION INTRAMUSCULAR at 07:54

## 2022-06-01 RX ADMIN — MELOXICAM 7.5 MG: 7.5 TABLET ORAL at 17:42

## 2022-06-01 RX ADMIN — AMLODIPINE BESYLATE 5 MG: 10 TABLET ORAL at 12:55

## 2022-06-01 RX ADMIN — MORPHINE SULFATE 4 MG: 4 INJECTION INTRAVENOUS at 07:49

## 2022-06-01 RX ADMIN — PRAVASTATIN SODIUM 40 MG: 20 TABLET ORAL at 17:42

## 2022-06-01 RX ADMIN — ONDANSETRON 4 MG: 2 INJECTION INTRAMUSCULAR; INTRAVENOUS at 07:48

## 2022-06-01 ASSESSMENT — PAIN SCALES - GENERAL
PAINLEVEL_OUTOF10: 5
PAINLEVEL_OUTOF10: 0
PAINLEVEL_OUTOF10: 7
PAINLEVEL_OUTOF10: 10
PAINLEVEL_OUTOF10: 0

## 2022-06-01 ASSESSMENT — PAIN - FUNCTIONAL ASSESSMENT: PAIN_FUNCTIONAL_ASSESSMENT: 0-10

## 2022-06-01 ASSESSMENT — ENCOUNTER SYMPTOMS
DIARRHEA: 0
NAUSEA: 0
VOMITING: 0
BACK PAIN: 0
COLOR CHANGE: 0
SHORTNESS OF BREATH: 0
FACIAL SWELLING: 1
RHINORRHEA: 0
EYE PAIN: 1
ABDOMINAL PAIN: 0
COUGH: 0

## 2022-06-01 ASSESSMENT — PAIN DESCRIPTION - ORIENTATION: ORIENTATION: ANTERIOR

## 2022-06-01 ASSESSMENT — PAIN DESCRIPTION - LOCATION
LOCATION: FACE
LOCATION: HEAD

## 2022-06-01 ASSESSMENT — VISUAL ACUITY: OU: 1

## 2022-06-01 ASSESSMENT — PAIN DESCRIPTION - DESCRIPTORS: DESCRIPTORS: ACHING

## 2022-06-01 ASSESSMENT — PAIN DESCRIPTION - PAIN TYPE
TYPE: ACUTE PAIN
TYPE: ACUTE PAIN

## 2022-06-01 NOTE — CONSULTS
Department of Otolaryngology  Attending Consult Note      Reason for Consult:  Facial Fracture  Requesting Physician:  Adalberto Almaguer COMPLAINT:  Facial fracture    History Obtained From:  patient    HISTORY OF PRESENT ILLNESS:                The patient is a 80 y.o. male with significant past medical history of HTN, DM, COPD who presents with fall. He states he was walking to the kitchen last night when he suddenly fell after developing a leg cramp. He lost consciousness. He was noted to be bleeding from the nose and face after the fall. CT maxillofacial revealed a blowout-type fracture of the right orbital floor and maxilla. He denies change in visual acuity. He denies diplopia or pain with eye movements. He denies malocclusion. No nasal obstruction  No otorrhea. Does note pain and swelling around right eye.      Past Medical History:        Diagnosis Date    Abnormal blood sugar     Abnormality of cortisol-binding globulin (HCC)     Actinic keratosis     Acute right flank pain     Anaphylactic reaction to bee sting     Anxiety     takes Lorazepam prn    Anxiety disorder     Arthritis     Arthritis of left shoulder region     Asthma 7/20/2016    followed by pulmonary; uses inhaler    Backache 7/20/2016    Bilateral otitis media     BPH (benign prostatic hyperplasia) 7/20/2016    CAD (coronary artery disease) 04/20/2011    He had mild nonobstructive CAD at cath by Dr. Rikki Collins several years ago; cardiac cath:  4/20/11    Cervical neck pain with evidence of disc disease     Chronic renal insufficiency 7/20/2016    Controlled diabetes mellitus ([address] 7/20/2016 6/30/21 :  hgb A1c:  5.8; daily fasting sqbs:  102    COPD (chronic obstructive pulmonary disease) ([address] 7/20/2016    Corneal foreign body 6/13/14    COVID-19 vaccine series completed 03/22/2021    Moderna    Depressive disorder 7/20/2016    clinical depression    Diarrhea     Dumping syndrome     Dyspepsia and other specified disorders of function of stomach 8/29/2012    ED (erectile dysfunction) 7/20/2016    Edema     Elevated CPK     Elevated prolactin level     Essential hypertension, benign 8/29/2012    GERD (gastroesophageal reflux disease)     Hand pain, right     Hematuria     Hyperlipidemia     Hyperprolactinemia ([facility]     followed by endocrinologist    Hypertension     Hypogonadism in male     Hypothyroidism     takes levothyroxine    Knee effusion     Knee pain     Low serum testosterone level     Neoplasm of uncertain behavior of skin of neck     Neuralgia     Nevus, non-neoplastic     Numbness and tingling in left arm     Open wound of finger without complication 6/50/90    JHONY on CPAP 8/22/2014    uses CPAP    Osteoarthrosis, unspecified whether generalized or localized, involving lower leg 8/29/2012    Osteoarthrosis, unspecified whether generalized or localized, lower leg 8/29/2012    Other disorder of calcium metabolism 8/29/2012    Other disorders of calcium metabolism     Other malaise and fatigue 11/27/2012    Pituitary tumor 12/11/2013    Prolactin increased     Puncture wound 6/11/12    pt stepped on a maximiliano nail    Pure hypercholesterolemia 8/29/2012    Right shoulder pain     RLS (restless legs syndrome) 7/20/2016    Seborrheic keratosis     Seizures ([address]     30 years ago 1971    Shingles (herpes zoster) polyneuropathy     Shingles rash     Sinusitis      Past Surgical History:        Procedure Laterality Date    BUNIONECTOMY Right     CARDIAC CATHETERIZATION  04/20/2011    CARPAL TUNNEL RELEASE Bilateral     CERVICAL FUSION  11/2007    C3-C4-has metal screws     CHEST SURGERY      pt denies    ORTHOPEDIC SURGERY      bilat carpel tunnel, bunion repair,neck surg foot surg    TONSILLECTOMY       Current Medications:   Current Facility-Administered Medications: amLODIPine (NORVASC) tablet 5 mg, 5 mg, Oral, Daily  mometasone-formoterol (DULERA) 200-5 MCG/ACT inhaler 2 puff, 2 puff, Inhalation, BID  hydroCHLOROthiazide (HYDRODIURIL) tablet 12.5 mg, 12.5 mg, Oral, Daily  [START ON 6/2/2022] levothyroxine (SYNTHROID) tablet 100 mcg, 100 mcg, Oral, QAM AC  lisinopril (PRINIVIL;ZESTRIL) tablet 20 mg, 20 mg, Oral, Daily  LORazepam (ATIVAN) tablet 1 mg, 1 mg, Oral, Q6H PRN  meloxicam (MOBIC) tablet 7.5 mg, 7.5 mg, Oral, Daily  [START ON 6/2/2022] pantoprazole (PROTONIX) tablet 40 mg, 40 mg, Oral, QAM AC  pravastatin (PRAVACHOL) tablet 40 mg, 40 mg, Oral, Daily  sodium chloride flush 0.9 % injection 5-40 mL, 5-40 mL, IntraVENous, 2 times per day  sodium chloride flush 0.9 % injection 5-40 mL, 5-40 mL, IntraVENous, PRN  0.9 % sodium chloride infusion, , IntraVENous, PRN  potassium chloride (KLOR-CON M) extended release tablet 40 mEq, 40 mEq, Oral, PRN **OR** potassium bicarb-citric acid (EFFER-K) effervescent tablet 40 mEq, 40 mEq, Oral, PRN **OR** potassium chloride 10 mEq/100 mL IVPB (Peripheral Line), 10 mEq, IntraVENous, PRN  potassium chloride 10 mEq/100 mL IVPB (Peripheral Line), 10 mEq, IntraVENous, PRN  magnesium sulfate 2000 mg in 50 mL IVPB premix, 2,000 mg, IntraVENous, PRN  ondansetron (ZOFRAN-ODT) disintegrating tablet 4 mg, 4 mg, Oral, Q8H PRN **OR** ondansetron (ZOFRAN) injection 4 mg, 4 mg, IntraVENous, Q6H PRN  polyethylene glycol (GLYCOLAX) packet 17 g, 17 g, Oral, Daily PRN  bisacodyl (DULCOLAX) suppository 10 mg, 10 mg, Rectal, Daily PRN  famotidine (PEPCID) tablet 10 mg, 10 mg, Oral, Daily PRN  aluminum & magnesium hydroxide-simethicone (MAALOX) 200-200-20 MG/5ML suspension 30 mL, 30 mL, Oral, Q6H PRN  acetaminophen (TYLENOL) tablet 650 mg, 650 mg, Oral, Q6H PRN **OR** acetaminophen (TYLENOL) suppository 650 mg, 650 mg, Rectal, Q6H PRN  HYDROcodone-acetaminophen (NORCO) 5-325 MG per tablet 1 tablet, 1 tablet, Oral, Q4H PRN  HYDROcodone-acetaminophen (NORCO)  MG per tablet 1 tablet, 1 tablet, Oral, Q4H PRN  hydrALAZINE (APRESOLINE) injection 20 mg, 20 mg, IntraVENous, Q4H PRN  fleet rectal enema 1 enema, 1 enema, Rectal, PRN  melatonin tablet 6 mg, 6 mg, Oral, Nightly PRN  guaiFENesin-dextromethorphan (ROBITUSSIN DM) 100-10 MG/5ML syrup 10 mL, 10 mL, Oral, Q4H PRN  insulin lispro (HUMALOG) injection vial 0-8 Units, 0-8 Units, SubCUTAneous, TID WC  insulin lispro (HUMALOG) injection vial 0-4 Units, 0-4 Units, SubCUTAneous, Nightly  glucose chewable tablet 16 g, 4 tablet, Oral, PRN  dextrose bolus 10% 125 mL, 125 mL, IntraVENous, PRN **OR** dextrose bolus 10% 250 mL, 250 mL, IntraVENous, PRN  glucagon (rDNA) injection 1 mg, 1 mg, IntraMUSCular, PRN  dextrose 5 % solution, 100 mL/hr, IntraVENous, PRN  Allergies:  Cephalexin, Penicillins, and Sulfa antibiotics    Social History:      Family History:       Problem Relation Age of Onset    Diabetes Maternal Grandfather     Cancer Mother         Kidney    Cancer Father         Lymphoma     REVIEW OF SYSTEMS:    10 system ROS negative except as noted above. PHYSICAL EXAM:    VITALS:  /63   Pulse 78   Temp 98.2 °F (36.8 °C)   Resp 17   Ht 5' 10\" (1.778 m)   Wt 197 lb (89.4 kg)   SpO2 91%   BMI 28.27 kg/m²   CONSTITUTIONAL:  awake, alert, cooperative, no apparent distress, and appears stated age  EYES:  Right elijah-orbital edema/ecchymosis. Repaired laceration of right brow. Extraocular motility appears intact. No entrapment is appreciated. Patient denies diplopia with upgaze/downgaze. Subconjunctival hemorrhage right temporal  ENT:  Nares patent anteriorly. No active bleeding or clots. Oral cavity w/o lesions. Occlusion at baseline. No trismus. Mild TTP over right elijah-orbital region. NECK:   symmetrical, trachea midline and skin normal  LUNGS:  No increased work of breathing, good air exchange, clear to auscultation bilaterally, no crackles or wheezing  MUSCULOSKELETAL:  Moves 4/4 extremities w/o restriction  NEUROLOGIC:  Awake, alert, oriented to name, place and time.   Cranial nerves II-XII are grossly intact. Motor is 5 out of 5 bilaterally. Cerebellar finger to nose, heel to shin intact. Sensory is intact. Babinski down going, Romberg negative, and gait is normal.  SKIN:  Warm, dry      DATA:    Radiology Review:      CT Maxillofacial  EXAMINATION: CT FACIAL BONES WO CONTRAST 6/1/2022 7:22 AM        COMPARISON: None available.       INDICATION: orbital fx, facial trauma       TECHNIQUE: Multiple axial images were obtained through the facial bones . Reformatted images were provided.        Radiation dose reduction techniques were used for this study. Our CT scanners   use one or all of the following: Automated exposure control, adjustment of the   mA and/or kV according to patient size, iterative reconstruction.       FINDINGS:   Soft tissue laceration is seen associated with the right periorbital soft   tissues with extension into the superior portion of the orbit. There is a   blowout fracture in the floor of the right orbit with herniation of fat below   the fracture plane. There is a fracture in the medial wall of the right orbit   with displacement of the fat. There is a fracture posteriorly in the maxillary   sinus on the right without significant displacement. An air-fluid level seen in   the right maxillary sinus.       The zygomatic arch is preserved. The bony nasal septum is normal. The temporal   mandibular joints are preserved. There is no discrete nasal fracture. There is   previous anterior fusion in the cervical spine. This is partially visualized. (Images personally reviewed)    IMPRESSION/RECOMMENDATIONS:      Fracture of right orbital floor and medial orbital wall.  -No urgent surgical intervention is indicated for this type of fracture.  Given the large fracture of the orbital floor and medial orbital wall, I recommend outpatient follow up in 5 days for re-evaluation with a surgeon (Plastic/Reconstructive Surgery or Oculoplastics Surgery or Facial Plastics/OMFS surgery) who performs orbital fracture surgery.   - No nose-blowing. Sneeze with mouth open. Avoid valsalva. -Recommend ophthalmology consult for evaluation of globe. Thank you for involving me in the care of this patient. Please don't hesitate to contact me with any questions about his care.

## 2022-06-01 NOTE — ED NOTES
Patient placed in gown. RN assisted cleaning off blood from right ear and face.       Efrain Franco RN  06/01/22 [id number]

## 2022-06-01 NOTE — ED TRIAGE NOTES
Does not remember what happened tonight     Has a lac to right eye and his tongue and abrasion left elbow       Pt does not know what happened, c\o a head currently     Alert 0 x4 currently and for ems for pt     He said he remembers getting up to go to bathroom getting cramp in leg and he was found in the kitchen

## 2022-06-01 NOTE — FLOWSHEET NOTE
06/01/22 1724   Dual Clinician Skin Assessment   Dual Skin Assessment (4 Eyes) WDL   Second Clinical  (First and Last Name) DAVID Swan   Skin Integumentary    Skin Integumentary (WDL) X   Skin Color Ecchymosis (comment)   Skin Condition/Temp Warm;Dry   Skin Integrity Incision (see LDA); Laceration (comment)  (face right side)   Wound Prevention/Protection Method Yes   Location of Wound Prevention Sacrum   Orientation of Wound Prevention Posterior   Wound Offloading (Prevention Methods) Bed, pressure reduction mattress;Repositioning;Turning

## 2022-06-01 NOTE — H&P
meds      Dispo/Discharge Planning:   -home 1-2 days depending on specialists' recs    Diet: ADULT DIET; Regular; 4 carb choices (60 gm/meal); Low Fat/Low Chol/High Fiber/NATY  VTE ppx: SCDs  Code status: Full Code    Hospital Problems:  Principal Problem:    Closed fracture of orbit ([facility] [address]  Active Problems:    Closed fracture of maxillary sinus (HCC)    Controlled diabetes mellitus (HCC)    COPD (chronic obstructive pulmonary disease) ([facility] [address]    Essential hypertension with goal blood pressure less than 130/85    JHONY on CPAP  Resolved Problems:    * No resolved hospital problems.  *       Past History:     Past Medical History:   Diagnosis Date    Abnormal blood sugar     Abnormality of cortisol-binding globulin (HCC)     Actinic keratosis     Acute right flank pain     Anaphylactic reaction to bee sting     Anxiety     takes Lorazepam prn    Anxiety disorder     Arthritis     Arthritis of left shoulder region     Asthma 7/20/2016    followed by pulmonary; uses inhaler    Backache 7/20/2016    Bilateral otitis media     BPH (benign prostatic hyperplasia) 7/20/2016    CAD (coronary artery disease) 04/20/2011    He had mild nonobstructive CAD at cath by Dr. Angeline Starr several years ago; cardiac cath:  4/20/11    Cervical neck pain with evidence of disc disease     Chronic renal insufficiency 7/20/2016    Controlled diabetes mellitus ([facility] [address] 7/20/2016 6/30/21 :  hgb A1c:  5.8; daily fasting sqbs:  102    COPD (chronic obstructive pulmonary disease) ([facility] [address] 7/20/2016    Corneal foreign body 6/13/14    COVID-19 vaccine series completed 03/22/2021    Moderna    Depressive disorder 7/20/2016    clinical depression    Diarrhea     Dumping syndrome     Dyspepsia and other specified disorders of function of stomach 8/29/2012    ED (erectile dysfunction) 7/20/2016    Edema     Elevated CPK     Elevated prolactin level     Essential hypertension, benign 8/29/2012    GERD (gastroesophageal reflux disease)     Hand pain, right     Hematuria     Hyperlipidemia     Hyperprolactinemia ([address]     followed by endocrinologist    Hypertension     Hypogonadism in male     Hypothyroidism     takes levothyroxine    Knee effusion     Knee pain     Low serum testosterone level     Neoplasm of uncertain behavior of skin of neck     Neuralgia     Nevus, non-neoplastic     Numbness and tingling in left arm     Open wound of finger without complication     JHONY on CPAP 2014    uses CPAP    Osteoarthrosis, unspecified whether generalized or localized, involving lower leg 2012    Osteoarthrosis, unspecified whether generalized or localized, lower leg 2012    Other disorder of calcium metabolism 2012    Other disorders of calcium metabolism     Other malaise and fatigue 2012    Pituitary tumor 2013    Prolactin increased     Puncture wound 12    pt stepped on a maximiliano nail    Pure hypercholesterolemia 2012    Right shoulder pain     RLS (restless legs syndrome) 2016    Seborrheic keratosis     Seizures ([address]     30 years ago 1971    Shingles (herpes zoster) polyneuropathy     Shingles rash     Sinusitis      Past Surgical History:   Procedure Laterality Date    BUNIONECTOMY Right     CARDIAC CATHETERIZATION  2011    CARPAL TUNNEL RELEASE Bilateral     CERVICAL FUSION  2007    C3-C4-has metal screws     CHEST SURGERY      pt denies    ORTHOPEDIC SURGERY      bilat carpel tunnel, bunion repair,neck surg foot surg    TONSILLECTOMY        Allergies   Allergen Reactions    Cephalexin Hives    Penicillins Other (See Comments)     States causes him to pass out    Sulfa Antibiotics Rash      Social History     Tobacco Use    Smoking status: Former Smoker     Packs/day: 0.50     Quit date: 2009     Years since quittin.1    Smokeless tobacco: Former User     Quit date: 1986   Substance Use Topics    Alcohol use: No      Family History   Problem Relation Age of Onset    Diabetes Maternal Grandfather     Cancer Mother         Kidney    Cancer Father         Lymphoma      Family history reviewed and negative except as noted above.       Immunization History   Administered Date(s) Administered    Influenza Virus Vaccine 10/22/2015    Influenza, High Dose (Fluzone 65 yrs and older) 12/22/2016    Pneumococcal Conjugate 13-valent ([id number]) 03/30/2017    Td (Adult), 2 Lf Tetanus Toxoid, Pf (Td, Absorbed) 06/01/2022    Td vaccine (adult) 06/11/2012     Prior to Admit Medications:  Current Outpatient Medications   Medication Instructions    acetaminophen (TYLENOL) 500 MG tablet 1-2 tablets, Oral, EVERY 6 HOURS PRN    amLODIPine (NORVASC) 5 mg, Oral, DAILY    budesonide-formoterol (SYMBICORT) 160-4.5 MCG/ACT AERO 2 puffs, Inhalation, 2 TIMES DAILY    Docusate Sodium 100 MG TABS 2 tablets, Oral, DAILY PRN    EPINEPHrine (EPIPEN) 0.3 mg, IntraMUSCular, PRN    hydroCHLOROthiazide (HYDRODIURIL) 12.5 mg, Oral, DAILY    hydrocortisone (CORTEF) 5 mg, Oral, DAILY PRN    levothyroxine (SYNTHROID) 100 mcg, Oral, DAILY BEFORE BREAKFAST    lisinopril (PRINIVIL;ZESTRIL) 20 mg, Oral, DAILY    LORazepam (ATIVAN) 1 mg, Oral, 4 TIMES DAILY PRN    meloxicam (MOBIC) 7.5 mg, Oral, DAILY    metFORMIN (GLUCOPHAGE) 1,000 mg, Oral, 2 TIMES DAILY WITH MEALS    nitroGLYCERIN (NITROSTAT) 0.4 mg, SubLINGual, PRN    omeprazole (PRILOSEC) 40 mg, Oral, DAILY    pravastatin (PRAVACHOL) 40 mg, Oral, DAILY    sildenafil (VIAGRA) 100 mg, Oral, PRN    TESTOSTERONE CYPIONATE IM 50 mg, IntraMUSCular         Objective:     Patient Vitals for the past 24 hrs:   Temp Pulse Resp BP SpO2   06/01/22 1330 -- 72 17 (!) 120/45 94 %   06/01/22 1300 -- 80 21 137/68 92 %   06/01/22 1255 -- -- -- 127/67 --   06/01/22 1100 -- 78 16 123/69 94 %   06/01/22 1030 -- 82 21 118/63 93 %   06/01/22 0900 -- 60 -- (!) 104/55 --   06/01/22 0845 -- -- -- -- 91 %   06/01/22 0830 -- -- -- -- 94 %   06/01/22 0815 -- -- -- -- 94 %   06/01/22 0759 -- 88 21 138/70 90 %   06/01/22 0729 -- -- -- (!) 121/109 95 %   06/01/22 0642 -- -- -- -- 95 %   06/01/22 0627 -- -- -- (!) 144/68 91 %   06/01/22 0623 98.4 °F (36.9 °C) 72 18 120/69 98 %   06/01/22 0612 -- -- -- -- 92 %   06/01/22 0557 -- -- -- 120/69 91 %   06/01/22 0542 -- -- -- (!) 141/72 97 %   06/01/22 0415 97.7 °F (36.5 °C) 88 16 (!) 160/64 94 %       Oxygen Therapy  SpO2: 94 %  O2 Device: None (Room air)    Estimated body mass index is 28.27 kg/m² as calculated from the following:    Height as of this encounter: 5' 10\" (1.778 m). Weight as of this encounter: 197 lb (89.4 kg). No intake or output data in the 24 hours ending 06/01/22 1545      Physical Exam:    Blood pressure (!) 120/45, pulse 72, temperature 98.4 °F (36.9 °C), temperature source Oral, resp. rate 17, height 5' 10\" (1.778 m), weight 197 lb (89.4 kg), SpO2 94 %. General:    Well nourished. No overt distress  Head:  Significant trauma to R side of face with eye swollen shut, laceration over R eyelid and maxillary area. Eyes:  Sclerae appear normal.  Pupils equally round. ENT:   Moist oral mucosa. No significant laceration to tongue that I can see  Neck:  No restricted ROM. Trachea midline   CV:   RRR. No jugular venous distension. Lungs:   CTAB. Respirations even, unlabored  Abdomen:   nondistended. Extremities: No cyanosis or clubbing. No edema  Skin:     No rashes and normal coloration. Warm and dry. Neuro:  CN II-XII grossly intact. Sensation intact. A&Ox3  Psych:  Normal mood and affect.       I have reviewed ordered lab tests and independently visualized imaging below:    Last 24hr Labs:  Recent Results (from the past 24 hour(s))   CBC    Collection Time: 06/01/22  4:22 AM   Result Value Ref Range    WBC 9.1 4.3 - 11.1 K/uL    RBC 4.49 4.23 - 5.6 M/uL    Hemoglobin 14.2 13.6 - 17.2 g/dL    Hematocrit 42.9 41.1 - 50.3 %    MCV 95.5 79.6 - 97.8 FL    MCH 31.6 26.1 - 32.9 PG    MCHC 33.1 31.4 - 35.0 g/dL    RDW 13.2 11.9 - 14.6 %    Platelets 087 285 - 242 K/uL    MPV 10.9 9.4 - 12.3 FL    nRBC 0.00 0.0 - 0.2 K/uL   Comprehensive Metabolic Panel    Collection Time: 06/01/22  4:22 AM   Result Value Ref Range    Sodium 140 136 - 145 mmol/L    Potassium 4.1 3.5 - 5.1 mmol/L    Chloride 110 (H) 98 - 107 mmol/L    CO2 23 21 - 32 mmol/L    Anion Gap 7 7 - 16 mmol/L    Glucose 120 (H) 65 - 100 mg/dL    BUN 17 8 - 23 MG/DL    CREATININE 1.10 0.8 - 1.5 MG/DL    GFR African American >60 >60 ml/min/1.73m2    GFR Non- >60 >60 ml/min/1.73m2    Calcium 9.8 8.3 - 10.4 MG/DL    Total Bilirubin 0.5 0.2 - 1.1 MG/DL    ALT 48 12 - 65 U/L    AST 26 15 - 37 U/L    Alk Phosphatase 54 50 - 136 U/L    Total Protein 7.4 6.3 - 8.2 g/dL    Albumin 3.9 3.2 - 4.6 g/dL    Globulin 3.5 2.3 - 3.5 g/dL    Albumin/Globulin Ratio 1.1 (L) 1.2 - 3.5     POCT Glucose    Collection Time: 06/01/22  4:22 AM   Result Value Ref Range    POC Glucose 113 (H) 65 - 100 mg/dL    Performed by: Cisco Mitchell    POCT Glucose    Collection Time: 06/01/22  4:35 AM   Result Value Ref Range    Glucose 113 mg/dL       Other Studies:  CT HEAD WO CONTRAST    Result Date: 6/1/2022  EXAM: CT HEAD WO CONTRAST HISTORY:  Reason for exam:->hit head. TECHNIQUE: Axial images of the brain were performed without the administration of intravenous contrast. Images were obtained axial plane and coronal reformatted images were submitted. Dose reduction technique used: Automated exposure control/Adjustment of the mA and/or kV according to patient size/Use of iterative reconstruction technique. COMPARISON: 6/3/2014 FINDINGS: There is no evidence of acute intracranial hemorrhage, midline shift, or mass effect. No intra or extra-axial fluid collections are observed. No evidence of acute confluent territorial infarction. Ventricles and basal cisterns are patent and symmetric.  Visualized mastoid air cells are without significant fluid. The coronal images show a wide defect/fracture in the floor of the right orbit. There is herniation and entrapment of right intraorbital fat and the inferior rectus muscle. There is intraorbital gas. There is a fracture of the medial wall of the right orbit. Hyperdense fluid is seen in the right maxillary sinus. There is significant right facial swelling including the right eyelid. Significant gas is present under the right eyelid. As seen, the right globe appears grossly intact. 1. No CT evidence of acute intracranial process. 2. Right orbital fracture as described above. CT FACIAL BONES WO CONTRAST    Result Date: 6/1/2022  EXAMINATION: CT FACIAL BONES WO CONTRAST 6/1/2022 7:22 AM COMPARISON: None available. INDICATION: orbital fx, facial trauma TECHNIQUE: Multiple axial images were obtained through the facial bones . Reformatted images were provided. Radiation dose reduction techniques were used for this study. Our CT scanners use one or all of the following: Automated exposure control, adjustment of the mA and/or kV according to patient size, iterative reconstruction. FINDINGS: Soft tissue laceration is seen associated with the right periorbital soft tissues with extension into the superior portion of the orbit. There is a blowout fracture in the floor of the right orbit with herniation of fat below the fracture plane. There is a fracture in the medial wall of the right orbit with displacement of the fat. There is a fracture posteriorly in the maxillary sinus on the right without significant displacement. An air-fluid level seen in the right maxillary sinus. The zygomatic arch is preserved. The bony nasal septum is normal. The temporal mandibular joints are preserved. There is no discrete nasal fracture. There is previous anterior fusion in the cervical spine. This is partially visualized.      1.  Blowout type right orbital fracture with both orbital floor and medial wall fractures as well as extension into the posterior maxillary sinus. There is a periorbital laceration with gas extending into the superior aspect of the right orbit. Echocardiogram:  No results found for this or any previous visit. Meds previously ordered:  Orders Placed This Encounter   Medications    morphine injection 4 mg    ondansetron (ZOFRAN) injection 4 mg    diptheria-tetanus toxoids (DECAVAC) 2-2 LF/0.5ML injection 0.5 mL    amLODIPine (NORVASC) tablet 5 mg    hydroCHLOROthiazide (HYDRODIURIL) tablet 12.5 mg    levothyroxine (SYNTHROID) tablet 100 mcg    lisinopril (PRINIVIL;ZESTRIL) tablet 20 mg    LORazepam (ATIVAN) tablet 1 mg    magnesium sulfate 2000 mg in 50 mL IVPB premix    famotidine (PEPCID) tablet 10 mg    OR Linked Order Group     acetaminophen (TYLENOL) tablet 650 mg     acetaminophen (TYLENOL) suppository 650 mg    HYDROcodone-acetaminophen (NORCO) 5-325 MG per tablet 1 tablet    HYDROcodone-acetaminophen (NORCO)  MG per tablet 1 tablet    hydrALAZINE (APRESOLINE) injection 20 mg    melatonin tablet 6 mg    guaiFENesin-dextromethorphan (ROBITUSSIN DM) 100-10 MG/5ML syrup 10 mL    insulin lispro (HUMALOG) injection vial 0-8 Units    insulin lispro (HUMALOG) injection vial 0-4 Units    glucose chewable tablet 16 g    OR Linked Order Group     dextrose bolus 10% 125 mL     dextrose bolus 10% 250 mL    glucagon (rDNA) injection 1 mg    dextrose 5 % solution       Signed:  Christos Jc MD    Part of this note may have been written by using a voice dictation software. The note has been proof read but may still contain some grammatical/other typographical errors.

## 2022-06-01 NOTE — ACP (ADVANCE CARE PLANNING)
Advance Care Planning   Healthcare Decision Maker:    Primary decision maker spouse Gillian Herron [phone]    Click here to complete Healthcare Decision Makers including selection of the Healthcare Decision Maker Relationship (ie \"Primary\"). Today we documented Decision Maker(s) consistent with ACP documents on file.

## 2022-06-01 NOTE — ED NOTES
Patient visual acuity can read line 20/30 accurately with left eye. Patient can read line 20/50 with right eye. Patient states that his right eye he can see but feels \"it has sleep in it\".       Vanessa Rosado RN  06/01/22 1011

## 2022-06-01 NOTE — ED NOTES
Report given to [facility]. Transferring care at this time.       Sangeeta De Souza RN  06/01/22 [id number]

## 2022-06-01 NOTE — ED PROVIDER NOTES
[employer] Emergency Department Provider Note                   PCP:                Christine Apodaca MD               Age: 80 y.o. Sex: male       ICD-10-CM    1. Syncope and collapse  R55    2. Orbital floor (blow-out) closed fracture ([facility] [address]  S02.30XA    3. Closed fracture of medial wall of right orbit, initial encounter ([facility] [address]  S02.831A    4. Closed fracture of maxillary sinus, initial encounter ([facility] [address]  S02.401A    5. Facial laceration, initial encounter  S01.81XA    6. Laceration of tongue, initial encounter  S01.512A        DISPOSITION Decision To Admit 06/01/2022 10:01:11 AM       New Prescriptions    No medications on file       Orders Placed This Encounter   Procedures    LACERATION REPAIR    LACERATION REPAIR    CT HEAD WO CONTRAST    CT FACIAL BONES WO CONTRAST    CBC    Comprehensive Metabolic Panel    CARDIAC/RESPIRATORY MONITORING    Visual acuity screening    POCT Glucose    POCT Glucose    EKG 12 Lead    Insert peripheral IV        MDM  Number of Diagnoses or Management Options     Amount and/or Complexity of Data Reviewed  Clinical lab tests: ordered and reviewed  Tests in the radiology section of CPT®: ordered and reviewed  Tests in the medicine section of CPT®: ordered and reviewed  Independent visualization of images, tracings, or specimens: yes    Risk of Complications, Morbidity, and/or Mortality  Presenting problems: high  Diagnostic procedures: low  Management options: freddy Koo is a 80 y.o. male who presents to the Emergency Department with chief complaint of    Chief Complaint   Patient presents with    Laceration      Patient woke up in the middle the night and was in the kitchen and that his last thing he remembers. His wife heard a thud and found him face first on the kitchen floor moaning and groaning and making a weird sound. It took her some time to get him turned over and his face was covered in blood.   She estimates he was unconscious and moaning and confused for at least a few minutes. She denies any definitive stiffness or seizure activity and is very unclear on what she saw. Patient does not recall this. He has laceration to his eye and tongue. He complains of a headache. He denies neck pain back pain chest pain or trouble breathing. He denies abdominal pain. He denies any numbness tingling or weakness. He is uncertain on when his last tetanus shot was. All other systems reviewed and are negative. Review of Systems   Constitutional: Negative for chills and fever. HENT: Positive for facial swelling. Negative for congestion and rhinorrhea. Eyes: Positive for pain. Negative for visual disturbance. Respiratory: Negative for cough and shortness of breath. Cardiovascular: Negative for chest pain and leg swelling. Gastrointestinal: Negative for abdominal pain, diarrhea, nausea and vomiting. Endocrine: Negative for polydipsia and polyuria. Genitourinary: Negative for dysuria, frequency and hematuria. Musculoskeletal: Negative for back pain and myalgias. Skin: Negative for color change and rash. Neurological: Positive for seizures ( Seizures as a teenager. ). Negative for weakness, numbness and headaches. Psychiatric/Behavioral: Negative for confusion. All other systems reviewed and are negative.       Past Medical History:   Diagnosis Date    Abnormal blood sugar     Abnormality of cortisol-binding globulin (HCC)     Actinic keratosis     Acute right flank pain     Anaphylactic reaction to bee sting     Anxiety     takes Lorazepam prn    Anxiety disorder     Arthritis     Arthritis of left shoulder region     Asthma 7/20/2016    followed by pulmonary; uses inhaler    Backache 7/20/2016    Bilateral otitis media     BPH (benign prostatic hyperplasia) 7/20/2016    CAD (coronary artery disease) 04/20/2011    He had mild nonobstructive CAD at cath by Dr. Rickey Boothe several years ago; cardiac cath:  4/20/11    Cervical neck pain with evidence of disc disease     Chronic renal insufficiency 7/20/2016    Controlled diabetes mellitus ([address] 7/20/2016 6/30/21 :  hgb A1c:  5.8; daily fasting sqbs:  102    COPD (chronic obstructive pulmonary disease) ([address] 7/20/2016    Corneal foreign body 6/13/14    COVID-19 vaccine series completed 03/22/2021    Moderna    Depressive disorder 7/20/2016    clinical depression    Diarrhea     Dumping syndrome     Dyspepsia and other specified disorders of function of stomach 8/29/2012    ED (erectile dysfunction) 7/20/2016    Edema     Elevated CPK     Elevated prolactin level     Essential hypertension, benign 8/29/2012    GERD (gastroesophageal reflux disease)     Hand pain, right     Hematuria     Hyperlipidemia     Hyperprolactinemia ([address]     followed by endocrinologist    Hypertension     Hypogonadism in male     Hypothyroidism     takes levothyroxine    Knee effusion     Knee pain     Low serum testosterone level     Neoplasm of uncertain behavior of skin of neck     Neuralgia     Nevus, non-neoplastic     Numbness and tingling in left arm     Open wound of finger without complication 1/33/36    JHONY on CPAP 8/22/2014    uses CPAP    Osteoarthrosis, unspecified whether generalized or localized, involving lower leg 8/29/2012    Osteoarthrosis, unspecified whether generalized or localized, lower leg 8/29/2012    Other disorder of calcium metabolism 8/29/2012    Other disorders of calcium metabolism     Other malaise and fatigue 11/27/2012    Pituitary tumor 12/11/2013    Prolactin increased     Puncture wound 6/11/12    pt stepped on a maximiliano nail    Pure hypercholesterolemia 8/29/2012    Right shoulder pain     RLS (restless legs syndrome) 7/20/2016    Seborrheic keratosis     Seizures ([address]     30 years ago 1971    Shingles (herpes zoster) polyneuropathy     Shingles rash     Sinusitis         Past Surgical History:   Procedure Laterality Date    BUNIONECTOMY Right     CARDIAC CATHETERIZATION  04/20/2011    CARPAL TUNNEL RELEASE Bilateral     CERVICAL FUSION  11/2007    C3-C4-has metal screws     CHEST SURGERY      pt denies    ORTHOPEDIC SURGERY      bilat carpel tunnel, bunion repair,neck surg foot surg    TONSILLECTOMY          Family History   Problem Relation Age of Onset    Diabetes Maternal Grandfather     Cancer Mother         Kidney    Cancer Father         Lymphoma           Social Connections:     Frequency of Communication with Friends and Family: Not on file    Frequency of Social Gatherings with Friends and Family: Not on file    Attends Yarsani Services: Not on file    Active Member of Clubs or Organizations: Not on file    Attends Club or Organization Meetings: Not on file    Marital Status: Not on file        Allergies   Allergen Reactions    Cephalexin Hives    Penicillins Other (See Comments)     States causes him to pass out    Sulfa Antibiotics Rash        Vitals signs and nursing note reviewed. Patient Vitals for the past 4 hrs:   Temp Pulse Resp BP SpO2   06/01/22 0900 -- 60 -- (!) 104/55 --   06/01/22 0845 -- -- -- -- 91 %   06/01/22 0830 -- -- -- -- 94 %   06/01/22 0815 -- -- -- -- 94 %   06/01/22 0759 -- 88 21 138/70 90 %   06/01/22 0729 -- -- -- (!) 121/109 95 %   06/01/22 0642 -- -- -- -- 95 %   06/01/22 0627 -- -- -- (!) 144/68 91 %   06/01/22 0623 98.4 °F (36.9 °C) 72 18 120/69 98 %   06/01/22 0612 -- -- -- -- 92 %          Physical Exam  Vitals and nursing note reviewed. Constitutional:       Appearance: Normal appearance. HENT:      Head: Normocephalic. Comments: V-shaped 4 cm laceration adjacent to right eye and eyelid, tenderness but no obvious step-off of right orbital rim present     Right Ear: Tympanic membrane normal.      Left Ear: Tympanic membrane normal.      Nose: Nose normal.      Mouth/Throat:      Mouth: Mucous membranes are moist.      Pharynx: Oropharynx is clear. Uvula midline. Eyes:      General: Lids are normal. Vision grossly intact. Extraocular Movements: Extraocular movements intact. Conjunctiva/sclera: Conjunctivae normal.      Pupils: Pupils are equal, round, and reactive to light. Cardiovascular:      Rate and Rhythm: Normal rate and regular rhythm. Pulses: Normal pulses. Heart sounds: Normal heart sounds. Pulmonary:      Effort: Pulmonary effort is normal.      Breath sounds: Normal breath sounds. Abdominal:      General: There is no distension. Palpations: Abdomen is soft. Tenderness: There is no abdominal tenderness. There is no guarding or rebound. Musculoskeletal:         General: No tenderness. Normal range of motion. Cervical back: Neck supple. No tenderness. Right lower leg: No edema. Left lower leg: No edema. Skin:     General: Skin is warm and dry. Neurological:      Mental Status: He is alert and oriented to person, place, and time. Cranial Nerves: No cranial nerve deficit. Sensory: No sensory deficit. Motor: No weakness. Psychiatric:         Behavior: Behavior normal.          Lac Repair    Date/Time: 6/1/2022 9:53 AM  Performed by: Ariadne Way MD  Authorized by: Ariadne Way MD     Consent:     Consent obtained:  Verbal    Consent given by:  Patient    Risks discussed:  Pain    Alternatives discussed:  No treatment  Anesthesia (see MAR for exact dosages): Anesthesia method:  Local infiltration    Local anesthetic:  Lidocaine 1% w/o epi  Laceration details:     Location:  Face    Facial location: adjacent to right eye upper and lower eyelid area.     Length (cm):  4  Repair type:     Repair type:  Simple  Pre-procedure details:     Preparation:  Patient was prepped and draped in usual sterile fashion  Exploration:     Contaminated: no    Treatment:     Area cleansed with:  Betadine    Amount of cleaning:  Standard    Irrigation solution:  Sterile saline    Irrigation method: Syringe  Skin repair:     Repair method:  Sutures    Suture size:  5-0    Suture material:  Nylon    Suture technique:  Simple interrupted    Number of sutures:  5  Approximation:     Approximation:  Close  Post-procedure details:     Dressing:  Antibiotic ointment    Patient tolerance of procedure: Tolerated well, no immediate complications  Lac Repair    Date/Time: 6/1/2022 9:55 AM  Performed by: Jaskaran Mixon MD  Authorized by: Jaskaran Mixon MD     Consent:     Consent obtained:  Verbal    Consent given by:  Patient    Risks discussed:  Pain    Alternatives discussed:  No treatment  Anesthesia (see MAR for exact dosages): Anesthesia method:  Local infiltration    Local anesthetic:  Lidocaine 1% w/o epi  Laceration details:     Location: tongue. Length (cm):  3    Depth (mm):  1  Repair type:     Repair type:  Simple  Exploration:     Hemostasis achieved with:  Direct pressure  Treatment:     Amount of cleaning:  Standard    Irrigation solution:  Sterile saline    Irrigation method:  Syringe    Visualized foreign bodies/material removed: no    Skin repair:     Repair method:  Sutures    Suture material:  Fast-absorbing gut    Suture technique:  Simple interrupted    Number of sutures:  3  Approximation:     Approximation:  Close  Post-procedure details:     Dressing:  Open (no dressing)    Patient tolerance of procedure: Tolerated well, no immediate complications        Labs Reviewed   COMPREHENSIVE METABOLIC PANEL - Abnormal; Notable for the following components:       Result Value    Chloride 110 (*)     Glucose 120 (*)     Albumin/Globulin Ratio 1.1 (*)     All other components within normal limits   POCT GLUCOSE - Abnormal; Notable for the following components:    POC Glucose 113 (*)     All other components within normal limits   POCT GLUCOSE - Normal   CBC        CT FACIAL BONES WO CONTRAST   Final Result   1.   Blowout type right orbital fracture with both orbital floor and medial wall fractures as well as extension into the posterior maxillary sinus. There is a   periorbital laceration with gas extending into the superior aspect of the right   orbit. CT HEAD WO CONTRAST   Final Result   1. No CT evidence of acute intracranial process. 2. Right orbital fracture as described above. [place] Coma Scale  Eye Opening: Spontaneous  Best Verbal Response: Oriented  Best Motor Response: Obeys commands  [place] Coma Scale Score: 15                     Voice dictation software was used during the making of this note. This software is not perfect and grammatical and other typographical errors may be present. This note has not been completely proofread for errors.         Jaskaran Mixon MD  06/01/22 [id number]       Jaskaran Mixon MD  06/01/22 1188       Jaskaran Mixon MD  06/01/22 1000

## 2022-06-01 NOTE — ACP (ADVANCE CARE PLANNING)
[facility]  At the heart of better care     Advance Care Planning   Admit Date:  2022  5:39 AM   Name:  Alexsandra Morgan   Age:  80 y.o. Sex:  male  :  1950   MRN:  [id number]   Room:  [place] [address]    Alexsandra Morgan is able to make his own decisions:   yes      Patient / surrogate decision-maker directed:  FULL CODE      Patient or surrogate consented to discussion of the current conditions, workup, management plans, prognosis, and understand the risk for further deterioration. Time spent: 17 minutes in direct discussion (face to face and/or over phone).       Signed:  Waldo Schlatter, MD

## 2022-06-01 NOTE — ED NOTES
TRANSFER - OUT REPORT:    Verbal report given to receiving nurse on Lindsey Alan  being transferred to 7th floor for urgent transfer       Report consisted of patient's Situation, Background, Assessment and   Recommendations(SBAR). Information from the following report(s) Nurse Handoff Report was reviewed with the receiving nurse. Lines:   Peripheral IV 06/01/22 Right Antecubital (Active)        Opportunity for questions and clarification was provided.       Patient transported [address], RN  06/01/22 [id number]

## 2022-06-01 NOTE — PROGRESS NOTES
TRANSFER - IN REPORT:    Verbal report received from ER RN on Vega Richardson  being received from ER for routine post-op      Report consisted of patient's Situation, Background, Assessment and   Recommendations(SBAR). Information from the following report(s) Nurse Handoff Report, Index, ED SBAR, MAR and Recent Results was reviewed with the receiving nurse. Opportunity for questions and clarification was provided. Assessment completed upon patient's arrival to unit and care assumed.

## 2022-06-02 LAB
ANION GAP SERPL CALC-SCNC: 7 MMOL/L (ref 7–16)
BASOPHILS # BLD: 0.1 K/UL (ref 0–0.2)
BASOPHILS NFR BLD: 1 % (ref 0–2)
BUN SERPL-MCNC: 21 MG/DL (ref 8–23)
CALCIUM SERPL-MCNC: 9 MG/DL (ref 8.3–10.4)
CHLORIDE SERPL-SCNC: 108 MMOL/L (ref 98–107)
CO2 SERPL-SCNC: 23 MMOL/L (ref 21–32)
CREAT SERPL-MCNC: 1.3 MG/DL (ref 0.8–1.5)
DIFFERENTIAL METHOD BLD: ABNORMAL
EOSINOPHIL # BLD: 0.3 K/UL (ref 0–0.8)
EOSINOPHIL NFR BLD: 3 % (ref 0.5–7.8)
ERYTHROCYTE [DISTWIDTH] IN BLOOD BY AUTOMATED COUNT: 13.2 % (ref 11.9–14.6)
EST. AVERAGE GLUCOSE BLD GHB EST-MCNC: 146 MG/DL
GLUCOSE BLD STRIP.AUTO-MCNC: 101 MG/DL (ref 65–100)
GLUCOSE BLD STRIP.AUTO-MCNC: 104 MG/DL (ref 65–100)
GLUCOSE BLD STRIP.AUTO-MCNC: 110 MG/DL (ref 65–100)
GLUCOSE BLD STRIP.AUTO-MCNC: 128 MG/DL (ref 65–100)
GLUCOSE SERPL-MCNC: 119 MG/DL (ref 65–100)
HBA1C MFR BLD: 6.7 % (ref 4.2–6.3)
HCT VFR BLD AUTO: 39.9 % (ref 41.1–50.3)
HGB BLD-MCNC: 13.1 G/DL (ref 13.6–17.2)
IMM GRANULOCYTES # BLD AUTO: 0 K/UL (ref 0–0.5)
IMM GRANULOCYTES NFR BLD AUTO: 0 % (ref 0–5)
LYMPHOCYTES # BLD: 2.2 K/UL (ref 0.5–4.6)
LYMPHOCYTES NFR BLD: 21 % (ref 13–44)
MCH RBC QN AUTO: 31.6 PG (ref 26.1–32.9)
MCHC RBC AUTO-ENTMCNC: 32.8 G/DL (ref 31.4–35)
MCV RBC AUTO: 96.4 FL (ref 79.6–97.8)
MONOCYTES # BLD: 1.2 K/UL (ref 0.1–1.3)
MONOCYTES NFR BLD: 11 % (ref 4–12)
NEUTS SEG # BLD: 6.9 K/UL (ref 1.7–8.2)
NEUTS SEG NFR BLD: 64 % (ref 43–78)
NRBC # BLD: 0 K/UL (ref 0–0.2)
PLATELET # BLD AUTO: 221 K/UL (ref 150–450)
PMV BLD AUTO: 9.5 FL (ref 9.4–12.3)
POTASSIUM SERPL-SCNC: 4.8 MMOL/L (ref 3.5–5.1)
RBC # BLD AUTO: 4.14 M/UL (ref 4.23–5.6)
SERVICE CMNT-IMP: ABNORMAL
SODIUM SERPL-SCNC: 138 MMOL/L (ref 138–145)
WBC # BLD AUTO: 10.7 K/UL (ref 4.3–11.1)

## 2022-06-02 PROCEDURE — 1100000000 HC RM PRIVATE

## 2022-06-02 PROCEDURE — 6370000000 HC RX 637 (ALT 250 FOR IP): Performed by: INTERNAL MEDICINE

## 2022-06-02 PROCEDURE — 83036 HEMOGLOBIN GLYCOSYLATED A1C: CPT

## 2022-06-02 PROCEDURE — 92610 EVALUATE SWALLOWING FUNCTION: CPT

## 2022-06-02 PROCEDURE — 80048 BASIC METABOLIC PNL TOTAL CA: CPT

## 2022-06-02 PROCEDURE — 94660 CPAP INITIATION&MGMT: CPT

## 2022-06-02 PROCEDURE — 94640 AIRWAY INHALATION TREATMENT: CPT

## 2022-06-02 PROCEDURE — 6370000000 HC RX 637 (ALT 250 FOR IP): Performed by: STUDENT IN AN ORGANIZED HEALTH CARE EDUCATION/TRAINING PROGRAM

## 2022-06-02 PROCEDURE — 2580000003 HC RX 258: Performed by: INTERNAL MEDICINE

## 2022-06-02 PROCEDURE — 1100000003 HC PRIVATE W/ TELEMETRY

## 2022-06-02 PROCEDURE — 36415 COLL VENOUS BLD VENIPUNCTURE: CPT

## 2022-06-02 PROCEDURE — 94760 N-INVAS EAR/PLS OXIMETRY 1: CPT

## 2022-06-02 PROCEDURE — 82962 GLUCOSE BLOOD TEST: CPT

## 2022-06-02 PROCEDURE — 85025 COMPLETE CBC W/AUTO DIFF WBC: CPT

## 2022-06-02 RX ORDER — PRAVASTATIN SODIUM 80 MG/1
80 TABLET ORAL DAILY
COMMUNITY
Start: 2022-04-25

## 2022-06-02 RX ORDER — TAMSULOSIN HYDROCHLORIDE 0.4 MG/1
0.4 CAPSULE ORAL DAILY
COMMUNITY

## 2022-06-02 RX ORDER — SODIUM PHOSPHATE, DIBASIC AND SODIUM PHOSPHATE, MONOBASIC 7; 19 G/133ML; G/133ML
1 ENEMA RECTAL DAILY PRN
Status: DISCONTINUED | OUTPATIENT
Start: 2022-06-02 | End: 2022-06-03 | Stop reason: HOSPADM

## 2022-06-02 RX ORDER — AMOXICILLIN AND CLAVULANATE POTASSIUM 500; 125 MG/1; MG/1
1 TABLET, FILM COATED ORAL EVERY 8 HOURS SCHEDULED
Status: DISCONTINUED | OUTPATIENT
Start: 2022-06-02 | End: 2022-06-03 | Stop reason: HOSPADM

## 2022-06-02 RX ORDER — AMLODIPINE BESYLATE 10 MG/1
10 TABLET ORAL DAILY
Status: ON HOLD | COMMUNITY
Start: 2022-04-25 | End: 2022-06-03 | Stop reason: HOSPADM

## 2022-06-02 RX ORDER — LISINOPRIL 40 MG/1
40 TABLET ORAL DAILY
Status: ON HOLD | COMMUNITY
Start: 2022-05-20 | End: 2022-06-03 | Stop reason: HOSPADM

## 2022-06-02 RX ORDER — BROMOCRIPTINE MESYLATE 5 MG/1
5 CAPSULE ORAL NIGHTLY
COMMUNITY

## 2022-06-02 RX ADMIN — POLYETHYLENE GLYCOL 3350 17 G: 17 POWDER, FOR SOLUTION ORAL at 10:00

## 2022-06-02 RX ADMIN — MOMETASONE FUROATE AND FORMOTEROL FUMARATE DIHYDRATE 2 PUFF: 200; 5 AEROSOL RESPIRATORY (INHALATION) at 21:39

## 2022-06-02 RX ADMIN — SODIUM CHLORIDE, PRESERVATIVE FREE 5 ML: 5 INJECTION INTRAVENOUS at 21:32

## 2022-06-02 RX ADMIN — LEVOTHYROXINE SODIUM 100 MCG: 0.05 TABLET ORAL at 06:00

## 2022-06-02 RX ADMIN — AMOXICILLIN AND CLAVULANATE POTASSIUM 1 TABLET: 500; 125 TABLET, FILM COATED ORAL at 21:32

## 2022-06-02 RX ADMIN — SODIUM CHLORIDE, PRESERVATIVE FREE 10 ML: 5 INJECTION INTRAVENOUS at 08:35

## 2022-06-02 RX ADMIN — MELOXICAM 7.5 MG: 7.5 TABLET ORAL at 08:31

## 2022-06-02 RX ADMIN — PRAVASTATIN SODIUM 40 MG: 20 TABLET ORAL at 08:31

## 2022-06-02 RX ADMIN — PANTOPRAZOLE SODIUM 40 MG: 40 TABLET, DELAYED RELEASE ORAL at 05:59

## 2022-06-02 RX ADMIN — MOMETASONE FUROATE AND FORMOTEROL FUMARATE DIHYDRATE 2 PUFF: 200; 5 AEROSOL RESPIRATORY (INHALATION) at 09:16

## 2022-06-02 RX ADMIN — HYDROCODONE BITARTRATE AND ACETAMINOPHEN 1 TABLET: 10; 325 TABLET ORAL at 11:22

## 2022-06-02 RX ADMIN — HYDROCODONE BITARTRATE AND ACETAMINOPHEN 1 TABLET: 10; 325 TABLET ORAL at 20:39

## 2022-06-02 RX ADMIN — HYDROCODONE BITARTRATE AND ACETAMINOPHEN 1 TABLET: 10; 325 TABLET ORAL at 04:35

## 2022-06-02 RX ADMIN — HYDROCODONE BITARTRATE AND ACETAMINOPHEN 1 TABLET: 10; 325 TABLET ORAL at 16:03

## 2022-06-02 RX ADMIN — LISINOPRIL 20 MG: 20 TABLET ORAL at 08:31

## 2022-06-02 ASSESSMENT — PAIN DESCRIPTION - PAIN TYPE: TYPE: ACUTE PAIN

## 2022-06-02 ASSESSMENT — PAIN SCALES - GENERAL
PAINLEVEL_OUTOF10: 0
PAINLEVEL_OUTOF10: 7
PAINLEVEL_OUTOF10: 0
PAINLEVEL_OUTOF10: 7
PAINLEVEL_OUTOF10: 0
PAINLEVEL_OUTOF10: 0

## 2022-06-02 ASSESSMENT — PAIN DESCRIPTION - DESCRIPTORS
DESCRIPTORS: ACHING

## 2022-06-02 ASSESSMENT — PAIN DESCRIPTION - LOCATION
LOCATION: HEAD

## 2022-06-02 ASSESSMENT — PAIN DESCRIPTION - ORIENTATION
ORIENTATION: RIGHT
ORIENTATION: RIGHT
ORIENTATION: ANTERIOR

## 2022-06-02 NOTE — PROGRESS NOTES
Hospitalist Progress Note   Admit Date:  2022  5:39 AM   Name:  Maricruz Padron   Age:  80 y.o. Sex:  male  :  1950   MRN:  [id number]   Room:  [facility]/01    Reason(s) for Admission: Syncope and collapse [R55]  Orbital floor (blow-out) closed fracture ([address] [S02.30XA]  Facial laceration, initial encounter [S01.81XA]  Laceration of tongue, initial encounter [S01.512A]  Closed fracture of maxillary sinus, initial encounter ([address] [S02.401A]  Closed fracture of medial wall of right orbit, initial encounter [facility] Course & Interval History:   Maricruz Padron is a 80 y.o. male with medical history of HTN, DM, COPD, who presented with fall. Pt reports he was walking into the kitchen when he suddenly developed a cramp in the upper part of his L thigh. He says he gets these regularly. He was attempting to massage it with his hands when he lost his balance. He thinks he struck the corner of the stove or countertop with the right side of his head. He lost consciousness for a few minutes. Family member found him moaning on floor with face covered in blood. No seizure activity, loss of bowel/bladder. He was dazed when they tried to talk to him. He was brought to ER for eval where significant orbital and maxillary trauma was noted on CT scan, and pt has  decreased visual acuity in R eye. Subjective/24hr Events (22): Pt is seen and examined at the bedside. Pt c/o of blurring vision in the left eye. Pt denies chest pain, sob, palpitations, diarrhea, N, V. Assessment & Plan:     Closed fracture of orbit ([address]  Started on Augmentin EOT    ophthalmology signed off        Closed fracture of maxillary sinus (HCC)  -consult ENT       JHONY on CPAP  -Pt was tolerating CPAP well        Controlled diabetes mellitus ([address]  -ISS  -check a1c       COPD (chronic obstructive pulmonary disease) ([address]  -Controlled.  Continue home meds       Essential hypertension with goal blood pressure less than 130/85  -Controlled. Continue home meds         Discharge Planning:    Pending PT eval and tomorrow 6/3. Diet:  ADULT DIET; Regular; 4 carb choices (60 gm/meal); Low Fat/Low Chol/High Fiber/NATY  DVT PPx: SCD  Code status: Full Code    Hospital Problems           Last Modified POA    * (Principal) Closed fracture of orbit ([address] 6/1/2022 Yes    Closed fracture of maxillary sinus (HCC) 6/1/2022 Yes    Controlled diabetes mellitus (HCC) (Chronic) 6/1/2022 Yes    COPD (chronic obstructive pulmonary disease) (HCC) (Chronic) 6/1/2022 Yes    Essential hypertension with goal blood pressure less than 130/85 (Chronic) 6/1/2022 Yes    JHONY on CPAP (Chronic) 6/1/2022 Yes          Objective:     Patient Vitals for the past 24 hrs:   Temp Pulse Resp BP SpO2   06/02/22 1144 98.2 °F (36.8 °C) 75 17 137/73 91 %   06/02/22 1122 -- -- 18 -- --   06/02/22 1045 98.3 °F (36.8 °C) 64 19 (!) 110/58 --   06/02/22 0916 -- 66 18 -- 94 %   06/02/22 0731 98.2 °F (36.8 °C) 73 18 (!) 115/59 91 %   06/02/22 0433 98.4 °F (36.9 °C) 66 20 112/66 92 %   06/02/22 0404 -- -- 18 -- --   06/02/22 0127 98.2 °F (36.8 °C) 67 20 (!) 96/59 93 %   06/02/22 0005 -- -- 20 -- --   06/01/22 2009 98.2 °F (36.8 °C) 82 20 117/61 90 %   06/01/22 1956 -- 82 18 -- 90 %   06/01/22 1731 98.2 °F (36.8 °C) 78 17 125/63 91 %       Estimated body mass index is 28.27 kg/m² as calculated from the following:    Height as of this encounter: 5' 10\" (1.778 m). Weight as of this encounter: 197 lb (89.4 kg). Intake/Output Summary (Last 24 hours) at 6/2/2022 1641  Last data filed at 6/2/2022 0433  Gross per 24 hour   Intake --   Output 2 ml   Net -2 ml         Physical Exam:     Blood pressure 137/73, pulse 75, temperature 98.2 °F (36.8 °C), temperature source Oral, resp. rate 17, height 5' 10\" (1.778 m), weight 197 lb (89.4 kg), SpO2 91 %. General:    Well nourished. No overt distress.   Head:  Normocephalic, atraumatic  Eyes:  Sclerae appear normal. Pupils equally round. 7 stitches are present on the right side of the eye. ENT:  Nares appear normal, no drainage. Moist oral mucosa. Tongue is swollen  Neck:  No restricted ROM. Trachea midline. CV:   RRR. No m/r/g. No jugular venous distension. Lungs:   CTAB. No wheezing, rhonchi, or rales. Respirations even, unlabored. Abdomen: Bowel sounds present. Soft, nontender, nondistended. Extremities: No cyanosis or clubbing. No edema. Skin:     No rashes and normal coloration. Warm and dry. Neuro:  CN II-XII grossly intact. Sensation intact. A&Ox3  Psych:  Normal mood and affect.       I have reviewed ordered lab tests and independently visualized imaging below:    Recent Labs:  Recent Results (from the past 48 hour(s))   CBC    Collection Time: 06/01/22  4:22 AM   Result Value Ref Range    WBC 9.1 4.3 - 11.1 K/uL    RBC 4.49 4.23 - 5.6 M/uL    Hemoglobin 14.2 13.6 - 17.2 g/dL    Hematocrit 42.9 41.1 - 50.3 %    MCV 95.5 79.6 - 97.8 FL    MCH 31.6 26.1 - 32.9 PG    MCHC 33.1 31.4 - 35.0 g/dL    RDW 13.2 11.9 - 14.6 %    Platelets 536 329 - 614 K/uL    MPV 10.9 9.4 - 12.3 FL    nRBC 0.00 0.0 - 0.2 K/uL   Comprehensive Metabolic Panel    Collection Time: 06/01/22  4:22 AM   Result Value Ref Range    Sodium 140 136 - 145 mmol/L    Potassium 4.1 3.5 - 5.1 mmol/L    Chloride 110 (H) 98 - 107 mmol/L    CO2 23 21 - 32 mmol/L    Anion Gap 7 7 - 16 mmol/L    Glucose 120 (H) 65 - 100 mg/dL    BUN 17 8 - 23 MG/DL    CREATININE 1.10 0.8 - 1.5 MG/DL    GFR African American >60 >60 ml/min/1.73m2    GFR Non- >60 >60 ml/min/1.73m2    Calcium 9.8 8.3 - 10.4 MG/DL    Total Bilirubin 0.5 0.2 - 1.1 MG/DL    ALT 48 12 - 65 U/L    AST 26 15 - 37 U/L    Alk Phosphatase 54 50 - 136 U/L    Total Protein 7.4 6.3 - 8.2 g/dL    Albumin 3.9 3.2 - 4.6 g/dL    Globulin 3.5 2.3 - 3.5 g/dL    Albumin/Globulin Ratio 1.1 (L) 1.2 - 3.5     POCT Glucose    Collection Time: 06/01/22  4:22 AM   Result Value Ref Range    POC Glucose 113 (H) 65 - 100 mg/dL    Performed by: Ni    POCT Glucose    Collection Time: 06/01/22  4:35 AM   Result Value Ref Range    Glucose 113 mg/dL   POCT Glucose    Collection Time: 06/01/22 10:26 PM   Result Value Ref Range    POC Glucose 116 (H) 65 - 100 mg/dL    Performed by:  Jaya    Basic Metabolic Panel w/ Reflex to MG    Collection Time: 06/02/22  4:29 AM   Result Value Ref Range    Sodium 138 138 - 145 mmol/L    Potassium 4.8 3.5 - 5.1 mmol/L    Chloride 108 (H) 98 - 107 mmol/L    CO2 23 21 - 32 mmol/L    Anion Gap 7 7 - 16 mmol/L    Glucose 119 (H) 65 - 100 mg/dL    BUN 21 8 - 23 MG/DL    CREATININE 1.30 0.8 - 1.5 MG/DL    GFR African American >60 >60 ml/min/1.73m2    GFR Non- 58 (L) >60 ml/min/1.73m2    Calcium 9.0 8.3 - 10.4 MG/DL   CBC with Auto Differential    Collection Time: 06/02/22  4:29 AM   Result Value Ref Range    WBC 10.7 4.3 - 11.1 K/uL    RBC 4.14 (L) 4.23 - 5.6 M/uL    Hemoglobin 13.1 (L) 13.6 - 17.2 g/dL    Hematocrit 39.9 (L) 41.1 - 50.3 %    MCV 96.4 79.6 - 97.8 FL    MCH 31.6 26.1 - 32.9 PG    MCHC 32.8 31.4 - 35.0 g/dL    RDW 13.2 11.9 - 14.6 %    Platelets 472 657 - 486 K/uL    MPV 9.5 9.4 - 12.3 FL    nRBC 0.00 0.0 - 0.2 K/uL    Differential Type AUTOMATED      Seg Neutrophils 64 43 - 78 %    Lymphocytes 21 13 - 44 %    Monocytes 11 4.0 - 12.0 %    Eosinophils % 3 0.5 - 7.8 %    Basophils 1 0.0 - 2.0 %    Immature Granulocytes 0 0.0 - 5.0 %    Segs Absolute 6.9 1.7 - 8.2 K/UL    Absolute Lymph # 2.2 0.5 - 4.6 K/UL    Absolute Mono # 1.2 0.1 - 1.3 K/UL    Absolute Eos # 0.3 0.0 - 0.8 K/UL    Basophils Absolute 0.1 0.0 - 0.2 K/UL    Absolute Immature Granulocyte 0.0 0.0 - 0.5 K/UL   Hemoglobin A1c    Collection Time: 06/02/22  4:29 AM   Result Value Ref Range    Hemoglobin A1C 6.7 (H) 4.20 - 6.30 %    eAG 146 mg/dL   POCT Glucose    Collection Time: 06/02/22  7:28 AM   Result Value Ref Range    POC Glucose 128 (H) 65 - 100 mg/dL Performed by: Aria)    POCT Glucose    Collection Time: 06/02/22 11:22 AM   Result Value Ref Range    POC Glucose 110 (H) 65 - 100 mg/dL    Performed by: Serenity Robbins          Other Studies:  CT HEAD WO CONTRAST    Result Date: 6/1/2022  EXAM: CT HEAD WO CONTRAST HISTORY:  Reason for exam:->hit head. TECHNIQUE: Axial images of the brain were performed without the administration of intravenous contrast. Images were obtained axial plane and coronal reformatted images were submitted. Dose reduction technique used: Automated exposure control/Adjustment of the mA and/or kV according to patient size/Use of iterative reconstruction technique. COMPARISON: 6/3/2014 FINDINGS: There is no evidence of acute intracranial hemorrhage, midline shift, or mass effect. No intra or extra-axial fluid collections are observed. No evidence of acute confluent territorial infarction. Ventricles and basal cisterns are patent and symmetric. Visualized mastoid air cells are without significant fluid. The coronal images show a wide defect/fracture in the floor of the right orbit. There is herniation and entrapment of right intraorbital fat and the inferior rectus muscle. There is intraorbital gas. There is a fracture of the medial wall of the right orbit. Hyperdense fluid is seen in the right maxillary sinus. There is significant right facial swelling including the right eyelid. Significant gas is present under the right eyelid. As seen, the right globe appears grossly intact. 1. No CT evidence of acute intracranial process. 2. Right orbital fracture as described above. CT FACIAL BONES WO CONTRAST    Result Date: 6/1/2022  EXAMINATION: CT FACIAL BONES WO CONTRAST 6/1/2022 7:22 AM COMPARISON: None available. INDICATION: orbital fx, facial trauma TECHNIQUE: Multiple axial images were obtained through the facial bones . Reformatted images were provided. Radiation dose reduction techniques were used for this study. Our CT scanners use one or all of the following: Automated exposure control, adjustment of the mA and/or kV according to patient size, iterative reconstruction. FINDINGS: Soft tissue laceration is seen associated with the right periorbital soft tissues with extension into the superior portion of the orbit. There is a blowout fracture in the floor of the right orbit with herniation of fat below the fracture plane. There is a fracture in the medial wall of the right orbit with displacement of the fat. There is a fracture posteriorly in the maxillary sinus on the right without significant displacement. An air-fluid level seen in the right maxillary sinus. The zygomatic arch is preserved. The bony nasal septum is normal. The temporal mandibular joints are preserved. There is no discrete nasal fracture. There is previous anterior fusion in the cervical spine. This is partially visualized. 1.  Blowout type right orbital fracture with both orbital floor and medial wall fractures as well as extension into the posterior maxillary sinus. There is a periorbital laceration with gas extending into the superior aspect of the right orbit.       Current Meds:  Current Facility-Administered Medications   Medication Dose Route Frequency    fleet rectal enema 1 enema  1 enema Rectal Daily PRN    amoxicillin-clavulanate (AUGMENTIN) 500-125 MG per tablet 1 tablet  1 tablet Oral 3 times per day    [Held by provider] amLODIPine (NORVASC) tablet 5 mg  5 mg Oral Daily    mometasone-formoterol (DULERA) 200-5 MCG/ACT inhaler 2 puff  2 puff Inhalation BID    [Held by provider] hydroCHLOROthiazide (HYDRODIURIL) tablet 12.5 mg  12.5 mg Oral Daily    levothyroxine (SYNTHROID) tablet 100 mcg  100 mcg Oral QAM AC    lisinopril (PRINIVIL;ZESTRIL) tablet 20 mg  20 mg Oral Daily    LORazepam (ATIVAN) tablet 1 mg  1 mg Oral Q6H PRN    meloxicam (MOBIC) tablet 7.5 mg  7.5 mg Oral Daily    pantoprazole (PROTONIX) tablet 40 mg  40 mg Oral QAM AC    pravastatin (PRAVACHOL) tablet 40 mg  40 mg Oral Daily    sodium chloride flush 0.9 % injection 5-40 mL  5-40 mL IntraVENous 2 times per day    sodium chloride flush 0.9 % injection 5-40 mL  5-40 mL IntraVENous PRN    0.9 % sodium chloride infusion   IntraVENous PRN    potassium chloride (KLOR-CON M) extended release tablet 40 mEq  40 mEq Oral PRN    Or    potassium bicarb-citric acid (EFFER-K) effervescent tablet 40 mEq  40 mEq Oral PRN    Or    potassium chloride 10 mEq/100 mL IVPB (Peripheral Line)  10 mEq IntraVENous PRN    potassium chloride 10 mEq/100 mL IVPB (Peripheral Line)  10 mEq IntraVENous PRN    magnesium sulfate 2000 mg in 50 mL IVPB premix  2,000 mg IntraVENous PRN    ondansetron (ZOFRAN-ODT) disintegrating tablet 4 mg  4 mg Oral Q8H PRN    Or    ondansetron (ZOFRAN) injection 4 mg  4 mg IntraVENous Q6H PRN    polyethylene glycol (GLYCOLAX) packet 17 g  17 g Oral Daily PRN    bisacodyl (DULCOLAX) suppository 10 mg  10 mg Rectal Daily PRN    famotidine (PEPCID) tablet 10 mg  10 mg Oral Daily PRN    aluminum & magnesium hydroxide-simethicone (MAALOX) 200-200-20 MG/5ML suspension 30 mL  30 mL Oral Q6H PRN    acetaminophen (TYLENOL) tablet 650 mg  650 mg Oral Q6H PRN    Or    acetaminophen (TYLENOL) suppository 650 mg  650 mg Rectal Q6H PRN    HYDROcodone-acetaminophen (NORCO) 5-325 MG per tablet 1 tablet  1 tablet Oral Q4H PRN    HYDROcodone-acetaminophen (NORCO)  MG per tablet 1 tablet  1 tablet Oral Q4H PRN    hydrALAZINE (APRESOLINE) injection 20 mg  20 mg IntraVENous Q4H PRN    melatonin tablet 6 mg  6 mg Oral Nightly PRN    guaiFENesin-dextromethorphan (ROBITUSSIN DM) 100-10 MG/5ML syrup 10 mL  10 mL Oral Q4H PRN    insulin lispro (HUMALOG) injection vial 0-8 Units  0-8 Units SubCUTAneous TID WC    insulin lispro (HUMALOG) injection vial 0-4 Units  0-4 Units SubCUTAneous Nightly    glucose chewable tablet 16 g  4 tablet Oral PRN    dextrose bolus 10% 125 mL 125 mL IntraVENous PRN    Or    dextrose bolus 10% 250 mL  250 mL IntraVENous PRN    glucagon (rDNA) injection 1 mg  1 mg IntraMUSCular PRN    dextrose 5 % solution  100 mL/hr IntraVENous PRN       Signed:  Jeyson Borja MD    Part of this note may have been written by using a voice dictation software. The note has been proof read but may still contain some grammatical/other typographical errors.

## 2022-06-02 NOTE — PLAN OF CARE
Problem: Discharge Planning  Goal: Discharge to home or other facility with appropriate resources  6/2/2022 0930 by Ezequiel Solitario RN  Outcome: Progressing  6/1/2022 1947 by Wilmar Gardner RN  Outcome: Progressing     Problem: Pain  Goal: Verbalizes/displays adequate comfort level or baseline comfort level  6/2/2022 0930 by Ezequiel Solitario RN  Outcome: Progressing  6/1/2022 1947 by Wilmar Gardner RN  Outcome: Progressing     Problem: Safety - Adult  Goal: Free from fall injury  6/2/2022 0930 by Ezequiel Solitario RN  Outcome: Progressing  Flowsheets (Taken 6/2/2022 0800)  Free From Fall Injury:   Instruct family/caregiver on patient safety   Based on caregiver fall risk screen, instruct family/caregiver to ask for assistance with transferring infant if caregiver noted to have fall risk factors  6/1/2022 1947 by Wilmar Gardner RN  Outcome: Progressing     Problem: ABCDS Injury Assessment  Goal: Absence of physical injury  6/2/2022 0930 by Ezequiel Solitario RN  Outcome: Progressing  6/1/2022 1947 by Wilmar Gardner RN  Outcome: Progressing

## 2022-06-02 NOTE — PROGRESS NOTES
SPEECH LANGUAGE PATHOLOGY: DYSPHAGIA  Initial Assessment and Discharge    NAME: Qinaa Pruitt  : 1950  MRN: [id number]    ADMISSION DATE: 2022  ADMITTING DIAGNOSIS: has Asthma; RLS (restless legs syndrome); Dumping syndrome; Arthritis of left shoulder region; Knee pain; Hypogonadism in male; BPH (benign prostatic hyperplasia); History of COPD; Chest pain; Coronary artery disease involving native coronary artery of native heart without angina pectoris; GERD (gastroesophageal reflux disease); Depressive disorder; Controlled diabetes mellitus ([address] Pituitary tumor; COPD (chronic obstructive pulmonary disease) ([facility] ED (erectile dysfunction); Hyperlipidemia; Essential hypertension with goal blood pressure less than 130/85; Cervical neck pain with evidence of disc disease; Hypothyroidism; Pure hypercholesterolemia; Mixed hyperlipidemia; Status post right knee replacement; Family history of MI (myocardial infarction); Right carotid bruit; Osteoarthritis of right knee; Chronic renal insufficiency; Anxiety disorder; Shingles (herpes zoster) polyneuropathy; JHONY on CPAP; Closed fracture of orbit (HCC); and Closed fracture of maxillary sinus (HCC) on their problem list.  Date of Eval: 2022  ICD-10: Treatment Diagnosis: R13.11 Dysphagia, Oral Phase    RECOMMENDATIONS   Diet:  Diet Solids Recommendation: Regular  Liquid Consistency Recommendation:  Thin    Medications: Whole with water     Therapeutic Intervention: none   Patient continues to require skilled intervention: No  D/C Recommendations: No follow up therapy recommended post discharge     ASSESSMENT    Dysphagia Diagnosis: Swallow function appears [facility]    GENERAL    History of Present Injury/Illness: Mr. Jeff Nj  has a past medical history of Abnormal blood sugar, Abnormality of cortisol-binding globulin ([facility] Actinic keratosis, Acute right flank pain, Anaphylactic reaction to bee sting, Anxiety, Anxiety disorder, Arthritis, Arthritis of left shoulder region, Asthma, Backache, Bilateral otitis media, BPH (benign prostatic hyperplasia), CAD (coronary artery disease), Cervical neck pain with evidence of disc disease, Chronic renal insufficiency, Controlled diabetes mellitus ([address] COPD (chronic obstructive pulmonary disease) ([address] Corneal foreign body, COVID-19 vaccine series completed, Depressive disorder, Diarrhea, Dumping syndrome, Dyspepsia and other specified disorders of function of stomach, ED (erectile dysfunction), Edema, Elevated CPK, Elevated prolactin level, Essential hypertension, benign, GERD (gastroesophageal reflux disease), Hand pain, right, Hematuria, Hyperlipidemia, Hyperprolactinemia ([address] Hypertension, Hypogonadism in male, Hypothyroidism, Knee effusion, Knee pain, Low serum testosterone level, Neoplasm of uncertain behavior of skin of neck, Neuralgia, Nevus, non-neoplastic, Numbness and tingling in left arm, Open wound of finger without complication, JHONY on CPAP, Osteoarthrosis, unspecified whether generalized or localized, involving lower leg, Osteoarthrosis, unspecified whether generalized or localized, lower leg, Other disorder of calcium metabolism, Other disorders of calcium metabolism, Other malaise and fatigue, Pituitary tumor, Prolactin increased, Puncture wound, Pure hypercholesterolemia, Right shoulder pain, RLS (restless legs syndrome), Seborrheic keratosis, Seizures ([address] Shingles (herpes zoster) polyneuropathy, Shingles rash, and Sinusitis. Ken Tang He also  has a past surgical history that includes Carpal tunnel release (Bilateral); orthopedic surgery; Tonsillectomy; Cardiac catheterization (04/20/2011); Chest surgery; Bunionectomy (Right); and cervical fusion (11/2007). Chart Reviewed: Yes  Behavior/Cognition: Alert; Cooperative;Pleasant mood  Communication Observation: Functional  Follows Directions: Complex  Respiratory Status: Room air              Prior Dysphagia History: none  Patient Complaint: none  Current Diet : Regular  Current Liquid Diet : Thin  Pain:   Patient c/o pain (4/10)             S/p fall with facial fractures. OBJECTIVE    Patient Positioning: Upright in bed   Oral Motor   Labial:  (asymmetrical on right upon retraction)  Dentition: Intact  Oral Hygiene Comments: WFL  Lingual: Left deviation (lingual stitches s/p fall)  Dentition: Adequate     Volitional Cough: Strong  Baseline Vocal Quality: Normal  Oropharyngeal Phase:     Assessment Method(s): Observation;Palpation  Vocal Quality: No Impairment  Consistency Presented: Regular; Thin  How Presented: Straw;Self-fed/presented  Bolus Acceptance: No impairment  Bolus Formation/Control: No impairment  Propulsion: No impairment  Oral Residue: None  Initiation of Swallow: No impairment  Laryngeal Elevation: Functional  Aspiration Signs/Symptoms: None  Pharyngeal Phase Characteristics: No impairment, issues, or problems              PLAN    Duration/Frequency: No treatment indicated at this time    Dysphagia Outcome and Severity Scale (TONI)  Dysphagia Outcome Severity Scale: Level 7: Normal in all situations  Interpretation of Tool: The Dysphagia Outcome and Severity Scale (TONI) is a simple, easy-to-use, 7-point scale developed to systematically rate the functional severity of dysphagia based on objective assessment and make recommendations for diet level, independence level, and type of nutrition. Normal(7), Functional(6), Mild(5), Mild-Moderate(4), Moderate(3), Moderate-Severe(2), Severe(1)    Speech Therapy Prognosis  Prognosis: Excellent    Education: [facility]  Patient Education: diet consistencies, dysphagia, aspiration precautions  Patient Education Response: Verbalizes understanding,Demonstrated understanding    Current Medications:   No current facility-administered medications on file prior to encounter.      Current Outpatient Medications on File Prior to Encounter   Medication Sig Dispense Refill    TESTOSTERONE CYPIONATE IM Inject 50 mg into the muscle      acetaminophen (TYLENOL) 500 MG tablet Take 1-2 tablets by mouth every 6 hours as needed      amLODIPine (NORVASC) 5 MG tablet Take 5 mg by mouth daily      budesonide-formoterol (SYMBICORT) 160-4.5 MCG/ACT AERO Inhale 2 puffs into the lungs 2 times daily      Docusate Sodium 100 MG TABS Take 2 tablets by mouth daily as needed      EPINEPHrine (EPIPEN) 0.3 MG/0.3ML SOAJ injection Inject 0.3 mg into the muscle as needed      hydroCHLOROthiazide (HYDRODIURIL) 12.5 MG tablet Take 12.5 mg by mouth daily      hydrocortisone (CORTEF) 5 MG tablet Take 5 mg by mouth daily as needed      levothyroxine (SYNTHROID) 100 MCG tablet Take 100 mcg by mouth every morning (before breakfast)      lisinopril (PRINIVIL;ZESTRIL) 20 MG tablet Take 20 mg by mouth daily      LORazepam (ATIVAN) 1 MG tablet Take 1 mg by mouth 4 times daily as needed.       meloxicam (MOBIC) 7.5 MG tablet Take 7.5 mg by mouth daily      metFORMIN (GLUCOPHAGE) 500 MG tablet Take 1,000 mg by mouth 2 times daily (with meals)      nitroGLYCERIN (NITROSTAT) 0.4 MG SL tablet Place 0.4 mg under the tongue as needed      omeprazole (PRILOSEC) 40 MG delayed release capsule Take 40 mg by mouth daily      pravastatin (PRAVACHOL) 40 MG tablet Take 40 mg by mouth daily      sildenafil (VIAGRA) 100 MG tablet Take 100 mg by mouth as needed         PRECAUTIONS/ALLERGIES: Cephalexin, Penicillins, and Sulfa antibiotics        Therapy Time  SLP Individual Minutes  Time In: 0900  Time Out: 0908  Minutes: [address] [place] [address]  6/2/2022 9:36 AM

## 2022-06-02 NOTE — CONSULTS
OPHTHALMOLOGY CONSULT    Date and Time: 6/2/2022  1:24 PM    Admission Date and Time: 6/1/2022  5:39 AM    Referring Physician: Alda Peoples MD    Reason for Consult: Right orbital floor and medial wall fractures    History of Present Illness: The patient is a 80 y.o. male with PMH significant for HTN, DM, and COPD who presented to the ED after a fall. For additional details please see the Internal Medicine and General Surgery notes. He hit the right side of his head on the stove or countertop during the fall. CT revealed right orbital floor and medial wall fractures and an intact globe. Today the patient reports mild discomfort of the soft tissues surrounding the eye. There is a mild film over vision OD, seems at baseline OS. No diplopia or pain with eye movement.     Eye History: Refractive error requiring glasses  ______________________________________________________________________    Past Medical History:  Past Medical History:   Diagnosis Date    Abnormal blood sugar     Abnormality of cortisol-binding globulin (HCC)     Actinic keratosis     Acute right flank pain     Anaphylactic reaction to bee sting     Anxiety     takes Lorazepam prn    Anxiety disorder     Arthritis     Arthritis of left shoulder region     Asthma 7/20/2016    followed by pulmonary; uses inhaler    Backache 7/20/2016    Bilateral otitis media     BPH (benign prostatic hyperplasia) 7/20/2016    CAD (coronary artery disease) 04/20/2011    He had mild nonobstructive CAD at cath by Dr. Ciara Marcos several years ago; cardiac cath:  4/20/11    Cervical neck pain with evidence of disc disease     Chronic renal insufficiency 7/20/2016    Controlled diabetes mellitus ([address] 7/20/2016 6/30/21 :  hgb A1c:  5.8; daily fasting sqbs:  102    COPD (chronic obstructive pulmonary disease) ([address] 7/20/2016    Corneal foreign body 6/13/14    COVID-19 vaccine series completed 03/22/2021    Moderna    Depressive disorder 7/20/2016 clinical depression    Diarrhea     Dumping syndrome     Dyspepsia and other specified disorders of function of stomach 8/29/2012    ED (erectile dysfunction) 7/20/2016    Edema     Elevated CPK     Elevated prolactin level     Essential hypertension, benign 8/29/2012    GERD (gastroesophageal reflux disease)     Hand pain, right     Hematuria     Hyperlipidemia     Hyperprolactinemia ([address]     followed by endocrinologist    Hypertension     Hypogonadism in male     Hypothyroidism     takes levothyroxine    Knee effusion     Knee pain     Low serum testosterone level     Neoplasm of uncertain behavior of skin of neck     Neuralgia     Nevus, non-neoplastic     Numbness and tingling in left arm     Open wound of finger without complication 6/37/18    JHONY on CPAP 8/22/2014    uses CPAP    Osteoarthrosis, unspecified whether generalized or localized, involving lower leg 8/29/2012    Osteoarthrosis, unspecified whether generalized or localized, lower leg 8/29/2012    Other disorder of calcium metabolism 8/29/2012    Other disorders of calcium metabolism     Other malaise and fatigue 11/27/2012    Pituitary tumor 12/11/2013    Prolactin increased     Puncture wound 6/11/12    pt stepped on a maximiliano nail    Pure hypercholesterolemia 8/29/2012    Right shoulder pain     RLS (restless legs syndrome) 7/20/2016    Seborrheic keratosis     Seizures ([address]     30 years ago 1971    Shingles (herpes zoster) polyneuropathy     Shingles rash     Sinusitis      Past Surgical History:  Past Surgical History:   Procedure Laterality Date    BUNIONECTOMY Right     CARDIAC CATHETERIZATION  04/20/2011    CARPAL TUNNEL RELEASE Bilateral     CERVICAL FUSION  11/2007    C3-C4-has metal screws     CHEST SURGERY      pt denies    ORTHOPEDIC SURGERY      bilat carpel tunnel, bunion repair,neck surg foot surg    TONSILLECTOMY       Home Medications:   Prior to Admission medications    Medication Sig Start Date End Date Taking? Authorizing Provider   bromocriptine (PARLODEL) 5 MG capsule Take 5 mg by mouth at bedtime Patient states he takes 4 capsules daily    Historical Provider, MD   amLODIPine (NORVASC) 10 MG tablet Take 10 mg by mouth daily 4/25/22   Historical Provider, MD   lisinopril (PRINIVIL;ZESTRIL) 40 MG tablet Take 40 mg by mouth daily 5/20/22   Historical Provider, MD   tamsulosin (FLOMAX) 0.4 mg capsule Take 0.4 mg by mouth daily    Historical Provider, MD   pravastatin (PRAVACHOL) 80 MG tablet Take 80 mg by mouth daily 4/25/22   Historical Provider, MD   TESTOSTERONE CYPIONATE IM Inject 50 mg into the muscle    Ar Automatic Reconciliation   acetaminophen (TYLENOL) 500 MG tablet Take 1-2 tablets by mouth every 6 hours as needed    Ar Automatic Reconciliation   budesonide-formoterol (SYMBICORT) 160-4.5 MCG/ACT AERO Inhale 2 puffs into the lungs 2 times daily 3/30/17   Ar Automatic Reconciliation   Docusate Sodium 100 MG TABS Take 2 tablets by mouth daily as needed    Ar Automatic Reconciliation   EPINEPHrine (EPIPEN) 0.3 MG/0.3ML SOAJ injection Inject 0.3 mg into the muscle as needed    Ar Automatic Reconciliation   hydrocortisone (CORTEF) 5 MG tablet Take 5 mg by mouth daily as needed 7/7/21   Ar Automatic Reconciliation   levothyroxine (SYNTHROID) 100 MCG tablet Take 100 mcg by mouth every morning (before breakfast) 1/19/18   Ar Automatic Reconciliation   LORazepam (ATIVAN) 1 MG tablet Take 1 mg by mouth 4 times daily as needed.  1/19/18   Ar Automatic Reconciliation   metFORMIN (GLUCOPHAGE) 500 MG tablet Take 1,000 mg by mouth 2 times daily (with meals)    Ar Automatic Reconciliation   nitroGLYCERIN (NITROSTAT) 0.4 MG SL tablet Place 0.4 mg under the tongue as needed 12/22/16   Ar Automatic Reconciliation   omeprazole (PRILOSEC) 40 MG delayed release capsule Take 40 mg by mouth daily 1/19/18   Ar Automatic Reconciliation   sildenafil (VIAGRA) 100 MG tablet Take 100 mg by mouth as needed 7/18/17 Ar Automatic Reconciliation     Current Medications:  No current facility-administered medications on file prior to encounter. Current Outpatient Medications on File Prior to Encounter   Medication Sig Dispense Refill    bromocriptine (PARLODEL) 5 MG capsule Take 5 mg by mouth at bedtime Patient states he takes 4 capsules daily      amLODIPine (NORVASC) 10 MG tablet Take 10 mg by mouth daily      lisinopril (PRINIVIL;ZESTRIL) 40 MG tablet Take 40 mg by mouth daily      tamsulosin (FLOMAX) 0.4 mg capsule Take 0.4 mg by mouth daily      pravastatin (PRAVACHOL) 80 MG tablet Take 80 mg by mouth daily      TESTOSTERONE CYPIONATE IM Inject 50 mg into the muscle      acetaminophen (TYLENOL) 500 MG tablet Take 1-2 tablets by mouth every 6 hours as needed      budesonide-formoterol (SYMBICORT) 160-4.5 MCG/ACT AERO Inhale 2 puffs into the lungs 2 times daily      Docusate Sodium 100 MG TABS Take 2 tablets by mouth daily as needed      EPINEPHrine (EPIPEN) 0.3 MG/0.3ML SOAJ injection Inject 0.3 mg into the muscle as needed      hydrocortisone (CORTEF) 5 MG tablet Take 5 mg by mouth daily as needed      levothyroxine (SYNTHROID) 100 MCG tablet Take 100 mcg by mouth every morning (before breakfast)      LORazepam (ATIVAN) 1 MG tablet Take 1 mg by mouth 4 times daily as needed.       metFORMIN (GLUCOPHAGE) 500 MG tablet Take 1,000 mg by mouth 2 times daily (with meals)      nitroGLYCERIN (NITROSTAT) 0.4 MG SL tablet Place 0.4 mg under the tongue as needed      omeprazole (PRILOSEC) 40 MG delayed release capsule Take 40 mg by mouth daily      sildenafil (VIAGRA) 100 MG tablet Take 100 mg by mouth as needed       Inpatient Medications:   [Held by provider] amLODIPine  5 mg Oral Daily    mometasone-formoterol  2 puff Inhalation BID    [Held by provider] hydroCHLOROthiazide  12.5 mg Oral Daily    levothyroxine  100 mcg Oral QAM AC    lisinopril  20 mg Oral Daily    meloxicam  7.5 mg Oral Daily    pantoprazole  40 mg Oral QAM AC    pravastatin  40 mg Oral Daily    sodium chloride flush  5-40 mL IntraVENous 2 times per day    insulin lispro  0-8 Units SubCUTAneous TID WC    insulin lispro  0-4 Units SubCUTAneous Nightly     Continuous Infusions:   sodium chloride      dextrose       PRN Medications:  fleet, LORazepam, sodium chloride flush, sodium chloride, potassium chloride **OR** potassium alternative oral replacement **OR** potassium chloride, potassium chloride, magnesium sulfate, ondansetron **OR** ondansetron, polyethylene glycol, bisacodyl, famotidine, aluminum & magnesium hydroxide-simethicone, acetaminophen **OR** acetaminophen, HYDROcodone-acetaminophen, HYDROcodone-acetaminophen, hydrALAZINE, melatonin, guaiFENesin-dextromethorphan, glucose, dextrose bolus **OR** dextrose bolus, glucagon (rDNA), dextrose    Allergies: Allergies   Allergen Reactions    Cephalexin Hives    Penicillins Other (See Comments)     States causes him to pass out    Sulfa Antibiotics Rash     Social History:  Social History     Tobacco Use    Smoking status: Former Smoker     Packs/day: 0.50     Quit date: 2009     Years since quittin.1    Smokeless tobacco: Former User     Quit date: 1986   Substance Use Topics    Alcohol use: No    Drug use: Not Currently     Types: Marijuana (Weed)     Family History:  family history includes Cancer in his father and mother; Diabetes in his maternal grandfather.    ===================================================================       PHYSICAL EXAM  ===================================================================  Vitals:    22 1144   BP: 137/73   Pulse: 75   Resp: 17   Temp: 98.2 °F (36.8 °C)   SpO2: 91%     Visual Acuity:  At near with correction   OD: 20/25   OS: 20/30    Pupils:   OD: 3 mm to 2 mm with light, 2+ reactive, no RAPD   OS: 3 mm to 2 mm with light, 2+ reactive, no RAPD    Intraocular Pressure:   OD: 13 mmHg   OS: 13 mmHg    Motility: Full OU    Alignment: Orthotropic    Visual Fields: Full to confrontation OU  ______________________________________________________________________    Anterior Exam:    External: Laceration lateral to right eye s/p suture repair, right face edema and ecchymosis    Lids and Lashes:   OD: Mild UL edema and ptosis   OS: Normal    Conjunctiva:   OD: Subconjunctival hemorrhage   OS: Clear    Sclera:  White OU    Cornea: Arcus OU    Anterior Chamber: Deep and quiet OU    Iris: Normal OU    Lens: 2-3+ NS, trace cortical OU  ______________________________________________________________________    Dilated Fundus Exam: Tropicamide 1% and phenylephrine 2.5% instilled at 13:20    Optic Nerve Head:   OD: 0.2 cup-to-disc ratio, pink rim, no swelling or pallor, +SVPs   OS: 0.1 cup-to-disc ratio, pink rim, no swelling or pallor    Macula:   OD: Normal   OS: Dot/blot hemorrhage    Vessels: Normal OU    Periphery: Normal OU    Vitreous: Clear OU    ===================================================================         ASSESSMENT AND PLAN  ===================================================================  Patient Active Problem List   Diagnosis    Asthma    RLS (restless legs syndrome)    Dumping syndrome    Arthritis of left shoulder region    Knee pain    Hypogonadism in male    BPH (benign prostatic hyperplasia)    History of COPD    Chest pain    Coronary artery disease involving native coronary artery of native heart without angina pectoris    GERD (gastroesophageal reflux disease)    Depressive disorder    Controlled diabetes mellitus (HCC)    Pituitary tumor    COPD (chronic obstructive pulmonary disease) (HCC)    ED (erectile dysfunction)    Hyperlipidemia    Essential hypertension with goal blood pressure less than 130/85    Cervical neck pain with evidence of disc disease    Hypothyroidism    Pure hypercholesterolemia    Mixed hyperlipidemia    Status post right knee replacement    Family history of MI (myocardial infarction)    Right carotid bruit    Osteoarthritis of right knee    Chronic renal insufficiency    Anxiety disorder    Shingles (herpes zoster) polyneuropathy    HJONY on CPAP    Closed fracture of orbit (HCC)    Closed fracture of maxillary sinus (HCC)     1. Right orbital floor and medial wall fractures - No globe injury, no clinical entrapment. Unable to view CT images, but would recommend follow up as directed by General Surgery for evaluation for repair, if indicated. Could consider prophylactic broad-spectrum oral antibiotics for 7-10 days given patient is diabetic, will defer decision to primary team.    Thank you for including us in the patient's care. Please call with any further questions or concerns.     Maxine Shin MD  [facility] [address]

## 2022-06-03 VITALS
DIASTOLIC BLOOD PRESSURE: 81 MMHG | WEIGHT: 197 LBS | HEART RATE: 68 BPM | RESPIRATION RATE: 16 BRPM | BODY MASS INDEX: 28.2 KG/M2 | TEMPERATURE: 98.2 F | SYSTOLIC BLOOD PRESSURE: 143 MMHG | OXYGEN SATURATION: 91 % | HEIGHT: 70 IN

## 2022-06-03 LAB
ANION GAP SERPL CALC-SCNC: 8 MMOL/L (ref 7–16)
BASOPHILS # BLD: 0.1 K/UL (ref 0–0.2)
BASOPHILS NFR BLD: 1 % (ref 0–2)
BUN SERPL-MCNC: 24 MG/DL (ref 8–23)
CALCIUM SERPL-MCNC: 9.2 MG/DL (ref 8.3–10.4)
CHLORIDE SERPL-SCNC: 104 MMOL/L (ref 98–107)
CO2 SERPL-SCNC: 24 MMOL/L (ref 21–32)
CREAT SERPL-MCNC: 1.1 MG/DL (ref 0.8–1.5)
DIFFERENTIAL METHOD BLD: ABNORMAL
EOSINOPHIL # BLD: 0.3 K/UL (ref 0–0.8)
EOSINOPHIL NFR BLD: 3 % (ref 0.5–7.8)
ERYTHROCYTE [DISTWIDTH] IN BLOOD BY AUTOMATED COUNT: 13.2 % (ref 11.9–14.6)
GLUCOSE BLD STRIP.AUTO-MCNC: 106 MG/DL (ref 65–100)
GLUCOSE BLD STRIP.AUTO-MCNC: 122 MG/DL (ref 65–100)
GLUCOSE SERPL-MCNC: 102 MG/DL (ref 65–100)
HCT VFR BLD AUTO: 39.8 % (ref 41.1–50.3)
HGB BLD-MCNC: 13 G/DL (ref 13.6–17.2)
IMM GRANULOCYTES # BLD AUTO: 0 K/UL (ref 0–0.5)
IMM GRANULOCYTES NFR BLD AUTO: 0 % (ref 0–5)
LYMPHOCYTES # BLD: 1.7 K/UL (ref 0.5–4.6)
LYMPHOCYTES NFR BLD: 19 % (ref 13–44)
MAGNESIUM SERPL-MCNC: 2.1 MG/DL (ref 1.8–2.4)
MCH RBC QN AUTO: 31.5 PG (ref 26.1–32.9)
MCHC RBC AUTO-ENTMCNC: 32.7 G/DL (ref 31.4–35)
MCV RBC AUTO: 96.4 FL (ref 79.6–97.8)
MONOCYTES # BLD: 0.9 K/UL (ref 0.1–1.3)
MONOCYTES NFR BLD: 10 % (ref 4–12)
NEUTS SEG # BLD: 5.8 K/UL (ref 1.7–8.2)
NEUTS SEG NFR BLD: 67 % (ref 43–78)
NRBC # BLD: 0 K/UL (ref 0–0.2)
PHOSPHATE SERPL-MCNC: 3.4 MG/DL (ref 2.3–3.7)
PLATELET # BLD AUTO: 188 K/UL (ref 150–450)
PMV BLD AUTO: 10.6 FL (ref 9.4–12.3)
POTASSIUM SERPL-SCNC: 4.8 MMOL/L (ref 3.5–5.1)
RBC # BLD AUTO: 4.13 M/UL (ref 4.23–5.6)
SERVICE CMNT-IMP: ABNORMAL
SERVICE CMNT-IMP: ABNORMAL
SODIUM SERPL-SCNC: 136 MMOL/L (ref 138–145)
WBC # BLD AUTO: 8.7 K/UL (ref 4.3–11.1)

## 2022-06-03 PROCEDURE — 97530 THERAPEUTIC ACTIVITIES: CPT

## 2022-06-03 PROCEDURE — 83735 ASSAY OF MAGNESIUM: CPT

## 2022-06-03 PROCEDURE — 36415 COLL VENOUS BLD VENIPUNCTURE: CPT

## 2022-06-03 PROCEDURE — 6370000000 HC RX 637 (ALT 250 FOR IP): Performed by: INTERNAL MEDICINE

## 2022-06-03 PROCEDURE — 80048 BASIC METABOLIC PNL TOTAL CA: CPT

## 2022-06-03 PROCEDURE — 97165 OT EVAL LOW COMPLEX 30 MIN: CPT

## 2022-06-03 PROCEDURE — 82962 GLUCOSE BLOOD TEST: CPT

## 2022-06-03 PROCEDURE — 97161 PT EVAL LOW COMPLEX 20 MIN: CPT

## 2022-06-03 PROCEDURE — 94760 N-INVAS EAR/PLS OXIMETRY 1: CPT

## 2022-06-03 PROCEDURE — 84100 ASSAY OF PHOSPHORUS: CPT

## 2022-06-03 PROCEDURE — 94640 AIRWAY INHALATION TREATMENT: CPT

## 2022-06-03 PROCEDURE — 6370000000 HC RX 637 (ALT 250 FOR IP): Performed by: STUDENT IN AN ORGANIZED HEALTH CARE EDUCATION/TRAINING PROGRAM

## 2022-06-03 PROCEDURE — 85025 COMPLETE CBC W/AUTO DIFF WBC: CPT

## 2022-06-03 RX ORDER — AMOXICILLIN AND CLAVULANATE POTASSIUM 500; 125 MG/1; MG/1
1 TABLET, FILM COATED ORAL EVERY 8 HOURS SCHEDULED
Qty: 19 TABLET | Refills: 0 | Status: SHIPPED | OUTPATIENT
Start: 2022-06-03 | End: 2022-06-10

## 2022-06-03 RX ORDER — HYDROCODONE BITARTRATE AND ACETAMINOPHEN 10; 325 MG/1; MG/1
1 TABLET ORAL EVERY 6 HOURS PRN
Qty: 12 TABLET | Refills: 0 | Status: SHIPPED | OUTPATIENT
Start: 2022-06-03 | End: 2022-06-06

## 2022-06-03 RX ADMIN — AMOXICILLIN AND CLAVULANATE POTASSIUM 1 TABLET: 500; 125 TABLET, FILM COATED ORAL at 05:46

## 2022-06-03 RX ADMIN — PANTOPRAZOLE SODIUM 40 MG: 40 TABLET, DELAYED RELEASE ORAL at 05:46

## 2022-06-03 RX ADMIN — PRAVASTATIN SODIUM 40 MG: 20 TABLET ORAL at 09:01

## 2022-06-03 RX ADMIN — LEVOTHYROXINE SODIUM 100 MCG: 0.05 TABLET ORAL at 05:46

## 2022-06-03 RX ADMIN — HYDROCODONE BITARTRATE AND ACETAMINOPHEN 1 TABLET: 10; 325 TABLET ORAL at 09:05

## 2022-06-03 RX ADMIN — HYDROCODONE BITARTRATE AND ACETAMINOPHEN 1 TABLET: 10; 325 TABLET ORAL at 04:12

## 2022-06-03 RX ADMIN — MOMETASONE FUROATE AND FORMOTEROL FUMARATE DIHYDRATE 2 PUFF: 200; 5 AEROSOL RESPIRATORY (INHALATION) at 08:27

## 2022-06-03 RX ADMIN — LISINOPRIL 20 MG: 20 TABLET ORAL at 09:01

## 2022-06-03 RX ADMIN — MELOXICAM 7.5 MG: 7.5 TABLET ORAL at 09:01

## 2022-06-03 ASSESSMENT — PAIN DESCRIPTION - DESCRIPTORS
DESCRIPTORS: ACHING
DESCRIPTORS: ACHING;DULL
DESCRIPTORS: ACHING;DULL

## 2022-06-03 ASSESSMENT — PAIN DESCRIPTION - ORIENTATION
ORIENTATION: RIGHT
ORIENTATION: ANTERIOR
ORIENTATION: RIGHT

## 2022-06-03 ASSESSMENT — PAIN SCALES - GENERAL
PAINLEVEL_OUTOF10: 0
PAINLEVEL_OUTOF10: 7

## 2022-06-03 ASSESSMENT — PAIN DESCRIPTION - LOCATION
LOCATION: EYE;FACE
LOCATION: EYE;FACE
LOCATION: HEAD

## 2022-06-03 ASSESSMENT — PAIN DESCRIPTION - PAIN TYPE: TYPE: ACUTE PAIN

## 2022-06-03 NOTE — PROGRESS NOTES
PHYSICAL THERAPY Initial Assessment, Discharge and AM  (Link to Caseload Tracking: PT Visit Days : 1  Acknowledge Orders  Time In/Out  PT Charge Capture  Rehab Caseload Tracker    Tony Vargas is a 80 y.o. male   PRIMARY DIAGNOSIS: Closed fracture of orbit (HCC)  Syncope and collapse [R55]  Orbital floor (blow-out) closed fracture ([facility] [S02.30XA]  Facial laceration, initial encounter [S01.81XA]  Laceration of tongue, initial encounter [S01.512A]  Closed fracture of maxillary sinus, initial encounter ([facility] [S02.401A]  Closed fracture of medial wall of right orbit, initial encounter ([facility] [N98.576H]       Reason for Referral: History of falling (Z91.81)  Inpatient: Payor: Kacey Bui / Plan: MEDICARE PART A AND B / Product Type: *No Product type* /     ASSESSMENT:     REHAB RECOMMENDATIONS:   Recommendation to date pending progress:  Setting:   No further skilled therapy after discharge from hospital    Equipment:     None     ASSESSMENT:  Mr. Martine Campuzano  is a 80year old M who presents after syncopal episode, pt is unsure of what caused it. At baseline, pt is independent. He has not had any other falls. This date pt performs mobility including bed mobility with independence. He was able to ambulate 250 ft with independence and no AD. No strength, gait or balance deficits noted. He does endorse some R sided blurred vision but is not impacting his ability to ambulate. No pain or dizziness throughout session today. Pt is functioning at his baseline with no need for skilled PT, will discharge from caseload at this time.       [employer] AM-PAC 6 Clicks Basic Mobility Inpatient Short Form  AM-PAC Mobility Inpatient   How much difficulty turning over in bed?: None  How much difficulty sitting down on / standing up from a chair with arms?: None  How much difficulty moving from lying on back to sitting on side of bed?: None  How much help from another person moving to and from a bed to a chair?: None  How [] [] [] [] [] [] [] [x] []    Stand to Sit [x] [] [] [] [] [] [] [] [] [] []     [] [] [] [] [] [] [] [] [] [] []    I=Independent, Mod I=Modified Independent, S=Supervision, SBA=Standby Assistance, CGA=Contact Guard Assistance,   Min=Minimal Assistance, Mod=Moderate Assistance, Max=Maximal Assistance, Total=Total Assistance, NT=Not Tested    GAIT: I Mod I S SBA CGA Min Mod Max Total  NT x2 Comments:   Level of Assistance [x] [] [] [] [] [] [] [] [] [] []    Distance 250 feet    DME None    Gait Quality WFL    Weightbearing Status      Stairs      I=Independent, Mod I=Modified Independent, S=Supervision, SBA=Standby Assistance, CGA=Contact Guard Assistance,   Min=Minimal Assistance, Mod=Moderate Assistance, Max=Maximal Assistance, Total=Total Assistance, NT=Not Tested    PLAN:   ACUTE PHYSICAL THERAPY GOALS:   (Developed with and agreed upon by patient and/or caregiver.)    (1.) Elfida Muñiz  will move from supine to sit and sit to supine  with INDEPENDENCE within 7 treatment day(s). GOAL MET 6/3/22  (2.) Elfida Muñiz will transfer from bed to chair and chair to bed with INDEPENDENCE using the least restrictive device within 7 treatment day(s). GOAL MET 6/3/22  (3.) Elfida Muñiz will ambulate with INDEPENDENCE for 250 feet with the least restrictive device within 7 treatment day(s). GOAL MET 6/3/22      FREQUENCY AND DURATION:  (eval and d/c) for duration of hospital stay or until stated goals are met, whichever comes first.    THERAPY PROGNOSIS: n/a    PROBLEM LIST:   (Skilled intervention is medically necessary to address:)  n/a INTERVENTIONS PLANNED:   (Benefits and precautions of physical therapy have been discussed with the patient.)  n/a       TREATMENT:   EVALUATION: LOW COMPLEXITY: (Untimed Charge)    TREATMENT:   Therapeutic Activity (8 Minutes):  Therapeutic activity included Rolling, Supine to Sit, Sit to Supine, Scooting, Ambulation on level ground, Sitting balance  and Standing balance to improve functional Activity tolerance, Balance, Mobility and Strength. TREATMENT GRID:  N/A    AFTER TREATMENT PRECAUTIONS: Bed, Bed/Chair Locked, Call light within reach, Needs within reach, RN notified and Visitors at bedside    INTERDISCIPLINARY COLLABORATION:  MD/ PA/ NP , RN/ PCT and OT/ XIE    EDUCATION: Education Given To: Patient  Education Provided: Role of Therapy;Plan of Care;Transfer Training; Fall Prevention Strategies  Education Method: Verbal  Barriers to Learning: None  Education Outcome: Verbalized understanding    TIME IN/OUT:  Time In: 1058  Time Out: 1109  Minutes: Shashi Jorgensen 80 ABELARDO, PT

## 2022-06-03 NOTE — PROGRESS NOTES
CM continuing to follow for ongoing discharge planning. PT/OT evaluations and recommendations still pending. No SLP concerns. CM does not anticipate any needs at discharge. Will review patient discharge plans in IDT rounds.

## 2022-06-03 NOTE — DISCHARGE SUMMARY
Hospitalist Discharge Summary   Admit Date:  2022  5:39 AM   DC Note date: 6/3/2022  Name:  Oz Garza   Age:  80 y.o. Sex:  male  :  1950   MRN:  [id number]   Room:  [facility]  PCP:  Christine Apodaca MD    Presenting Complaint: Laceration    Initial Admission Diagnosis: Syncope and collapse [R55]  Orbital floor (blow-out) closed fracture ([address] [S02.30XA]  Facial laceration, initial encounter [S01.81XA]  Laceration of tongue, initial encounter [S01.512A]  Closed fracture of maxillary sinus, initial encounter ([address] [S02.401A]  Closed fracture of medial wall of right orbit, initial encounter ([address] [T16.909D]     Problem List for this Hospitalization:  [unfilled]  Did Patient have Sepsis (YES OR NO): no     Hospital Course:  Wendy Elias is a 80 y. o. male with medical history of HTN, DM, COPD, who presented with fall.  Pt reports he was walking into the kitchen when he suddenly developed a cramp in the upper part of his L thigh. [facility] says he gets these regularly. [facility] was attempting to massage it with his hands when he lost his balance.  He thinks he struck the corner of the stove or countertop with the right side of his head.  He lost consciousness for a few minutes.  Family member found him moaning on floor with face covered in blood.    He was dazed when they tried to talk to him. [facility] was brought to ER for eval where significant orbital and maxillary trauma was noted on CT scan, and pt has  decreased visual acuity in R eye. He had 5 stitches placed on the right side of the orbit and few stitches on the tongue. ophthalmology consulted and recommended to start prophylactic antibiotics as pt was diabetic. He was started on Augmentin. ENT evaluated and recommended outpatient follow up with plastic surgeon. Pt tolerated CPAP well. Pt recommended No further skilled therapy. He has blurring vision on right eye and needs outpatient follow up with ophthalmology.  Pt is hemodynamically stable for discharge. Pt has no issues with swallowing.         Essential hypertension with goal blood pressure less than 130/85  Holding his bp meds due to soft bp. Disposition: [unfilled]  Diet: ADULT DIET; Regular; 4 carb choices (60 gm/meal); Low Fat/Low Chol/High Fiber/NATY  Code Status: Full Code    Follow Up Orders:  No follow-ups on file. [unfilled]    Follow up labs/diagnostics (ultimately defer to outpatient provider): Follow up with PCP for removal of staples and titration bp meds, Ophthalmology and Plastic surgeon. Time spent in patient discharge and coordination 35 minutes. Plan was discussed with patient. All questions answered. Patient was stable at time of discharge. Instructions given to call a physician or return if any concerns. Discharge Info:      Medication List      START taking these medications    amoxicillin-clavulanate 500-125 MG per tablet  Commonly known as: AUGMENTIN  Take 1 tablet by mouth every 8 hours for 19 doses     HYDROcodone-acetaminophen  MG per tablet  Commonly known as: NORCO  Take 1 tablet by mouth every 6 hours as needed for Pain for up to 3 days.         CONTINUE taking these medications    acetaminophen 500 MG tablet  Commonly known as: TYLENOL     bromocriptine 5 MG capsule  Commonly known as: PARLODEL     Docusate Sodium 100 MG Tabs     EPINEPHrine 0.3 MG/0.3ML Soaj injection  Commonly known as: EPIPEN     hydrocortisone 5 MG tablet  Commonly known as: CORTEF     levothyroxine 100 MCG tablet  Commonly known as: SYNTHROID     LORazepam 1 MG tablet  Commonly known as: ATIVAN     metFORMIN 500 MG tablet  Commonly known as: GLUCOPHAGE     nitroGLYCERIN 0.4 MG SL tablet  Commonly known as: NITROSTAT     omeprazole 40 MG delayed release capsule  Commonly known as: PRILOSEC     pravastatin 80 MG tablet  Commonly known as: PRAVACHOL     sildenafil 100 MG tablet  Commonly known as: VIAGRA     Symbicort 160-4.5 MCG/ACT Aero  Generic drug: budesonide-formoterol     tamsulosin 0.4 mg capsule  Commonly known as: FLOMAX     TESTOSTERONE CYPIONATE IM        STOP taking these medications    amLODIPine 10 MG tablet  Commonly known as: NORVASC     clindamycin 300 MG capsule  Commonly known as: CLEOCIN     lisinopril 40 MG tablet  Commonly known as: PRINIVIL;ZESTRIL           Where to Get Your Medications      These medications [facility]  [address] [facility],[address], [address]    Phone: [phone]   · amoxicillin-clavulanate 500-125 MG per tablet     Information about where to get these medications is not yet available    Ask your nurse or doctor about these medications  · HYDROcodone-acetaminophen  MG per tablet         Procedures done this admission:  * No surgery found *    Consults this admission:  IP CONSULT TO OTOLARYNGOLOGY  IP CONSULT TO OPHTHALMOLOGY  IP CONSULT TO OPHTHALMOLOGY  IP CONSULT TO PHYSICAL THERAPY  IP CONSULT TO OCCUPATIONAL THERAPY  FOLLOW UP VISIT    Echocardiogram/EKG results:  @BSHSILASTIMGCAT(BCY1419:1)@     No results found for this or any previous visit (from the past 4464 hour(s)). Diagnostic Imaging/Tests:   CT HEAD WO CONTRAST    Result Date: 6/1/2022  EXAM: CT HEAD WO CONTRAST HISTORY:  Reason for exam:->hit head. TECHNIQUE: Axial images of the brain were performed without the administration of intravenous contrast. Images were obtained axial plane and coronal reformatted images were submitted. Dose reduction technique used: Automated exposure control/Adjustment of the mA and/or kV according to patient size/Use of iterative reconstruction technique. COMPARISON: 6/3/2014 FINDINGS: There is no evidence of acute intracranial hemorrhage, midline shift, or mass effect. No intra or extra-axial fluid collections are observed. No evidence of acute confluent territorial infarction.  Ventricles and basal cisterns are patent and symmetric. Visualized mastoid air cells are without significant fluid. The coronal images show a wide defect/fracture in the floor of the right orbit. There is herniation and entrapment of right intraorbital fat and the inferior rectus muscle. There is intraorbital gas. There is a fracture of the medial wall of the right orbit. Hyperdense fluid is seen in the right maxillary sinus. There is significant right facial swelling including the right eyelid. Significant gas is present under the right eyelid. As seen, the right globe appears grossly intact. 1. No CT evidence of acute intracranial process. 2. Right orbital fracture as described above. CT FACIAL BONES WO CONTRAST    Result Date: 6/1/2022  EXAMINATION: CT FACIAL BONES WO CONTRAST 6/1/2022 7:22 AM COMPARISON: None available. INDICATION: orbital fx, facial trauma TECHNIQUE: Multiple axial images were obtained through the facial bones . Reformatted images were provided. Radiation dose reduction techniques were used for this study. Our CT scanners use one or all of the following: Automated exposure control, adjustment of the mA and/or kV according to patient size, iterative reconstruction. FINDINGS: Soft tissue laceration is seen associated with the right periorbital soft tissues with extension into the superior portion of the orbit. There is a blowout fracture in the floor of the right orbit with herniation of fat below the fracture plane. There is a fracture in the medial wall of the right orbit with displacement of the fat. There is a fracture posteriorly in the maxillary sinus on the right without significant displacement. An air-fluid level seen in the right maxillary sinus. The zygomatic arch is preserved. The bony nasal septum is normal. The temporal mandibular joints are preserved. There is no discrete nasal fracture. There is previous anterior fusion in the cervical spine. This is partially visualized.      1.  Blowout type right orbital fracture with both orbital floor and medial wall fractures as well as extension into the posterior maxillary sinus. There is a periorbital laceration with gas extending into the superior aspect of the right orbit.       [unfilled]    Labs: Results:       BMP, Mg, Phos Recent Labs     06/01/22 0422 06/02/22 0429 06/03/22 0452    138 136*   K 4.1 4.8 4.8   * 108* 104   CO2 23 23 24   BUN 17 21 24*   MG  --   --  2.1   PHOS  --   --  3.4      CBC Recent Labs     06/01/22 0422 06/02/22 0429 06/03/22 0452   WBC 9.1 10.7 8.7   RBC 4.49 4.14* 4.13*   HGB 14.2 13.1* 13.0*   HCT 42.9 39.9* 39.8*    221 188      LFT Recent Labs     06/01/22 0422   ALT 48   GLOB 3.5      Cardiac Testing No results found for: BNP, CPK, RCK1, CKMB   Coagulation Tests Lab Results   Component Value Date    INR 1.0 07/12/2021    APTT 30.1 07/12/2021      A1c No results found for: HBA1C   Lipid Panel No results found for: CHOL, CHOLPOCT, CHOLX, CHLST, CHOLV, 393255, HDL, HDLC, LDL, LDLC, 817044, VLDLC, VLDL, TGLX, TRIGL   Thyroid Panel No results found for: TSH, T4, FT4     Most Recent UA No results found for: MUCUS, UCOM       All Labs from Last 24 Hrs:  Recent Results (from the past 24 hour(s))   POCT Glucose    Collection Time: 06/02/22  4:51 PM   Result Value Ref Range    POC Glucose 101 (H) 65 - 100 mg/dL    Performed by: Thom Grant    POCT Glucose    Collection Time: 06/02/22  8:41 PM   Result Value Ref Range    POC Glucose 104 (H) 65 - 100 mg/dL    Performed by: Patrick    Basic Metabolic Panel w/ Reflex to MG    Collection Time: 06/03/22  4:52 AM   Result Value Ref Range    Sodium 136 (L) 138 - 145 mmol/L    Potassium 4.8 3.5 - 5.1 mmol/L    Chloride 104 98 - 107 mmol/L    CO2 24 21 - 32 mmol/L    Anion Gap 8 7 - 16 mmol/L    Glucose 102 (H) 65 - 100 mg/dL    BUN 24 (H) 8 - 23 MG/DL    CREATININE 1.10 0.8 - 1.5 MG/DL    GFR African American >60 >60 ml/min/1.73m2    GFR Non- >60 >60 ml/min/1.73m2    Calcium 9.2 8.3 - 10.4 MG/DL   CBC with Auto Differential    Collection Time: 06/03/22  4:52 AM   Result Value Ref Range    WBC 8.7 4.3 - 11.1 K/uL    RBC 4.13 (L) 4.23 - 5.6 M/uL    Hemoglobin 13.0 (L) 13.6 - 17.2 g/dL    Hematocrit 39.8 (L) 41.1 - 50.3 %    MCV 96.4 79.6 - 97.8 FL    MCH 31.5 26.1 - 32.9 PG    MCHC 32.7 31.4 - 35.0 g/dL    RDW 13.2 11.9 - 14.6 %    Platelets 359 504 - 336 K/uL    MPV 10.6 9.4 - 12.3 FL    nRBC 0.00 0.0 - 0.2 K/uL    Differential Type AUTOMATED      Seg Neutrophils 67 43 - 78 %    Lymphocytes 19 13 - 44 %    Monocytes 10 4.0 - 12.0 %    Eosinophils % 3 0.5 - 7.8 %    Basophils 1 0.0 - 2.0 %    Immature Granulocytes 0 0.0 - 5.0 %    Segs Absolute 5.8 1.7 - 8.2 K/UL    Absolute Lymph # 1.7 0.5 - 4.6 K/UL    Absolute Mono # 0.9 0.1 - 1.3 K/UL    Absolute Eos # 0.3 0.0 - 0.8 K/UL    Basophils Absolute 0.1 0.0 - 0.2 K/UL    Absolute Immature Granulocyte 0.0 0.0 - 0.5 K/UL   Magnesium    Collection Time: 06/03/22  4:52 AM   Result Value Ref Range    Magnesium 2.1 1.8 - 2.4 mg/dL   Phosphorus    Collection Time: 06/03/22  4:52 AM   Result Value Ref Range    Phosphorus 3.4 2.3 - 3.7 MG/DL   POCT Glucose    Collection Time: 06/03/22  6:16 AM   Result Value Ref Range    POC Glucose 122 (H) 65 - 100 mg/dL    Performed by: Susie    POCT Glucose    Collection Time: 06/03/22 11:25 AM   Result Value Ref Range    POC Glucose 106 (H) 65 - 100 mg/dL    Performed by: Betty Luis        Current Med List in Hospital:   Current Facility-Administered Medications   Medication Dose Route Frequency    fleet rectal enema 1 enema  1 enema Rectal Daily PRN    amoxicillin-clavulanate (AUGMENTIN) 500-125 MG per tablet 1 tablet  1 tablet Oral 3 times per day    [Held by provider] amLODIPine (NORVASC) tablet 5 mg  5 mg Oral Daily    mometasone-formoterol (DULERA) 200-5 MCG/ACT inhaler 2 puff  2 puff Inhalation BID    [Held by provider] hydroCHLOROthiazide (HYDRODIURIL) tablet 12.5 mg  12.5 mg Oral Daily    levothyroxine (SYNTHROID) tablet 100 mcg  100 mcg Oral QAM AC    lisinopril (PRINIVIL;ZESTRIL) tablet 20 mg  20 mg Oral Daily    LORazepam (ATIVAN) tablet 1 mg  1 mg Oral Q6H PRN    meloxicam (MOBIC) tablet 7.5 mg  7.5 mg Oral Daily    pantoprazole (PROTONIX) tablet 40 mg  40 mg Oral QAM AC    pravastatin (PRAVACHOL) tablet 40 mg  40 mg Oral Daily    sodium chloride flush 0.9 % injection 5-40 mL  5-40 mL IntraVENous 2 times per day    sodium chloride flush 0.9 % injection 5-40 mL  5-40 mL IntraVENous PRN    0.9 % sodium chloride infusion   IntraVENous PRN    potassium chloride (KLOR-CON M) extended release tablet 40 mEq  40 mEq Oral PRN    Or    potassium bicarb-citric acid (EFFER-K) effervescent tablet 40 mEq  40 mEq Oral PRN    Or    potassium chloride 10 mEq/100 mL IVPB (Peripheral Line)  10 mEq IntraVENous PRN    potassium chloride 10 mEq/100 mL IVPB (Peripheral Line)  10 mEq IntraVENous PRN    magnesium sulfate 2000 mg in 50 mL IVPB premix  2,000 mg IntraVENous PRN    ondansetron (ZOFRAN-ODT) disintegrating tablet 4 mg  4 mg Oral Q8H PRN    Or    ondansetron (ZOFRAN) injection 4 mg  4 mg IntraVENous Q6H PRN    polyethylene glycol (GLYCOLAX) packet 17 g  17 g Oral Daily PRN    bisacodyl (DULCOLAX) suppository 10 mg  10 mg Rectal Daily PRN    famotidine (PEPCID) tablet 10 mg  10 mg Oral Daily PRN    aluminum & magnesium hydroxide-simethicone (MAALOX) 200-200-20 MG/5ML suspension 30 mL  30 mL Oral Q6H PRN    acetaminophen (TYLENOL) tablet 650 mg  650 mg Oral Q6H PRN    Or    acetaminophen (TYLENOL) suppository 650 mg  650 mg Rectal Q6H PRN    HYDROcodone-acetaminophen (NORCO) 5-325 MG per tablet 1 tablet  1 tablet Oral Q4H PRN    HYDROcodone-acetaminophen (NORCO)  MG per tablet 1 tablet  1 tablet Oral Q4H PRN    hydrALAZINE (APRESOLINE) injection 20 mg  20 mg IntraVENous Q4H PRN    melatonin tablet 6 mg  6 mg Oral Nightly PRN    guaiFENesin-dextromethorphan (ROBITUSSIN DM) 100-10 MG/5ML syrup 10 mL  10 mL Oral Q4H PRN    insulin lispro (HUMALOG) injection vial 0-8 Units  0-8 Units SubCUTAneous TID WC    insulin lispro (HUMALOG) injection vial 0-4 Units  0-4 Units SubCUTAneous Nightly    glucose chewable tablet 16 g  4 tablet Oral PRN    dextrose bolus 10% 125 mL  125 mL IntraVENous PRN    Or    dextrose bolus 10% 250 mL  250 mL IntraVENous PRN    glucagon (rDNA) injection 1 mg  1 mg IntraMUSCular PRN    dextrose 5 % solution  100 mL/hr IntraVENous PRN     Current Outpatient Medications   Medication Sig    HYDROcodone-acetaminophen (NORCO)  MG per tablet Take 1 tablet by mouth every 6 hours as needed for Pain for up to 3 days.  amoxicillin-clavulanate (AUGMENTIN) 500-125 MG per tablet Take 1 tablet by mouth every 8 hours for 19 doses    bromocriptine (PARLODEL) 5 MG capsule Take 5 mg by mouth at bedtime Patient states he takes 4 capsules daily    tamsulosin (FLOMAX) 0.4 mg capsule Take 0.4 mg by mouth daily    pravastatin (PRAVACHOL) 80 MG tablet Take 80 mg by mouth daily    TESTOSTERONE CYPIONATE IM Inject 50 mg into the muscle    acetaminophen (TYLENOL) 500 MG tablet Take 1-2 tablets by mouth every 6 hours as needed    budesonide-formoterol (SYMBICORT) 160-4.5 MCG/ACT AERO Inhale 2 puffs into the lungs 2 times daily    Docusate Sodium 100 MG TABS Take 2 tablets by mouth daily as needed    EPINEPHrine (EPIPEN) 0.3 MG/0.3ML SOAJ injection Inject 0.3 mg into the muscle as needed    hydrocortisone (CORTEF) 5 MG tablet Take 5 mg by mouth daily as needed    levothyroxine (SYNTHROID) 100 MCG tablet Take 100 mcg by mouth every morning (before breakfast)    LORazepam (ATIVAN) 1 MG tablet Take 1 mg by mouth 4 times daily as needed.     metFORMIN (GLUCOPHAGE) 500 MG tablet Take 1,000 mg by mouth 2 times daily (with meals)    nitroGLYCERIN (NITROSTAT) 0.4 MG SL tablet Place 0.4 mg under the tongue as needed    omeprazole (PRILOSEC) 40 MG delayed release capsule Take 40 mg by mouth daily    sildenafil (VIAGRA) 100 MG tablet Take 100 mg by mouth as needed       Allergies   Allergen Reactions    Cephalexin Hives    Penicillins Other (See Comments)     States causes him to pass out    Sulfa Antibiotics Rash     Immunization History   Administered Date(s) Administered    Influenza Virus Vaccine 10/22/2015    Influenza, High Dose (Fluzone 65 yrs and older) 12/22/2016    Pneumococcal Conjugate 13-valent ([id number]) 03/30/2017    Td (Adult), 2 Lf Tetanus Toxoid, Pf (Td, Absorbed) 06/01/2022    Td vaccine (adult) 06/11/2012       Recent Vital Data:  Patient Vitals for the past 24 hrs:   Temp Pulse Resp BP SpO2   06/03/22 1125 98.2 °F (36.8 °C) 68 16 (!) 143/81 91 %   06/03/22 0905 -- -- 18 -- --   06/03/22 0829 -- 65 16 -- 92 %   06/03/22 0755 98 °F (36.7 °C) 69 16 130/62 93 %   06/03/22 0545 97.9 °F (36.6 °C) 59 18 112/70 91 %   06/03/22 0028 98.2 °F (36.8 °C) 68 18 110/70 94 %   06/02/22 2009 98.4 °F (36.9 °C) 65 18 117/67 91 %   06/02/22 1649 98.2 °F (36.8 °C) 63 19 116/74 (!) 89 %     @BSHSIFLOW(2444:last)@    Estimated body mass index is 28.27 kg/m² as calculated from the following:    Height as of this encounter: 5' 10\" (1.778 m). Weight as of this encounter: 197 lb (89.4 kg). Intake/Output Summary (Last 24 hours) at 6/3/2022 1548  Last data filed at 6/3/2022 0936  Gross per 24 hour   Intake 480 ml   Output --   Net 480 ml         Physical Exam:    General:          Well nourished. No overt distress. Head:               Normocephalic, atraumatic  Eyes:               Sclerae appear normal. Pupils equally round. 7 stitches are present on the right side of the eye. Tongue is swollen mildly. ENT:                Nares appear normal, no drainage. Moist oral mucosa. Tongue is swollen  Neck:               No restricted ROM. Trachea midline. CV:                  RRR. No m/r/g. No jugular venous distension.   Lungs: CTAB.  No wheezing, rhonchi, or rales. Respirations even, unlabored. Abdomen: Bowel sounds present. Soft, nontender, nondistended. Extremities:     No cyanosis or clubbing. No edema. Skin:                No rashes and normal coloration. Warm and dry. Neuro:             CN II-XII grossly intact. Sensation intact. A&Ox3  Psych:             Normal mood and affect.     Signed:  Max Holloway MD    Part of this note may have been written by using a voice

## 2022-06-03 NOTE — PROGRESS NOTES
Discharge instructions reviewed with pt and wife. Opportunity given for questions. Pt to call on Monday for Follow-up visits as offices are now closed. IV removed and paper narcotic script given to pt.

## 2022-06-03 NOTE — PROGRESS NOTES
OCCUPATIONAL THERAPY Initial Assessment and Discharge          Acknowledge Orders  Time  OT Charge Capture  Rehab Caseload Tracker      Dawit Muñiz is a 80 y.o. male   PRIMARY DIAGNOSIS: Closed fracture of orbit (HCC)  Syncope and collapse [R55]  Orbital floor (blow-out) closed fracture ([facility] [S02.30XA]  Facial laceration, initial encounter [S01.81XA]  Laceration of tongue, initial encounter [S01.512A]  Closed fracture of maxillary sinus, initial encounter ([facility] [S02.401A]  Closed fracture of medial wall of right orbit, initial encounter ([facility] [U93.729H]       Reason for Referral: History of falling (Z91.81)  Dizziness and Giddiness (R42)  Inpatient: Payor: MEDICARE / Plan: MEDICARE PART A AND B / Product Type: *No Product type* /     ASSESSMENT:     REHAB RECOMMENDATIONS:   Recommendation to date pending progress:  Setting:   No further skilled therapy after discharge from hospital    Equipment:     None     ASSESSMENT:  Mr. Dina Valero is a 79 y/o M admitted with facial fractures after a fall @ night. Baseline independent, active. Reports no pain. Demonstrates mobility and ADLs independently. Only c/o is some blurred vision in R eye related to swelling. He appears to be at baseline for ADLs. No OT needs identified.      [address] [facility] [address] [address] [facility]ks Daily Activity Inpatient Short Form:    AM-PAC Daily Activity Inpatient   How much help for putting on and taking off regular lower body clothing?: None  How much help for Bathing?: None  How much help for Toileting?: None  How much help for putting on and taking off regular upper body clothing?: None  How much help for taking care of personal grooming?: None  How much help for eating meals?: None  AM-PAC Inpatient Daily Activity Raw Score: 24  AM-PAC Inpatient ADL T-Scale Score : 57.54  ADL Inpatient CMS 0-100% Score: 0  ADL Inpatient CMS G-Code Modifier : CH           SUBJECTIVE:     Mr. Dina Valero states, \"I was getting a teaspoon of mustard to help with my leg cramp. \"     Social/Functional Lives With: Spouse  Type of Home: House  Home Layout: One level  Home Access: Stairs to enter with rails  Entrance Stairs - Number of Steps: 1  Bathroom Shower/Tub: Tub/Shower unit  Bathroom Toilet: Standard  Home Equipment:  (Cpap)  Receives Help From: Family  ADL Assistance: Independent  Ambulation Assistance: Independent  Transfer Assistance: Independent  Active : Yes  Mode of Transportation: Car  Occupation: Retired    OBJECTIVE:     Abiola Parker / Mikey Simmons / Nicky Forget: none    RESTRICTIONS/PRECAUTIONS: none        PAIN: [place] / O2:   Pre Treatment:   Pain Assessment: None - Denies Pain      Post Treatment: none        Vitals          Oxygen            GROSS EVALUATION: INTACT IMPAIRED   (See Comments)   UE AROM [x] []   UE PROM [x] []   Strength [x]       Posture / Balance [x]     Sensation [x]     Coordination [x]       Tone [x]       Edema [x] NA    Activity Tolerance [x]       Hand Dominance R [x] L []      COGNITION/  PERCEPTION: INTACT IMPAIRED   (See Comments)   Orientation [x]     Vision []  blurry vision in R eye related to swelling d/t orbital fx    Hearing [x]     Cognition  [x]     Perception [x]       MOBILITY: I Mod I S SBA CGA Min Mod Max Total  NT x2 Comments:   Bed Mobility    Rolling [] [] [] [] [] [] [] [] [] [] []    Supine to Sit [x] [] [] [] [] [] [] [] [] [] []    Scooting [x] [] [] [] [] [] [] [] [] [] []    Sit to Supine [x] [] [] [] [] [] [] [] [] [] []    Transfers    Sit to Stand [x] [] [] [] [] [] [] [] [] [] []    Bed to Chair [x] [] [] [] [] [] [] [] [] [] []    Stand to Sit [x] [] [] [] [] [] [] [] [] [] []    Tub/Shower [] [] [] [] [] [] [] [] [] [] []     Toilet [] [] [] [] [] [] [] [] [] [] []      [] [] [] [] [] [] [] [] [] [] []    I=Independent, Mod I=Modified Independent, S=Supervision/Setup, SBA=Standby Assistance, CGA=Contact Guard Assistance, Min=Minimal Assistance, Mod=Moderate Assistance, Max=Maximal Assistance, Total=Total Assistance, NT=Not Tested    ACTIVITIES OF DAILY LIVING: I Mod I S SBA CGA Min Mod Max Total NT Comments   BASIC ADLs:              Bathing/ Showering [x] [] [] [] [] [] [] [] [] []    Toileting [x] [] [] [] [] [] [] [] [] []    Upper Body Dressing [x] [] [] [] [] [] [] [] [] []    Lower Body Dressing [x] [] [] [] [] [] [] [] [] []    Feeding [x] [] [] [] [] [] [] [] [] []    Grooming [x] [] [] [] [] [] [] [] [] []    Personal Device Care [x] [] [] [] [] [] [] [] [] []    Functional Mobility [x] [] [] [] [] [] [] [] [] []    I=Independent, Mod I=Modified Independent, S=Supervision/Setup, SBA=Standby Assistance, CGA=Contact Guard Assistance, Min=Minimal Assistance, Mod=Moderate Assistance, Max=Maximal Assistance, Total=Total Assistance, NT=Not Tested    PLAN:     FREQUENCY/DURATION: NONE. Discharge OT. PROBLEM LIST:   (Skilled intervention is medically necessary to address:)  Impaired vision   INTERVENTIONS PLANNED:  (Benefits and precautions of occupational therapy have been discussed with the patient.)  Education  NONE         TREATMENT:     EVALUATION: LOW COMPLEXITY: (Untimed Charge)    TREATMENT:   Evaluation only.  No Treatment     TREATMENT GRID:  N/A    AFTER TREATMENT PRECAUTIONS: Bed, Call light within reach and Visitors at bedside    INTERDISCIPLINARY COLLABORATION:  MD/ PA/ NP , RN/ PCT, PT/ PTA, OT/ XIE and RN Case Manager/      EDUCATION:  Education Given To: Patient  Education Provided: Role of Therapy;Plan of Care  Education Method: Verbal  Barriers to Learning: None    TOTAL TREATMENT DURATION AND TIME:  Time In: 1100  Time Out: 1109  Minutes: [address] [facility], OT

## 2022-06-06 NOTE — PROGRESS NOTES
Patient called about his pharmacy not filling the amoxicillian.  Spoke with Dr. Sera Padron and she will call the pharmacy

## 2022-06-27 NOTE — PROGRESS NOTES
[address] [place] [address] Court, [address] [facility]  [phone]    Patient Name:  Aston Alexis  YOB: 1950    Pursuant to the emergency declaration under the [address] [place] [address] waiver authority and the [employer] and [employer] Act, this Virtual  Visit was conducted, with patient's consent, to reduce the patient's risk of exposure to COVID-19 and provide continuity of care for an established patient. Telehealth encounter is a billable service, with coverage as determined by the insurance carrier. Services were provided through a video synchronous discussion virtually to substitute for in-person clinic visit. Aston Alexis is a 80 y.o. male who was seen by synchronous (real-time) audio-video technology on 6/28/2022. Consent:  He and/or his healthcare decision maker is aware that this patient-initiated Telehealth encounter is a billable service, with coverage as determined by his insurance carrier. He is aware that he may receive a bill and has provided verbal consent to proceed: Yes        Office Visit 6/28/2022    CHIEF COMPLAINT:    Chief Complaint   Patient presents with    CPAP/BiPAP    Sleep Apnea       HISTORY OF PRESENT ILLNESS:  Patient is being seen today via virtual visit. Patient was diagnosed with sleep apnea in 2014 with AHI 23.6/hr with desaturations to 78%. He is prescribed CPAP 11cm using a nasal mask. Download reveals nightly compliance on therapy with AHI down to 0.8/hr. he denies any problems with mask or pressure. He uses a nasal mask most nights but switches to a full face if he has allergies. He reports a weight of 194 lbs. He had knee surgery since last visit and states he is doing well. He has an upcoming knee replacement on his left knee in September. He needs a new supply order and this will be done.                Past Medical History:   Diagnosis Date    Abnormal blood sugar     Abnormality of cortisol-binding globulin (HCC)     Actinic keratosis     Acute right flank pain     Anaphylactic reaction to bee sting     Anxiety     takes Lorazepam prn    Anxiety disorder     Arthritis     Arthritis of left shoulder region     Asthma 7/20/2016    followed by pulmonary; uses inhaler    Backache 7/20/2016    Bilateral otitis media     BPH (benign prostatic hyperplasia) 7/20/2016    CAD (coronary artery disease) 04/20/2011    He had mild nonobstructive CAD at cath by Dr. Rikki Collins several years ago; cardiac cath:  4/20/11    Cervical neck pain with evidence of disc disease     Chronic renal insufficiency 7/20/2016    Controlled diabetes mellitus ([address] 7/20/2016 6/30/21 :  hgb A1c:  5.8; daily fasting sqbs:  102    COPD (chronic obstructive pulmonary disease) ([address] 7/20/2016    Corneal foreign body 6/13/14    COVID-19 vaccine series completed 03/22/2021    Moderna    Depressive disorder 7/20/2016    clinical depression    Diarrhea     Dumping syndrome     Dyspepsia and other specified disorders of function of stomach 8/29/2012    ED (erectile dysfunction) 7/20/2016    Edema     Elevated CPK     Elevated prolactin level     Essential hypertension, benign 8/29/2012    GERD (gastroesophageal reflux disease)     Hand pain, right     Hematuria     Hyperlipidemia     Hyperprolactinemia ([address]     followed by endocrinologist    Hypertension     Hypogonadism in male     Hypothyroidism     takes levothyroxine    Knee effusion     Knee pain     Low serum testosterone level     Neoplasm of uncertain behavior of skin of neck     Neuralgia     Nevus, non-neoplastic     Numbness and tingling in left arm     Open wound of finger without complication 5/52/81    JHONY on CPAP 8/22/2014    uses CPAP    Osteoarthrosis, unspecified whether generalized or localized, involving lower leg 8/29/2012    Osteoarthrosis, unspecified whether generalized or localized, lower leg 8/29/2012    Other disorder of calcium metabolism 8/29/2012    Other disorders of calcium metabolism     Other malaise and fatigue 11/27/2012    Pituitary tumor 12/11/2013    Prolactin increased     Puncture wound 6/11/12    pt stepped on a maximiliano nail    Pure hypercholesterolemia 8/29/2012    Right shoulder pain     RLS (restless legs syndrome) 7/20/2016    Seborrheic keratosis     Seizures ([address]     30 years ago 1971    Shingles (herpes zoster) polyneuropathy     Shingles rash     Sinusitis        Patient Active Problem List   Diagnosis    Asthma    RLS (restless legs syndrome)    Dumping syndrome    Arthritis of left shoulder region    Knee pain    Hypogonadism in male    BPH (benign prostatic hyperplasia)    History of COPD    Chest pain    Coronary artery disease involving native coronary artery of native heart without angina pectoris    GERD (gastroesophageal reflux disease)    Depressive disorder    Controlled diabetes mellitus ([address]    Pituitary tumor    COPD (chronic obstructive pulmonary disease) ([address]    ED (erectile dysfunction)    Hyperlipidemia    Essential hypertension with goal blood pressure less than 130/85    Cervical neck pain with evidence of disc disease    Hypothyroidism    Pure hypercholesterolemia    Mixed hyperlipidemia    Status post right knee replacement    Family history of MI (myocardial infarction)    Right carotid bruit    Osteoarthritis of right knee    Chronic renal insufficiency    Anxiety disorder    Shingles (herpes zoster) polyneuropathy    JHONY on CPAP    Closed fracture of orbit (HCC)    Closed fracture of maxillary sinus (HCC)           Past Surgical History:   Procedure Laterality Date    BUNIONECTOMY Right     CARDIAC CATHETERIZATION  04/20/2011    CARPAL TUNNEL RELEASE Bilateral     CERVICAL FUSION  11/2007    C3-C4-has metal screws     CHEST SURGERY      pt denies    ORTHOPEDIC SURGERY      bilat carpel tunnel, bunion repair,neck surg foot surg    TONSILLECTOMY             Social History     Socioeconomic History    Marital status:      Spouse name: Not on file    Number of children: Not on file    Years of education: Not on file    Highest education level: Not on file   Occupational History    Not on file   Tobacco Use    Smoking status: Former Smoker     Packs/day: 0.50     Quit date: 2009     Years since quittin.1    Smokeless tobacco: Former User     Quit date: 1986   Substance and Sexual Activity    Alcohol use: No    Drug use: Not Currently     Types: Marijuana Lovena Mems)    Sexual activity: Not on file   Other Topics Concern    Not on file   Social History Narrative    Not on file     Social Determinants of Health     Financial Resource Strain:     Difficulty of Paying Living Expenses: Not on file   Food Insecurity:     Worried About [address] in the Last Year: Not on file    Stevenson of Food in the Last Year: Not on file   Transportation Needs:     Lack of Transportation (Medical): Not on file    Lack of Transportation (Non-Medical):  Not on file   Physical Activity:     Days of Exercise per Week: Not on file    Minutes of Exercise per Session: Not on file   Stress:     Feeling of Stress : Not on file   Social Connections:     Frequency of Communication with Friends and Family: Not on file    Frequency of Social Gatherings with Friends and Family: Not on file    Attends Quaker Services: Not on file    Active Member of Clubs or Organizations: Not on file    Attends Club or Organization Meetings: Not on file    Marital Status: Not on file   Intimate Partner Violence:     Fear of Current or Ex-Partner: Not on file    Emotionally Abused: Not on file    Physically Abused: Not on file    Sexually Abused: Not on file   Housing Stability:     Unable to Pay for Housing in the Last Year: Not on file    Number of [place] in the Last Year: Not on file    Unstable Housing in the Last Year: Not on file         Family History   Problem Relation Age of Onset    Diabetes Maternal Grandfather     Cancer Mother         Kidney    Cancer Father         Lymphoma         Allergies   Allergen Reactions    Cephalexin Hives    Penicillins Other (See Comments)     States causes him to pass out    Sulfa Antibiotics Rash         Current Outpatient Medications   Medication Sig    bromocriptine (PARLODEL) 5 MG capsule Take 5 mg by mouth at bedtime Patient states he takes 4 capsules daily    tamsulosin (FLOMAX) 0.4 mg capsule Take 0.4 mg by mouth daily    pravastatin (PRAVACHOL) 80 MG tablet Take 80 mg by mouth daily    TESTOSTERONE CYPIONATE IM Inject 50 mg into the muscle    acetaminophen (TYLENOL) 500 MG tablet Take 1-2 tablets by mouth every 6 hours as needed    budesonide-formoterol (SYMBICORT) 160-4.5 MCG/ACT AERO Inhale 2 puffs into the lungs 2 times daily    Docusate Sodium 100 MG TABS Take 2 tablets by mouth daily as needed    EPINEPHrine (EPIPEN) 0.3 MG/0.3ML SOAJ injection Inject 0.3 mg into the muscle as needed    hydrocortisone (CORTEF) 5 MG tablet Take 5 mg by mouth daily as needed    levothyroxine (SYNTHROID) 100 MCG tablet Take 100 mcg by mouth every morning (before breakfast)    LORazepam (ATIVAN) 1 MG tablet Take 1 mg by mouth 4 times daily as needed.  metFORMIN (GLUCOPHAGE) 500 MG tablet Take 1,000 mg by mouth 2 times daily (with meals)    nitroGLYCERIN (NITROSTAT) 0.4 MG SL tablet Place 0.4 mg under the tongue as needed    omeprazole (PRILOSEC) 40 MG delayed release capsule Take 40 mg by mouth daily    sildenafil (VIAGRA) 100 MG tablet Take 100 mg by mouth as needed     No current facility-administered medications for this visit. REVIEW OF SYSTEMS:   CONSTITUTIONAL:   There is no history of fever, chills, night sweats. CARDIAC:   No chest pain, pressure, discomfort, palpitations, orthopnea, murmurs, or edema.    GI: No dysphagia, heartburn reflux, nausea/vomiting, diarrhea, abdominal pain, or bleeding. NEURO:   There is no history of AMS, persistent headache, decreased level of consciousness, seizures, or motor or sensory deficits. VIRTUAL EXAM  PHYSICAL EXAMINATION:  [ INSTRUCTIONS:  \"[x]\" Indicates a positive item  \"[]\" Indicates a negative item   Vital Signs: (As obtained by patient/caregiver at home)  There were no vitals taken for this visit. Constitutional: [x] Appears well-developed and well-nourished [x] No apparent distress      [] Abnormal     Mental status: [x] Alert and awake  [x] Oriented to person/place/time [x] Able to follow commands    [] Abnormal -     Eyes:   EOM    [x]  Normal    [] Abnormal -   Sclera  [x]  Normal    [] Abnormal -          Discharge [x]  None visible   [] Abnormal -    HENT: [x] Normocephalic, atraumatic  [] Abnormal -  [x] Mouth/Throat: Mucous membranes are moist    External Ears [x] Normal  [] Abnormal -    Neck: [x] No visualized mass [] Abnormal -     Pulmonary/Chest: [x] Respiratory effort normal   [x] No visualized signs of difficulty breathing or respiratory distress        [] Abnormal -      Musculoskeletal:   [x] Normal gait with no signs of ataxia         [x] Normal range of motion of neck        [] Abnormal -     Neurological:        [x] No Facial Asymmetry (Cranial nerve 7 motor function) (limited exam due to video visit)          [x] No gaze palsy        [] Abnormal -         Skin:        [x] No significant exanthematous lesions or discoloration noted on facial skin         [] Abnormal -            Psychiatric:       [x] Normal Affect [] Abnormal -       [x] No Hallucinations    Other pertinent observable physical exam findings:-      ASSESSMENT/PLAN:  (Medical Decision Making)      Diagnosis Orders   1. JHONY on CPAP - patient is using and benefiting from pap therapy. Continue current settings with nightly compliance.  Renew supplies  Follow up in 1 year or sooner if needed  DME - DURABLE MEDICAL EQUIPMENT             Orders Placed This Encounter   Procedures    [facility]  Phone: [address] [address]  Dept: [phone]      Patient Name: Stephany Mccoy  : 1950  Gender: male  Address: [address] [place] [address] [address] [place] [address]   Patient phone: [phone] (home)       Primary Insurance: Payor: MEDICARE / Plan: MEDICARE PART A AND B / Product Type: *No Product type* /   Subscriber ID: [id number] - (Medicare)      AMB Supply Order  Order Details     DME Location:   Order Date: 2022   There were no encounter diagnoses.              (  X   )Supplies Needed       cpap Machine   (     ) CPAP Unit  (     ) Auto CPAP Unit  (     ) BiLevel Unit  (     ) Auto BiLevel Unit  (     ) ASV        (     ) Bilevel ST      Length of need: 12 months    Pressure:  11 cmH20  EPR:     Starting Ramp Pressure:   cm H20  Ramp Time: min        Patient had a diagnostic Apnea Hypopnea Index (AHI) of :    *SUPPLIES* Replace all as needed, or per coverage guidelines     Masks Type:  (    ) -Full Face Mask (1 per 3 mon)  (    ) -Full Mask (1 per month) Interface/Cushion      ( x ) -Nasal Mask (1 per 3 mon)  ( x  ) - Nasal Mask (1 per month) Interface/Cushion  (  x   ) -Pillow (2 per mon)  (     ) -Nmvunodif (1 per 6 mon)            Other Supplies:    (   X  )-Lftugqdg (1 per 6 mon)  (   X  )-Zkrebr Tubing (1 per 3 mon)  (   X  )- Disposable Filter (2 per mon)  (   x  )-Zmdgyd Humidifier (1 per year)     (  x   )-Rirwcuwaj (sometimes used with Full Face Mask) (1 per 6 mos)  (    )-Tubing-without heat (1 per 3 mos)  (     )-Non-Disposable Filter (1 per 6 mos)  (  x   )-Water Chamber (1 per 6 mos)  (     )-Humidifier non-heated (1 per 5 yrs)      Signed Date: 2022  Electronically Signed By: SOLOMON Adames CNP  Electronically Dated:  6/28/2022     No orders of the defined types were placed in this encounter. Collaborating Physician: Dr. Tino Jama      I spent at least 20 minutes with this established patient, and >50% of the time was spent counseling and/or coordinating care regarding shivam.     SOLOMON Adames CNP  Electronically signed

## 2022-06-28 ENCOUNTER — TELEMEDICINE (OUTPATIENT)
Dept: SLEEP MEDICINE | Age: 72
End: 2022-06-28
Payer: MEDICARE

## 2022-06-28 DIAGNOSIS — G47.33 OSA ON CPAP: Primary | ICD-10-CM

## 2022-06-28 DIAGNOSIS — Z99.89 OSA ON CPAP: Primary | ICD-10-CM

## 2022-06-28 PROCEDURE — 1123F ACP DISCUSS/DSCN MKR DOCD: CPT | Performed by: NURSE PRACTITIONER

## 2022-06-28 PROCEDURE — 1111F DSCHRG MED/CURRENT MED MERGE: CPT | Performed by: NURSE PRACTITIONER

## 2022-06-28 PROCEDURE — G8427 DOCREV CUR MEDS BY ELIG CLIN: HCPCS | Performed by: NURSE PRACTITIONER

## 2022-06-28 PROCEDURE — 99213 OFFICE O/P EST LOW 20 MIN: CPT | Performed by: NURSE PRACTITIONER

## 2022-06-28 PROCEDURE — 3017F COLORECTAL CA SCREEN DOC REV: CPT | Performed by: NURSE PRACTITIONER

## 2022-06-28 ASSESSMENT — SLEEP AND FATIGUE QUESTIONNAIRES
ESS TOTAL SCORE: 3
HOW LIKELY ARE YOU TO NOD OFF OR FALL ASLEEP WHILE SITTING INACTIVE IN A PUBLIC PLACE: 0
HOW LIKELY ARE YOU TO NOD OFF OR FALL ASLEEP WHILE LYING DOWN TO REST IN THE AFTERNOON WHEN CIRCUMSTANCES PERMIT: 2
HOW LIKELY ARE YOU TO NOD OFF OR FALL ASLEEP WHILE SITTING AND TALKING TO SOMEONE: 0
HOW LIKELY ARE YOU TO NOD OFF OR FALL ASLEEP WHILE WATCHING TV: 1
HOW LIKELY ARE YOU TO NOD OFF OR FALL ASLEEP WHEN YOU ARE A PASSENGER IN A CAR FOR AN HOUR WITHOUT A BREAK: 0
HOW LIKELY ARE YOU TO NOD OFF OR FALL ASLEEP WHILE SITTING QUIETLY AFTER LUNCH WITHOUT ALCOHOL: 0
HOW LIKELY ARE YOU TO NOD OFF OR FALL ASLEEP IN A CAR, WHILE STOPPED FOR A FEW MINUTES IN TRAFFIC: 0
HOW LIKELY ARE YOU TO NOD OFF OR FALL ASLEEP WHILE SITTING AND READING: 0

## 2022-07-25 DIAGNOSIS — M17.11 PRIMARY OSTEOARTHRITIS OF RIGHT KNEE: Primary | ICD-10-CM

## 2022-07-25 PROBLEM — M17.12 DEGENERATIVE ARTHRITIS OF LEFT KNEE: Status: ACTIVE | Noted: 2022-07-25

## 2022-08-14 RX ORDER — OXYCODONE HYDROCHLORIDE 5 MG/1
5 TABLET ORAL
Status: CANCELLED | OUTPATIENT
Start: 2022-08-14 | End: 2022-08-14

## 2022-08-14 RX ORDER — PROCHLORPERAZINE EDISYLATE 5 MG/ML
5 INJECTION INTRAMUSCULAR; INTRAVENOUS
Status: CANCELLED | OUTPATIENT
Start: 2022-08-14 | End: 2022-08-14

## 2022-08-14 RX ORDER — IPRATROPIUM BROMIDE AND ALBUTEROL SULFATE 2.5; .5 MG/3ML; MG/3ML
1 SOLUTION RESPIRATORY (INHALATION)
Status: CANCELLED | OUTPATIENT
Start: 2022-08-14 | End: 2022-08-14

## 2022-08-14 RX ORDER — HALOPERIDOL 5 MG/ML
1 INJECTION INTRAMUSCULAR
Status: CANCELLED | OUTPATIENT
Start: 2022-08-14 | End: 2022-08-14

## 2022-08-14 RX ORDER — HYDROMORPHONE HYDROCHLORIDE 2 MG/ML
0.5 INJECTION, SOLUTION INTRAMUSCULAR; INTRAVENOUS; SUBCUTANEOUS EVERY 5 MIN PRN
Status: CANCELLED | OUTPATIENT
Start: 2022-08-14

## 2022-08-15 ENCOUNTER — ANESTHESIA EVENT (OUTPATIENT)
Dept: SURGERY | Age: 72
End: 2022-08-15
Payer: MEDICARE

## 2022-08-15 ENCOUNTER — ANESTHESIA (OUTPATIENT)
Dept: SURGERY | Age: 72
End: 2022-08-15
Payer: MEDICARE

## 2022-08-15 ENCOUNTER — HOSPITAL ENCOUNTER (OUTPATIENT)
Age: 72
Setting detail: OUTPATIENT SURGERY
Discharge: HOME | End: 2022-08-15
Attending: OPHTHALMOLOGY | Admitting: OPHTHALMOLOGY
Payer: MEDICARE

## 2022-08-15 VITALS
BODY MASS INDEX: 28.27 KG/M2 | HEART RATE: 84 BPM | WEIGHT: 197 LBS | TEMPERATURE: 98.5 F | OXYGEN SATURATION: 96 % | DIASTOLIC BLOOD PRESSURE: 73 MMHG | SYSTOLIC BLOOD PRESSURE: 154 MMHG | RESPIRATION RATE: 18 BRPM

## 2022-08-15 DIAGNOSIS — S02.81XA: ICD-10-CM

## 2022-08-15 LAB
GLUCOSE BLD STRIP.AUTO-MCNC: 104 MG/DL (ref 65–100)
SERVICE CMNT-IMP: ABNORMAL

## 2022-08-15 PROCEDURE — 82962 GLUCOSE BLOOD TEST: CPT

## 2022-08-15 RX ORDER — ACETAMINOPHEN 500 MG
1000 TABLET ORAL ONCE
Status: DISCONTINUED | OUTPATIENT
Start: 2022-08-15 | End: 2022-08-15 | Stop reason: HOSPADM

## 2022-08-15 RX ORDER — LIDOCAINE HYDROCHLORIDE 10 MG/ML
1 INJECTION, SOLUTION INFILTRATION; PERINEURAL
Status: DISCONTINUED | OUTPATIENT
Start: 2022-08-15 | End: 2022-08-15 | Stop reason: HOSPADM

## 2022-08-15 RX ORDER — SODIUM CHLORIDE 9 MG/ML
INJECTION, SOLUTION INTRAVENOUS PRN
Status: DISCONTINUED | OUTPATIENT
Start: 2022-08-15 | End: 2022-08-15 | Stop reason: HOSPADM

## 2022-08-15 RX ORDER — FENTANYL CITRATE 50 UG/ML
100 INJECTION, SOLUTION INTRAMUSCULAR; INTRAVENOUS
Status: DISCONTINUED | OUTPATIENT
Start: 2022-08-15 | End: 2022-08-15 | Stop reason: HOSPADM

## 2022-08-15 RX ORDER — MIDAZOLAM HYDROCHLORIDE 2 MG/2ML
2 INJECTION, SOLUTION INTRAMUSCULAR; INTRAVENOUS
Status: DISCONTINUED | OUTPATIENT
Start: 2022-08-15 | End: 2022-08-15 | Stop reason: HOSPADM

## 2022-08-15 RX ORDER — SODIUM CHLORIDE, SODIUM LACTATE, POTASSIUM CHLORIDE, CALCIUM CHLORIDE 600; 310; 30; 20 MG/100ML; MG/100ML; MG/100ML; MG/100ML
INJECTION, SOLUTION INTRAVENOUS CONTINUOUS
Status: DISCONTINUED | OUTPATIENT
Start: 2022-08-15 | End: 2022-08-15 | Stop reason: HOSPADM

## 2022-08-15 RX ORDER — SODIUM CHLORIDE 0.9 % (FLUSH) 0.9 %
5-40 SYRINGE (ML) INJECTION PRN
Status: DISCONTINUED | OUTPATIENT
Start: 2022-08-15 | End: 2022-08-15 | Stop reason: HOSPADM

## 2022-08-15 RX ORDER — SODIUM CHLORIDE 0.9 % (FLUSH) 0.9 %
5-40 SYRINGE (ML) INJECTION EVERY 12 HOURS SCHEDULED
Status: DISCONTINUED | OUTPATIENT
Start: 2022-08-15 | End: 2022-08-15 | Stop reason: HOSPADM

## 2022-08-15 ASSESSMENT — COPD QUESTIONNAIRES: CAT_SEVERITY: MILD

## 2022-08-15 ASSESSMENT — PAIN - FUNCTIONAL ASSESSMENT: PAIN_FUNCTIONAL_ASSESSMENT: 0-10

## 2022-08-15 NOTE — ANESTHESIA PRE PROCEDURE
Department of Anesthesiology  Preprocedure Note       Name:  Paradise Lui   Age:  80 y.o.  :  1950                                          MRN:  [id number]         Date:  8/15/2022      Surgeon: Mumtaz Dodd):  Shubham Urban MD    Procedure: Procedure(s):  RIGHT ORBITAL  REPAIR    Medications prior to admission:   Prior to Admission medications    Medication Sig Start Date End Date Taking? Authorizing Provider   bromocriptine (PARLODEL) 5 MG capsule Take 5 mg by mouth at bedtime Patient states he takes 4 capsules daily    Historical Provider, MD   tamsulosin (FLOMAX) 0.4 mg capsule Take 0.4 mg by mouth daily    Historical Provider, MD   pravastatin (PRAVACHOL) 80 MG tablet Take 80 mg by mouth daily 22   Historical Provider, MD   TESTOSTERONE CYPIONATE IM Inject 50 mg into the muscle    Ar Automatic Reconciliation   acetaminophen (TYLENOL) 500 MG tablet Take 1-2 tablets by mouth every 6 hours as needed    Ar Automatic Reconciliation   budesonide-formoterol (SYMBICORT) 160-4.5 MCG/ACT AERO Inhale 2 puffs into the lungs 2 times daily 3/30/17   Ar Automatic Reconciliation   Docusate Sodium 100 MG TABS Take 2 tablets by mouth daily as needed    Ar Automatic Reconciliation   EPINEPHrine (EPIPEN) 0.3 MG/0.3ML SOAJ injection Inject 0.3 mg into the muscle as needed    Ar Automatic Reconciliation   hydrocortisone (CORTEF) 5 MG tablet Take 5 mg by mouth daily as needed 21   Ar Automatic Reconciliation   levothyroxine (SYNTHROID) 100 MCG tablet Take 100 mcg by mouth every morning (before breakfast) 18   Ar Automatic Reconciliation   LORazepam (ATIVAN) 1 MG tablet Take 1 mg by mouth 4 times daily as needed.  18   Ar Automatic Reconciliation   metFORMIN (GLUCOPHAGE) 500 MG tablet Take 1,000 mg by mouth 2 times daily (with meals)    Ar Automatic Reconciliation   nitroGLYCERIN (NITROSTAT) 0.4 MG SL tablet Place 0.4 mg under the tongue as needed 16   Ar Automatic Reconciliation   omeprazole (PRILOSEC) 40 MG delayed release capsule Take 40 mg by mouth daily 1/19/18   Ar Automatic Reconciliation   sildenafil (VIAGRA) 100 MG tablet Take 100 mg by mouth as needed 7/18/17   Ar Automatic Reconciliation       Current medications:    Current Facility-Administered Medications   Medication Dose Route Frequency Provider Last Rate Last Admin    lidocaine 1 % injection 1 mL  1 mL IntraDERmal Once PRN David Quiles MD        acetaminophen (TYLENOL) tablet 1,000 mg  1,000 mg Oral Once David Quiles MD        fentaNYL (SUBLIMAZE) injection 100 mcg  100 mcg IntraVENous Once PRN David Quiles MD        lactated ringers infusion   IntraVENous Continuous David Quiles MD        sodium chloride flush 0.9 % injection 5-40 mL  5-40 mL IntraVENous 2 times per day David Quiles MD        sodium chloride flush 0.9 % injection 5-40 mL  5-40 mL IntraVENous PRN David Quiles MD        0.9 % sodium chloride infusion   IntraVENous PRN David Quiles MD        midazolam PF (VERSED) injection 2 mg  2 mg IntraVENous Once PRN David Quiles MD           Allergies:     Allergies   Allergen Reactions    Cephalexin Hives    Penicillins Other (See Comments)     States causes him to pass out    Sulfa Antibiotics Rash       Problem List:    Patient Active Problem List   Diagnosis Code    Asthma J45.909    RLS (restless legs syndrome) G25.81    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9    Controlled diabetes mellitus (HCC) E11.9    Pituitary tumor D49.7    COPD (chronic obstructive pulmonary disease) ([facility] [address] J44.9    ED (erectile dysfunction) N52.9    Hyperlipidemia E78.5    Essential hypertension with goal blood pressure less than 130/85 I10    Cervical neck pain with evidence of disc disease M50.90    Hypothyroidism E03.9    Pure hypercholesterolemia E78.00    Mixed hyperlipidemia E78.2    Status post right knee replacement Z96.651    Family history of MI (myocardial infarction) Z82.49    Right carotid bruit R09.89    Osteoarthritis of right knee M17.11    Chronic renal insufficiency N18.9    Anxiety disorder F41.9    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    JHONY on CPAP G47.33, Z99.89    Closed fracture of orbit (HCC) S02.85XA    Closed fracture of maxillary sinus (HCC) S02.401A    Degenerative arthritis of left knee M17.12       Past Medical History:        Diagnosis Date    Abnormal blood sugar     [facility]binding globulin [facility]     Actinic keratosis     Acute right flank pain     Anaphylactic reaction to bee sting     Anxiety     takes Lorazepam prn    Anxiety disorder     Arthritis     Arthritis of left shoulder region     Asthma 7/20/2016    followed by pulmonary; uses inhaler    Backache 7/20/2016    Bilateral otitis media     BPH (benign prostatic hyperplasia) 7/20/2016    CAD (coronary artery disease) 04/20/2011    He had mild nonobstructive CAD at cath by Dr. Sayra Morales several years ago; cardiac cath:  4/20/11    Cervical neck pain with evidence of disc disease     Chronic renal insufficiency 7/20/2016    Controlled diabetes mellitus ([facility] [address] 7/20/2016 6/30/21 :  hgb A1c:  5.8; daily fasting sqbs:  102    COPD (chronic obstructive pulmonary disease) ([facility] [address] 7/20/2016    Corneal foreign body 6/13/14    COVID-19 vaccine series completed 03/22/2021    Moderna    Depressive disorder 7/20/2016    clinical depression    Diarrhea     Dumping syndrome     Dyspepsia and other specified disorders of function of stomach 8/29/2012    ED (erectile dysfunction) 7/20/2016    Edema     Elevated CPK     Elevated prolactin level     Essential hypertension, benign 2012    GERD (gastroesophageal reflux disease)     Hand pain, right     Hematuria     Hyperlipidemia     Hyperprolactinemia ([address]     followed by endocrinologist    Hypertension     Hypogonadism in male     Hypothyroidism     takes levothyroxine    Knee effusion     Knee pain     Low serum testosterone level     Neoplasm of uncertain behavior of skin of neck     Neuralgia     Nevus, non-neoplastic     Numbness and tingling in left arm     Open wound of finger without complication     JHONY on CPAP 2014    uses CPAP    Osteoarthrosis, unspecified whether generalized or localized, involving lower leg 2012    Osteoarthrosis, unspecified whether generalized or localized, lower leg 2012    Other disorder of calcium metabolism 2012    Other disorders of calcium metabolism     Other malaise and fatigue 2012    Pituitary tumor 2013    Prolactin increased     Puncture wound 12    pt stepped on a maximiliano nail    Pure hypercholesterolemia 2012    Right shoulder pain     RLS (restless legs syndrome) 2016    Seborrheic keratosis     Seizures ([address]     30 years ago 1971    Shingles (herpes zoster) polyneuropathy     Shingles rash     Sinusitis        Past Surgical History:        Procedure Laterality Date    BUNIONECTOMY Right     CARDIAC CATHETERIZATION  2011    CARPAL TUNNEL RELEASE Bilateral     CERVICAL FUSION  2007    C3-C4-has metal screws     CHEST SURGERY      pt denies    ORTHOPEDIC SURGERY      bilat carpel tunnel, bunion repair,neck surg foot surg    TONSILLECTOMY         Social History:    Social History     Tobacco Use    Smoking status: Former     Packs/day: 0.50     Types: Cigarettes     Quit date: 2009     Years since quittin.3    Smokeless tobacco: Former     Quit date: 1986   Substance Use Topics    Alcohol use:  No Counseling given: Not Answered      Vital Signs (Current):   Vitals:    08/15/22 0600   BP: (!) 154/73   Pulse: 84   Resp: 18   Temp: 98.5 °F (36.9 °C)   TempSrc: Oral   SpO2: 96%   Weight: 197 lb (89.4 kg)                                              BP Readings from Last 3 Encounters:   08/15/22 (!) 154/73   06/03/22 (!) 143/81   08/13/21 (!) 158/70       NPO Status: Time of last liquid consumption: 2200                        Time of last solid consumption: 2200                        Date of last liquid consumption: 08/14/22                        Date of last solid food consumption: 08/14/22    BMI:   Wt Readings from Last 3 Encounters:   08/15/22 197 lb (89.4 kg)   06/01/22 197 lb (89.4 kg)   06/28/21 192 lb (87.1 kg)     Body mass index is 28.27 kg/m².     CBC:   Lab Results   Component Value Date/Time    WBC 8.7 06/03/2022 04:52 AM    RBC 4.13 06/03/2022 04:52 AM    HGB 13.0 06/03/2022 04:52 AM    HCT 39.8 06/03/2022 04:52 AM    MCV 96.4 06/03/2022 04:52 AM    RDW 13.2 06/03/2022 04:52 AM     06/03/2022 04:52 AM       CMP:   Lab Results   Component Value Date/Time     06/03/2022 04:52 AM    K 4.8 06/03/2022 04:52 AM     06/03/2022 04:52 AM    CO2 24 06/03/2022 04:52 AM    BUN 24 06/03/2022 04:52 AM    CREATININE 1.10 06/03/2022 04:52 AM    GFRAA >60 06/03/2022 04:52 AM    LABGLOM >60 06/03/2022 04:52 AM    GLUCOSE 102 06/03/2022 04:52 AM    PROT 7.4 06/01/2022 04:22 AM    CALCIUM 9.2 06/03/2022 04:52 AM    BILITOT 0.5 06/01/2022 04:22 AM    ALKPHOS 54 06/01/2022 04:22 AM    AST 26 06/01/2022 04:22 AM    ALT 48 06/01/2022 04:22 AM       POC Tests:   Recent Labs     08/15/22  0605   POCGLU 104*       Coags:   Lab Results   Component Value Date/Time    PROTIME 13.4 07/12/2021 12:13 PM    INR 1.0 07/12/2021 12:13 PM    APTT 30.1 07/12/2021 12:13 PM       HCG (If Applicable): No results found for: PREGTESTUR, PREGSERUM, HCG, HCGQUANT     ABGs: No results found for: PHART, PO2ART, EST7JWZ, DRV5KLR, BEART, E3VNLPCV     Type & Screen (If Applicable):  No results found for: LABABO, LABRH    Drug/Infectious Status (If Applicable):  No results found for: HIV, HEPCAB    COVID-19 Screening (If Applicable): No results found for: COVID19        Anesthesia Evaluation  Patient summary reviewed and Nursing notes reviewed no history of anesthetic complications:   Airway: Mallampati: II     Neck ROM: full  Mouth opening: > = 3 FB   Dental: normal exam         Pulmonary: breath sounds clear to auscultation  (+) COPD: mild,  sleep apnea: on CPAP,                             Cardiovascular:  Exercise tolerance: good (>4 METS),   (+) hypertension:, CAD (cath 2011- medical mgmt): non-obstructive, hyperlipidemia        Rhythm: regular      Stress test reviewed             ROS comment: Perf scan 2017- normal EF, normal perf, low risk scan     Neuro/Psych:   (+) depression/anxiety             GI/Hepatic/Renal:   (+) GERD: well controlled,           Endo/Other:    (+) DiabetesType II DM, , hypothyroidism: arthritis: OA., .                  ROS comment: Restless leg syndrome Abdominal:             Vascular: negative vascular ROS. Other Findings:           Anesthesia Plan      general     ASA 3     (Case cancelled by surgeon, as it was deemed no longer medically necessary.)  Induction: intravenous. Anesthetic plan and risks discussed with patient and spouse.                         Boris Daily MD   8/15/2022

## 2022-08-28 RX ORDER — SODIUM CHLORIDE 0.9 % (FLUSH) 0.9 %
5-40 SYRINGE (ML) INJECTION PRN
Status: CANCELLED | OUTPATIENT
Start: 2022-08-28

## 2022-08-28 RX ORDER — SODIUM CHLORIDE 9 MG/ML
INJECTION, SOLUTION INTRAVENOUS PRN
Status: CANCELLED | OUTPATIENT
Start: 2022-08-28

## 2022-08-28 RX ORDER — ACETAMINOPHEN 500 MG
1000 TABLET ORAL ONCE
Status: CANCELLED | OUTPATIENT
Start: 2022-08-28 | End: 2022-08-28

## 2022-08-28 RX ORDER — SODIUM CHLORIDE 0.9 % (FLUSH) 0.9 %
5-40 SYRINGE (ML) INJECTION EVERY 12 HOURS SCHEDULED
Status: CANCELLED | OUTPATIENT
Start: 2022-08-28

## 2022-08-31 ENCOUNTER — HOSPITAL ENCOUNTER (OUTPATIENT)
Dept: SURGERY | Age: 72
Discharge: HOME | End: 2022-09-03
Payer: MEDICARE

## 2022-08-31 VITALS
BODY MASS INDEX: 28.35 KG/M2 | HEIGHT: 70 IN | TEMPERATURE: 98.8 F | WEIGHT: 198 LBS | DIASTOLIC BLOOD PRESSURE: 74 MMHG | HEART RATE: 91 BPM | RESPIRATION RATE: 18 BRPM | SYSTOLIC BLOOD PRESSURE: 156 MMHG | OXYGEN SATURATION: 95 %

## 2022-08-31 DIAGNOSIS — M17.12 PRIMARY OSTEOARTHRITIS OF LEFT KNEE: Primary | ICD-10-CM

## 2022-08-31 LAB
ANION GAP SERPL CALC-SCNC: 10 MMOL/L (ref 4–13)
APTT PPP: 32.2 SEC (ref 24.1–35.1)
BACTERIA SPEC CULT: ABNORMAL
BASOPHILS # BLD: 0.1 K/UL (ref 0–0.2)
BASOPHILS NFR BLD: 1 % (ref 0–2)
BUN SERPL-MCNC: 12 MG/DL (ref 8–23)
CALCIUM SERPL-MCNC: 9.9 MG/DL (ref 8.3–10.4)
CHLORIDE SERPL-SCNC: 111 MMOL/L (ref 101–110)
CO2 SERPL-SCNC: 21 MMOL/L (ref 21–32)
CREAT SERPL-MCNC: 1 MG/DL (ref 0.8–1.5)
DIFFERENTIAL METHOD BLD: ABNORMAL
EKG ATRIAL RATE: 87 BPM
EKG DIAGNOSIS: NORMAL
EKG P AXIS: 76 DEGREES
EKG P-R INTERVAL: 170 MS
EKG Q-T INTERVAL: 354 MS
EKG QRS DURATION: 86 MS
EKG QTC CALCULATION (BAZETT): 425 MS
EKG R AXIS: 27 DEGREES
EKG T AXIS: 58 DEGREES
EKG VENTRICULAR RATE: 87 BPM
EOSINOPHIL # BLD: 0.3 K/UL (ref 0–0.8)
EOSINOPHIL NFR BLD: 4 % (ref 0.5–7.8)
ERYTHROCYTE [DISTWIDTH] IN BLOOD BY AUTOMATED COUNT: 13.2 % (ref 11.9–14.6)
EST. AVERAGE GLUCOSE BLD GHB EST-MCNC: 126 MG/DL
GLUCOSE SERPL-MCNC: 205 MG/DL (ref 65–100)
HBA1C MFR BLD: 6 % (ref 4.8–5.6)
HCT VFR BLD AUTO: 40.4 % (ref 41.1–50.3)
HGB BLD-MCNC: 13.1 G/DL (ref 13.6–17.2)
IMM GRANULOCYTES # BLD AUTO: 0 K/UL (ref 0–0.5)
IMM GRANULOCYTES NFR BLD AUTO: 0 % (ref 0–5)
INR PPP: 1
LYMPHOCYTES # BLD: 1.8 K/UL (ref 0.5–4.6)
LYMPHOCYTES NFR BLD: 24 % (ref 13–44)
MCH RBC QN AUTO: 31.6 PG (ref 26.1–32.9)
MCHC RBC AUTO-ENTMCNC: 32.4 G/DL (ref 31.4–35)
MCV RBC AUTO: 97.3 FL (ref 79.6–97.8)
MONOCYTES # BLD: 0.8 K/UL (ref 0.1–1.3)
MONOCYTES NFR BLD: 11 % (ref 4–12)
NEUTS SEG # BLD: 4.4 K/UL (ref 1.7–8.2)
NEUTS SEG NFR BLD: 60 % (ref 43–78)
NRBC # BLD: 0 K/UL (ref 0–0.2)
PLATELET # BLD AUTO: 278 K/UL (ref 150–450)
PMV BLD AUTO: 10.7 FL (ref 9.4–12.3)
POTASSIUM SERPL-SCNC: 3.7 MMOL/L (ref 3.5–5.1)
PROTHROMBIN TIME: 14 SEC (ref 12.6–14.5)
RBC # BLD AUTO: 4.15 M/UL (ref 4.23–5.6)
SERVICE CMNT-IMP: ABNORMAL
SODIUM SERPL-SCNC: 142 MMOL/L (ref 136–145)
WBC # BLD AUTO: 7.4 K/UL (ref 4.3–11.1)

## 2022-08-31 PROCEDURE — 85025 COMPLETE CBC W/AUTO DIFF WBC: CPT

## 2022-08-31 PROCEDURE — 83036 HEMOGLOBIN GLYCOSYLATED A1C: CPT

## 2022-08-31 PROCEDURE — 85730 THROMBOPLASTIN TIME PARTIAL: CPT

## 2022-08-31 PROCEDURE — 80048 BASIC METABOLIC PNL TOTAL CA: CPT

## 2022-08-31 PROCEDURE — 87641 MR-STAPH DNA AMP PROBE: CPT

## 2022-08-31 PROCEDURE — 85610 PROTHROMBIN TIME: CPT

## 2022-08-31 PROCEDURE — 93005 ELECTROCARDIOGRAM TRACING: CPT

## 2022-08-31 RX ORDER — HYDROCODONE BITARTRATE AND ACETAMINOPHEN 10; 325 MG/1; MG/1
1 TABLET ORAL EVERY 4 HOURS PRN
Status: ON HOLD | COMMUNITY
End: 2022-09-19 | Stop reason: HOSPADM

## 2022-08-31 RX ORDER — METFORMIN HYDROCHLORIDE 500 MG/1
1000 TABLET, EXTENDED RELEASE ORAL 2 TIMES DAILY
COMMUNITY
Start: 2022-06-14

## 2022-08-31 RX ORDER — LISINOPRIL 10 MG/1
40 TABLET ORAL NIGHTLY
COMMUNITY

## 2022-08-31 RX ORDER — AMLODIPINE BESYLATE 10 MG/1
10 TABLET ORAL NIGHTLY
COMMUNITY
Start: 2022-07-15

## 2022-08-31 RX ORDER — ASPIRIN 81 MG/1
81 TABLET ORAL 2 TIMES DAILY
Status: ON HOLD | COMMUNITY
End: 2022-09-19 | Stop reason: HOSPADM

## 2022-08-31 NOTE — PERIOP NOTE
PLEASE CONTINUE TAKING ALL PRESCRIPTION MEDICATIONS UP TO THE DAY OF SURGERY UNLESS OTHERWISE DIRECTED BELOW. DISCONTINUE all vitamins, herbals and supplements 21 days prior to surgery. DISCONTINUE Non-Steriodal Anti-Inflammatory (NSAIDS) such as Advil, Ibuprofen, and Aleve 5 days prior to surgery. Home Medications to HOLD       All vitamins, supplements, and herbals stop today. All NSAIDs such as Advil, Aleve, Ibuprofen, Diclofenac, Naproxen,  and Aspirin, etc. Stop 5 days prior to surgery. Home Medications to take  the day of surgery   Hydrocodone-Acetaminophen if needed, Lorazepam (Ativan) if needed, Symbicort inhaler, Pravastatin, Tamsulosin (Flomax), Levothyroxine (Synthroid)          Comments      BRING: inhaler, CPAP, incentive spirometer              Please do not bring home medications with you on the day of surgery unless otherwise directed by your nurse. If you are instructed to bring home medications, please give them to your nurse as they will be administered by the nursing staff. If you have any questions, please call [address] [place] [address] [phone] or [address] [facility] [phone].

## 2022-08-31 NOTE — PROGRESS NOTES
Total Joint Surgery Preoperative Chart Review      Patient ID:  Yvonne Mon  [id number]  7 y.o.  1950  Surgeon: Dr. Evelin Allen  Date of Surgery: [unfilled]  Procedure: Total Left Knee Arthroplasty  Primary Care Physician: Gen Villalobos -[phone]  Specialty Physician(s):      Subjective:   Yvonne Mon is a 80 y.o. White (non-) male who presents for preoperative evaluation for Total Left Knee arthroplasty. This is a preoperative chart review note based on data collected by the nurse at the surgical Pre-Assessment visit.     Past Medical History:   Diagnosis Date    Abnormal blood sugar     Abnormality of cortisol-binding globulin (HCC)     Actinic keratosis     Acute right flank pain     Anaphylactic reaction to bee sting     Anxiety     takes Lorazepam prn    Anxiety disorder     Arthritis     Arthritis of left shoulder region     Asthma 7/20/2016    followed by pulmonary; uses inhaler    Backache 7/20/2016    Bilateral otitis media     BPH (benign prostatic hyperplasia) 7/20/2016    CAD (coronary artery disease) 04/20/2011    He had mild nonobstructive CAD at cath by Dr. Dannie Fajardo several years ago; cardiac cath:  4/20/11    Cervical neck pain with evidence of disc disease     Chronic renal insufficiency 7/20/2016    Controlled diabetes mellitus ([facility] [address] 7/20/2016 6/30/21 :  hgb A1c:  5.8; daily fasting sqbs:  102    COPD (chronic obstructive pulmonary disease) ([facility] [address] 7/20/2016    Symbicort BID-no PRN inhaler    Corneal foreign body 6/13/14    COVID-19 vaccine series completed 03/22/2021    Moderna    Depressive disorder 7/20/2016    clinical depression    Diarrhea     Dumping syndrome     Dyspepsia and other specified disorders of function of stomach 8/29/2012    ED (erectile dysfunction) 7/20/2016    Edema     Elevated CPK     Elevated prolactin level     Essential hypertension, benign 8/29/2012    GERD (gastroesophageal reflux disease)     Hand pain, right     Hematuria Hyperlipidemia     Hyperprolactinemia ([address]     followed by endocrinologist    Hypertension     Hypogonadism in male     Hypothyroidism     takes levothyroxine    Knee effusion     Knee pain     Low serum testosterone level     Neoplasm of uncertain behavior of skin of neck     Neuralgia     Nevus, non-neoplastic     Numbness and tingling in left arm     Open wound of finger without complication     JHONY on CPAP 2014    uses CPAP    Osteoarthrosis, unspecified whether generalized or localized, involving lower leg 2012    Osteoarthrosis, unspecified whether generalized or localized, lower leg 2012    Other disorder of calcium metabolism 2012    Other malaise and fatigue 2012    Pituitary tumor 2013    Followed by endocrinology    Prolactin increased     Puncture wound 12    pt stepped on a maximiliano nail    Pure hypercholesterolemia 2012    Right shoulder pain     RLS (restless legs syndrome) 2016    Seborrheic keratosis     Seizures ([facility] [address]     30 years ago 1971    Shingles (herpes zoster) polyneuropathy     Shingles rash     Sinusitis       Past Surgical History:   Procedure Laterality Date    BUNIONECTOMY Right     CARDIAC CATHETERIZATION  2011    CARPAL TUNNEL RELEASE Bilateral     CERVICAL FUSION  2007    C3-C4-has metal screws     CHEST SURGERY      pt denies    JOINT REPLACEMENT Right     knee    ORTHOPEDIC SURGERY      bilat carpel tunnel, bunion repair,neck surg foot surg    TONSILLECTOMY       Family History   Problem Relation Age of Onset    Diabetes Maternal Grandfather     Cancer Mother         Kidney    Cancer Father         Lymphoma      Social History     Tobacco Use    Smoking status: Former     Packs/day: 0.50     Types: Cigarettes     Quit date: 2009     Years since quittin.3    Smokeless tobacco: Former     Quit date: 1986   Substance Use Topics    Alcohol use: No       Prior to Admission medications    Medication Sig Start Date End Date Taking? Authorizing Provider   amLODIPine (NORVASC) 10 MG tablet Take 10 mg by mouth nightly 7/15/22  Yes Historical Provider, MD   lisinopril (PRINIVIL;ZESTRIL) 10 MG tablet Take 40 mg by mouth nightly   Yes Historical Provider, MD   aspirin EC 81 MG EC tablet Take 81 mg by mouth in the morning and at bedtime Takes for heart health   Yes Historical Provider, MD   HYDROcodone-acetaminophen (NORCO)  MG per tablet Take 1 tablet by mouth every 4 hours as needed. Historical Provider, MD   metFORMIN (GLUCOPHAGE-XR) 500 MG extended release tablet Take 1,000 mg by mouth in the morning and at bedtime 6/14/22   Historical Provider, MD   bromocriptine (PARLODEL) 5 MG capsule Take 5 mg by mouth at bedtime Patient states he takes 4 capsules nightly    Historical Provider, MD   tamsulosin (FLOMAX) 0.4 mg capsule Take 0.4 mg by mouth daily    Historical Provider, MD   pravastatin (PRAVACHOL) 80 MG tablet Take 80 mg by mouth daily 4/25/22   Historical Provider, MD   TESTOSTERONE CYPIONATE IM Inject 50 mg into the muscle    Ar Automatic Reconciliation   acetaminophen (TYLENOL) 500 MG tablet Take 1-2 tablets by mouth every 6 hours as needed    Ar Automatic Reconciliation   budesonide-formoterol (SYMBICORT) 160-4.5 MCG/ACT AERO Inhale 2 puffs into the lungs 2 times daily 3/30/17   Ar Automatic Reconciliation   Docusate Sodium 100 MG TABS Take 2 tablets by mouth daily as needed    Ar Automatic Reconciliation   EPINEPHrine (EPIPEN) 0.3 MG/0.3ML SOAJ injection Inject 0.3 mg into the muscle as needed    Ar Automatic Reconciliation   hydrocortisone (CORTEF) 5 MG tablet Take 5 mg by mouth daily as needed  Patient not taking: No sig reported 7/7/21   Ar Automatic Reconciliation   levothyroxine (SYNTHROID) 100 MCG tablet Take 100 mcg by mouth every morning (before breakfast) 1/19/18   Ar Automatic Reconciliation   LORazepam (ATIVAN) 1 MG tablet Take 1 mg by mouth 4 times daily as needed.  1/19/18   Ar Automatic Reconciliation   nitroGLYCERIN (NITROSTAT) 0.4 MG SL tablet Place 0.4 mg under the tongue as needed  Patient not taking: Reported on 8/31/2022 12/22/16   Ar Automatic Reconciliation   omeprazole (PRILOSEC) 40 MG delayed release capsule Take 40 mg by mouth nightly 1/19/18   Ar Automatic Reconciliation   sildenafil (VIAGRA) 100 MG tablet Take 100 mg by mouth as needed 7/18/17   Ar Automatic Reconciliation     Allergies   Allergen Reactions    Bee Venom Swelling    Cephalexin Hives    Penicillins Other (See Comments)     States causes him to pass out    Sulfa Antibiotics Rash          Objective:     Physical Exam:   Patient Vitals for the past 24 hrs:   Temp Pulse Resp BP SpO2   08/31/22 0804 98.8 °F (37.1 °C) 91 18 (!) 156/74 95 %       ECG:    No results found for this or any previous visit (from the past 4464 hour(s)).     Data Review:   Labs:   Recent Results (from the past 24 hour(s))   Protime-INR    Collection Time: 08/31/22  8:09 AM   Result Value Ref Range    Protime 14.0 12.6 - 14.5 sec    INR 1.0     CBC with Auto Differential    Collection Time: 08/31/22  8:09 AM   Result Value Ref Range    WBC 7.4 4.3 - 11.1 K/uL    RBC 4.15 (L) 4.23 - 5.6 M/uL    Hemoglobin 13.1 (L) 13.6 - 17.2 g/dL    Hematocrit 40.4 (L) 41.1 - 50.3 %    MCV 97.3 79.6 - 97.8 FL    MCH 31.6 26.1 - 32.9 PG    MCHC 32.4 31.4 - 35.0 g/dL    RDW 13.2 11.9 - 14.6 %    Platelets 127 321 - 242 K/uL    MPV 10.7 9.4 - 12.3 FL    nRBC 0.00 0.0 - 0.2 K/uL    Differential Type AUTOMATED      Seg Neutrophils 60 43 - 78 %    Lymphocytes 24 13 - 44 %    Monocytes 11 4.0 - 12.0 %    Eosinophils % 4 0.5 - 7.8 %    Basophils 1 0.0 - 2.0 %    Immature Granulocytes 0 0.0 - 5.0 %    Segs Absolute 4.4 1.7 - 8.2 K/UL    Absolute Lymph # 1.8 0.5 - 4.6 K/UL    Absolute Mono # 0.8 0.1 - 1.3 K/UL    Absolute Eos # 0.3 0.0 - 0.8 K/UL    Basophils Absolute 0.1 0.0 - 0.2 K/UL    Absolute Immature Granulocyte 0.0 0.0 - 0.5 K/UL   APTT    Collection Time: 08/31/22  8:09

## 2022-08-31 NOTE — PERIOP NOTE
The below lab results are within anesthesia limits.       Latest Reference Range & Units 8/31/22 08:09   Sodium 136 - 145 mmol/L 142   Potassium 3.5 - 5.1 mmol/L 3.7   Chloride 101 - 110 mmol/L 111 (H)   CO2 21 - 32 mmol/L 21   BUN,BUNPL 8 - 23 MG/DL 12   Creatinine 0.8 - 1.5 MG/DL 1.00   Anion Gap 4 - 13 mmol/L 10   GFR Non-African American >60 ml/min/1.73m2 >60   GFR  >60 ml/min/1.73m2 >60   Glucose, Random 65 - 100 mg/dL 205 (H)   CALCIUM, SERUM, 606210 8.3 - 10.4 MG/DL 9.9   Hemoglobin A1C 4.8 - 5.6 % 6.0 (H)   eAG (mg/dL) mg/dL 126   WBC 4.3 - 11.1 K/uL 7.4   RBC 4.23 - 5.6 M/uL 4.15 (L)   Hemoglobin Quant 13.6 - 17.2 g/dL 13.1 (L)   Hematocrit 41.1 - 50.3 % 40.4 (L)   MCV 79.6 - 97.8 FL 97.3   MCH 26.1 - 32.9 PG 31.6   MCHC 31.4 - 35.0 g/dL 32.4   MPV 9.4 - 12.3 FL 10.7   RDW 11.9 - 14.6 % 13.2   Platelet Count 091 - 450 K/uL 278   Absolute Mono # 0.1 - 1.3 K/UL 0.8   Eosinophils % 0.5 - 7.8 % 4   Basophils Absolute 0.0 - 0.2 K/UL 0.1   Differential Type -   AUTOMATED   Seg Neutrophils 43 - 78 % 60   Segs Absolute 1.7 - 8.2 K/UL 4.4   Lymphocytes 13 - 44 % 24   Absolute Lymph # 0.5 - 4.6 K/UL 1.8   Monocytes 4.0 - 12.0 % 11   Absolute Eos # 0.0 - 0.8 K/UL 0.3   Basophils 0.0 - 2.0 % 1   Immature Granulocytes 0.0 - 5.0 % 0   Nucleated Red Blood Cells 0.0 - 0.2 K/uL 0.00   Absolute Immature Granulocyte 0.0 - 0.5 K/UL 0.0   Prothrombin Time 12.6 - 14.5 sec 14.0   INR -   1.0   PTT 24.1 - 35.1 SEC 32.2   MSSA/MRSA SCREEN BY PCR  Rpt   (H): Data is abnormally high  (L): Data is abnormally low  Rpt: View report in Results Review for more information

## 2022-09-07 DIAGNOSIS — M17.11 PRIMARY OSTEOARTHRITIS OF RIGHT KNEE: ICD-10-CM

## 2022-09-14 ENCOUNTER — OFFICE VISIT (OUTPATIENT)
Dept: ORTHOPEDIC SURGERY | Age: 72
End: 2022-09-14

## 2022-09-14 DIAGNOSIS — M17.12 PRIMARY OSTEOARTHRITIS OF LEFT KNEE: ICD-10-CM

## 2022-09-14 DIAGNOSIS — M17.11 PRIMARY OSTEOARTHRITIS OF RIGHT KNEE: Primary | ICD-10-CM

## 2022-09-14 NOTE — H&P (VIEW-ONLY)
[id number] [facility]  Pre Operative History and Physical Exam    Patient ID:  Maureen Hanson  [id number]  39 y.o.  1950    Today: September 14, 2022           CC:  left knee pain    HPI:   The patient has  OA left knee. The patient was evaluated and examined during a consultation prior to this office visit. There have been no changes to the patient's orthopedic condition since the initial consultation. The patient has failed previous conservative treatment for this condition including antiinflammatories , and lifestyle modifications. The necessity for joint replacement is present. The patient will be admitted the day of surgery for left knee replacement.     Past Medical/Surgical History:  Past Medical History:   Diagnosis Date    Abnormal blood sugar     Abnormality of cortisol-binding globulin (HCC)     Actinic keratosis     Acute right flank pain     Anaphylactic reaction to bee sting     Anxiety     takes Lorazepam prn    Anxiety disorder     Arthritis     Arthritis of left shoulder region     Asthma 7/20/2016    followed by pulmonary; uses inhaler    Backache 7/20/2016    Bilateral otitis media     BPH (benign prostatic hyperplasia) 7/20/2016    CAD (coronary artery disease) 04/20/2011    He had mild nonobstructive CAD at cath by Dr. Maryjane Nicole several years ago; cardiac cath:  4/20/11    Cervical neck pain with evidence of disc disease     Chronic renal insufficiency 7/20/2016    Controlled diabetes mellitus ([address] 7/20/2016 8/21/22 A1c 6.0; daily fasting sqbs:  102    COPD (chronic obstructive pulmonary disease) ([address] 7/20/2016    Symbicort BID-no PRN inhaler    Corneal foreign body 6/13/14    COVID-19 vaccine series completed 03/22/2021    Moderna    Depressive disorder 7/20/2016    clinical depression    Diarrhea     Dumping syndrome     Dyspepsia and other specified disorders of function of stomach 8/29/2012    ED (erectile dysfunction) 7/20/2016    Edema     Elevated CPK     Elevated prolactin level     Essential hypertension, benign 8/29/2012    GERD (gastroesophageal reflux disease)     Hand pain, right     Hematuria     Hyperlipidemia     Hyperprolactinemia ([address]     followed by endocrinologist    Hypertension     Hypogonadism in male     Hypothyroidism     takes levothyroxine    Knee effusion     Knee pain     Low serum testosterone level     Neoplasm of uncertain behavior of skin of neck     Neuralgia     Nevus, non-neoplastic     Numbness and tingling in left arm     Open wound of finger without complication 5/42/02    JHONY on CPAP 8/22/2014    uses CPAP    Osteoarthrosis, unspecified whether generalized or localized, involving lower leg 8/29/2012    Osteoarthrosis, unspecified whether generalized or localized, lower leg 8/29/2012    Other disorder of calcium metabolism 8/29/2012    Other malaise and fatigue 11/27/2012    Pituitary tumor 12/11/2013    Followed by endocrinology    Prolactin increased     Puncture wound 6/11/12    pt stepped on a maximiliano nail    Pure hypercholesterolemia 8/29/2012    Right shoulder pain     RLS (restless legs syndrome) 7/20/2016    Seborrheic keratosis     Seizures ([address]     30 years ago 1971    Shingles (herpes zoster) polyneuropathy     Shingles rash     Sinusitis      Past Surgical History:   Procedure Laterality Date    BUNIONECTOMY Right     CARDIAC CATHETERIZATION  04/20/2011    CARPAL TUNNEL RELEASE Bilateral     CERVICAL FUSION  11/2007    C3-C4-has metal screws     CHEST SURGERY      pt denies    JOINT REPLACEMENT Right 2021    knee    ORTHOPEDIC SURGERY      bilat carpel tunnel, bunion repair,neck surg foot surg    TONSILLECTOMY          Allergies:    Allergies   Allergen Reactions    Bee Venom Swelling    Cephalexin Hives    Penicillins Other (See Comments)     States causes him to pass out    Sulfa Antibiotics Rash        Physical Exam:   General: NAD, Alert, Oriented, Appears their stated age     [de-identified]: NC/AT    Skin: No rashes, lesions or wounds seen      Psych: normal affect      Heart: Regular Rate, Rhythm     Lungs: unlabored respirations, no wheezing    Abdomen: Soft and non-distended     Ortho: Pain with limited ROM of the left knee    Neuro: no focal defects, moving extremities equally    Lymph: no lymphadenopathy     Meds:   Current Outpatient Medications   Medication Sig    amLODIPine (NORVASC) 10 MG tablet Take 10 mg by mouth nightly    lisinopril (PRINIVIL;ZESTRIL) 10 MG tablet Take 40 mg by mouth nightly    HYDROcodone-acetaminophen (NORCO)  MG per tablet Take 1 tablet by mouth every 4 hours as needed. aspirin EC 81 MG EC tablet Take 81 mg by mouth in the morning and at bedtime Takes for heart health    metFORMIN (GLUCOPHAGE-XR) 500 MG extended release tablet Take 1,000 mg by mouth in the morning and at bedtime    bromocriptine (PARLODEL) 5 MG capsule Take 5 mg by mouth at bedtime Patient states he takes 4 capsules nightly    tamsulosin (FLOMAX) 0.4 mg capsule Take 0.4 mg by mouth daily    pravastatin (PRAVACHOL) 80 MG tablet Take 80 mg by mouth daily    TESTOSTERONE CYPIONATE IM Inject 50 mg into the muscle    acetaminophen (TYLENOL) 500 MG tablet Take 1-2 tablets by mouth every 6 hours as needed    budesonide-formoterol (SYMBICORT) 160-4.5 MCG/ACT AERO Inhale 2 puffs into the lungs 2 times daily    Docusate Sodium 100 MG TABS Take 2 tablets by mouth daily as needed    EPINEPHrine (EPIPEN) 0.3 MG/0.3ML SOAJ injection Inject 0.3 mg into the muscle as needed    hydrocortisone (CORTEF) 5 MG tablet Take 5 mg by mouth daily as needed (Patient not taking: No sig reported)    levothyroxine (SYNTHROID) 100 MCG tablet Take 100 mcg by mouth every morning (before breakfast)    LORazepam (ATIVAN) 1 MG tablet Take 1 mg by mouth 4 times daily as needed.     nitroGLYCERIN (NITROSTAT) 0.4 MG SL tablet Place 0.4 mg under the tongue as needed (Patient not taking: Reported on 8/31/2022)    omeprazole (PRILOSEC) 40 MG delayed release capsule Take 40 mg by mouth nightly    sildenafil (VIAGRA) 100 MG tablet Take 100 mg by mouth as needed     No current facility-administered medications for this visit.          Labs:  Hospital Outpatient Visit on 08/31/2022   Component Date Value Ref Range Status    Protime 08/31/2022 14.0  12.6 - 14.5 sec Final    INR 08/31/2022 1.0    Final    Comment: Suggested therapeutic INR range:  Venous thrombosis and embolus  2.0-3.0  Prosthetic heart valve         2.5-3.5  ** Note new reference range and method **      Hemoglobin A1C 08/31/2022 6.0 (A) 4.8 - 5.6 % Final    eAG 08/31/2022 126  mg/dL Final    Comment: Reference Range  Normal: 4.8-5.6  Diabetic >=6.5%  Normal       <5.7%      Ventricular Rate 08/31/2022 87  BPM Final    Atrial Rate 08/31/2022 87  BPM Final    P-R Interval 08/31/2022 170  ms Final    QRS Duration 08/31/2022 86  ms Final    Q-T Interval 08/31/2022 354  ms Final    QTc Calculation (Bazett) 08/31/2022 425  ms Final    P Axis 08/31/2022 76  degrees Final    R Axis 08/31/2022 27  degrees Final    T Axis 08/31/2022 58  degrees Final    Diagnosis 08/31/2022 Normal sinus rhythm   Final    WBC 08/31/2022 7.4  4.3 - 11.1 K/uL Final    RBC 08/31/2022 4.15 (A) 4.23 - 5.6 M/uL Final    Hemoglobin 08/31/2022 13.1 (A) 13.6 - 17.2 g/dL Final    Hematocrit 08/31/2022 40.4 (A) 41.1 - 50.3 % Final    MCV 08/31/2022 97.3  79.6 - 97.8 FL Final    MCH 08/31/2022 31.6  26.1 - 32.9 PG Final    MCHC 08/31/2022 32.4  31.4 - 35.0 g/dL Final    RDW 08/31/2022 13.2  11.9 - 14.6 % Final    Platelets 00/05/9190 278  150 - 450 K/uL Final    MPV 08/31/2022 10.7  9.4 - 12.3 FL Final    nRBC 08/31/2022 0.00  0.0 - 0.2 K/uL Final    **Note: Absolute NRBC parameter is now reported with Hemogram**    Differential Type 08/31/2022 AUTOMATED    Final    Seg Neutrophils 08/31/2022 60  43 - 78 % Final    Lymphocytes 08/31/2022 24  13 - 44 % Final    Monocytes 08/31/2022 11  4.0 - 12.0 % Final    Eosinophils % 08/31/2022 4  0.5 - 7.8 % Final    Basophils 08/31/2022 1  0.0 - 2.0 % Final    Immature Granulocytes 08/31/2022 0  0.0 - 5.0 % Final    Segs Absolute 08/31/2022 4.4  1.7 - 8.2 K/UL Final    Absolute Lymph # 08/31/2022 1.8  0.5 - 4.6 K/UL Final    Absolute Mono # 08/31/2022 0.8  0.1 - 1.3 K/UL Final    Absolute Eos # 08/31/2022 0.3  0.0 - 0.8 K/UL Final    Basophils Absolute 08/31/2022 0.1  0.0 - 0.2 K/UL Final    Absolute Immature Granulocyte 08/31/2022 0.0  0.0 - 0.5 K/UL Final    PTT 08/31/2022 32.2  24.1 - 35.1 SEC Final    Comment: Heparin Therapeutic Range = 74 - 123 seconds  In addition to factor deficiency, monitoring heparin therapy, etc., evaluation of a prolonged aPTT result should include consideration of preanalytic variables such as heparin flush contamination, specimen integrity issues, etc.      Sodium 08/31/2022 142  136 - 145 mmol/L Final    Potassium 08/31/2022 3.7  3.5 - 5.1 mmol/L Final    Chloride 08/31/2022 111 (A) 101 - 110 mmol/L Final    CO2 08/31/2022 21  21 - 32 mmol/L Final    Anion Gap 08/31/2022 10  4 - 13 mmol/L Final    Glucose 08/31/2022 205 (A) 65 - 100 mg/dL Final    BUN 08/31/2022 12  8 - 23 MG/DL Final    Creatinine 08/31/2022 1.00  0.8 - 1.5 MG/DL Final    GFR  08/31/2022 >60  >60 ml/min/1.73m2 Final    GFR Non- 08/31/2022 >60  >60 ml/min/1.73m2 Final    Comment:      Estimated GFR is calculated using the Modification of Diet in Renal Disease (MDRD) Study equation, reported for both  Americans (GFRAA) and non- Americans (GFRNA), and normalized to 1.73m2 body surface area. The physician must decide which value applies to the patient. The MDRD study equation should only be used in individuals age 25 or older. It has not been validated for the following: pregnant women, patients with serious comorbid conditions,or on certain medications, or persons with extremes of body size, muscle mass, or nutritional status.       Calcium 08/31/2022 9.9  8.3 - 10.4 MG/DL Final    Special Requests 08/31/2022 NO SPECIAL REQUESTS    Final    Culture 08/31/2022 MRSA target DNA not detected, SA target DNA detected. A MRSA negative, SA positive test result does not preclude MRSA nasal colonization. (A)   Final   Admission on 08/15/2022, Discharged on 08/15/2022   Component Date Value Ref Range Status    POC Glucose 08/15/2022 104 (A) 65 - 100 mg/dL Final    Comment: 47 - 60 mg/dl Consistent with, but not fully diagnostic of hypoglycemia. 101 - 125 mg/dl Impaired fasting glucose/consistent with pre-diabetes mellitus  > 126 mg/dl Fasting glucose consistent with overt diabetes mellitus      Performed by: 08/15/2022 [address]   Final                 Patient Active Problem List   Diagnosis    Asthma    RLS (restless legs syndrome)    Dumping syndrome    Arthritis of left shoulder region    Knee pain    Hypogonadism in male    BPH (benign prostatic hyperplasia)    History of COPD    Chest pain    Coronary artery disease involving native coronary artery of native heart without angina pectoris    GERD (gastroesophageal reflux disease)    Depressive disorder    Controlled diabetes mellitus ([address]    Pituitary tumor    COPD (chronic obstructive pulmonary disease) ([address]    ED (erectile dysfunction)    Hyperlipidemia    Essential hypertension with goal blood pressure less than 130/85    Cervical neck pain with evidence of disc disease    Hypothyroidism    Pure hypercholesterolemia    Mixed hyperlipidemia    Status post right knee replacement    Family history of MI (myocardial infarction)    Right carotid bruit    Osteoarthritis of right knee    Chronic renal insufficiency    Anxiety disorder    Shingles (herpes zoster) polyneuropathy    JHONY on CPAP    Closed fracture of orbit (HCC)    Closed fracture of maxillary sinus (HCC)    Degenerative arthritis of left knee       Xray: AP/lateral/skier/sunrise view of left knee reveal narrowing of medial compartment with subchondral sclerosis.      Interpretation: osteoarthropathy left knee. Assessment:   1. OA left knee      Plan:    1. Proceed with scheduled left knee replacement         All material risks, benefits, and reasonable alternatives including postponing the procedure were discussed. The patient does wish to proceed with the procedure at this time.

## 2022-09-14 NOTE — PROGRESS NOTES
[address] [facility]  Pre Operative History and Physical Exam    Patient ID:  Josette Conner  [id number]  32 y.o.  1950    Today: September 14, 2022           CC:  left knee pain    HPI:   The patient has  OA left knee. The patient was evaluated and examined during a consultation prior to this office visit. There have been no changes to the patient's orthopedic condition since the initial consultation. The patient has failed previous conservative treatment for this condition including antiinflammatories , and lifestyle modifications. The necessity for joint replacement is present. The patient will be admitted the day of surgery for left knee replacement.     Past Medical/Surgical History:  Past Medical History:   Diagnosis Date    Abnormal blood sugar     Abnormality of cortisol-binding globulin (HCC)     Actinic keratosis     Acute right flank pain     Anaphylactic reaction to bee sting     Anxiety     takes Lorazepam prn    Anxiety disorder     Arthritis     Arthritis of left shoulder region     Asthma 7/20/2016    followed by pulmonary; uses inhaler    Backache 7/20/2016    Bilateral otitis media     BPH (benign prostatic hyperplasia) 7/20/2016    CAD (coronary artery disease) 04/20/2011    He had mild nonobstructive CAD at cath by Dr. Octavio Quinteros several years ago; cardiac cath:  4/20/11    Cervical neck pain with evidence of disc disease     Chronic renal insufficiency 7/20/2016    Controlled diabetes mellitus ([address] 7/20/2016 8/21/22 A1c 6.0; daily fasting sqbs:  102    COPD (chronic obstructive pulmonary disease) ([address] 7/20/2016    Symbicort BID-no PRN inhaler    Corneal foreign body 6/13/14    COVID-19 vaccine series completed 03/22/2021    Moderna    Depressive disorder 7/20/2016    clinical depression    Diarrhea     Dumping syndrome     Dyspepsia and other specified disorders of function of stomach 8/29/2012    ED (erectile dysfunction) 7/20/2016    Edema     Elevated CPK     Elevated prolactin level     Essential hypertension, benign 8/29/2012    GERD (gastroesophageal reflux disease)     Hand pain, right     Hematuria     Hyperlipidemia     Hyperprolactinemia ([address]     followed by endocrinologist    Hypertension     Hypogonadism in male     Hypothyroidism     takes levothyroxine    Knee effusion     Knee pain     Low serum testosterone level     Neoplasm of uncertain behavior of skin of neck     Neuralgia     Nevus, non-neoplastic     Numbness and tingling in left arm     Open wound of finger without complication 4/85/06    JHONY on CPAP 8/22/2014    uses CPAP    Osteoarthrosis, unspecified whether generalized or localized, involving lower leg 8/29/2012    Osteoarthrosis, unspecified whether generalized or localized, lower leg 8/29/2012    Other disorder of calcium metabolism 8/29/2012    Other malaise and fatigue 11/27/2012    Pituitary tumor 12/11/2013    Followed by endocrinology    Prolactin increased     Puncture wound 6/11/12    pt stepped on a maximiliano nail    Pure hypercholesterolemia 8/29/2012    Right shoulder pain     RLS (restless legs syndrome) 7/20/2016    Seborrheic keratosis     Seizures ([address]     30 years ago 1971    Shingles (herpes zoster) polyneuropathy     Shingles rash     Sinusitis      Past Surgical History:   Procedure Laterality Date    BUNIONECTOMY Right     CARDIAC CATHETERIZATION  04/20/2011    CARPAL TUNNEL RELEASE Bilateral     CERVICAL FUSION  11/2007    C3-C4-has metal screws     CHEST SURGERY      pt denies    JOINT REPLACEMENT Right 2021    knee    ORTHOPEDIC SURGERY      bilat carpel tunnel, bunion repair,neck surg foot surg    TONSILLECTOMY          Allergies:    Allergies   Allergen Reactions    Bee Venom Swelling    Cephalexin Hives    Penicillins Other (See Comments)     States causes him to pass out    Sulfa Antibiotics Rash        Physical Exam:   General: NAD, Alert, Oriented, Appears their stated age     [de-identified]: NC/AT    Skin: No rashes, lesions or wounds seen      Psych: normal affect      Heart: Regular Rate, Rhythm     Lungs: unlabored respirations, no wheezing    Abdomen: Soft and non-distended     Ortho: Pain with limited ROM of the left knee    Neuro: no focal defects, moving extremities equally    Lymph: no lymphadenopathy     Meds:   Current Outpatient Medications   Medication Sig    amLODIPine (NORVASC) 10 MG tablet Take 10 mg by mouth nightly    lisinopril (PRINIVIL;ZESTRIL) 10 MG tablet Take 40 mg by mouth nightly    HYDROcodone-acetaminophen (NORCO)  MG per tablet Take 1 tablet by mouth every 4 hours as needed. aspirin EC 81 MG EC tablet Take 81 mg by mouth in the morning and at bedtime Takes for heart health    metFORMIN (GLUCOPHAGE-XR) 500 MG extended release tablet Take 1,000 mg by mouth in the morning and at bedtime    bromocriptine (PARLODEL) 5 MG capsule Take 5 mg by mouth at bedtime Patient states he takes 4 capsules nightly    tamsulosin (FLOMAX) 0.4 mg capsule Take 0.4 mg by mouth daily    pravastatin (PRAVACHOL) 80 MG tablet Take 80 mg by mouth daily    TESTOSTERONE CYPIONATE IM Inject 50 mg into the muscle    acetaminophen (TYLENOL) 500 MG tablet Take 1-2 tablets by mouth every 6 hours as needed    budesonide-formoterol (SYMBICORT) 160-4.5 MCG/ACT AERO Inhale 2 puffs into the lungs 2 times daily    Docusate Sodium 100 MG TABS Take 2 tablets by mouth daily as needed    EPINEPHrine (EPIPEN) 0.3 MG/0.3ML SOAJ injection Inject 0.3 mg into the muscle as needed    hydrocortisone (CORTEF) 5 MG tablet Take 5 mg by mouth daily as needed (Patient not taking: No sig reported)    levothyroxine (SYNTHROID) 100 MCG tablet Take 100 mcg by mouth every morning (before breakfast)    LORazepam (ATIVAN) 1 MG tablet Take 1 mg by mouth 4 times daily as needed.     nitroGLYCERIN (NITROSTAT) 0.4 MG SL tablet Place 0.4 mg under the tongue as needed (Patient not taking: Reported on 8/31/2022)    omeprazole (PRILOSEC) 40 MG delayed release capsule Take 40 mg by mouth nightly    sildenafil (VIAGRA) 100 MG tablet Take 100 mg by mouth as needed     No current facility-administered medications for this visit.          Labs:  Hospital Outpatient Visit on 08/31/2022   Component Date Value Ref Range Status    Protime 08/31/2022 14.0  12.6 - 14.5 sec Final    INR 08/31/2022 1.0    Final    Comment: Suggested therapeutic INR range:  Venous thrombosis and embolus  2.0-3.0  Prosthetic heart valve         2.5-3.5  ** Note new reference range and method **      Hemoglobin A1C 08/31/2022 6.0 (A) 4.8 - 5.6 % Final    eAG 08/31/2022 126  mg/dL Final    Comment: Reference Range  Normal: 4.8-5.6  Diabetic >=6.5%  Normal       <5.7%      Ventricular Rate 08/31/2022 87  BPM Final    Atrial Rate 08/31/2022 87  BPM Final    P-R Interval 08/31/2022 170  ms Final    QRS Duration 08/31/2022 86  ms Final    Q-T Interval 08/31/2022 354  ms Final    QTc Calculation (Bazett) 08/31/2022 425  ms Final    P Axis 08/31/2022 76  degrees Final    R Axis 08/31/2022 27  degrees Final    T Axis 08/31/2022 58  degrees Final    Diagnosis 08/31/2022 Normal sinus rhythm   Final    WBC 08/31/2022 7.4  4.3 - 11.1 K/uL Final    RBC 08/31/2022 4.15 (A) 4.23 - 5.6 M/uL Final    Hemoglobin 08/31/2022 13.1 (A) 13.6 - 17.2 g/dL Final    Hematocrit 08/31/2022 40.4 (A) 41.1 - 50.3 % Final    MCV 08/31/2022 97.3  79.6 - 97.8 FL Final    MCH 08/31/2022 31.6  26.1 - 32.9 PG Final    MCHC 08/31/2022 32.4  31.4 - 35.0 g/dL Final    RDW 08/31/2022 13.2  11.9 - 14.6 % Final    Platelets 29/05/6841 278  150 - 450 K/uL Final    MPV 08/31/2022 10.7  9.4 - 12.3 FL Final    nRBC 08/31/2022 0.00  0.0 - 0.2 K/uL Final    **Note: Absolute NRBC parameter is now reported with Hemogram**    Differential Type 08/31/2022 AUTOMATED    Final    Seg Neutrophils 08/31/2022 60  43 - 78 % Final    Lymphocytes 08/31/2022 24  13 - 44 % Final    Monocytes 08/31/2022 11  4.0 - 12.0 % Final    Eosinophils % 08/31/2022 4  0.5 - 7.8 % Final    Basophils 08/31/2022 1  0.0 - 2.0 % Final    Immature Granulocytes 08/31/2022 0  0.0 - 5.0 % Final    Segs Absolute 08/31/2022 4.4  1.7 - 8.2 K/UL Final    Absolute Lymph # 08/31/2022 1.8  0.5 - 4.6 K/UL Final    Absolute Mono # 08/31/2022 0.8  0.1 - 1.3 K/UL Final    Absolute Eos # 08/31/2022 0.3  0.0 - 0.8 K/UL Final    Basophils Absolute 08/31/2022 0.1  0.0 - 0.2 K/UL Final    Absolute Immature Granulocyte 08/31/2022 0.0  0.0 - 0.5 K/UL Final    PTT 08/31/2022 32.2  24.1 - 35.1 SEC Final    Comment: Heparin Therapeutic Range = 74 - 123 seconds  In addition to factor deficiency, monitoring heparin therapy, etc., evaluation of a prolonged aPTT result should include consideration of preanalytic variables such as heparin flush contamination, specimen integrity issues, etc.      Sodium 08/31/2022 142  136 - 145 mmol/L Final    Potassium 08/31/2022 3.7  3.5 - 5.1 mmol/L Final    Chloride 08/31/2022 111 (A) 101 - 110 mmol/L Final    CO2 08/31/2022 21  21 - 32 mmol/L Final    Anion Gap 08/31/2022 10  4 - 13 mmol/L Final    Glucose 08/31/2022 205 (A) 65 - 100 mg/dL Final    BUN 08/31/2022 12  8 - 23 MG/DL Final    Creatinine 08/31/2022 1.00  0.8 - 1.5 MG/DL Final    GFR  08/31/2022 >60  >60 ml/min/1.73m2 Final    GFR Non- 08/31/2022 >60  >60 ml/min/1.73m2 Final    Comment:      Estimated GFR is calculated using the Modification of Diet in Renal Disease (MDRD) Study equation, reported for both  Americans (GFRAA) and non- Americans (GFRNA), and normalized to 1.73m2 body surface area. The physician must decide which value applies to the patient. The MDRD study equation should only be used in individuals age 25 or older. It has not been validated for the following: pregnant women, patients with serious comorbid conditions,or on certain medications, or persons with extremes of body size, muscle mass, or nutritional status.       Calcium 08/31/2022 9.9  8.3 - 10.4 MG/DL Final    Special Requests 08/31/2022 NO SPECIAL REQUESTS    Final    Culture 08/31/2022 MRSA target DNA not detected, SA target DNA detected. A MRSA negative, SA positive test result does not preclude MRSA nasal colonization. (A)   Final   Admission on 08/15/2022, Discharged on 08/15/2022   Component Date Value Ref Range Status    POC Glucose 08/15/2022 104 (A) 65 - 100 mg/dL Final    Comment: 47 - 60 mg/dl Consistent with, but not fully diagnostic of hypoglycemia. 101 - 125 mg/dl Impaired fasting glucose/consistent with pre-diabetes mellitus  > 126 mg/dl Fasting glucose consistent with overt diabetes mellitus      Performed by: 08/15/2022 [address]   Final                 Patient Active Problem List   Diagnosis    Asthma    RLS (restless legs syndrome)    Dumping syndrome    Arthritis of left shoulder region    Knee pain    Hypogonadism in male    BPH (benign prostatic hyperplasia)    History of COPD    Chest pain    Coronary artery disease involving native coronary artery of native heart without angina pectoris    GERD (gastroesophageal reflux disease)    Depressive disorder    Controlled diabetes mellitus ([address]    Pituitary tumor    COPD (chronic obstructive pulmonary disease) ([address]    ED (erectile dysfunction)    Hyperlipidemia    Essential hypertension with goal blood pressure less than 130/85    Cervical neck pain with evidence of disc disease    Hypothyroidism    Pure hypercholesterolemia    Mixed hyperlipidemia    Status post right knee replacement    Family history of MI (myocardial infarction)    Right carotid bruit    Osteoarthritis of right knee    Chronic renal insufficiency    Anxiety disorder    Shingles (herpes zoster) polyneuropathy    JHONY on CPAP    Closed fracture of orbit (HCC)    Closed fracture of maxillary sinus (HCC)    Degenerative arthritis of left knee       Xray: AP/lateral/skier/sunrise view of left knee reveal narrowing of medial compartment with subchondral sclerosis.      Interpretation: osteoarthropathy left knee. Assessment:   1. OA left knee      Plan:    1. Proceed with scheduled left knee replacement         All material risks, benefits, and reasonable alternatives including postponing the procedure were discussed. The patient does wish to proceed with the procedure at this time.

## 2022-09-18 ENCOUNTER — ANESTHESIA EVENT (OUTPATIENT)
Dept: SURGERY | Age: 72
End: 2022-09-18
Payer: MEDICARE

## 2022-09-19 ENCOUNTER — HOME HEALTH ADMISSION (OUTPATIENT)
Dept: HOME HEALTH SERVICES | Facility: HOME HEALTH | Age: 72
End: 2022-09-19
Payer: MEDICARE

## 2022-09-19 ENCOUNTER — HOSPITAL ENCOUNTER (OUTPATIENT)
Age: 72
Discharge: HOME HEALTH | End: 2022-09-19
Attending: ORTHOPAEDIC SURGERY | Admitting: ORTHOPAEDIC SURGERY
Payer: MEDICARE

## 2022-09-19 ENCOUNTER — ANESTHESIA (OUTPATIENT)
Dept: SURGERY | Age: 72
End: 2022-09-19
Payer: MEDICARE

## 2022-09-19 VITALS
OXYGEN SATURATION: 93 % | WEIGHT: 192.2 LBS | TEMPERATURE: 97.5 F | SYSTOLIC BLOOD PRESSURE: 114 MMHG | HEART RATE: 92 BPM | DIASTOLIC BLOOD PRESSURE: 66 MMHG | RESPIRATION RATE: 16 BRPM | BODY MASS INDEX: 27.58 KG/M2

## 2022-09-19 DIAGNOSIS — M17.12 PRIMARY OSTEOARTHRITIS OF LEFT KNEE: Primary | ICD-10-CM

## 2022-09-19 LAB
GLUCOSE BLD STRIP.AUTO-MCNC: 116 MG/DL (ref 65–100)
SERVICE CMNT-IMP: ABNORMAL

## 2022-09-19 PROCEDURE — 7100000001 HC PACU RECOVERY - ADDTL 15 MIN: Performed by: ORTHOPAEDIC SURGERY

## 2022-09-19 PROCEDURE — 3700000001 HC ADD 15 MINUTES (ANESTHESIA): Performed by: ORTHOPAEDIC SURGERY

## 2022-09-19 PROCEDURE — 6360000002 HC RX W HCPCS: Performed by: ANESTHESIOLOGY

## 2022-09-19 PROCEDURE — 3600000005 HC SURGERY LEVEL 5 BASE: Performed by: ORTHOPAEDIC SURGERY

## 2022-09-19 PROCEDURE — 2580000003 HC RX 258: Performed by: NURSE ANESTHETIST, CERTIFIED REGISTERED

## 2022-09-19 PROCEDURE — 2709999900 HC NON-CHARGEABLE SUPPLY: Performed by: ORTHOPAEDIC SURGERY

## 2022-09-19 PROCEDURE — 6360000002 HC RX W HCPCS: Performed by: ORTHOPAEDIC SURGERY

## 2022-09-19 PROCEDURE — 6360000002 HC RX W HCPCS: Performed by: NURSE ANESTHETIST, CERTIFIED REGISTERED

## 2022-09-19 PROCEDURE — 2500000003 HC RX 250 WO HCPCS: Performed by: ORTHOPAEDIC SURGERY

## 2022-09-19 PROCEDURE — 3700000000 HC ANESTHESIA ATTENDED CARE: Performed by: ORTHOPAEDIC SURGERY

## 2022-09-19 PROCEDURE — 97535 SELF CARE MNGMENT TRAINING: CPT

## 2022-09-19 PROCEDURE — 6370000000 HC RX 637 (ALT 250 FOR IP): Performed by: ANESTHESIOLOGY

## 2022-09-19 PROCEDURE — 82962 GLUCOSE BLOOD TEST: CPT

## 2022-09-19 PROCEDURE — 97530 THERAPEUTIC ACTIVITIES: CPT

## 2022-09-19 PROCEDURE — 6360000002 HC RX W HCPCS: Performed by: PHYSICIAN ASSISTANT

## 2022-09-19 PROCEDURE — C1713 ANCHOR/SCREW BN/BN,TIS/BN: HCPCS | Performed by: ORTHOPAEDIC SURGERY

## 2022-09-19 PROCEDURE — 97165 OT EVAL LOW COMPLEX 30 MIN: CPT

## 2022-09-19 PROCEDURE — 2700000000 HC OXYGEN THERAPY PER DAY

## 2022-09-19 PROCEDURE — 27447 TOTAL KNEE ARTHROPLASTY: CPT | Performed by: ORTHOPAEDIC SURGERY

## 2022-09-19 PROCEDURE — 97161 PT EVAL LOW COMPLEX 20 MIN: CPT

## 2022-09-19 PROCEDURE — 2500000003 HC RX 250 WO HCPCS: Performed by: NURSE ANESTHETIST, CERTIFIED REGISTERED

## 2022-09-19 PROCEDURE — 6370000000 HC RX 637 (ALT 250 FOR IP): Performed by: PHYSICIAN ASSISTANT

## 2022-09-19 PROCEDURE — 7100000000 HC PACU RECOVERY - FIRST 15 MIN: Performed by: ORTHOPAEDIC SURGERY

## 2022-09-19 PROCEDURE — 2500000003 HC RX 250 WO HCPCS: Performed by: ANESTHESIOLOGY

## 2022-09-19 PROCEDURE — 94760 N-INVAS EAR/PLS OXIMETRY 1: CPT

## 2022-09-19 PROCEDURE — C1776 JOINT DEVICE (IMPLANTABLE): HCPCS | Performed by: ORTHOPAEDIC SURGERY

## 2022-09-19 PROCEDURE — 2720000010 HC SURG SUPPLY STERILE: Performed by: ORTHOPAEDIC SURGERY

## 2022-09-19 PROCEDURE — 64447 NJX AA&/STRD FEMORAL NRV IMG: CPT | Performed by: ANESTHESIOLOGY

## 2022-09-19 PROCEDURE — 20985 CPTR-ASST DIR MS PX: CPT | Performed by: ORTHOPAEDIC SURGERY

## 2022-09-19 PROCEDURE — 2580000003 HC RX 258: Performed by: ORTHOPAEDIC SURGERY

## 2022-09-19 PROCEDURE — 3600000015 HC SURGERY LEVEL 5 ADDTL 15MIN: Performed by: ORTHOPAEDIC SURGERY

## 2022-09-19 PROCEDURE — 2580000003 HC RX 258: Performed by: ANESTHESIOLOGY

## 2022-09-19 DEVICE — PATELLA
Type: IMPLANTABLE DEVICE | Site: KNEE | Status: FUNCTIONAL
Brand: TRIATHLON

## 2022-09-19 DEVICE — CRUCIATE RETAINING FEMORAL
Type: IMPLANTABLE DEVICE | Site: KNEE | Status: FUNCTIONAL
Brand: TRIATHLON

## 2022-09-19 DEVICE — TIBIAL BEARING INSERT
Type: IMPLANTABLE DEVICE | Site: KNEE | Status: FUNCTIONAL
Brand: TRIATHLON

## 2022-09-19 DEVICE — COMPONENT TOT KNEE CAPPED PRIMARY CEM X3 TRIATHLON: Type: IMPLANTABLE DEVICE | Status: FUNCTIONAL

## 2022-09-19 DEVICE — TIBIAL COMPONENT
Type: IMPLANTABLE DEVICE | Site: KNEE | Status: FUNCTIONAL
Brand: TRIATHLON

## 2022-09-19 DEVICE — PALACOS® R IS A FAST-CURING, RADIOPAQUE, POLY(METHYL METHACRYLATE)-BASED BONE CEMENT.PALACOS ® R CONTAINS THE X-RAY CONTRAST MEDIUM ZIRCONIUM DIOXIDE. TO IMPROVE VISIBILITY IN THE SURGICAL FIELD PALACOS ® R HAS BEEN COLOURED WITH CHLOROPHYLL (E141). THE BONE CEMENT IS PREPARED DIRECTLY BEFORE USE BY MIXING A POLYMER POWDER COMPONENT WITH A LIQUID MONOMER COMPONENT. A DUCTILE DOUGH FORMS WHICH CURES WITHIN A FEW MINUTES.
Type: IMPLANTABLE DEVICE | Site: PATELLA | Status: FUNCTIONAL
Brand: PALACOS®

## 2022-09-19 RX ORDER — HYDROMORPHONE HYDROCHLORIDE 2 MG/ML
1 INJECTION, SOLUTION INTRAMUSCULAR; INTRAVENOUS; SUBCUTANEOUS
Status: DISCONTINUED | OUTPATIENT
Start: 2022-09-19 | End: 2022-09-19 | Stop reason: SDUPTHER

## 2022-09-19 RX ORDER — DEXAMETHASONE SODIUM PHOSPHATE 10 MG/ML
INJECTION INTRAMUSCULAR; INTRAVENOUS PRN
Status: DISCONTINUED | OUTPATIENT
Start: 2022-09-19 | End: 2022-09-19 | Stop reason: SDUPTHER

## 2022-09-19 RX ORDER — SODIUM CHLORIDE, SODIUM LACTATE, POTASSIUM CHLORIDE, CALCIUM CHLORIDE 600; 310; 30; 20 MG/100ML; MG/100ML; MG/100ML; MG/100ML
INJECTION, SOLUTION INTRAVENOUS CONTINUOUS PRN
Status: DISCONTINUED | OUTPATIENT
Start: 2022-09-19 | End: 2022-09-19 | Stop reason: SDUPTHER

## 2022-09-19 RX ORDER — ACETAMINOPHEN 500 MG
1000 TABLET ORAL EVERY 6 HOURS
Status: DISCONTINUED | OUTPATIENT
Start: 2022-09-19 | End: 2022-09-19 | Stop reason: HOSPADM

## 2022-09-19 RX ORDER — ROPIVACAINE HYDROCHLORIDE 2 MG/ML
INJECTION, SOLUTION EPIDURAL; INFILTRATION; PERINEURAL PRN
Status: DISCONTINUED | OUTPATIENT
Start: 2022-09-19 | End: 2022-09-19 | Stop reason: ALTCHOICE

## 2022-09-19 RX ORDER — SCOLOPAMINE TRANSDERMAL SYSTEM 1 MG/1
1 PATCH, EXTENDED RELEASE TRANSDERMAL
Status: DISCONTINUED | OUTPATIENT
Start: 2022-09-19 | End: 2022-09-19

## 2022-09-19 RX ORDER — SODIUM CHLORIDE 0.9 % (FLUSH) 0.9 %
5-40 SYRINGE (ML) INJECTION EVERY 12 HOURS SCHEDULED
Status: DISCONTINUED | OUTPATIENT
Start: 2022-09-19 | End: 2022-09-19

## 2022-09-19 RX ORDER — SODIUM CHLORIDE, SODIUM LACTATE, POTASSIUM CHLORIDE, CALCIUM CHLORIDE 600; 310; 30; 20 MG/100ML; MG/100ML; MG/100ML; MG/100ML
INJECTION, SOLUTION INTRAVENOUS CONTINUOUS
Status: DISCONTINUED | OUTPATIENT
Start: 2022-09-19 | End: 2022-09-19

## 2022-09-19 RX ORDER — SODIUM CHLORIDE 0.9 % (FLUSH) 0.9 %
5-40 SYRINGE (ML) INJECTION PRN
Status: DISCONTINUED | OUTPATIENT
Start: 2022-09-19 | End: 2022-09-19

## 2022-09-19 RX ORDER — LISINOPRIL 20 MG/1
40 TABLET ORAL NIGHTLY
Status: DISCONTINUED | OUTPATIENT
Start: 2022-09-19 | End: 2022-09-19 | Stop reason: HOSPADM

## 2022-09-19 RX ORDER — HYDROMORPHONE HYDROCHLORIDE 1 MG/ML
0.5 INJECTION, SOLUTION INTRAMUSCULAR; INTRAVENOUS; SUBCUTANEOUS EVERY 10 MIN PRN
Status: DISCONTINUED | OUTPATIENT
Start: 2022-09-19 | End: 2022-09-19 | Stop reason: HOSPADM

## 2022-09-19 RX ORDER — PANTOPRAZOLE SODIUM 40 MG/1
40 TABLET, DELAYED RELEASE ORAL
Status: DISCONTINUED | OUTPATIENT
Start: 2022-09-20 | End: 2022-09-19 | Stop reason: HOSPADM

## 2022-09-19 RX ORDER — DIPHENHYDRAMINE HYDROCHLORIDE 50 MG/ML
12.5 INJECTION INTRAMUSCULAR; INTRAVENOUS
Status: DISCONTINUED | OUTPATIENT
Start: 2022-09-19 | End: 2022-09-19 | Stop reason: HOSPADM

## 2022-09-19 RX ORDER — AMLODIPINE BESYLATE 10 MG/1
10 TABLET ORAL NIGHTLY
Status: DISCONTINUED | OUTPATIENT
Start: 2022-09-19 | End: 2022-09-19 | Stop reason: HOSPADM

## 2022-09-19 RX ORDER — ONDANSETRON 2 MG/ML
4 INJECTION INTRAMUSCULAR; INTRAVENOUS EVERY 6 HOURS PRN
Status: DISCONTINUED | OUTPATIENT
Start: 2022-09-19 | End: 2022-09-19 | Stop reason: HOSPADM

## 2022-09-19 RX ORDER — ONDANSETRON 2 MG/ML
4 INJECTION INTRAMUSCULAR; INTRAVENOUS
Status: DISCONTINUED | OUTPATIENT
Start: 2022-09-19 | End: 2022-09-19 | Stop reason: HOSPADM

## 2022-09-19 RX ORDER — ROPIVACAINE HYDROCHLORIDE 2 MG/ML
INJECTION, SOLUTION EPIDURAL; INFILTRATION; PERINEURAL
Status: COMPLETED | OUTPATIENT
Start: 2022-09-19 | End: 2022-09-19

## 2022-09-19 RX ORDER — OXYCODONE HYDROCHLORIDE 5 MG/1
5 TABLET ORAL PRN
Status: DISCONTINUED | OUTPATIENT
Start: 2022-09-19 | End: 2022-09-19 | Stop reason: HOSPADM

## 2022-09-19 RX ORDER — LEVOTHYROXINE SODIUM 0.1 MG/1
100 TABLET ORAL
Status: DISCONTINUED | OUTPATIENT
Start: 2022-09-20 | End: 2022-09-19 | Stop reason: HOSPADM

## 2022-09-19 RX ORDER — ONDANSETRON 2 MG/ML
INJECTION INTRAMUSCULAR; INTRAVENOUS PRN
Status: DISCONTINUED | OUTPATIENT
Start: 2022-09-19 | End: 2022-09-19 | Stop reason: SDUPTHER

## 2022-09-19 RX ORDER — ASPIRIN 81 MG/1
81 TABLET ORAL 2 TIMES DAILY
Status: DISCONTINUED | OUTPATIENT
Start: 2022-09-19 | End: 2022-09-19 | Stop reason: HOSPADM

## 2022-09-19 RX ORDER — PRAVASTATIN SODIUM 80 MG/1
80 TABLET ORAL DAILY
Status: DISCONTINUED | OUTPATIENT
Start: 2022-09-19 | End: 2022-09-19 | Stop reason: HOSPADM

## 2022-09-19 RX ORDER — MIDAZOLAM HYDROCHLORIDE 2 MG/2ML
2 INJECTION, SOLUTION INTRAMUSCULAR; INTRAVENOUS
Status: COMPLETED | OUTPATIENT
Start: 2022-09-19 | End: 2022-09-19

## 2022-09-19 RX ORDER — PROMETHAZINE HYDROCHLORIDE 25 MG/1
25 TABLET ORAL EVERY 8 HOURS PRN
Qty: 30 TABLET | Refills: 1 | Status: SHIPPED | OUTPATIENT
Start: 2022-09-19

## 2022-09-19 RX ORDER — PROPOFOL 10 MG/ML
INJECTION, EMULSION INTRAVENOUS CONTINUOUS PRN
Status: DISCONTINUED | OUTPATIENT
Start: 2022-09-19 | End: 2022-09-19 | Stop reason: SDUPTHER

## 2022-09-19 RX ORDER — OXYCODONE HYDROCHLORIDE 5 MG/1
10 TABLET ORAL PRN
Status: DISCONTINUED | OUTPATIENT
Start: 2022-09-19 | End: 2022-09-19 | Stop reason: HOSPADM

## 2022-09-19 RX ORDER — FENTANYL CITRATE 50 UG/ML
100 INJECTION, SOLUTION INTRAMUSCULAR; INTRAVENOUS
Status: DISCONTINUED | OUTPATIENT
Start: 2022-09-19 | End: 2022-09-19

## 2022-09-19 RX ORDER — SODIUM CHLORIDE 0.9 % (FLUSH) 0.9 %
5-40 SYRINGE (ML) INJECTION EVERY 12 HOURS SCHEDULED
Status: DISCONTINUED | OUTPATIENT
Start: 2022-09-19 | End: 2022-09-19 | Stop reason: HOSPADM

## 2022-09-19 RX ORDER — KETOROLAC TROMETHAMINE 30 MG/ML
INJECTION, SOLUTION INTRAMUSCULAR; INTRAVENOUS PRN
Status: DISCONTINUED | OUTPATIENT
Start: 2022-09-19 | End: 2022-09-19 | Stop reason: ALTCHOICE

## 2022-09-19 RX ORDER — FENTANYL CITRATE 50 UG/ML
25 INJECTION, SOLUTION INTRAMUSCULAR; INTRAVENOUS EVERY 5 MIN PRN
Status: DISCONTINUED | OUTPATIENT
Start: 2022-09-19 | End: 2022-09-19 | Stop reason: HOSPADM

## 2022-09-19 RX ORDER — METFORMIN HYDROCHLORIDE 500 MG/1
1000 TABLET, EXTENDED RELEASE ORAL 2 TIMES DAILY
Status: DISCONTINUED | OUTPATIENT
Start: 2022-09-19 | End: 2022-09-19 | Stop reason: HOSPADM

## 2022-09-19 RX ORDER — HYDROMORPHONE HYDROCHLORIDE 1 MG/ML
1 INJECTION, SOLUTION INTRAMUSCULAR; INTRAVENOUS; SUBCUTANEOUS
Status: DISCONTINUED | OUTPATIENT
Start: 2022-09-19 | End: 2022-09-19 | Stop reason: HOSPADM

## 2022-09-19 RX ORDER — SENNA AND DOCUSATE SODIUM 50; 8.6 MG/1; MG/1
1 TABLET, FILM COATED ORAL 2 TIMES DAILY
Status: DISCONTINUED | OUTPATIENT
Start: 2022-09-19 | End: 2022-09-19 | Stop reason: HOSPADM

## 2022-09-19 RX ORDER — ONDANSETRON 4 MG/1
4 TABLET, ORALLY DISINTEGRATING ORAL EVERY 8 HOURS PRN
Status: DISCONTINUED | OUTPATIENT
Start: 2022-09-19 | End: 2022-09-19 | Stop reason: HOSPADM

## 2022-09-19 RX ORDER — OXYCODONE HYDROCHLORIDE 5 MG/1
5-10 TABLET ORAL EVERY 4 HOURS PRN
Qty: 60 TABLET | Refills: 0 | Status: SHIPPED | OUTPATIENT
Start: 2022-09-19 | End: 2022-09-24

## 2022-09-19 RX ORDER — GLYCOPYRROLATE 0.2 MG/ML
INJECTION INTRAMUSCULAR; INTRAVENOUS PRN
Status: DISCONTINUED | OUTPATIENT
Start: 2022-09-19 | End: 2022-09-19 | Stop reason: SDUPTHER

## 2022-09-19 RX ORDER — TRANEXAMIC ACID 100 MG/ML
INJECTION, SOLUTION INTRAVENOUS PRN
Status: DISCONTINUED | OUTPATIENT
Start: 2022-09-19 | End: 2022-09-19 | Stop reason: SDUPTHER

## 2022-09-19 RX ORDER — TAMSULOSIN HYDROCHLORIDE 0.4 MG/1
0.4 CAPSULE ORAL DAILY
Status: DISCONTINUED | OUTPATIENT
Start: 2022-09-19 | End: 2022-09-19 | Stop reason: HOSPADM

## 2022-09-19 RX ORDER — DIPHENHYDRAMINE HYDROCHLORIDE 50 MG/ML
25 INJECTION INTRAMUSCULAR; INTRAVENOUS EVERY 6 HOURS PRN
Status: DISCONTINUED | OUTPATIENT
Start: 2022-09-19 | End: 2022-09-19 | Stop reason: HOSPADM

## 2022-09-19 RX ORDER — MELOXICAM 7.5 MG/1
7.5-15 TABLET ORAL DAILY PRN
Qty: 30 TABLET | Refills: 2 | Status: SHIPPED | OUTPATIENT
Start: 2022-09-19

## 2022-09-19 RX ORDER — ACETAMINOPHEN 500 MG
1000 TABLET ORAL ONCE
Status: DISCONTINUED | OUTPATIENT
Start: 2022-09-19 | End: 2022-09-19

## 2022-09-19 RX ORDER — SODIUM CHLORIDE 9 MG/ML
INJECTION, SOLUTION INTRAVENOUS PRN
Status: DISCONTINUED | OUTPATIENT
Start: 2022-09-19 | End: 2022-09-19 | Stop reason: HOSPADM

## 2022-09-19 RX ORDER — ACETAMINOPHEN 500 MG
1000 TABLET ORAL ONCE
Status: COMPLETED | OUTPATIENT
Start: 2022-09-19 | End: 2022-09-19

## 2022-09-19 RX ORDER — SODIUM CHLORIDE 9 MG/ML
INJECTION, SOLUTION INTRAVENOUS PRN
Status: DISCONTINUED | OUTPATIENT
Start: 2022-09-19 | End: 2022-09-19

## 2022-09-19 RX ORDER — DIPHENHYDRAMINE HCL 25 MG
25 CAPSULE ORAL EVERY 6 HOURS PRN
Status: DISCONTINUED | OUTPATIENT
Start: 2022-09-19 | End: 2022-09-19 | Stop reason: HOSPADM

## 2022-09-19 RX ORDER — LORAZEPAM 1 MG/1
1 TABLET ORAL 4 TIMES DAILY PRN
Status: DISCONTINUED | OUTPATIENT
Start: 2022-09-19 | End: 2022-09-19 | Stop reason: HOSPADM

## 2022-09-19 RX ORDER — SODIUM CHLORIDE 0.9 % (FLUSH) 0.9 %
5-40 SYRINGE (ML) INJECTION PRN
Status: DISCONTINUED | OUTPATIENT
Start: 2022-09-19 | End: 2022-09-19 | Stop reason: HOSPADM

## 2022-09-19 RX ORDER — METOCLOPRAMIDE HYDROCHLORIDE 5 MG/ML
10 INJECTION INTRAMUSCULAR; INTRAVENOUS
Status: DISCONTINUED | OUTPATIENT
Start: 2022-09-19 | End: 2022-09-19 | Stop reason: HOSPADM

## 2022-09-19 RX ORDER — BUPIVACAINE HYDROCHLORIDE 7.5 MG/ML
INJECTION, SOLUTION INTRASPINAL
Status: COMPLETED | OUTPATIENT
Start: 2022-09-19 | End: 2022-09-19

## 2022-09-19 RX ORDER — METHOCARBAMOL 750 MG/1
750 TABLET, FILM COATED ORAL 3 TIMES DAILY PRN
Qty: 40 TABLET | Refills: 1 | Status: SHIPPED | OUTPATIENT
Start: 2022-09-19

## 2022-09-19 RX ORDER — BROMOCRIPTINE MESYLATE 2.5 MG/1
5 TABLET ORAL NIGHTLY
Status: DISCONTINUED | OUTPATIENT
Start: 2022-09-19 | End: 2022-09-19 | Stop reason: HOSPADM

## 2022-09-19 RX ORDER — ASPIRIN 81 MG/1
81 TABLET ORAL EVERY 12 HOURS
Qty: 70 TABLET | Refills: 0 | Status: SHIPPED | OUTPATIENT
Start: 2022-09-19 | End: 2022-10-24

## 2022-09-19 RX ORDER — OXYCODONE HYDROCHLORIDE 5 MG/1
10 TABLET ORAL EVERY 4 HOURS PRN
Status: DISCONTINUED | OUTPATIENT
Start: 2022-09-19 | End: 2022-09-19 | Stop reason: HOSPADM

## 2022-09-19 RX ORDER — SILDENAFIL 100 MG/1
100 TABLET, FILM COATED ORAL PRN
Status: DISCONTINUED | OUTPATIENT
Start: 2022-09-20 | End: 2022-09-19 | Stop reason: HOSPADM

## 2022-09-19 RX ADMIN — SODIUM CHLORIDE, POTASSIUM CHLORIDE, SODIUM LACTATE AND CALCIUM CHLORIDE: 600; 310; 30; 20 INJECTION, SOLUTION INTRAVENOUS at 07:40

## 2022-09-19 RX ADMIN — Medication 2 G: at 09:20

## 2022-09-19 RX ADMIN — ROPIVACAINE HYDROCHLORIDE 20 ML: 2 INJECTION, SOLUTION EPIDURAL; INFILTRATION at 09:09

## 2022-09-19 RX ADMIN — BUPIVACAINE HYDROCHLORIDE IN DEXTROSE 10 MG: 7.5 INJECTION, SOLUTION SUBARACHNOID at 09:31

## 2022-09-19 RX ADMIN — PHENYLEPHRINE HYDROCHLORIDE 150 MCG: 0.1 INJECTION, SOLUTION INTRAVENOUS at 09:51

## 2022-09-19 RX ADMIN — TRANEXAMIC ACID 1000 MG: 100 INJECTION, SOLUTION INTRAVENOUS at 09:30

## 2022-09-19 RX ADMIN — OXYCODONE 10 MG: 5 TABLET ORAL at 14:26

## 2022-09-19 RX ADMIN — ONDANSETRON 4 MG: 2 INJECTION INTRAMUSCULAR; INTRAVENOUS at 09:38

## 2022-09-19 RX ADMIN — PHENYLEPHRINE HYDROCHLORIDE 200 MCG: 0.1 INJECTION, SOLUTION INTRAVENOUS at 10:07

## 2022-09-19 RX ADMIN — ACETAMINOPHEN 1000 MG: 500 TABLET, FILM COATED ORAL at 07:33

## 2022-09-19 RX ADMIN — SODIUM CHLORIDE, SODIUM LACTATE, POTASSIUM CHLORIDE, AND CALCIUM CHLORIDE: 600; 310; 30; 20 INJECTION, SOLUTION INTRAVENOUS at 09:19

## 2022-09-19 RX ADMIN — PHENYLEPHRINE HYDROCHLORIDE 200 MCG: 0.1 INJECTION, SOLUTION INTRAVENOUS at 09:59

## 2022-09-19 RX ADMIN — MIDAZOLAM HYDROCHLORIDE 2 MG: 1 INJECTION, SOLUTION INTRAMUSCULAR; INTRAVENOUS at 09:07

## 2022-09-19 RX ADMIN — PHENYLEPHRINE HYDROCHLORIDE 150 MCG: 0.1 INJECTION, SOLUTION INTRAVENOUS at 09:46

## 2022-09-19 RX ADMIN — GLYCOPYRROLATE 0.2 MG: 0.2 INJECTION, SOLUTION INTRAMUSCULAR; INTRAVENOUS at 10:14

## 2022-09-19 RX ADMIN — DEXAMETHASONE SODIUM PHOSPHATE 10 MG: 10 INJECTION INTRAMUSCULAR; INTRAVENOUS at 09:38

## 2022-09-19 RX ADMIN — SODIUM CHLORIDE, SODIUM LACTATE, POTASSIUM CHLORIDE, AND CALCIUM CHLORIDE: 600; 310; 30; 20 INJECTION, SOLUTION INTRAVENOUS at 09:50

## 2022-09-19 RX ADMIN — PROPOFOL 100 MCG/KG/MIN: 10 INJECTION, EMULSION INTRAVENOUS at 09:36

## 2022-09-19 ASSESSMENT — PAIN SCALES - GENERAL
PAINLEVEL_OUTOF10: 2
PAINLEVEL_OUTOF10: 3
PAINLEVEL_OUTOF10: 5

## 2022-09-19 ASSESSMENT — PAIN DESCRIPTION - ORIENTATION: ORIENTATION: LEFT

## 2022-09-19 ASSESSMENT — KOOS JR
RISING FROM SITTING: 1
TWISING OR PIVOTING ON KNEE: 1
STANDING UPRIGHT: 1
GOING UP OR DOWN STAIRS: 1
HOW SEVERE IS YOUR KNEE STIFFNESS AFTER FIRST WAKING IN MORNING: 1
KOOS JR TOTAL INTERVAL SCORE: 68.284
BENDING TO THE FLOOR TO PICK UP OBJECT: 1
STRAIGHTENING KNEE FULLY: 1

## 2022-09-19 ASSESSMENT — PAIN DESCRIPTION - LOCATION: LOCATION: KNEE

## 2022-09-19 ASSESSMENT — PAIN DESCRIPTION - DESCRIPTORS: DESCRIPTORS: ACHING

## 2022-09-19 ASSESSMENT — PAIN - FUNCTIONAL ASSESSMENT: PAIN_FUNCTIONAL_ASSESSMENT: 0-10

## 2022-09-19 NOTE — ANESTHESIA PROCEDURE NOTES
Spinal Block    Patient location during procedure: OR  End time: 9/19/2022 9:31 AM  Reason for block: primary anesthetic  Staffing  Performed: anesthesiologist   Anesthesiologist: Roz Cummings MD  Spinal Block  Patient position: sitting  Prep: ChloraPrep  Patient monitoring: cardiac monitor, continuous pulse ox, continuous capnometry, frequent blood pressure checks and oxygen  Approach: midline  Location: L3/L4  Provider prep: sterile gloves and mask  Needle  Needle type: pencil-tip   Needle gauge: 25 G  Needle length: 3.5 in  Assessment  Sensory level: T4  Events: cerebrospinal fluid  Swirl obtained: Yes  CSF: clear  Attempts: 1  Hemodynamics: stable  Preanesthetic Checklist  Completed: patient identified, IV checked, site marked, risks and benefits discussed, surgical/procedural consents, equipment checked, pre-op evaluation, timeout performed, anesthesia consent given, oxygen available and monitors applied/VS acknowledged

## 2022-09-19 NOTE — DISCHARGE INSTRUCTIONS
[facility]      Patient Discharge Instructions    Verner Rutter / [id number] : 1950    Admitted 2022 Discharged: 2022     IF YOU HAVE ANY PROBLEMS ONCE YOU ARE AT HOME CALL THE FOLLOWING NUMBERS:   Main office number: [phone]      Medications    The medications you are to continue on are listed on the medication reconciliation sheet. Narcotic pain medications as well as supplemental iron can cause constipation. If this occurs try stopping the narcotic pain medication and/or the iron. It is important that you take the medication exactly as they are prescribed. Medications which increase your risk of blood clots are listed to stop for 5 weeks after surgery as well as medications or supplements which increase your risk of bleeding complications. Keep your medication in the bottles provided by the pharmacist and keep a list of the medication names, dosages, and times to be taken in your wallet. Do not take other medications without consulting your doctor. Important Information    Do NOT smoke as this will greatly increase your risk of infection! Resume your prehospital diet. If you have excessive nausea or vomitting call your doctor's office     Leg swelling and warmth is normal for 6 months after surgery. If you experience swelling in your leg elevate you leg while laying down with your toes above your heart. If you have sudden onset severe swelling with leg pain call our office. Use Johny Hose stockings until we see you in the office for your follow up appointment. The stitches deep inside take approximately 6 months to dissolve. There will be sharp shooting, stinging and burning pain. This is normal and will resolve between 3-6 months after surgery. Difficulty sleeping is normal following total Knee and Hip replacement. You may try melatonin, an over-the-counter sleep aid or benadryl to help with sleep.  Most patients will resume sleeping through the night 8 weeks after surgery. Home Physical Therapy is arranged. Home Health will contact you within 48 hrs of discharge that you have chosen. If you have not received a call within this time frame please contact that provider you chose. You should be given this information before you leave the hospital.     You are at a risk for falls. Use the rolling walker when walking. Patients who have had a joint replacement should not drive if they are still taking narcotic pain mediation during the daytime hours. Most patients wean themselves off of pain medication within 2-5 weeks after surgery. When to Call the office    - If you have a temperature greater then 101  - Uncontrolled vomiting   - Loose control of your bladder or bowel function  - Are unable to bear any wieght   - Need a pain medication refill          Total Knee Replacement: What to Expect at  [address] had a total knee replacement. The doctor replaced the worn ends of the bones that connect to your knee (thighbone and lower leg bone) with plastic and metal parts. When you leave the hospital, you should be able to move around with a walker or crutches. But you will need someone to help you at home until you have more energy and can move around better. You will go home with a bandage and stitches, staples, skin glue, or tape strips. Change the bandage as your doctor tells you to. If you have stitches or staples, your doctor will remove them about 2 weeks after your surgery. Glue or tape strips will fall off on their own over time. You may still have some mild pain, and the area may be swollen for a few months after surgery. Your knee will continue to improve for up to a year. You will probably use a walker for some time after surgery. When you are ready, you can use a cane. You may be able to walk without support after a couple weeks, or when you are comfortable.   You will need to do months of physical rehabilitation (rehab) after a knee replacement. Rehab will help you strengthen the muscles of the knee and help you regain movement. After you recover, your artificial knee will allow you to do normal daily activities with less pain or no pain at all. You may be able to hike, dance, or ride a bike. Talk to your doctor about whether you can do more strenuous activities. Always tell your caregivers that you have an artificial knee. How long it will take to walk on your own, return to normal activities, and go back to work depends on your health and how well your rehabilitation (rehab) program goes. The better you do with your rehab exercises, the quicker you will get your strength and movement back. This care sheet gives you a general idea about how long it will take for you to recover. But each person recovers at a different pace. Follow the steps below to get better as quickly as possible. How can you care for yourself at home? Activity    Rest when you feel tired. You may take a nap, but don't stay in bed all day. When you sit, use a chair with arms. You can use the arms to help you stand up. Work with your physical therapist to find the best way to exercise. What you can do as your knee heals will depend on whether your new knee is cemented or uncemented. You may not be able to do certain things for a while if your new knee is uncemented. After your knee has healed enough, you can do more strenuous activities with caution. You can golf, but you may want to use a golf cart for some time. And don't wear shoes with spikes. You can bike on a flat road or on a stationary bike. Talk to your doctor before biking uphill. Your doctor may suggest that you stay away from activities that put stress on your knee. These include tennis, badminton, contact sports like football, jumping (such as in basketball), jogging, and running. Avoid activities where you might fall. Do not sit for more than 1 hour at a time.  Get up and walk blood clots. If you take a blood thinner, be sure you get instructions about how to take your medicine safely. Blood thinners can cause serious bleeding problems. This medicine could be in pill form or as a shot (injection). If a shot is needed, your doctor will tell you how to do this. Be safe with medicines. Take pain medicines exactly as directed. If the doctor gave you a prescription medicine for pain, take it as prescribed. If you are not taking a prescription pain medicine, ask your doctor if you can take an over-the-counter medicine. Plan to take your pain medicine 30 minutes before exercises. It is easier to prevent pain before it starts than to stop it after it has started. If you think your pain medicine is making you sick to your stomach: Take your medicine after meals (unless your doctor has told you not to). Ask your doctor for a different pain medicine. If your doctor prescribed antibiotics, take them as directed. Do not stop taking them just because you feel better. You need to take the full course of antibiotics. Incision care    If your doctor told you how to care for your cut (incision), follow your doctor's instructions. You will have a dressing over the cut. A dressing helps the incision heal and protects it. Your doctor will tell you how to take care of this. If you did not get instructions, follow this general advice: If you have strips of tape on the cut the doctor made, leave the tape on for a week or until it falls off. If you have stitches or staples, your doctor will tell you when to come back to have them removed. If you have skin glue on the cut, leave it on until it falls off. Skin glue is also called skin adhesive or liquid stitches. Change the bandage every day. Wash the area daily with warm water, and pat it dry. Don't use hydrogen peroxide or alcohol. They can slow healing.   You may cover the area with a gauze bandage if it oozes fluid or rubs against clothing. You may shower 24 to 48 hours after surgery. Pat the incision dry. Don't swim or take a bath for the first 2 weeks, or until your doctor tells you it is okay. Exercise    Your rehab program will give you a number of exercises to do to help you get back your knee's range of motion and strength. Always do them as your therapist tells you. Ice    For pain and swelling, put ice or a cold pack on the area for 10 to 20 minutes at a time. Put a thin cloth between the ice and your skin. If your doctor recommended cold therapy using a portable machine, follow the instructions that came with the machine. Other instructions    Wear compression stockings if your doctor told you to. These may help to prevent blood clots. Your doctor will tell you how long you need to keep wearing the compression stockings. Carry a medical alert card that says you have an artificial joint. You have metal pieces in your knee. These may set off some airport metal detectors. Follow-up care is a key part of your treatment and safety. Be sure to make and go to all appointments, and call your doctor if you are having problems. It's also a good idea to know your test results and keep a list of the medicines you take. When should you call for help? Call 911 anytime you think you may need emergency care. For example, call if:    You passed out (lost consciousness). You have severe trouble breathing. You have sudden chest pain and shortness of breath, or you cough up blood. Call your doctor now or seek immediate medical care if:    You have signs of infection, such as: Increased pain, swelling, warmth, or redness. Red streaks leading from the incision. Pus draining from the incision. A fever. You have signs of a blood clot, such as:  Pain in your calf, back of the knee, thigh, or groin. Redness and swelling in your leg or groin. Your incision comes open and begins to bleed, or the bleeding increases.      You have pain that does not get better after you take pain medicine. Watch closely for changes in your health, and be sure to contact your doctor if:    You do not have a bowel movement after taking a laxative. Where can you learn more? Go to [email or web address] and sign in to your [employer] account. Enter C719 in the [facility] box to learn more about \"Total Knee Replacement: What to Expect at Home. \"     If you do not have an account, please click on the \"Sign Up Now\" link. Current as of: March 9, 2022               Content Version: 13.4  © 2006-2022 Healthwise, [employer]. Care instructions adapted under license by [facility] ([facility]). If you have questions about a medical condition or this instruction, always ask your healthcare professional. [address] any warranty or liability for your use of this information. Information obtained by :  I understand that if any problems occur once I am at home I am to contact my physician. I understand and acknowledge receipt of the instructions indicated above.                                                                                                                                            Physician's or R.N.'s Signature                                                                  Date/Time                                                                                                                                              Patient or Representative Signature                                                          Date/Time

## 2022-09-19 NOTE — FLOWSHEET NOTE
RN with two patients with interventions needed on first patient; patient ready for d/c but not available to call report. When RN called report at 36 the nurse on the floor was not available.

## 2022-09-19 NOTE — PROGRESS NOTES
TRANSFER - IN REPORT:    Verbal report received from Margot HERNANDEZ on Kelly Reasons  being received from PACU for routine post-op      Report consisted of patient's Situation, Background, Assessment and   Recommendations(SBAR). Information from the following report(s) Surgery Report, Intake/Output, and MAR was reviewed with the receiving nurse. Opportunity for questions and clarification was provided.

## 2022-09-19 NOTE — PROGRESS NOTES
Activity Inpatient   How much help for putting on and taking off regular lower body clothing?: A Little  How much help for Bathing?: A Little  How much help for Toileting?: A Little  How much help for putting on and taking off regular upper body clothing?: None  How much help for taking care of personal grooming?: None  How much help for eating meals?: None  AM-PAC Inpatient Daily Activity Raw Score: 21  AM-PAC Inpatient ADL T-Scale Score : 44.27  ADL Inpatient CMS 0-100% Score: 32.79  ADL Inpatient CMS G-Code Modifier : CJ     SUBJECTIVE:     Mr. Suárez May state he wanted to go home today      Social/Functional   Lives With: Spouse  Type of Home: House  Home Layout: One level  Home Access: Stairs to enter without rails  Entrance Stairs - Number of Steps: 1  Bathroom Shower/Tub: Tub/Shower unit  Home Equipment: [address], rolling    OBJECTIVE:     Rhett Grey / Jaz Leon / Janelle Gao: NA    RESTRICTIONS/PRECAUTIONS:       PAIN: VITALS / O2:   Pre Treatment:      2/10    Post Treatment: 2/10 [facility] Vitals          Oxygen        GROSS EVALUATION: INTACT IMPAIRED   (See Comments)   UE AROM x []   UE PROM [][] []   Strength [x]       Posture / Balance []  Cga in standing   Sensation []     Coordination []  Cga in standing     Tone []       Edema []    Activity Tolerance []    Fair withstanding   Hand Dominance R [] L []      COGNITION/  PERCEPTION: INTACT IMPAIRED   (See Comments)   Orientation [x]     Vision [x]     Hearing [x]     Cognition  [x]     Perception [x]       MOBILITY: I Mod I S SBA CGA Min Mod Max Total  NT x2 Comments:   Bed Mobility    Rolling [] [] [] [] [] [] [] [] [] [] []    Supine to Sit [] [] [] [] [x] [] [] [] [] [] []    Scooting [] [] [] [] [x] [] [] [] [] [] []    Sit to Supine [] [] [] [] [] [] [] [] [] [] []    Transfers    Sit to Stand [] [] [] [] [x] [] [] [] [] [] []    Bed to Chair [] [] [] [] [x] [] [] [] [] [] []    Stand to Sit [] [] [] [] [x] [] [] [] [] [] []    Tub/Shower [] [] [] [] [] [] [] [] [] [] []       Toilet [] [] [] [] [] [] [] [] [] [] []        [] [] [] [] [] [] [] [] [] [] []    I=Independent, Mod I=Modified Independent, S=Supervision/Setup, SBA=Standby Assistance, CGA=Contact Guard Assistance, Min=Minimal Assistance, Mod=Moderate Assistance, Max=Maximal Assistance, Total=Total Assistance, NT=Not Tested    ACTIVITIES OF DAILY LIVING: I Mod I S SBA CGA Min Mod Max Total NT Comments   BASIC ADLs:              Upper Body Bathing [] [] [] [] [] [] [] [] [] []    Lower Body Bathing [] [] [] [] [] [] [] [] [] []    Toileting [] [] [] [] [x] [] [] [] [] []    Upper Body Dressing [] [] [x] [] [] [] [] [] [] []    Lower Body Dressing [] [] [] [] [] [x] [] [] [] []    Feeding [] [] [x] [] [] [] [] [] [] []    Grooming [] [] [x] [] [] [] [] [] [] []    Personal Device Care [] [] [] [] [] [] [] [] [] []    Functional Mobility [] [] [] [] [x] [] [] [] [] []    I=Independent, Mod I=Modified Independent, S=Supervision/Setup, SBA=Standby Assistance, CGA=Contact Guard Assistance, Min=Minimal Assistance, Mod=Moderate Assistance, Max=Maximal Assistance, Total=Total Assistance, NT=Not Tested    PLAN:     FREQUENCY/DURATION   OT Plan of Care: 1 time/week, 2 times/week for duration of hospital stay or until stated goals are met, whichever comes first.    ACUTE OCCUPATIONAL THERAPY GOALS:   (Developed with and agreed upon by patient and/or caregiver.)    GOALS:   DISCHARGE GOALS (in preparation for going home/rehab):  3 days  1. Mr. Eri Saravia will perform lower body dressing activity with minimal assist required to demonstrate improved functional mobility and safety. -GOAL MET 9/19/22     2. Mr. Eri Saravia will perform bathing activity with minimal assist required to demonstrate improved functional mobility and safety. 3.  Mr. Eri Saravia will perform toileting activity with  contact guard assist to demonstrate improved functional mobility and safety. -GOAL MET 9/19/22     4.   Mr. Eri Saravia will perform all functional transfers transfer with contact guard assist to demonstrate improved functional mobility and safety. 5. Pt will be able to perform self care with stand by to minimal assistance and ambulate short distances with family that is capable of assisting patient. -GOAL MET 9/19/22           PROBLEM LIST:   (Skilled intervention is medically necessary to address:)  Decreased Activity Tolerance  Decreased Balance  Decreased Coordination  Decreased Gait Ability  Decreased Safety Awareness  Decreased Strength  Decreased Transfer Abilities  Increased Pain   INTERVENTIONS PLANNED:   (Benefits and precautions of occupational therapy have been discussed with the patient.)  Self Care Training  Therapeutic Activity  Therapeutic Exercise/HEP  Neuromuscular Re-education  Education         TREATMENT:     EVALUATION: LOW COMPLEXITY: (Untimed Charge)  TREATMENT:   SELF CARE: (25 minutes):   Procedure(s) (per grid) utilized to improve and/or restore self-care/home management as related to dressing, toileting, grooming, self feeding, and functional mobility . Required minimal visual, verbal, manual, and tactile cueing to facilitate activities of daily living skills, compensatory activities, and Ot poc, dc planning, post opday 1 ADL task performance .       AFTER TREATMENT PRECAUTIONS: Bed/Chair Locked, Call light within reach, Chair, Needs within reach, RN notified, and Visitors at bedside    INTERDISCIPLINARY COLLABORATION:  RN/ PCT, PT/ PTA, and OT/ XIE    EDUCATION:  Education Given To: Patient, Family  Education Provided: Role of Therapy, Plan of Care, Precautions, Transfer Training, ADL Adaptive Strategies, IADL Safety, Fall Prevention Strategies  Education Method: Demonstration, Verbal  Barriers to Learning: None  Education Outcome: Verbalized understanding, Demonstrated understanding  [x] Safe And Effective Hygiene  [x] Fall Precautions  [] Hip Precautions  [] D/C Instruction Review [] Prosthesis Review  [x] Walker Management/Safety  [x] Adaptive Equipment as Needed  [x] Therapeutic Resting Position of Joint       TOTAL TREATMENT DURATION AND TIME:  Time In: 1510  Time Out: [address]  Minutes: [address], OT

## 2022-09-19 NOTE — ANESTHESIA PROCEDURE NOTES
Peripheral Block    Patient location during procedure: pre-op  Reason for block: post-op pain management and at surgeon's request  Start time: 9/19/2022 9:07 AM  End time: 9/19/2022 9:09 AM  Staffing  Anesthesiologist: Bubba Fabian MD  Preanesthetic Checklist  Completed: patient identified, IV checked, site marked, risks and benefits discussed, surgical/procedural consents, equipment checked, pre-op evaluation, timeout performed, anesthesia consent given, oxygen available and monitors applied/VS acknowledged  Peripheral Block   Patient position: prone  Prep: ChloraPrep  Provider prep: mask and sterile gloves  Patient monitoring: cardiac monitor, continuous pulse ox, frequent blood pressure checks, IV access, oxygen and responsive to questions  Block type: Femoral  Adductor canal  Laterality: left  Injection technique: single-shot  Guidance: ultrasound guided    Needle   Needle type: insulated echogenic nerve stimulator needle   Needle gauge: 22 G  Needle localization: anatomical landmarks and ultrasound guidance  Test dose: negative  Needle length: 4.   Assessment   Injection assessment: negative aspiration for heme, no paresthesia on injection and local visualized surrounding nerve on ultrasound  Slow fractionated injection: yes  Hemodynamics: stable  Real-time US image taken/store: yes  Outcomes: uncomplicated    Medications Administered  dexamethasone 4 MG/ML - Perineural   5 mg - 9/19/2022 9:09:00 AM  ropivacaine (NAROPIN) injection 0.2% - Perineural   20 mL - 9/19/2022 9:09:00 AM

## 2022-09-19 NOTE — PROGRESS NOTES
PHYSICAL THERAPY JOINT CAMP: TOTAL KNEE ARTHROPLASTY Initial Assessment and PM  (Link to Caseload Tracking: PT Visit Days : 1  Acknowledge Orders  Time In/Out  PT Charge Capture  Rehab Caseload Tracker  Episode   Peder Members is a 71 y.o. male   PRIMARY DIAGNOSIS: Degenerative arthritis of left knee  Degenerative arthritis of left knee [M17.12]  Primary osteoarthritis of left knee [M17.12]  Procedure(s) (LRB):  lt KNEE TOTAL ARTHROPLASTY ROBOTIC (Left)  Day of Surgery  Reason for Referral: Pain in Left Knee (M25.562)  Stiffness of Left Knee, Not elsewhere classified (M25.662)  Difficulty in walking, Not elsewhere classified (R26.2)  Outpatient in a bed: Payor: MEDICARE / Plan: MEDICARE PART A AND B / Product Type: *No Product type* /     REHAB RECOMMENDATIONS:   Recommendation to date pending progress:  Setting:  Home Health Therapy    Equipment:     Pt has rolling walker & BSC     RANGE OF MOTION:   Left Knee Flexion: L Knee Flexion 0-145: 123  Left Knee Extension: L Knee Extension 0: 0     GAIT: I Mod I S SBA CGA Min Mod Max Total  NT x2 Comments:   Level of Assistance [] [] [] [x] [] [] [] [] [] [] []            Weightbearing Status  Left Lower Extremity Weight Bearing: Weight Bearing As Tolerated    Distance  additional 210ft after an initial 50ft gait assessment feet    Gait Quality Antalgic, Decreased marcos , Decreased step clearance, Decreased step length, and Decreased stance    DME Rolling Walker     Stairs Up & down 1 step with walker & CGA     Ramp NT    I=Independent, Mod I=Modified Independent, S=Supervision, SBA=Standby Assistance, CGA=Contact Guard Assistance,   Min=Minimal Assistance, Mod=Moderate Assistance, Max=Maximal Assistance, Total=Total Assistance, NT=Not Tested    ASSESSMENT:   ASSESSMENT:  Mr. Jada Carpenter  presented with impaired strength & mobility s/p left TKA.  This patient also had decreased stability during out of bed activity but he is currently functioning at a level that would be manageable for a caregiver in the home environment. This patient wishes to return home day of surgery. Reviewed with patient  PT expectations & general safety for discharge to home later this pm. This pt is expected to progress quickly with follow up therapy.  .     Outcome Measure:   KOOS-JR:  Jr CATIA. Knee Survey Score: 7    SUBJECTIVE:   Mr. Brianna Garcia states, no concerns     Home Environment/Prior Level of Function Lives With: Spouse  Type of Home: House  Home Layout: One level  Home Access: Stairs to enter without rails  Entrance Stairs - Number of Steps: 1  Bathroom Shower/Tub: Tub/Shower unit  Home Equipment: Walker, rolling  Receives Help From: Family  ADL Assistance: Independent  Ambulation Assistance: Independent  Transfer Assistance: Independent  Active : Yes  Mode of Transportation: Car    OBJECTIVE:     PAIN: VITAL SIGNS: LINES/DRAINS:   Pre Treatment:  Pain Assessment: 0-10  Pain Level: 2  Pain Location: Knee  Pain Orientation: Left  Non-Pharmaceutical Pain Intervention(s): Ambulation/Increased Activity;Cold pack      Post Treatment: 2/10 Vitals NT       Oxygen NT    IV    RESTRICTIONS/PRECAUTIONS:  Restrictions/Precautions: Weight Bearing, Fall Risk  Left Lower Extremity Weight Bearing: Weight Bearing As Tolerated                LOWER EXTREMITY GROSS EVALUATION:  RIGHT LE   Within Functional Limits   Abnormal   Comments   Strength [x] []     Range of Motion [x] []        LEFT LE Within Functional Limits   Abnormal   Comments   Strength [] [x]     Range of Motion [] [] AROM LLE (degrees)  L Knee Flexion 0-145: 123  L Knee Extension 0: 0     UPPER EXTREMITY GROSS EVALUATION:     Within Functional Limits   Abnormal   Comments   Strength [x] []    Range of Motion [x] []      SENSATION  Sensation [x]  grossly     COGNITION/  PERCEPTION: Intact Impaired (Comments):   Orientation [x]     Vision [x]     Hearing [x]     Cognition  [x]       MOBILITY: I Mod I S SBA CGA Min Mod Max Total  NT x2 Comments:   Bed Mobility    Rolling [] [] [] [x] [] [] [] [] [] [] []    Supine to Sit [] [] [] [x] [] [] [] [] [] [] []    Scooting [] [] [] [x] [] [] [] [] [] [] []    Sit to Supine [] [] [] [x] [] [] [] [] [] [] []    Transfers    Sit to Stand [] [] [] [x] [] [] [] [] [] [] []    Bed to Chair [] [] [] [x] [] [] [] [] [] [] []    Stand to Sit [] [] [] [x] [] [] [] [] [] [] []    Stand Pivot [] [] [] [x] [] [] [] [] [] [] []    Toilet [] [] [] [] [] [] [] [] [] [] []     [] [] [] [] [] [] [] [] [] [] []    I=Independent, Mod I=Modified Independent, S=Supervision, SBA=Standby Assistance, CGA=Contact Guard Assistance,   Min=Minimal Assistance, Mod=Moderate Assistance, Max=Maximal Assistance, Total=Total Assistance, NT=Not Tested    BALANCE: Good Fair+ Fair Fair- Poor NT Comments   Sitting Static [] [] [x] [] [] []    Sitting Dynamic [] [] [x] [] [] []              Standing Static [] [] [x] [x] [] []    Standing Dynamic [] [] [x] [x] [] []      PLAN:   ACUTE PHYSICAL THERAPY GOALS:   (Developed with and agreed upon by patient and/or caregiver.)  GOALS (1-4 days):  (1.)Mr. Jose Luis Aviles will move from supine to sit and sit to supine  in bed with INDEPENDENT. (2.)Mr. Jose Luis Aviles will transfer from bed to chair and chair to bed with SUPERVISION using the least restrictive device. (3.)Mr. Jose Luis Aviles will ambulate with SUPERVISION for 400 feet with the least restrictive device. (4.)Mr. Jose Luis Aviles will ambulate up/down 1 steps with No railing with STAND BY ASSIST.  (5.)Mr. Jose Luis Aviles will increase left knee ROM to 0-90°.  ________________________________________________________________________________________________           FREQUENCY AND DURATION: BID for duration of hospital stay or until stated goals are met, whichever comes first.    THERAPY PROGNOSIS: Good    PROBLEM LIST:   (Skilled intervention is medically necessary to address:)  Decreased ADL/Functional Activities, Decreased Activity Tolerance, Decreased AROM/PROM, Decreased Gait Ability, Decreased Strength, Decreased Transfer Abilities, and Increased Pain   INTERVENTIONS PLANNED:   (Benefits and precautions of physical therapy have been discussed with the patient.)  Therapeutic Activity, Therapeutic Exercise/HEP, Gait Training, Modalities, and Education       TREATMENT:   EVALUATION: LOW COMPLEXITY: (Untimed Charge)    TREATMENT:   Therapeutic Activity (23 Minutes): Therapeutic activity included TKA exercises, reviewed PT role, POC & PT expectations for DC, review of diagonal wt shift to reduce the risk of blood clots & for prep to wean off walker, repeated bed mobility for practice, progressive gait training an additional 210 ft after an initial 50 ft gait assessment & single step stair training  to improve functional Activity tolerance, Balance, Coordination, Mobility, Strength, and ROM.     TREATMENT GRID:  THERAPEUTIC  EXERCISES: DATE:  9/19 DATE:   DATE:      AM PM AM PM AM PM    [] [] [] [] [] []   Ankle Pumps  20       Quad Sets  20       Gluteal Sets  20       Hip Abd/ADduction  20       Straight Leg Raises  20       Knee Slides  20       Short Arc Quads  20       Chair Slides  20                         B = bilateral; AA = active assistive; A = active; P = passive      EDUCATION: Education Given To: Patient, Family, Caregiver  Education Provided: Role of Therapy, Plan of Care, Home Exercise Program, Precautions, Transfer Training, IADL Safety, Equipment, Family Education  Education Method: Demonstration, Verbal, Teach Back, Printed Information/Hand-outs  Education Outcome: Demonstrated understanding, Verbalized understanding  EDUCATION:  [x] Home Exercises  [x] Fall Precautions  [x] No Pillow Under Knee  [x] D/C Instruction Review [x] Cryocuff  [x] Walker Management/Safety  [] Adaptive Equipment as Needed     AFTER TREATMENT PRECAUTIONS: Bed/Chair Locked, Call light within reach, Needs within reach, RN notified, and Visitors at bedside    INTERDISCIPLINARY COLLABORATION:  RN/ PCT and OT/ XIE    COMPLIANCE WITH PROGRAM/EXERCISE: compliant all of the time. RECOMMENDATIONS/INTENT FOR NEXT TREATMENT SESSION: Treatment next visit will focus on increasing Mr. Adriel Andrea independence with bed mobility, transfers, gait training, strength/ROM exercises, modalities for pain, and patient education. TIME IN/OUT:  Time In: 1555  Time Out: 1625  Minutes: Josy Nolasco.  Yelena Olguin

## 2022-09-19 NOTE — INTERVAL H&P NOTE
Update History & Physical    The patient's History and Physical of September 14, 2022 was reviewed with the patient and I examined the patient. There was no change. The surgical site was confirmed by the patient and me. Plan: The risks, benefits, expected outcome, and alternative to the recommended procedure have been discussed with the patient. Patient understands and wants to proceed with the procedure.      Electronically signed by KRISTYN Malone on 9/19/2022 at 7:43 AM

## 2022-09-19 NOTE — PERIOP NOTE
RN attempted to call report; floor RN Jennifer Gonzales, RN was unavailable as she had just received a new patient. RN ask for the floor nurse to call her back when she is available.

## 2022-09-19 NOTE — CARE COORDINATION
09/19/22 2353   Service Assessment   Patient Orientation Alert and Oriented;Person;Place;Situation;Self   Cognition Alert   History Provided By Patient   Primary Caregiver Self   Support Systems Spouse/Significant Other   Prior Functional Level Independent in ADLs/IADLs   Current Functional Level Independent in ADLs/IADLs   Can patient return to prior living arrangement Yes   Ability to make needs known: Good   Family able to assist with home care needs: Yes   Social/Functional History   Lives With Spouse   Type of [facility] [address] Help From Family   ADL Assistance Independent   Ambulation Assistance Independent   Transfer Assistance Independent   Active  Yes   Mode of Transportation Car   Discharge Planning   Type of Residence House   Living Arrangements Spouse/Significant Other   Current Services Prior To Admission None   Potential Assistance Needed N/A   DME Ordered? No   Patient expects to be discharged to: [facility]   Transition of Care Consult (CM Consult) [facility];PT   Mode of Transport at Discharge Other (see comment)  (family)   Confirm Follow Up Transport Family   Condition of Participation: Discharge Planning   The Plan for Transition of Care is related to the following treatment goals: Home with [facility] and spouse. The Patient and/or Patient Representative was provided with a Choice of Provider? Patient   The Patient and/Or Patient Representative agree with the Discharge Plan? Yes   Freedom of Choice list was provided with basic dialogue that supports the patient's individualized plan of care/goals, treatment preferences, and shares the quality data associated with the providers? Yes   CM met with patient for d/c planning. Patient alert and oriented x 3, independent of ADL's and lives with his wife. He is S/P Left TKA. DME includes walker and declines bedside commode.  He confirms demographics and contact information is correct. Provided with choice list for home health and he chose [facility] home health and referral sent. Patient voices no concerns or needs for d/c. Patient in agreement with d/c. Patient to d/c home with [facility] home health PT and spouse.

## 2022-09-19 NOTE — PROGRESS NOTES
09/19/22 1500   Oxygen Therapy/Pulse Ox   O2 Therapy Oxygen   O2 Device Nasal cannula   O2 Flow Rate (L/min) 2 L/min   Heart Rate 74   SpO2 97 %   Patient achieved 2750 MI/sec on IS. Patient encouraged to do 10 breaths every hour while awake-patient agreed and demonstrated. No shortness of breath or distress noted. BS are clear b/l. [facility] notes reviewed- continuous Sat monitor order noted HS.

## 2022-09-19 NOTE — PERIOP NOTE
TRANSFER - OUT REPORT:    Verbal report given to Elizabeth Caraballo RN on Veneta Limb  being transferred to [facility] for routine progression of patient care       Report consisted of patient's Situation, Background, Assessment and   Recommendations(SBAR). Information from the following report(s) Nurse Handoff Report and Cardiac Rhythm SR  was reviewed with the receiving nurse. Lines:   Peripheral IV 09/19/22 Left Hand (Active)   Site Assessment Clean, dry & intact 09/19/22 1235   Line Status Infusing 09/19/22 1235   Phlebitis Assessment No symptoms 09/19/22 1235   Infiltration Assessment 0 09/19/22 1235   Dressing Status Clean, dry & intact 09/19/22 1235   Dressing Type Transparent 09/19/22 1235        Opportunity for questions and clarification was provided.       Patient transported with:  O2 @ 3lpm

## 2022-09-19 NOTE — ANESTHESIA PRE PROCEDURE
Department of Anesthesiology  Preprocedure Note       Name:  Travis Huddleston   Age:  71 y.o.  :  1950                                          MRN:  [id number]         Date:  2022      Surgeon: Jessie Mohs):  Randy Edwards MD    Procedure: Procedure(s):  lt KNEE TOTAL ARTHROPLASTY ROBOTIC    Medications prior to admission:   Prior to Admission medications    Medication Sig Start Date End Date Taking? Authorizing Provider   amLODIPine (NORVASC) 10 MG tablet Take 10 mg by mouth nightly 7/15/22   Historical Provider, MD   lisinopril (PRINIVIL;ZESTRIL) 10 MG tablet Take 40 mg by mouth nightly    Historical Provider, MD   HYDROcodone-acetaminophen (NORCO)  MG per tablet Take 1 tablet by mouth every 4 hours as needed.   Patient not taking: Reported on 2022    Historical Provider, MD   aspirin EC 81 MG EC tablet Take 81 mg by mouth in the morning and at bedtime Takes for heart health    Historical Provider, MD   metFORMIN (GLUCOPHAGE-XR) 500 MG extended release tablet Take 1,000 mg by mouth in the morning and at bedtime 22   Historical Provider, MD   bromocriptine (PARLODEL) 5 MG capsule Take 5 mg by mouth at bedtime Patient states he takes 4 capsules nightly    Historical Provider, MD   tamsulosin (FLOMAX) 0.4 mg capsule Take 0.4 mg by mouth daily    Historical Provider, MD   pravastatin (PRAVACHOL) 80 MG tablet Take 80 mg by mouth daily 22   Historical Provider, MD   TESTOSTERONE CYPIONATE IM Inject 50 mg into the muscle    Ar Automatic Reconciliation   acetaminophen (TYLENOL) 500 MG tablet Take 1-2 tablets by mouth every 6 hours as needed    Ar Automatic Reconciliation   budesonide-formoterol (SYMBICORT) 160-4.5 MCG/ACT AERO Inhale 2 puffs into the lungs 2 times daily 3/30/17   Ar Automatic Reconciliation   Docusate Sodium 100 MG TABS Take 2 tablets by mouth daily as needed  Patient not taking: Reported on 2022    Ar Automatic Reconciliation   EPINEPHrine (EPIPEN) 0.3 MG/0.3ML SOAJ injection Inject 0.3 mg into the muscle as needed  Patient not taking: Reported on 9/19/2022    Ar Automatic Reconciliation   hydrocortisone (CORTEF) 5 MG tablet Take 5 mg by mouth daily as needed  Patient not taking: No sig reported 7/7/21   Ar Automatic Reconciliation   levothyroxine (SYNTHROID) 100 MCG tablet Take 100 mcg by mouth every morning (before breakfast) 1/19/18   Ar Automatic Reconciliation   LORazepam (ATIVAN) 1 MG tablet Take 1 mg by mouth 4 times daily as needed.  1/19/18   Ar Automatic Reconciliation   nitroGLYCERIN (NITROSTAT) 0.4 MG SL tablet Place 0.4 mg under the tongue as needed  Patient not taking: Reported on 8/31/2022 12/22/16   Ar Automatic Reconciliation   omeprazole (PRILOSEC) 40 MG delayed release capsule Take 40 mg by mouth nightly 1/19/18   Ar Automatic Reconciliation   sildenafil (VIAGRA) 100 MG tablet Take 100 mg by mouth as needed 7/18/17   Ar Automatic Reconciliation       Current medications:    Current Facility-Administered Medications   Medication Dose Route Frequency Provider Last Rate Last Admin    fentaNYL (SUBLIMAZE) injection 100 mcg  100 mcg IntraVENous Once PRN Deniz Miranda MD        scopolamine (TRANSDERM-SCOP) transdermal patch 1 patch  1 patch TransDERmal Q72H Deniz Miranda MD   1 patch at 09/19/22 3972    lactated ringers infusion   IntraVENous Continuous Deniz Miranda  mL/hr at 09/19/22 0740 New Bag at 09/19/22 0740    sodium chloride flush 0.9 % injection 5-40 mL  5-40 mL IntraVENous 2 times per day Deniz Miranda MD        sodium chloride flush 0.9 % injection 5-40 mL  5-40 mL IntraVENous PRN Deniz Miranda MD        0.9 % sodium chloride infusion   IntraVENous PRN KRISTYN Michel        ceFAZolin (ANCEF) 2000 mg in sterile water 20 mL IV syringe  2,000 mg IntraVENous On Call to [address], [place]        tranexamic acid (CYKLOKAPRON) 1,000 mg, sodium chloride 0.9 % 50 mL    PRN Royer Villavicencio MD   60 mL at 09/19/22 0567       Allergies:     Allergies   Allergen Reactions    Bee Venom Swelling    Cephalexin Hives    Penicillins Other (See Comments)     States causes him to pass out    Sulfa Antibiotics Rash       Problem List:    Patient Active Problem List   Diagnosis Code    Asthma J45.909    RLS (restless legs syndrome) G25.81    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9    Controlled diabetes mellitus (HCC) E11.9    Pituitary tumor D49.7    COPD (chronic obstructive pulmonary disease) ([facility] [address] J44.9    ED (erectile dysfunction) N52.9    Hyperlipidemia E78.5    Essential hypertension with goal blood pressure less than 130/85 I10    Cervical neck pain with evidence of disc disease M50.90    Hypothyroidism E03.9    Pure hypercholesterolemia E78.00    Mixed hyperlipidemia E78.2    Status post right knee replacement Z96.651    Family history of MI (myocardial infarction) Z82.49    Right carotid bruit R09.89    Osteoarthritis of right knee M17.11    Chronic renal insufficiency N18.9    Anxiety disorder F41.9    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    JHONY on CPAP G47.33, Z99.89    Closed fracture of orbit ([facility]) S02.85XA    Closed fracture of maxillary sinus ([facility]) S02.401A    Degenerative arthritis of left knee M17.12    Primary osteoarthritis of left knee M17.12       Past Medical History:        Diagnosis Date    Abnormal blood sugar     Abnormality of cortisol-binding globulin ([facility])     Actinic keratosis     Acute right flank pain     Anaphylactic reaction to bee sting     Anxiety     takes Lorazepam prn    Anxiety disorder     Arthritis     Arthritis of left shoulder region     Asthma 7/20/2016    followed by pulmonary; uses inhaler    Backache 7/20/2016    Bilateral otitis media     BPH (benign prostatic hyperplasia) 7/20/2016    CAD (coronary artery disease) 04/20/2011    He had mild nonobstructive CAD at cath by Dr. Loni Farrell several years ago; cardiac cath:  4/20/11    Cervical neck pain with evidence of disc disease     Chronic renal insufficiency 7/20/2016    Controlled diabetes mellitus ([address] 7/20/2016 8/21/22 A1c 6.0; daily fasting sqbs:  102    COPD (chronic obstructive pulmonary disease) ([address] 7/20/2016    Symbicort BID-no PRN inhaler    Corneal foreign body 6/13/14    COVID-19 vaccine series completed 03/22/2021    Moderna    Depressive disorder 7/20/2016    clinical depression    Diarrhea     Dumping syndrome     Dyspepsia and other specified disorders of function of stomach 8/29/2012    ED (erectile dysfunction) 7/20/2016    Edema     Elevated CPK     Elevated prolactin level     Essential hypertension, benign 8/29/2012    GERD (gastroesophageal reflux disease)     Hand pain, right     Hematuria     Hyperlipidemia     Hyperprolactinemia ([address]     followed by endocrinologist    Hypertension     Hypogonadism in male     Hypothyroidism     takes levothyroxine    Knee effusion     Knee pain     Low serum testosterone level     Neoplasm of uncertain behavior of skin of neck     Neuralgia     Nevus, non-neoplastic     Numbness and tingling in left arm     Open wound of finger without complication 1/24/48    JHONY on CPAP 8/22/2014    uses CPAP    Osteoarthrosis, unspecified whether generalized or localized, involving lower leg 8/29/2012    Osteoarthrosis, unspecified whether generalized or localized, lower leg 8/29/2012    Other disorder of calcium metabolism 8/29/2012    Other malaise and fatigue 11/27/2012    Pituitary tumor 12/11/2013    Followed by endocrinology    Prolactin increased  Puncture wound 12    pt stepped on a maximiliano nail    Pure hypercholesterolemia 2012    Right shoulder pain     RLS (restless legs syndrome) 2016    Seborrheic keratosis     Seizures ([address]     30 years ago 1971    Shingles (herpes zoster) polyneuropathy     Shingles rash     Sinusitis        Past Surgical History:        Procedure Laterality Date    BUNIONECTOMY Right     CARDIAC CATHETERIZATION  2011    CARPAL TUNNEL RELEASE Bilateral     CERVICAL FUSION  2007    C3-C4-has metal screws     CHEST SURGERY      pt denies    JOINT REPLACEMENT Right     knee    ORTHOPEDIC SURGERY      bilat carpel tunnel, bunion repair,neck surg foot surg    TONSILLECTOMY         Social History:    Social History     Tobacco Use    Smoking status: Former     Packs/day: 0.50     Types: Cigarettes     Quit date: 2009     Years since quittin.4    Smokeless tobacco: Former     Quit date: 1986   Substance Use Topics    Alcohol use: No                                Counseling given: Not Answered      Vital Signs (Current):   Vitals:    22 0700 22 0907 22 0909   BP: (!) 142/79 (!) 141/69 (!) 141/71   Pulse: 75 84 70   Resp: 18 16 16   Temp: 98.9 °F (37.2 °C)     TempSrc: Temporal     SpO2: 95% 97% 95%   Weight: 192 lb 3.2 oz (87.2 kg)                                                BP Readings from Last 3 Encounters:   22 (!) 141/71   22 (!) 156/74   08/15/22 (!) 154/73       NPO Status: Time of last liquid consumption:                         Time of last solid consumption:                         Date of last liquid consumption: 22                        Date of last solid food consumption: 22    BMI:   Wt Readings from Last 3 Encounters:   22 192 lb 3.2 oz (87.2 kg)   22 198 lb (89.8 kg)   08/15/22 197 lb (89.4 kg)     Body mass index is 27.58 kg/m².     CBC:   Lab Results   Component Value Date/Time    WBC 7.4 08/31/2022 08:09 AM    RBC 4.15 08/31/2022 08:09 AM    HGB 13.1 08/31/2022 08:09 AM    HCT 40.4 08/31/2022 08:09 AM    MCV 97.3 08/31/2022 08:09 AM    RDW 13.2 08/31/2022 08:09 AM     08/31/2022 08:09 AM       CMP:   Lab Results   Component Value Date/Time     08/31/2022 08:09 AM    K 3.7 08/31/2022 08:09 AM     08/31/2022 08:09 AM    CO2 21 08/31/2022 08:09 AM    BUN 12 08/31/2022 08:09 AM    CREATININE 1.00 08/31/2022 08:09 AM    GFRAA >60 08/31/2022 08:09 AM    LABGLOM >60 08/31/2022 08:09 AM    GLUCOSE 205 08/31/2022 08:09 AM    PROT 7.4 06/01/2022 04:22 AM    CALCIUM 9.9 08/31/2022 08:09 AM    BILITOT 0.5 06/01/2022 04:22 AM    ALKPHOS 54 06/01/2022 04:22 AM    AST 26 06/01/2022 04:22 AM    ALT 48 06/01/2022 04:22 AM       POC Tests:   Recent Labs     09/19/22  0745   POCGLU 116*       Coags:   Lab Results   Component Value Date/Time    PROTIME 14.0 08/31/2022 08:09 AM    INR 1.0 08/31/2022 08:09 AM    APTT 32.2 08/31/2022 08:09 AM    APTT 30.1 07/12/2021 12:13 PM       HCG (If Applicable): No results found for: PREGTESTUR, PREGSERUM, HCG, HCGQUANT     ABGs: No results found for: PHART, PO2ART, HKA8ZRC, RSJ3VMB, BEART, U5QHCOQR     Type & Screen (If Applicable):  No results found for: LABABO, LABRH    Drug/Infectious Status (If Applicable):  No results found for: HIV, HEPCAB    COVID-19 Screening (If Applicable): No results found for: COVID19        Anesthesia Evaluation  Patient summary reviewed and Nursing notes reviewed  Airway: Mallampati: II  TM distance: >3 FB   Neck ROM: full     Dental:          Pulmonary:Negative Pulmonary ROS and normal exam    (+) sleep apnea: on CPAP,                             Cardiovascular:  Exercise tolerance: good (>4 METS),   (+) hypertension:, CAD:,         Rhythm: regular  Rate: normal                    Neuro/Psych:   Negative Neuro/Psych ROS              GI/Hepatic/Renal: Neg GI/Hepatic/Renal ROS  (+) GERD: well controlled,           Endo/Other: Negative Endo/Other ROS   (+) DiabetesType II DM, , hypothyroidism::., .          Pt had no PAT visit       Abdominal:             Vascular: negative vascular ROS.          Other Findings:           Anesthesia Plan      spinal     ASA 3                         Post-op pain plan if not by surgeon: single peripheral nerve block            Jean-Paul Baca MD   9/19/2022

## 2022-09-19 NOTE — OP NOTE
[facility] Robotic Assisted Total Knee Arthroplasty: Posterior Cruciate Retaining       Min Agosto   : 1950  Medical Record [facility]:[id number]  Pre-operative Diagnosis:  Degenerative arthritis of left knee [M17.12]  Post-operative Diagnosis: Degenerative arthritis of left knee [M17.12]  Location: [address] [place] [address]  Surgeon: Jonnie Porras MD   Assistant: Alla camacho    Anesthesia: Spinal and ACB    Indications: Patient has end stage arthritis. They have tried and failed conservative management. Procedure:Procedure(s) (LRB):  lt KNEE TOTAL ARTHROPLASTY ROBOTIC (Left)            CPT- 48177- Total knee arthroplasty           95667- Other procedures on musculoskeletal system            0055T- Computer assisted surgical navigation   The complexity of the total joint surgery requires the use of a first assistant for positioning, retraction and expertise in closure. Tourniquet Time: 0 minutes  EBL: 250 cc  Findings: severe degenerative arthritis with loss of cartilage in weight bearing compartments of the knee,  patellar osteophytes with loss of patella cartilage, posterior femoral osteophytes   BMI: Body mass index is 27.58 kg/m². Charito Harrison was brought to the operating room and positioned on the operating table. He was anesthestized with anesthesia. IV antibiotics were administered. Prior to the incision being made a timeout was called identifying the patient, procedure ,operative side and surgeon The operative leg was prepped and draped in the usual sterile manner. An anterior longitudinal incision was accomplished just medial to the tibial tubercle and extending approximal 6 centimeters proximal to the superior pole of the patella. A medial parapatellar capsular incision was performed. The medial capsular flap was elevated around to the insertion of the semimembranous tendon.   The patella was everted and the knee flexed and externally rotated. The medial and external menisci were excised. The lateral half of the fat pad excised and the patella femoral ligament was released. The anterior cruciate ligament was resect and the posterior cruciate ligament was retained. The femoral and tibial arrays were pinned in place and registered with the [employer] 92. The patient landmarks were collected and the tibial and femoral checkpoints were registered and verified. The preresection balancing was performed. The distal femur was addressed first. Utilizing the [facility] robotic arm the distal femoral cut was made. The anterior and posterior cuts were then made. The osteophytes were removed from the tibial and femoral surfaces. The tibia was then addressed. The netFactor robotic arm was then used to make the measured resection of the tibia. The tibia was sized. The tibial base plate was pinned into place with the appropriate external rotation and stem site prepared. A trial femoral component and poly was placed. A preliminary range of motion was accomplished with the trial components. The patient was found to obtain full extension as well as appropriate flexion. The patient's ligaments were stable in flexion and extension to medial and lateral stressing and the alignment was through the appropriate mechanical axis. Additional surgical procedures included: none. The patella was then everted. The bone was resect to accommodate the three peg patellar button. A trial reduction of the patella revealed appropriate tracking through the patellofemoral groove with no lateral retinacular release being accomplished. All trial components were removed. The real implants were opened: Sizes listed below. The knee was irrigated. There were no femoral deficiencies. There were no tibial deficiencies. No augmentation was utilized. The tibial component was impacted into place.  The femoral and patella components were cemented into place.    Cristela Treviño knee was placed through range of motion and noted to be stable as mentioned above with the trail components. The wound was dry, therefore no drain was used. The operative knee was injected with 60 cc of Naropin, 10 cc's of morphine and 1 cc of 30 mg of Toradol. The knee was then soaked with a diluted betadine solution for approximately 3 min. This was then thoroughly irrigated. The capsular layer was closed using a #1 Stratafix suture. Then, 1 gram (100 mg/ml) of Transexamic Acid was injected into the joint space. The subcutaneous layers were closed using 2-0 Stratafix. Finally the skin was closed using 3-0 Vicryl and staples which were applied in occlusive fashion and sterile bandage applied. An Iceman cryo pad was applied on the operative leg. Sponge count and needle counts were correct. Cristela Treviño left the operating room     Implants:   Implant Name Type Inv.  Item Serial No.  Lot No. LRB No. Used Action   CEMENT BONE 40GM HI VISC PALACOS R - [id number]  CEMENT BONE 40GM HI VISC PALACOS R [id number] [facility] [id number] Left 1 Implanted   BASEPLATE TIB SZ 6 [id number] ML77MM KNEE TRITANIUM 4 CRUCFRM - [id number]  BASEPLATE TIB SZ 6 [id number] ML77MM KNEE TRITANIUM 4 CRUCFRM [id number] [employer] [facility] [id number] Left 1 Implanted   COMPONENT FEM SZ 6 L KNEE CRUCE RET RUCHI TRIATHLON - [id number]  COMPONENT FEM SZ 6 L KNEE CRUCE RET RUCHI TRIATHLON [id number] [employer] [facility] [id number] Left 1 Implanted   [employer]lon Asymmetric Patella   [id number]  [id number] Left 1 Implanted   INSERT TIB SZ 6 THK9MM UNIV KNEE POLYETH CNDYL STBL SHENA NEUT - [id number]  INSERT TIB SZ 6 THK9MM UNIV KNEE POLYETH CNDYL STBL SHENA NEUT [id number] [employer] [facility] [id number] Left 1 Implanted         Signed By: Leona South MD   9/19/2022,  10:58 AM

## 2022-09-20 ENCOUNTER — HOME CARE VISIT (OUTPATIENT)
Dept: SCHEDULING | Facility: HOME HEALTH | Age: 72
End: 2022-09-20
Payer: MEDICARE

## 2022-09-20 ENCOUNTER — POST-OP TELEPHONE (OUTPATIENT)
Dept: ORTHOPEDICS UNIT | Age: 72
End: 2022-09-20

## 2022-09-20 VITALS
TEMPERATURE: 99.7 F | OXYGEN SATURATION: 99 % | RESPIRATION RATE: 16 BRPM | SYSTOLIC BLOOD PRESSURE: 118 MMHG | HEART RATE: 74 BPM | DIASTOLIC BLOOD PRESSURE: 68 MMHG

## 2022-09-20 PROCEDURE — G0151 HHCP-SERV OF PT,EA 15 MIN: HCPCS

## 2022-09-20 PROCEDURE — 1090000001 HH PPS REVENUE CREDIT

## 2022-09-20 PROCEDURE — 400013 HH SOC

## 2022-09-20 PROCEDURE — 1090000002 HH PPS REVENUE DEBIT

## 2022-09-20 PROCEDURE — 0221000100 HH NO PAY CLAIM PROCEDURE

## 2022-09-20 ASSESSMENT — ENCOUNTER SYMPTOMS
DYSPNEA ACTIVITY LEVEL: AFTER AMBULATING MORE THAN 20 FT
PAIN LOCATION - PAIN QUALITY: SHARP, DULL, ACHE
CONSTIPATION: 1

## 2022-09-20 NOTE — TELEPHONE ENCOUNTER
Called patient to follow up after TKA with SDD on 9/19/22. Doing \" pretty good. \"  Post op pain is managed. Patient's pharmacy did not fill oxycodone rx. Patient will contact pharmacy and call navigator if further action is needed. [facility] SOC is scheduled for late morning. Reviewed importance of ambulating at least every hour during the day. Advised regarding increase in post op pain and swelling around POD # 3. Reviewed cool jet protocol. Reviewed miralax protocol. Reviewed contact number for ortho navigator.

## 2022-09-21 PROCEDURE — 1090000001 HH PPS REVENUE CREDIT

## 2022-09-21 PROCEDURE — 1090000002 HH PPS REVENUE DEBIT

## 2022-09-22 PROCEDURE — 1090000001 HH PPS REVENUE CREDIT

## 2022-09-22 PROCEDURE — 1090000002 HH PPS REVENUE DEBIT

## 2022-09-23 ENCOUNTER — HOME CARE VISIT (OUTPATIENT)
Dept: SCHEDULING | Facility: HOME HEALTH | Age: 72
End: 2022-09-23
Payer: MEDICARE

## 2022-09-23 VITALS
HEART RATE: 81 BPM | TEMPERATURE: 98.1 F | SYSTOLIC BLOOD PRESSURE: 138 MMHG | OXYGEN SATURATION: 98 % | RESPIRATION RATE: 15 BRPM | DIASTOLIC BLOOD PRESSURE: 80 MMHG

## 2022-09-23 PROCEDURE — 1090000001 HH PPS REVENUE CREDIT

## 2022-09-23 PROCEDURE — 1090000002 HH PPS REVENUE DEBIT

## 2022-09-23 PROCEDURE — G0151 HHCP-SERV OF PT,EA 15 MIN: HCPCS

## 2022-09-24 PROCEDURE — 1090000001 HH PPS REVENUE CREDIT

## 2022-09-24 PROCEDURE — 1090000002 HH PPS REVENUE DEBIT

## 2022-09-25 PROCEDURE — 1090000002 HH PPS REVENUE DEBIT

## 2022-09-25 PROCEDURE — 1090000001 HH PPS REVENUE CREDIT

## 2022-09-26 ENCOUNTER — HOME CARE VISIT (OUTPATIENT)
Dept: SCHEDULING | Facility: HOME HEALTH | Age: 72
End: 2022-09-26
Payer: MEDICARE

## 2022-09-26 VITALS
SYSTOLIC BLOOD PRESSURE: 138 MMHG | DIASTOLIC BLOOD PRESSURE: 80 MMHG | TEMPERATURE: 98.6 F | RESPIRATION RATE: 15 BRPM | HEART RATE: 88 BPM | OXYGEN SATURATION: 95 %

## 2022-09-26 DIAGNOSIS — Z96.651 PRESENCE OF RIGHT ARTIFICIAL KNEE JOINT: Primary | ICD-10-CM

## 2022-09-26 PROCEDURE — G0157 HHC PT ASSISTANT EA 15: HCPCS

## 2022-09-26 PROCEDURE — 1090000002 HH PPS REVENUE DEBIT

## 2022-09-26 PROCEDURE — 1090000001 HH PPS REVENUE CREDIT

## 2022-09-26 NOTE — CASE COMMUNICATION
Please fax Outpatient order to [facility] at [phone].  PT to DC on 10.12.22. Patient will need Outpatient appointment on 10.13.22 or 10.14.22.    Thank you  Bonnie Hightower  [phone]

## 2022-09-27 ENCOUNTER — HOME CARE VISIT (OUTPATIENT)
Dept: SCHEDULING | Facility: HOME HEALTH | Age: 72
End: 2022-09-27
Payer: MEDICARE

## 2022-09-27 VITALS
OXYGEN SATURATION: 94 % | DIASTOLIC BLOOD PRESSURE: 78 MMHG | TEMPERATURE: 98.9 F | HEART RATE: 84 BPM | SYSTOLIC BLOOD PRESSURE: 138 MMHG | RESPIRATION RATE: 15 BRPM

## 2022-09-27 DIAGNOSIS — Z96.652 HX OF TOTAL KNEE ARTHROPLASTY, LEFT: Primary | ICD-10-CM

## 2022-09-27 PROCEDURE — 1090000002 HH PPS REVENUE DEBIT

## 2022-09-27 PROCEDURE — 1090000001 HH PPS REVENUE CREDIT

## 2022-09-27 PROCEDURE — G0157 HHC PT ASSISTANT EA 15: HCPCS

## 2022-09-27 RX ORDER — OXYCODONE HYDROCHLORIDE 5 MG/1
5 TABLET ORAL EVERY 4 HOURS PRN
Qty: 30 TABLET | Refills: 0 | Status: SHIPPED | OUTPATIENT
Start: 2022-09-27 | End: 2022-10-02

## 2022-09-28 PROCEDURE — 1090000002 HH PPS REVENUE DEBIT

## 2022-09-28 PROCEDURE — 1090000001 HH PPS REVENUE CREDIT

## 2022-09-29 PROCEDURE — 1090000002 HH PPS REVENUE DEBIT

## 2022-09-29 PROCEDURE — 1090000001 HH PPS REVENUE CREDIT

## 2022-09-30 ENCOUNTER — HOME CARE VISIT (OUTPATIENT)
Dept: SCHEDULING | Facility: HOME HEALTH | Age: 72
End: 2022-09-30
Payer: MEDICARE

## 2022-09-30 VITALS
HEART RATE: 99 BPM | TEMPERATURE: 98 F | DIASTOLIC BLOOD PRESSURE: 84 MMHG | RESPIRATION RATE: 17 BRPM | SYSTOLIC BLOOD PRESSURE: 160 MMHG | OXYGEN SATURATION: 98 %

## 2022-09-30 PROCEDURE — G0157 HHC PT ASSISTANT EA 15: HCPCS

## 2022-09-30 PROCEDURE — 1090000002 HH PPS REVENUE DEBIT

## 2022-09-30 PROCEDURE — 1090000001 HH PPS REVENUE CREDIT

## 2022-09-30 ASSESSMENT — ENCOUNTER SYMPTOMS: PAIN LOCATION - PAIN QUALITY: THROBBING, SHARP

## 2022-10-01 PROCEDURE — 1090000002 HH PPS REVENUE DEBIT

## 2022-10-01 PROCEDURE — 1090000001 HH PPS REVENUE CREDIT

## 2022-10-02 PROCEDURE — 1090000001 HH PPS REVENUE CREDIT

## 2022-10-02 PROCEDURE — 1090000002 HH PPS REVENUE DEBIT

## 2022-10-03 ENCOUNTER — HOME CARE VISIT (OUTPATIENT)
Dept: SCHEDULING | Facility: HOME HEALTH | Age: 72
End: 2022-10-03
Payer: MEDICARE

## 2022-10-03 VITALS
TEMPERATURE: 98.3 F | DIASTOLIC BLOOD PRESSURE: 70 MMHG | SYSTOLIC BLOOD PRESSURE: 138 MMHG | RESPIRATION RATE: 15 BRPM | HEART RATE: 94 BPM | OXYGEN SATURATION: 95 %

## 2022-10-03 PROCEDURE — 1090000001 HH PPS REVENUE CREDIT

## 2022-10-03 PROCEDURE — 1090000002 HH PPS REVENUE DEBIT

## 2022-10-03 PROCEDURE — G0157 HHC PT ASSISTANT EA 15: HCPCS

## 2022-10-04 PROCEDURE — 1090000002 HH PPS REVENUE DEBIT

## 2022-10-04 PROCEDURE — 1090000001 HH PPS REVENUE CREDIT

## 2022-10-05 ENCOUNTER — HOME CARE VISIT (OUTPATIENT)
Dept: SCHEDULING | Facility: HOME HEALTH | Age: 72
End: 2022-10-05
Payer: MEDICARE

## 2022-10-05 VITALS
OXYGEN SATURATION: 97 % | HEART RATE: 73 BPM | DIASTOLIC BLOOD PRESSURE: 65 MMHG | TEMPERATURE: 97.5 F | SYSTOLIC BLOOD PRESSURE: 135 MMHG | RESPIRATION RATE: 17 BRPM

## 2022-10-05 PROCEDURE — G0157 HHC PT ASSISTANT EA 15: HCPCS

## 2022-10-05 PROCEDURE — 1090000002 HH PPS REVENUE DEBIT

## 2022-10-05 PROCEDURE — 1090000001 HH PPS REVENUE CREDIT

## 2022-10-05 ASSESSMENT — ENCOUNTER SYMPTOMS: PAIN LOCATION - PAIN QUALITY: SORE, SHARP

## 2022-10-06 PROCEDURE — 1090000002 HH PPS REVENUE DEBIT

## 2022-10-06 PROCEDURE — 1090000001 HH PPS REVENUE CREDIT

## 2022-10-07 PROCEDURE — 1090000002 HH PPS REVENUE DEBIT

## 2022-10-07 PROCEDURE — 1090000001 HH PPS REVENUE CREDIT

## 2022-10-08 PROCEDURE — 1090000002 HH PPS REVENUE DEBIT

## 2022-10-08 PROCEDURE — 1090000001 HH PPS REVENUE CREDIT

## 2022-10-09 PROCEDURE — 1090000001 HH PPS REVENUE CREDIT

## 2022-10-09 PROCEDURE — 1090000002 HH PPS REVENUE DEBIT

## 2022-10-10 DIAGNOSIS — Z96.652 HX OF TOTAL KNEE ARTHROPLASTY, LEFT: Primary | ICD-10-CM

## 2022-10-10 PROCEDURE — 1090000001 HH PPS REVENUE CREDIT

## 2022-10-10 PROCEDURE — 1090000002 HH PPS REVENUE DEBIT

## 2022-10-10 RX ORDER — OXYCODONE HYDROCHLORIDE 5 MG/1
5 TABLET ORAL EVERY 4 HOURS PRN
Qty: 30 TABLET | Refills: 0 | Status: SHIPPED | OUTPATIENT
Start: 2022-10-10 | End: 2022-10-15

## 2022-10-11 PROCEDURE — 1090000001 HH PPS REVENUE CREDIT

## 2022-10-11 PROCEDURE — 1090000002 HH PPS REVENUE DEBIT

## 2022-10-12 ENCOUNTER — HOME CARE VISIT (OUTPATIENT)
Dept: SCHEDULING | Facility: HOME HEALTH | Age: 72
End: 2022-10-12
Payer: MEDICARE

## 2022-10-12 ENCOUNTER — HOME CARE VISIT (OUTPATIENT)
Dept: HOME HEALTH SERVICES | Facility: HOME HEALTH | Age: 72
End: 2022-10-12
Payer: MEDICARE

## 2022-10-12 VITALS
DIASTOLIC BLOOD PRESSURE: 70 MMHG | OXYGEN SATURATION: 94 % | RESPIRATION RATE: 16 BRPM | HEART RATE: 84 BPM | TEMPERATURE: 98.7 F | SYSTOLIC BLOOD PRESSURE: 138 MMHG

## 2022-10-12 PROCEDURE — 1090000002 HH PPS REVENUE DEBIT

## 2022-10-12 PROCEDURE — 1090000001 HH PPS REVENUE CREDIT

## 2022-10-12 PROCEDURE — G0151 HHCP-SERV OF PT,EA 15 MIN: HCPCS

## 2022-10-12 ASSESSMENT — ENCOUNTER SYMPTOMS: PAIN LOCATION - PAIN QUALITY: ACHE

## 2022-10-13 ENCOUNTER — POST-OP TELEPHONE (OUTPATIENT)
Dept: ORTHOPEDICS UNIT | Age: 72
End: 2022-10-13

## 2022-10-13 NOTE — TELEPHONE ENCOUNTER
Patient called navigator to report a fall that occurred at home yesterday afternoon. Was getting up from recliner and lost balance. Braced fall by grabbing onto recliner. Landed on both knees. Denies injury. Incision to left knee remains intact with no drainage. Has noted that \" left thigh is sore. \"  Able to bear weight on left leg. Advised to rest, elevate and ice left leg. Will contact navigator with any changes.

## 2022-10-14 ENCOUNTER — HOSPITAL ENCOUNTER (OUTPATIENT)
Dept: PHYSICAL THERAPY | Age: 72
Setting detail: RECURRING SERIES
Discharge: HOME | End: 2022-10-17
Payer: MEDICARE

## 2022-10-14 PROCEDURE — 97110 THERAPEUTIC EXERCISES: CPT

## 2022-10-14 PROCEDURE — 97161 PT EVAL LOW COMPLEX 20 MIN: CPT

## 2022-10-14 PROCEDURE — 97140 MANUAL THERAPY 1/> REGIONS: CPT

## 2022-10-14 PROCEDURE — 97016 VASOPNEUMATIC DEVICE THERAPY: CPT

## 2022-10-14 NOTE — THERAPY EVALUATION
Katerin Phillips  : 1950  Primary: Medicare Part A And B  Secondary: [address] @ [address] [facility] [address]  Phone: [phone]  Fax: [phone] Plan Frequency: 2 times a week for 8 weeks  Plan of Care/Certification Expiration Date: 22 (start of plan of care 10/14/2022)    PT Visit Info:         Visit Count:  1    OUTPATIENT PHYSICAL THERAPY:OP NOTE TYPE: Initial Assessment 10/14/2022               Episode  Appt Desk         Treatment Diagnosis:  Pain in Left Knee (M25.562)  Other abnormalities of gait and mobility (R26.89)  History of L total knee arthroplasty (F95.297)  Medical/Referring Diagnosis:  Hx of total knee arthroplasty, left [Q00.079]  Referring Physician:  Sidney Toussaint MD MD Orders:  PT Eval and Treat   Return MD Appt:  10/25/2022  Date of Onset:  Onset Date: 22 (s/p L TKA)   Allergies:  Bee venom, Cephalexin, Penicillins, and Sulfa antibiotics  Restrictions/Precautions:    Restrictions/Precautions: Weight Bearing; Fall Risk  Left Lower Extremity Weight Bearing: Weight Bearing As Tolerated     Medications Last Reviewed:  10/14/2022     SUBJECTIVE   History of Injury/Illness (Reason for Referral):   Mr. Leslie Cervantes is a 71 y.o. male presenting to physical therapy with complaints of L knee pain and stiffness s/p TKA on 2022. Patient was seen in our clinic following R TKA on 2021 with great progress and reports no issues with his R knee at this time. Patient had home health physical therapy and reports continuing with his exercises twice a day. He does report a fall in his home on 10/12/2022 where he stood up and turned, losing his balance and falling between two chairs. Patient reports catching himself and hitting his L thigh, but not landing on his knee. He has minimal complaints of L knee pain, but some tightness and soreness in his L distal quadriceps.  He continues to ice three times and day and presents with good ROM in his L knee. Spoke with patient about importance of HEP and progress at home and in therapy. Anticipate good response to therapy as patient is motivated and eager to return to his prior level of activities and independence. Patient presents with increased pain, decreased strength, decreased ROM, decreased flexibility, impaired gait, impaired posture, impaired transfer ability, decreased activity tolerance, and overall impaired functional mobility.    Patient Stated Goal(s):  \"Get back to normal activities including working on cars and yard work\"  Initial:     2/10 Post Session:     2/10  Past Medical History/Comorbidities:   Mr. Tisha Lewis  has a past medical history of Abnormal blood sugar, Abnormality of cortisol-binding globulin ([address] Actinic keratosis, Acute right flank pain, Anaphylactic reaction to bee sting, Anxiety, Anxiety disorder, Arthritis, Arthritis of left shoulder region, Asthma, Backache, Bilateral otitis media, BPH (benign prostatic hyperplasia), CAD (coronary artery disease), Cervical neck pain with evidence of disc disease, Chronic renal insufficiency, Controlled diabetes mellitus ([address] COPD (chronic obstructive pulmonary disease) ([address] Corneal foreign body, COVID-19 vaccine series completed, Depressive disorder, Diarrhea, Dumping syndrome, Dyspepsia and other specified disorders of function of stomach, ED (erectile dysfunction), Edema, Elevated CPK, Elevated prolactin level, Essential hypertension, benign, GERD (gastroesophageal reflux disease), Hand pain, right, Hematuria, Hyperlipidemia, Hyperprolactinemia ([address] Hypertension, Hypogonadism in male, Hypothyroidism, Knee effusion, Knee pain, Low serum testosterone level, Neoplasm of uncertain behavior of skin of neck, Neuralgia, Nevus, non-neoplastic, Numbness and tingling in left arm, Open wound of finger without complication, JHONY on CPAP, Osteoarthrosis, unspecified whether generalized or localized, involving lower leg, Osteoarthrosis, unspecified whether generalized or localized, lower leg, Other disorder of calcium metabolism, Other malaise and fatigue, Pituitary tumor, Prolactin increased, Puncture wound, Pure hypercholesterolemia, Right shoulder pain, RLS (restless legs syndrome), Seborrheic keratosis, Seizures ([facility] [address] Shingles (herpes zoster) polyneuropathy, Shingles rash, and Sinusitis. Mr. Yves Razo  has a past surgical history that includes Carpal tunnel release (Bilateral); orthopedic surgery; Tonsillectomy; Cardiac catheterization (04/20/2011); Chest surgery; Bunionectomy (Right); cervical fusion (11/2007); joint replacement (Right, 2021); and Total knee arthroplasty (Left, 9/19/2022). Social History/Living Environment:   Lives With: Spouse  Type of Home: House  Home Layout: One level  Home Access: Stairs to enter without rails  Entrance Stairs - Number of Steps: 1  Bathroom Shower/Tub: Tub/Shower unit  Bathroom Toilet: Standard  Home Equipment: Walker, rolling     Prior Level of Function/Work/Activity:   Occupation: Retired  Receives Help From: Family  ADL Assistance: Independent  Ambulation Assistance: Independent  Transfer Assistance: Independent  Active : Yes  Mode of Transportation: Car        Learning:   Does the patient/guardian have any barriers to learning?: No barriers  Will there be a co-learner?: No  What is the preferred language of the patient/guardian?: English  Is an  required?: No  How does the patient/guardian prefer to learn new concepts?: Listening; Reading; Demonstration; Pictures/Videos     Fall Risk Scale: Rosales Total Score: 40  Rosales Fall Risk: Medium (25-44)     Dominant Side:  right handed      OBJECTIVE     Patient denies any LE paresthesia. Patient denies any increase of symptoms with cough, sneeze or valsalva. Patient denies any saddle paresthesia or bowel/bladder deficits.      Observation/Orthostatic Postural Assessment:          Patient with slightly widened based of support, decreased weight shift to L LE, moderate L LE edema, incision site healing well with steri-strips still intact, but beginning to fall off. Some scabbing and dry skin noted. No signs/ symptoms of infection or DVT present. Small bruising noted medial L knee. Palpation:          Moderate tenderness to palpation of gross L knee. Moderate warmth and edema noted, no signs/ symptoms of infection or DVT present. Moderate to significant tenderness to palpation of L distal quadriceps with increased tightness noted. Anthropometric Measurements (cm) Left Right   Knee joint line 44.5 40     ROM:          Moderate hamstring tightness bilaterally  AROM/ PROM Left (degrees) Right (degrees)   Knee Flexion 118 120   Knee Extension Lacking 4 0     Strength: Motion Tested Left   (*/5) Right  (*/5)   Knee Extension 4 5   Hip Flexion 4+ 5   Hip Abduction 4- 4+   Ankle DF 5 5     Special Tests:          Madhavi's sign (deep vein thrombosis): negative bilaterally  No other special tests performed due to acuity of surgery    Passive Accessory Motion:         Moderate patellar limitation on L all directions. Neurological Screen:              Myotomes: Key muscle strength testing through [facility] is [facility]. Dermatomes: Sensation to light touch for bilateral LE is intact from L1 to S2. Reflexes: Patellar (L3/ L4): 2+ bilaterally                 Achilles (S1/ S2): 2+ bilaterally  Abnormal reflexes: Clonus: negative bilaterally    Functional Mobility:         Gait/Ambulation:  Patient ambulating with straight cane, mild antalgic pattern, decreased heel strike L LE, decreased stance time L, mild lateral sway. Transfers:  Minimal use of bilateral UE for sit to stand transfers. Difficulty sleeping- typically a side sleeper, difficulty with car transfers, unable to drive at this time, would like to return to hobbies including working on cars and yard work.     Balance:          Sitting and standing balance grossly intact. ASSESSMENT   Initial Assessment:  Mr. Sigrid Roth is a 71 y.o. male presenting to physical therapy with complaints of L knee pain and stiffness s/p TKA on 9/19/2022. Patient was seen in our clinic following R TKA on 7/23/2021 with great progress and reports no issues with his R knee at this time. Patient had home health physical therapy and reports continuing with his exercises twice a day. He does report a fall in his home on 10/12/2022 where he stood up and turned, losing his balance and falling between two chairs. Patient reports catching himself and hitting his L thigh, but not landing on his knee. He has minimal complaints of L knee pain, but some tightness and soreness in his L distal quadriceps. He continues to ice three times and day and presents with good ROM in his L knee. Spoke with patient about importance of HEP and progress at home and in therapy. Anticipate good response to therapy as patient is motivated and eager to return to his prior level of activities and independence. Patient presents with increased pain, decreased strength, decreased ROM, decreased flexibility, impaired gait, impaired posture, impaired transfer ability, decreased activity tolerance, and overall impaired functional mobility. Patient is a good candidate for skilled physical therapy interventions to include manual therapy, therapeutic exercise, balance training, gait training, transfer training, postural re-education, body mechanics training, and pain modalities as needed. Problem List: (Impacting functional limitations): Body Structures, Functions, Activity Limitations Requiring Skilled Therapeutic Intervention: Decreased functional mobility ; Decreased ADL status; Decreased ROM; Decreased body mechanics; Decreased tolerance to work activity; Decreased strength; Decreased endurance; Decreased balance;  Increased pain; Decreased posture     Therapy Prognosis:   Therapy Prognosis: Good Assessment Complexity:   Low Complexity  PLAN   Effective Dates: 10/14/2022 TO Plan of Care/Certification Expiration Date: 12/13/22 (start of plan of care 10/14/2022)   Frequency/Duration: Plan Frequency: 2 times a week for 8 weeks   Interventions Planned (Treatment may consist of any combination of the following):    Current Treatment Recommendations: Strengthening; ROM; Balance training; Functional mobility training; Transfer training; ADL/Self-care training; Endurance training; Gait training; Stair training; Neuromuscular re-education; Manual Therapy - Soft Tissue Mobilization; Pain management; Return to work related activity; Home exercise program; Safety education & training; Modalities; Integrated dry needling; Therapeutic activities     GOALS: (Goals have been discussed and agreed upon with patient.)  Short-Term Functional Goals: Time Frame: 10/14/2022 to 11/12/2022  Patient demonstrates independence with home exercise program without verbal cueing provided by therapist.   Patient will report no more than 1/10 L knee pain at rest in order to demonstrate improved self pain control and tolerance. Patient will be educated in and demonstrate proper squat lift technique in order to reduce stress on bilateral LE, improve safety, and reduce risk of injury. Patient will improve L knee ROM to 0-120 degrees in order to demonstrate full functional and symmetrical ROM. Discharge Goals: Time Frame: 10/14/2022 to 12/13/2022  Patient will exhibit no more than 2 cm circumferential difference between L and R knee joint line measurement in order to demonstrate improved edema control. Patient will improve gross L LE strength to at least 4+/5 in order to return to prior level of activities and independence. Patient will be able to perform stairs with reciprocal pattern up and down with use of single handrail in order to improve negotiation around the community.   Patient will be able to return to yard work and working on cars with minimal to no complaints in order to return to prior recreational activities. Patient will improve Lower Extremity Functional Scale score to 65/80 from 57/80. Outcome Measure: Tool Used: Lower Extremity Functional Scale (LEFS)  Score:  Initial: 57/80 Most Recent: X/80 (Date: -- )   Interpretation of Score: 20 questions each scored on a 5 point scale with 0 representing \"extreme difficulty or unable to perform\" and 4 representing \"no difficulty\". The lower the score, the greater the functional disability. 80/80 represents no disability. Minimal detectable change is 9 points. Medical Necessity:   > Patient is expected to demonstrate progress in strength, range of motion, balance, coordination, and functional technique to increase independence with ambulation and transfers and improve safety during ambulation, transfers, stairs, and recreational activities. > Skilled intervention continues to be required due to decreased strength and mobility s/p L TKA. Reason For Services/Other Comments:  > Patient continues to require skilled intervention due to decreased activity tolerance hindering quality of life and return to prior independence level. Total Duration:  Time In: 0910  Time Out: 1005    Regarding Rob Elias's therapy, I certify that the treatment plan above will be carried out by a therapist or under their direction.   Thank you for this referral,  Saumya Briggs, PT     Referring Physician Signature: Latonia Meeks., * _______________________________ Date _____________        Post Session Pain  Charge Capture  PT Visit Info MD Guidelines  MyChart

## 2022-10-14 NOTE — PROGRESS NOTES
Bridger Ray  : 1950  Primary: Medicare Part A And B  Secondary: [address] [facility] [address]  Phone: [phone]  Fax: [phone] Plan Frequency: 2 times a week for 8 weeks  Plan of Care/Certification Expiration Date: 22 (start of plan of care 10/14/2022)    PT Visit Info:  No data recorded   Visit Count:  1   OUTPATIENT PHYSICAL THERAPY:OP NOTE TYPE: Treatment Note 10/14/2022       Episode  }Appt Desk             Treatment Diagnosis: Pain in Left Knee (M25.562)  Other abnormalities of gait and mobility (R26.89)  History of L total knee arthroplasty (N33.755)  Medical/Referring Diagnosis:  Hx of total knee arthroplasty, left [X40.236]  Referring Physician:  Paul Reyes MD MD Orders:  PT Eval and Treat   Return MD Appt:  10/25/2022  Date of Onset:  Onset Date: 22 (s/p L TKA)     Allergies:   Bee venom, Cephalexin, Penicillins, and Sulfa antibiotics  Restrictions/Precautions:  Restrictions/Precautions: Weight Bearing; Fall Risk  Left Lower Extremity Weight Bearing: Weight Bearing As Tolerated     Interventions Planned (Treatment may consist of any combination of the following):    Current Treatment Recommendations: Strengthening; ROM; Balance training; Functional mobility training; Transfer training; ADL/Self-care training; Endurance training; Gait training; Stair training; Neuromuscular re-education; Manual Therapy - Soft Tissue Mobilization; Pain management; Return to work related activity; Home exercise program; Safety education & training; Modalities; Integrated dry needling; Therapeutic activities     Subjective Comments:  Patient reports feeling better this time than after his R TKA. States he did have a fall on 10/12/2022 in his home where he stood up and turned too quickly. States he fell between two chairs and caught himself.  Some sorenss in L thigh, but did not land on his L knee. Initial:}    2/10Post Session:       2/10  Medications Last Reviewed:  10/14/2022  Updated Objective Findings:  See evaluation note from today  Treatment   THERAPEUTIC EXERCISE: (15 minutes):    Exercises per grid below to improve mobility, strength, balance, and coordination. Required moderate visual, verbal, manual, and tactile cues to promote proper body alignment, promote proper body posture, and promote proper body mechanics. Progressed resistance, range, repetitions, and complexity of movement as indicated. Date:  10/14/2022 Date:   Date:     Activity/Exercise Parameters Parameters Parameters   Prone quad stretch Strap, 3 x 30 seconds     Heel prop 2 x 2 minutes     Hamstring stretch Strap, 2 x 30 seconds     Calf stretch Strap, 2 x 30 seconds     Heel slides Active x 10  Strap x 5     Sit to stand X 5             Time spent with patient reviewing proper muscle recruitment and technique with exercises. MANUAL THERAPY: (10 minutes):   Joint mobilization and Soft tissue mobilization was utilized and necessary because of the patient's restricted joint motion, painful spasm, loss of articular motion, and restricted motion of soft tissue. Supine soft tissue mobilization with biofreeze to L quadriceps to decrease tightness and pain    MODALITIES: (10 minutes):        Vasopneumatic compression device to L knee with patient in supine to decrease swelling and pain      HEP: As above; handouts given to patient for all exercises. Treatment/Session Summary:    Treatment Assessment:  Patient with good response to therapy and expressed and demonstrated understanding of HEP. Decreased swelling at end of session following vasopnuematic device. Decreased L quadriceps tightness following manual therapy. Communication/Consultation:  Spoke with patient in regards to plan of care, progression of therapy sessions, HEP, and use of ice at home.   Equipment provided today:  Patient given handout with above exercises for home. Recommendations/Intent for next treatment session: Next visit will focus on pain and edema control, manual therapy and modalities as needed, progression of ROM, strength, and functional mobility as tolerated.     Total Treatment Billable Duration:  55 minutes: 20 evaluation, 15 therapeutic exercise, 10 manual, 10 vasopnuematic compression  Time In: 0910  Time Out: 1005    Alf Smith, PT       Charge Capture  }Post Session Pain  PT Visit Info  [facility] Portal  MD Guidelines  Scanned Media  Benefits  MyChart    Future Appointments   Date Time Provider Lenora Hawkins   10/18/2022  8:00 AM Alf Smith, PT [facility]   10/20/2022 11:00 AM Claire Durand, PTA [facility]   10/25/2022  3:15 PM Ruthy Rubin MD [facility]   10/26/2022 10:00 AM Alf Sarah, PT [facility]   10/28/2022  9:00 AM Claire Durand, PTA [facility]   11/1/2022  9:00 AM Alf Sarah, PT [facility]   11/3/2022 10:00 AM Alf Sarah, PT [facility]   11/8/2022 10:00 AM Alf Sarah, PT [facility]   11/10/2022  9:00 AM Claire Durand, PTA [facility]   7/3/2023  8:20 AM Teetee Escalante APRN - CNP [facility]

## 2022-10-16 ASSESSMENT — PAIN SCALES - GENERAL: PAINLEVEL_OUTOF10: 2

## 2022-10-18 ENCOUNTER — HOSPITAL ENCOUNTER (OUTPATIENT)
Dept: PHYSICAL THERAPY | Age: 72
Setting detail: RECURRING SERIES
Discharge: HOME | End: 2022-10-21
Payer: MEDICARE

## 2022-10-18 PROCEDURE — 97140 MANUAL THERAPY 1/> REGIONS: CPT

## 2022-10-18 PROCEDURE — 97016 VASOPNEUMATIC DEVICE THERAPY: CPT

## 2022-10-18 PROCEDURE — 97110 THERAPEUTIC EXERCISES: CPT

## 2022-10-18 ASSESSMENT — PAIN SCALES - GENERAL: PAINLEVEL_OUTOF10: 2

## 2022-10-18 NOTE — PROGRESS NOTES
Phuong Torres  : 1950  Primary: Medicare Part A And B  Secondary: [address] [facility] [address]  Phone: [phone]  Fax: [phone] Plan Frequency: 2 times a week for 8 weeks  Plan of Care/Certification Expiration Date: 22 (start of plan of care 10/14/2022)      PT Visit Info:  No data recorded   Visit Count:  2   OUTPATIENT PHYSICAL THERAPY:OP NOTE TYPE: Treatment Note 10/18/2022       Episode  }Appt Desk             Treatment Diagnosis: Pain in Left Knee (M25.562)  Other abnormalities of gait and mobility (R26.89)  History of L total knee arthroplasty (J25.577)  Medical/Referring Diagnosis:  Hx of total knee arthroplasty, left [H17.723]  Referring Physician:  Conrado Bocanegra.MD MEHTA Orders:  PT Eval and Treat   Return MD Appt:  10/25/2022  Date of Onset:  Onset Date: 22 (s/p L TKA)     Allergies:   Bee venom, Cephalexin, Penicillins, and Sulfa antibiotics  Restrictions/Precautions:  Restrictions/Precautions: Weight Bearing; Fall Risk  Left Lower Extremity Weight Bearing: Weight Bearing As Tolerated     Interventions Planned (Treatment may consist of any combination of the following):    Current Treatment Recommendations: Strengthening; ROM; Balance training; Functional mobility training; Transfer training; ADL/Self-care training; Endurance training; Gait training; Stair training; Neuromuscular re-education; Manual Therapy - Soft Tissue Mobilization; Pain management; Return to work related activity; Home exercise program; Safety education & training; Modalities; Integrated dry needling; Therapeutic activities     Subjective Comments:  Patient reports feeling good with just some tightness in his L thigh. States he was able to walk a mile yesterday with minimal complaints.   Initial:}    2 (L thigh)/10Post Session:        /10  Medications Last Reviewed:  10/18/2022  Updated Objective Findings:   L knee ROM:  0-121 degrees; L knee circumference 43 cm to 42 cm after vasopneumatic device  Treatment   THERAPEUTIC EXERCISE: (30 minutes):    Exercises per grid below to improve mobility, strength, balance, and coordination. Required moderate visual, verbal, manual, and tactile cues to promote proper body alignment, promote proper body posture, and promote proper body mechanics. Progressed resistance, range, repetitions, and complexity of movement as indicated. Date:  10/14/2022 Date:  10/18/2022 Date:     Activity/Exercise Parameters Parameters Parameters   Prone quad stretch Strap, 3 x 30 seconds Manual, 5 x 30 second    Heel prop 2 x 2 minutes 3 minutes    Hamstring stretch Strap, 2 x 30 seconds Strap, 4 x 30 seconds    Calf stretch Strap, 2 x 30 seconds Slant board, 3 x 30 seconds    Heel slides Active x 10  Strap x 5 Active x 10    Sit to stand X 5 2 x 10 plinth    Calf raises --- Heel/ toe 2 x 10    Mini squat --- X 10    NU step --- Level 1, 5 minutes      Time spent with patient reviewing proper muscle recruitment and technique with exercises. MANUAL THERAPY: (18 minutes):   Joint mobilization and Soft tissue mobilization was utilized and necessary because of the patient's restricted joint motion, painful spasm, loss of articular motion, and restricted motion of soft tissue. Supine soft tissue mobilization with biofreeze to L quadriceps to decrease tightness and pain  Gentle L patellar mobilizations all directions to improve mobility    MODALITIES: (10 minutes):        Vasopneumatic compression device to L knee with patient in supine to decrease swelling and pain      HEP: As above; handouts given to patient for all exercises. Treatment/Session Summary:    Treatment Assessment:  Patient with full ROM today and minimal complaints during exercises. Decreased tightness in L thigh following manual therapy.  Plan to progress exercises as tolerated over next few

## 2022-10-20 ENCOUNTER — HOSPITAL ENCOUNTER (OUTPATIENT)
Dept: PHYSICAL THERAPY | Age: 72
Setting detail: RECURRING SERIES
Discharge: HOME | End: 2022-10-23
Payer: MEDICARE

## 2022-10-20 PROCEDURE — 97110 THERAPEUTIC EXERCISES: CPT

## 2022-10-20 PROCEDURE — 97140 MANUAL THERAPY 1/> REGIONS: CPT

## 2022-10-20 ASSESSMENT — PAIN SCALES - GENERAL: PAINLEVEL_OUTOF10: 2

## 2022-10-20 NOTE — PROGRESS NOTES
Jorge Faustin  : 1950  Primary: Medicare Part A And B  Secondary: [address] @ [address] [facility] [address]  Phone: [phone]  Fax: [phone] Plan Frequency: 2 times a week for 8 weeks  Plan of Care/Certification Expiration Date: 22 (start of plan of care 10/14/2022)      PT Visit Info:  No data recorded   Visit Count:  3   OUTPATIENT PHYSICAL THERAPY:OP NOTE TYPE: Treatment Note 10/20/2022       Episode  }Appt Desk             Treatment Diagnosis: Pain in Left Knee (M25.562)  Other abnormalities of gait and mobility (R26.89)  History of L total knee arthroplasty (X95.438)  Medical/Referring Diagnosis:  Hx of total knee arthroplasty, left [J52.241]  Referring Physician:  Divya Mei MD MD Orders:  PT Eval and Treat   Return MD Appt:  10/25/2022  Date of Onset:  Onset Date: 22 (s/p L TKA)     Allergies:   Bee venom, Cephalexin, Penicillins, and Sulfa antibiotics  Restrictions/Precautions:  Restrictions/Precautions: Weight Bearing; Fall Risk  Left Lower Extremity Weight Bearing: Weight Bearing As Tolerated     Interventions Planned (Treatment may consist of any combination of the following):    Current Treatment Recommendations: Strengthening; ROM; Balance training; Functional mobility training; Transfer training; ADL/Self-care training; Endurance training; Gait training; Stair training; Neuromuscular re-education; Manual Therapy - Soft Tissue Mobilization; Pain management; Return to work related activity; Home exercise program; Safety education & training; Modalities; Integrated dry needling;  Therapeutic activities     Subjective Comments:  Pt. reported less tightness today in left thigh  Initial:}    210Post Session:       0/10  Medications Last Reviewed:  10/20/2022  Updated Objective Findings:   L knee ROM:  0-130 degrees  Treatment   THERAPEUTIC EXERCISE: (40 minutes):    Exercises therapy and was compliant with all exercises and ambulates without cane 250 feet with no antalgic gait pattern      Communication/Consultation:   Reviewed HEP, use of ice, and gradual increase in activities  Equipment provided today:  None today  Recommendations/Intent for next treatment session: Next visit will focus on pain and edema control, manual therapy and modalities as needed, progression of ROM, strength, and functional mobility as tolerated.     Total Treatment Billable Duration:  55 minutes  Time In: 8930  Time Out: 1200    LIGIA AMAYA PTA       Charge Capture  }Post Session Pain  PT Visit Info  [employer] Portal  MD Guidelines  Scanned Media  Benefits  MyChart    Future Appointments   Date Time Provider Lenora Hawkins   10/25/2022  3:15 PM Kody Lynch MD [facility]   10/26/2022 10:00 AM Vealannae Radhaa, PT [facility]   10/28/2022  9:00 AM Quentin Pink, PTA [facility]   11/1/2022  9:00 AM Vealannae Allegra, PT [facility]   11/3/2022 10:00 AM Vealannae Tarungra, PT [facility]   11/8/2022 10:00 AM Veatrice Tarungra, PT [facility]   11/10/2022  9:00 AM Quetnin Pink, PTA [facility]   7/3/2023  8:20 AM SOLOMON Orellana - ELLE [facility]

## 2022-10-25 ENCOUNTER — OFFICE VISIT (OUTPATIENT)
Dept: ORTHOPEDIC SURGERY | Age: 72
End: 2022-10-25

## 2022-10-25 DIAGNOSIS — Z96.652 PRESENCE OF LEFT ARTIFICIAL KNEE JOINT: Primary | ICD-10-CM

## 2022-10-25 PROCEDURE — 99024 POSTOP FOLLOW-UP VISIT: CPT | Performed by: ORTHOPAEDIC SURGERY

## 2022-10-25 RX ORDER — GABAPENTIN 100 MG/1
100-300 CAPSULE ORAL NIGHTLY
Qty: 90 CAPSULE | Refills: 0 | Status: SHIPPED | OUTPATIENT
Start: 2022-10-25 | End: 2022-11-24

## 2022-10-25 NOTE — PROGRESS NOTES
Post-op TKA Visit      10/25/22     Allergies   Allergen Reactions    Bee Venom Swelling    Cephalexin Hives    Penicillins Other (See Comments)     States causes him to pass out    Sulfa Antibiotics Rash     Current Outpatient Medications on File Prior to Visit   Medication Sig Dispense Refill    aspirin EC 81 MG EC tablet Take 1 tablet by mouth in the morning and 1 tablet in the evening. 70 tablet 0    promethazine (PHENERGAN) 25 MG tablet Take 1 tablet by mouth every 8 hours as needed for Nausea 30 tablet 1    methocarbamol (ROBAXIN-750) 750 MG tablet Take 1 tablet by mouth 3 times daily as needed (muscle spasm or cramps) 40 tablet 1    meloxicam (MOBIC) 7.5 MG tablet Take 1-2 tablets by mouth daily as needed for Pain 30 tablet 2    amLODIPine (NORVASC) 10 MG tablet Take 10 mg by mouth nightly      lisinopril (PRINIVIL;ZESTRIL) 10 MG tablet Take 40 mg by mouth nightly      metFORMIN (GLUCOPHAGE-XR) 500 MG extended release tablet Take 1,000 mg by mouth in the morning and at bedtime      bromocriptine (PARLODEL) 5 MG capsule Take 5 mg by mouth at bedtime Patient states he takes 4 capsules nightly      tamsulosin (FLOMAX) 0.4 mg capsule Take 0.4 mg by mouth daily      pravastatin (PRAVACHOL) 80 MG tablet Take 80 mg by mouth daily reports taking every morning. TESTOSTERONE CYPIONATE IM Inject 50 mg into the muscle      acetaminophen (TYLENOL) 500 MG tablet Take 500 mg by mouth every 6 hours as needed for Pain for low pain. budesonide-formoterol (SYMBICORT) 160-4.5 MCG/ACT AERO Inhale 2 puffs into the lungs 2 times daily for COPD.        Docusate Sodium 100 MG TABS Take 2 tablets by mouth daily as needed (for constipation)      EPINEPHrine (EPIPEN) 0.3 MG/0.3ML SOAJ injection Inject 0.3 mg into the muscle daily as needed (allergic reaction)      levothyroxine (SYNTHROID) 100 MCG tablet Take 100 mcg by mouth every morning (before breakfast)      LORazepam (ATIVAN) 1 MG tablet Take 1 mg by mouth 4 times daily as needed for Anxiety. nitroGLYCERIN (NITROSTAT) 0.4 MG SL tablet Place 0.4 mg under the tongue as needed for Chest pain every 5 min as needed. max dose of 3.       omeprazole (PRILOSEC) 40 MG delayed release capsule Take 40 mg by mouth nightly      sildenafil (VIAGRA) 100 MG tablet Take 100 mg by mouth as needed       No current facility-administered medications on file prior to visit. 5 weeks Status Post left TKA     History: The patient returns today for post-op visit following left TKA. Their pain is improving, but he is still having some discomfort at night that keeps him awake. They are ambulating without any walking aid. They have completed home physical therapy and started outpatient therapy which is going well. They are pleased with their results thus far. Denies any new complaints today. Physical Exam:  The patient's incision is well healed. There is mild swelling and mildly increased warmth. ROM is 0 to 125 degrees. There is no M/L or A/P instability. The calf is soft and non-tender. Neurologic and vascular exams are intact. X-Rays: AP and Lateral x-rays of left reveals a cementless TKA, [place] prosthesis. There is no patella arthroplasty. There is good alignment and good position of the components. X-Ray Diagnosis: Satisfactory appearance left TKA    Disposition: The patient is doing well following left TKA.     ---- Neuropathic pain treatment: For neuropathic pain relief the patient was given a prescription and counseled regarding the possibility of risks and complications from this medication. They will continue physical therapy exercises. The patient was advised about fall precautions and dental prophylaxis. The patient will follow up as scheduled in 5 months or sooner if needed.

## 2022-10-26 ENCOUNTER — HOSPITAL ENCOUNTER (OUTPATIENT)
Dept: PHYSICAL THERAPY | Age: 72
Setting detail: RECURRING SERIES
Discharge: HOME | End: 2022-10-29
Payer: MEDICARE

## 2022-10-26 PROCEDURE — 97110 THERAPEUTIC EXERCISES: CPT

## 2022-10-26 PROCEDURE — 97140 MANUAL THERAPY 1/> REGIONS: CPT

## 2022-10-26 ASSESSMENT — PAIN SCALES - GENERAL: PAINLEVEL_OUTOF10: 1

## 2022-10-26 NOTE — PROGRESS NOTES
Facundo Mackay  : 1950  Primary: Medicare Part A And B  Secondary: [address] @ [address] [facility] [address] [address]  Phone: [phone]  Fax: [phone] Plan Frequency: 2 times a week for 8 weeks  Plan of Care/Certification Expiration Date: 22 (start of plan of care 10/14/2022)      PT Visit Info:  No data recorded   Visit Count:  4   OUTPATIENT PHYSICAL THERAPY:OP NOTE TYPE: Treatment Note 10/26/2022       Episode  }Appt Desk             Treatment Diagnosis: Pain in Left Knee (M25.562)  Other abnormalities of gait and mobility (R26.89)  History of L total knee arthroplasty (B20.450)  Medical/Referring Diagnosis:  Hx of total knee arthroplasty, left [Q33.216]  Referring Physician:  Jason Kahn MD MD Orders:  PT Eval and Treat   Return MD Appt:  10/25/2022  Date of Onset:  Onset Date: 22 (s/p L TKA)     Allergies:   Bee venom, Cephalexin, Penicillins, and Sulfa antibiotics  Restrictions/Precautions:  Restrictions/Precautions: Weight Bearing; Fall Risk  Left Lower Extremity Weight Bearing: Weight Bearing As Tolerated     Interventions Planned (Treatment may consist of any combination of the following):    Current Treatment Recommendations: Strengthening; ROM; Balance training; Functional mobility training; Transfer training; ADL/Self-care training; Endurance training; Gait training; Stair training; Neuromuscular re-education; Manual Therapy - Soft Tissue Mobilization; Pain management; Return to work related activity; Home exercise program; Safety education & training; Modalities; Integrated dry needling; Therapeutic activities     Subjective Comments:   Patient saw MD yesterday with a good report, X ray looks great, and MD surprised at how well he is doing. States they gave him something to help him sleep, but he had not taken it yet.   Initial:}    1 (L thigh)/10Post Session:       0/10  Medications Last Reviewed:  10/26/2022  Updated Objective Findings:   Adhesions noted under incision site- minimal tenderness    Treatment   THERAPEUTIC EXERCISE: (40 minutes):    Exercises per grid below to improve mobility, strength, balance, and coordination. Required moderate visual, verbal, manual, and tactile cues to promote proper body alignment, promote proper body posture, and promote proper body mechanics. Progressed resistance, range, repetitions, and complexity of movement as indicated. Date:  10/26/2022 Date:  10/18/2022 Date:  10/20/22   Activity/Exercise Parameters Parameters Parameters   Prone quad stretch Strap, 3 x 30 seconds Manual, 5 x 30 second -------   Heel prop 7 minutes with heat 3 minutes --------   Shuttle  Double leg, 100, 2 x 10    Single leg, 50, 2 x 10 L  100 lbs 3x10 reps    Step ups  6 inch, 2 x 10 forward and lateral  6 inch 2x10 reps fwd & lateral    Chair slides ---  X 20 reps x 10 sec hold each   Standing hip flexion ---  X 20 reps at bar BLE's    Standing hip abduction ---  2x10 reps    Standing hamstring curls  ---  X 20 reps   Hamstring stretch Strap, 3 x 30 seconds Strap, 4 x 30 seconds Strap 4x30 sec hold    Calf stretch Slant board, 3 x 30 seconds Slant board, 3 x 30 seconds 4x30 sec hold on incline   Heel slides --- Active x 10 -------   Sit to stand Plinth, 2 x 10 2 x 10 plinth 2x10 reps    Calf raises Heel/ toe, 3 x 10 Heel/ toe 2 x 10 Heel to toe    Mini squat --- X 10 X 10 reps   NU step Level 2, x 10 minutes Level 1, 5 minutes Level 4 x 10 mins     Time spent with patient reviewing proper muscle recruitment and technique with exercises. MANUAL THERAPY: (15 minutes):   Joint mobilization and Soft tissue mobilization was utilized and necessary because of the patient's restricted joint motion, painful spasm, loss of articular motion, and restricted motion of soft tissue.    Supine soft tissue mobilization with biofreeze to L quadriceps to decrease tightness and pain  Gentle L patellar mobilizations all directions to improve mobility    MODALITIES: (0 minutes):        Patient advised to ice at home      HEP: As above; handouts given to patient for all exercises. Treatment/Session Summary:    Treatment Assessment:  Patient making significant progress with therapy. Some tightness and tenderness over incision site and plan to continue with manual therapy to decrease adhesions and tightness. Progress functional strengthening as tolerated. Patient to keep an eye on the pink color around his distal scar, but not concerning at this time. Communication/Consultation:   Reviewed HEP  Equipment provided today:  None today  Recommendations/Intent for next treatment session: Next visit will focus on pain and edema control, manual therapy and modalities as needed, progression of ROM, strength, and functional mobility as tolerated.     Total Treatment Billable Duration:  55 minutes  Time In: 8422  Time Out: Kirsten Laguna PT       Charge Capture  }Post Session Pain  PT Visit Info  [employer] Portal  MD Guidelines  Scanned Media  Benefits  MyChart    Future Appointments   Date Time Provider Lenora Hawkins   10/28/2022  9:00 AM [facility]   11/1/2022  9:00 AM Mateo Harris, PT [facility] O   11/3/2022 10:00 AM Mateo Harris, PT [facility]   11/8/2022 10:00 AM Veaiden Harris, PT [facility]   11/10/2022  9:00 AM Quentin Pink PTA [facility] O   3/28/2023  9:45 AM Kody Rubio.MD DELCID [facility]   7/3/2023  8:20 AM SOLOMON Orellana - ELLE PSCKATTY [facility] AMB

## 2022-10-28 ENCOUNTER — HOSPITAL ENCOUNTER (OUTPATIENT)
Dept: PHYSICAL THERAPY | Age: 72
Setting detail: RECURRING SERIES
Discharge: HOME | End: 2022-10-31
Payer: MEDICARE

## 2022-10-28 PROCEDURE — 97110 THERAPEUTIC EXERCISES: CPT

## 2022-10-28 PROCEDURE — 97140 MANUAL THERAPY 1/> REGIONS: CPT

## 2022-10-28 ASSESSMENT — PAIN SCALES - GENERAL: PAINLEVEL_OUTOF10: 0

## 2022-10-28 NOTE — PROGRESS NOTES
Francia Hadley  : 1950  Primary: Medicare Part A And B  Secondary: [address] [facility] [address]  Phone: [phone]  Fax: [phone] Plan Frequency: 2 times a week for 8 weeks  Plan of Care/Certification Expiration Date: 22 (start of plan of care 10/14/2022)      PT Visit Info:  No data recorded   Visit Count:  5   OUTPATIENT PHYSICAL THERAPY:OP NOTE TYPE: Treatment Note 10/28/2022       Episode  }Appt Desk             Treatment Diagnosis: Pain in Left Knee (M25.562)  Other abnormalities of gait and mobility (R26.89)  History of L total knee arthroplasty (X70.297)  Medical/Referring Diagnosis:  Hx of total knee arthroplasty, left [W54.318]  Referring Physician:  Simeon Al MD MD Orders:  PT Eval and Treat   Return MD Appt:  10/25/2022  Date of Onset:  Onset Date: 22 (s/p L TKA)     Allergies:   Bee venom, Cephalexin, Penicillins, and Sulfa antibiotics  Restrictions/Precautions:  Restrictions/Precautions: Weight Bearing; Fall Risk  Left Lower Extremity Weight Bearing: Weight Bearing As Tolerated     Interventions Planned (Treatment may consist of any combination of the following):    Current Treatment Recommendations: Strengthening; ROM; Balance training; Functional mobility training; Transfer training; ADL/Self-care training; Endurance training; Gait training; Stair training; Neuromuscular re-education; Manual Therapy - Soft Tissue Mobilization; Pain management; Return to work related activity; Home exercise program; Safety education & training; Modalities; Integrated dry needling; Therapeutic activities     Subjective Comments:   Pt. reported he is continuing getting along well with no issues  Initial:}    0/10Post Session:       0/10  Medications Last Reviewed:  10/28/2022  Updated Objective Findings:   Pt. 's incision site still red/pink no notable heat or tender to touch. Swelling around left knee with fluid palpable. 0-130 degrees left knee range of motion    Treatment   THERAPEUTIC EXERCISE: (40 minutes):    Exercises per grid below to improve mobility, strength, balance, and coordination. Required moderate visual, verbal, manual, and tactile cues to promote proper body alignment, promote proper body posture, and promote proper body mechanics. Progressed resistance, range, repetitions, and complexity of movement as indicated. Date:  10/26/2022 Date:  10/28/2022 Date:  10/20/22   Activity/Exercise Parameters Parameters Parameters   Prone quad stretch Strap, 3 x 30 seconds -------- -------   Heel prop 7 minutes with heat 3 minutes --------   Shuttle  Double leg, 100, 2 x 10    Single leg, 50, 2 x 10 L Double leg, 100 lbs 2x10 reps   Single leg 50 lbs 2x10  lbs 3x10 reps    Step ups  6 inch, 2 x 10 forward and lateral 8 inch fwd and lateral x 30 reps each 6 inch 2x10 reps fwd & lateral    Chair slides --- X30 reps x 5 sec hold  X 20 reps x 10 sec hold each   Standing hip flexion --- X 30 reps 2,5 lb wt. s  X 20 reps at bar BLE's    Standing hip abduction --- 2x15 reps 2.5 lb wt.s  2x10 reps    Standing hamstring curls  --- X 30 reps 2.5 lb wt. s  X 20 reps   Hamstring stretch Strap, 3 x 30 seconds Strap, 4 x 30 seconds Strap 4x30 sec hold    Calf stretch Slant board, 3 x 30 seconds Slant board, 3 x 30 seconds 4x30 sec hold on incline   Heel slides --- Active x 10 -------   Sit to stand Plinth, 2 x 10 2 x 10 plinth 2x10 reps    Calf raises Heel/ toe, 3 x 10 Heel/ toe 2 x 10 Heel to toe    Mini squat --- ---- X 10 reps   NU step Level 2, x 10 minutes Level 4 x 10  minutes Level 4 x 10 mins     Time spent with patient reviewing proper muscle recruitment and technique with exercises.      MANUAL THERAPY: (15 minutes):   Joint mobilization and Soft tissue mobilization was utilized and necessary because of the patient's restricted joint motion, painful spasm, loss of articular motion, and restricted motion of soft tissue. Supine soft tissue mobilization with biofreeze to L quadriceps to decrease tightness and pain  Gentle L patellar mobilizations all directions to improve mobility  Suction cup used to decrease adhesions    MODALITIES: (x 10 minutes):        Pt. Received ice pack with LE's elevated higher than heart to decreased swelling in left knee. HEP: As above; handouts given to patient for all exercises. Treatment/Session Summary:    Treatment Assessment:  Pt. continues to be independent and compliant with all exercises. Pt.'s left knee range is 0-130 excellent. KEEP a watch on incision if redness does not subside and swelling does not diminish contact surgeon. Communication/Consultation:   Reviewed HEP  Equipment provided today:  None today  Recommendations/Intent for next treatment session: Next visit will focus on pain and edema control, manual therapy and modalities as needed, progression of ROM, strength, and functional mobility as tolerated.     Total Treatment Billable Duration:  55 minutes  Time In: 9302  Time Out: 1005    LIGIA AMAYA PTA       Charge Capture  }Post Session Pain  PT Visit Info  [employer] Portal  MD Guidelines  Scanned Media  Benefits  MyChart    Future Appointments   Date Time Provider Lenora Hawkins   11/1/2022  9:00 AM Oleg Contreras, [facility]   11/3/2022 10:00 AM Oleg Contreras PT [facility]   11/8/2022 10:00 AM Oleg Contreras PT [facility]   11/10/2022  9:00 AM Vashti Bob PTA [facility] O   3/28/2023  9:45 AM MD BHAVIN Phillips AMB   7/3/2023  8:20 AM SOLOMON Fierro - ELLE MOSS [facility] AMB

## 2022-11-01 ENCOUNTER — HOSPITAL ENCOUNTER (OUTPATIENT)
Dept: PHYSICAL THERAPY | Age: 72
Setting detail: RECURRING SERIES
Discharge: HOME | End: 2022-11-04
Payer: MEDICARE

## 2022-11-01 ENCOUNTER — POST-OP TELEPHONE (OUTPATIENT)
Dept: ORTHOPEDICS UNIT | Age: 72
End: 2022-11-01

## 2022-11-01 PROCEDURE — 97140 MANUAL THERAPY 1/> REGIONS: CPT

## 2022-11-01 PROCEDURE — 97110 THERAPEUTIC EXERCISES: CPT

## 2022-11-01 PROCEDURE — 97016 VASOPNEUMATIC DEVICE THERAPY: CPT

## 2022-11-01 ASSESSMENT — PAIN SCALES - GENERAL: PAINLEVEL_OUTOF10: 0

## 2022-11-01 NOTE — PROGRESS NOTES
[employer]  : 1950  Primary: Medicare Part A And B  Secondary: [address] [facility] [address]  Phone: [phone]  Fax: [phone] Plan Frequency: 2 times a week for 8 weeks  Plan of Care/Certification Expiration Date: 22 (start of plan of care 10/14/2022)      PT Visit Info:  No data recorded   Visit Count:  6   OUTPATIENT PHYSICAL THERAPY:OP NOTE TYPE: Treatment Note 2022       Episode  }Appt Desk             Treatment Diagnosis: Pain in Left Knee (M25.562)  Other abnormalities of gait and mobility (R26.89)  History of L total knee arthroplasty (D64.722)  Medical/Referring Diagnosis:  Hx of total knee arthroplasty, left [L68.968]  Referring Physician:  Stephanie Jimenes MD MD Orders:  PT Eval and Treat   Return MD Appt:  10/25/2022  Date of Onset:  Onset Date: 22 (s/p L TKA)     Allergies:   Bee venom, Cephalexin, Penicillins, and Sulfa antibiotics  Restrictions/Precautions:  Restrictions/Precautions: Weight Bearing; Fall Risk  Left Lower Extremity Weight Bearing: Weight Bearing As Tolerated     Interventions Planned (Treatment may consist of any combination of the following):    Current Treatment Recommendations: Strengthening; ROM; Balance training; Functional mobility training; Transfer training; ADL/Self-care training; Endurance training; Gait training; Stair training; Neuromuscular re-education; Manual Therapy - Soft Tissue Mobilization; Pain management; Return to work related activity; Home exercise program; Safety education & training; Modalities; Integrated dry needling; Therapeutic activities     Subjective Comments:   Patient reports being a little swollen still with some tightness, but no pain.   Initial:}    0/10Post Session:       0/10  Medications Last Reviewed:  2022  Updated Objective Findings:   pink/ redness around incision looking the same as last session with no complaints of increased pain or tenderness. L knee ROM 0-130 degrees    Treatment   THERAPEUTIC EXERCISE: (30 minutes):    Exercises per grid below to improve mobility, strength, balance, and coordination. Required moderate visual, verbal, manual, and tactile cues to promote proper body alignment, promote proper body posture, and promote proper body mechanics. Progressed resistance, range, repetitions, and complexity of movement as indicated. Date:  10/26/2022 Date:  10/28/2022 Date:  11/1/2022   Activity/Exercise Parameters Parameters Parameters   Prone quad stretch Strap, 3 x 30 seconds -------- ---   Heel prop 7 minutes with heat 3 minutes ---   Shuttle  Double leg, 100, 2 x 10    Single leg, 50, 2 x 10 L Double leg, 100 lbs 2x10 reps   Single leg 50 lbs 2x10 LLE ---    Step ups  6 inch, 2 x 10 forward and lateral 8 inch fwd and lateral x 30 reps each 8 inch, forward and lateral, 2 x 10 each   Chair slides --- X30 reps x 5 sec hold  ---   Standing hip flexion --- X 30 reps 2,5 lb wt. s  ---    Standing hip abduction --- 2x15 reps 2.5 lb wt.s  Yellow loop, 2 x 10   Standing hamstring curls  --- X 30 reps 2.5 lb wt. s  ---   Hamstring stretch Strap, 3 x 30 seconds Strap, 4 x 30 seconds ---   Calf stretch Slant board, 3 x 30 seconds Slant board, 3 x 30 seconds Slant board, 4 x 30 seconds   Heel slides --- Active x 10 ---   Sit to stand Plinth, 2 x 10 2 x 10 plinth Chair, 2 x 10   Calf raises Heel/ toe, 3 x 10 Heel/ toe 2 x 10 Heel to toe, 2 x 10   NU step Level 2, x 10 minutes Level 4 x 10  minutes Level 4 x 10 minutes     Time spent with patient reviewing proper muscle recruitment and technique with exercises. MANUAL THERAPY: (15 minutes):   Joint mobilization and Soft tissue mobilization was utilized and necessary because of the patient's restricted joint motion, painful spasm, loss of articular motion, and restricted motion of soft tissue.    Supine soft tissue mobilization to gross L knee anterior and posterior to decrease tightness and swelling  Gentle L patellar mobilizations all directions to improve mobility  Scar mobilization   over incision site to decrease tightness and adhesions    MODALITIES: (10 minutes):        Vasopneumatic compression device to L LE with bilateral LE propped on bolster at end of session to decrease edema and soreness      HEP: As above; handouts given to patient for all exercises. Treatment/Session Summary:    Treatment Assessment:  Patient making great progress with ROM, strength, and activity tolerance. Some continued redness over incision site,  but no worse and not causing any problems- will continue to monitor. Decreased swelling after vasopneumatic device today. Communication/Consultation:   Reviewed HEP  Equipment provided today:  None today  Recommendations/Intent for next treatment session: Next visit will focus on pain and edema control, manual therapy and modalities as needed, progression of ROM, strength, and functional mobility as tolerated.     Total Treatment Billable Duration:  55 minutes  Time In: 0388  Time Out: 1000    Saumya Briggs, PT       Charge Capture  }Post Session Pain  PT Visit Info  [employer] Portal  MD Guidelines  Scanned Media  Benefits  MyChart    Future Appointments   Date Time Provider Lenora Hawkins   11/3/2022 10:00 AM Saumya Briggs, PT [facility]   11/8/2022 10:00 AM Saumya Briggs, PT [facility]   11/10/2022  9:00 AM Veronica Katz, PTA [facility]   11/14/2022  9:00 AM Leonorvioleta Peck, PT [facility]   11/18/2022  9:00 AM Veronica Osegueras, PTA [facility]   11/22/2022  9:00 AM Leonor Peck, PT [facility]   11/29/2022  9:00 AM Veronicaradhika Osegueras, PTA [facility]   12/1/2022  9:00 AM Leonor Cisco, PT [facility]   12/6/2022  9:00 AM Leonor Cisco, PT [facility]   12/8/2022  9:00 AM Veronica Oumars, PTA [facility]   12/13/2022  9:00 AM Leonor Peck, PT [facility]   3/28/2023  9:45 AM Ace Galdamez MD [facility] AM[facility]   7/3/2023 8:20 AM Andrew Romero, APRN - CNP [facility] AMB

## 2022-11-01 NOTE — TELEPHONE ENCOUNTER
Patient called to ask if he can restart testosterone. Is 6 weeks s/p TKA. Has completed ASA regimen. Advised may restart testosterone. Living Status/Prior Level of Function: Independent with ADLs and IADLs    OBJECTIVE:      Initial Initial Current   VAS 6     NDI 30%     CERV ROM      Cervical Flexion WFL     Cervical Extension 42 p     Cervical SB 30 B p     Cervical rotation L 40 p/R 55           ROM Left Right    Shoulder Flex 170 WFL    Shoulder Abd [facility] WFL    Shoulder ER T2 T3    Shoulder IR L1 T10                Strength  Left Right    Shoulder Flex 4+ 4+    Shoulder Scap      Shoulder ER 4 4+    Shoulder IR 5 5                Reflexes Left Right    C5-6 Biceps X X    C5-6 Brachioradialis X X    C7-8 Triceps X X          UE Dermatomes WNL WNL    UE Myotomes WNL WNL            Joint mobility: t-spine, CTJ, cervical spine PAs   []Normal    [x]Hypo   []Hyper    Palpation:  Restriction noted B rhomboids, MT, UT, lev, cervical paraspinals, elevated 1st/2nd rib L; TTP C7-T2    Functional Mobility/Transfers: independent    Posture: flat back, rounded shoulders, forward head, beginning of dowager's hump    Bandages/Dressings/Incisions: NA    Gait: (include devices/WB status): WNL     Orthopedic Special Tests:   Spurling's +    Compression +    Distraction -    Vertebral Artery                            [x] Patient history, allergies, meds reviewed. Medical chart reviewed. See intake form. Review Of Systems (ROS):  [x]Performed Review of systems (Integumentary, CardioPulmonary, Neurological) by intake and observation. Intake form has been scanned into medical record. Patient has been instructed to contact their primary care physician regarding ROS issues if not already being addressed at this time.       Co-morbidities/Complexities (which will affect course of rehabilitation):   []None           Arthritic conditions   []Rheumatoid arthritis (M05.9)  []Osteoarthritis (M19.91)   Cardiovascular conditions   [x]Hypertension (I10)  []Hyperlipidemia (E78.5)  []Angina pectoris (I20)  []Atherosclerosis (I70)  []CVA Musculoskeletal conditions   []Disc C/T spine, ribs, Soft tissue to include: Dry Needling/IASTM, STM, PROM, Gr I-IV mobilizations, manipulation. [x] Modalities as needed that may include: thermal agents, E-stim, Biofeedback, US, iontophoresis as indicated  [x] Patient education on joint protection, postural re-education, activity modification, progression of HEP. HEP instruction: see flowsheet    GOALS:  Patient stated goal: control pain    Therapist goals for Patient:   Short Term Goals: To be achieved in: 2 weeks  1. Independent in HEP and progression per patient tolerance, in order to prevent re-injury. 2. Patient will have a decrease in pain to facilitate improvement in movement, function, and ADLs as indicated by Functional Deficits. Long Term Goals: To be achieved in: 6 weeks  1. Disability index score of 15% or less for the NDI to assist with reaching prior level of function. 2. Patient will demonstrate increased AROM to [facility] of cervical/thoracic spine to allow for proper joint functioning as indicated by patients Functional Deficits. 3. Patient will demonstrate an increase in postural awareness and control and activation of  Deep cervical stabilizers to allow for proper functional mobility as indicated by patients Functional Deficits. 4. Patient will return to sleeping without increased symptoms or restriction. 5. Pt will have strategies to limit tightness and pain at work.        Electronically signed by:  Rainer Urbina, PT , DPT, CDNT

## 2022-11-03 ENCOUNTER — HOSPITAL ENCOUNTER (OUTPATIENT)
Dept: PHYSICAL THERAPY | Age: 72
Setting detail: RECURRING SERIES
Discharge: HOME | End: 2022-11-06
Payer: MEDICARE

## 2022-11-03 PROCEDURE — 97140 MANUAL THERAPY 1/> REGIONS: CPT

## 2022-11-03 PROCEDURE — 97016 VASOPNEUMATIC DEVICE THERAPY: CPT

## 2022-11-03 PROCEDURE — 97110 THERAPEUTIC EXERCISES: CPT

## 2022-11-03 ASSESSMENT — PAIN SCALES - GENERAL: PAINLEVEL_OUTOF10: 0

## 2022-11-03 NOTE — PROGRESS NOTES
Ochoa Graham  : 1950  Primary: Medicare Part A And B  Secondary: [address] @ [address] [facility] [address]  Phone: [phone]  Fax: [phone] Plan Frequency: 2 times a week for 8 weeks  Plan of Care/Certification Expiration Date: 22 (start of plan of care 10/14/2022)      PT Visit Info:  No data recorded   Visit Count:  7   OUTPATIENT PHYSICAL THERAPY:OP NOTE TYPE: Treatment Note 11/3/2022       Episode  }Appt Desk             Treatment Diagnosis: Pain in Left Knee (M25.562)  Other abnormalities of gait and mobility (R26.89)  History of L total knee arthroplasty (Y57.069)  Medical/Referring Diagnosis:  Hx of total knee arthroplasty, left [K38.002]  Referring Physician:  Marisela Lux MD MD Orders:  PT Eval and Treat   Return MD Appt:  10/25/2022  Date of Onset:  Onset Date: 22 (s/p L TKA)     Allergies:   Bee venom, Cephalexin, Penicillins, and Sulfa antibiotics  Restrictions/Precautions:  Restrictions/Precautions: Weight Bearing; Fall Risk  Left Lower Extremity Weight Bearing: Weight Bearing As Tolerated     Interventions Planned (Treatment may consist of any combination of the following):    Current Treatment Recommendations: Strengthening; ROM; Balance training; Functional mobility training; Transfer training; ADL/Self-care training; Endurance training; Gait training; Stair training; Neuromuscular re-education; Manual Therapy - Soft Tissue Mobilization; Pain management; Return to work related activity; Home exercise program; Safety education & training; Modalities; Integrated dry needling; Therapeutic activities     Subjective Comments:   Patient reports working on a shelf yesterday without problems. Minimal soreness in medial knee last night, but no pain this morning.   Initial:}    0/10Post Session:       0/10  Medications Last Reviewed:  11/3/2022  Updated Objective Findings: decreased redness anterior knee and decreased edema under knee cap; L knee circumference 42.5 cm prior to vasopneumatic device and 41.3 cm after    Treatment   THERAPEUTIC EXERCISE: (35 minutes):    Exercises per grid below to improve mobility, strength, balance, and coordination. Required moderate visual, verbal, manual, and tactile cues to promote proper body alignment, promote proper body posture, and promote proper body mechanics. Progressed resistance, range, repetitions, and complexity of movement as indicated. Date:  11/3/2022 Date:  10/28/2022 Date:  11/1/2022   Activity/Exercise Parameters Parameters Parameters   Prone quad stretch -- -------- ---   Heel prop 5 minutes 3 minutes ---   Shuttle  Double leg, 150, 2 x 10    Single leg, 75, 2 x 10 L Double leg, 100 lbs 2x10 reps   Single leg 50 lbs 2x10 LLE ---    Step ups  8 inch, forward and lateral, 2 x 10 each 8 inch fwd and lateral x 30 reps each 8 inch, forward and lateral, 2 x 10 each   Chair slides --- X30 reps x 5 sec hold  ---   Standing hip flexion --- X 30 reps 2,5 lb wt. s  ---    Standing hip abduction Red loop, 2 x 10 each 2x15 reps 2.5 lb wt.s  Yellow loop, 2 x 10   Standing hamstring curls  --- X 30 reps 2.5 lb wt. s  ---   Hamstring stretch Strap, 4 x 30 seconds Strap, 4 x 30 seconds ---   Calf stretch Slant board, 4 x 30 seconds Slant board, 3 x 30 seconds Slant board, 4 x 30 seconds   Heel slides --- Active x 10 ---   Sit to stand Chair, 2 x 10 2 x 10 plinth Chair, 2 x 10   Calf raises Heel/ toe, 3 x 10 Heel/ toe 2 x 10 Heel to toe, 2 x 10   NU step Level 5, 3 minutes, level 2, 7 minutes Level 4 x 10  minutes Level 4 x 10 minutes     Time spent with patient reviewing proper muscle recruitment and technique with exercises.      MANUAL THERAPY: (13 minutes):   Joint mobilization and Soft tissue mobilization was utilized and necessary because of the patient's restricted joint motion, painful spasm, loss of articular motion, and restricted motion of soft tissue. Supine soft tissue mobilization to gross L knee anterior and posterior to decrease tightness and swelling  Gentle L patellar mobilizations all directions to improve mobility  Scar mobilization with and without suction cup over incision site to decrease tightness and adhesions    MODALITIES: (10 minutes):        Vasopneumatic compression device to L LE with bilateral LE propped on bolster at end of session to decrease edema and soreness      HEP: As above; handouts given to patient for all exercises. Treatment/Session Summary:    Treatment Assessment:  Patient with significant progress in ROM, strength, and overall activity tolerance. Decreased swelling today and less redness around incision. Progress note next session and may change to one time a week based on progress and patient preference. Communication/Consultation:   Reviewed HEP and safety at home  Equipment provided today:  None today  Recommendations/Intent for next treatment session: Next visit will focus on pain and edema control, manual therapy and modalities as needed, progression of ROM, strength, and functional mobility as tolerated.     Total Treatment Billable Duration:  58 minutes  Time In: 1000  Time Out: 1100    Enrrique Gamble, PT       Charge Capture  }Post Session Pain  PT Visit Info  [employer] Portal  MD Guidelines  Scanned Media  Benefits  MyChart    Future Appointments   Date Time Provider Lenora Hawkins   11/8/2022 10:00 AM Enrrique Gamble, PT [facility]   11/10/2022  9:00 AM Jorge Luis Kilgore, PTA [facility]   11/14/2022  9:00 AM Ulus Rubinstein, PT [facility]   11/18/2022  9:00 AM Jorge Luis Kilgore, PTA [facility]   11/22/2022 11:00 AM Ulus Rubinstein, PT [facility]   11/29/2022  9:00 AM Jorge Luis Jame, PTA [facility]   12/1/2022  9:00 AM Ulus Rubinstein, PT [facility]   12/6/2022  9:00 AM Ulus Rubinstein, PT [facility]   12/8/2022  9:00 AM Jorge Luis Kilgore, PTA [facility]   12/13/2022  9:00 AM Ulus Rubinstein, PT [facility]   3/28/2023  9:45 AM Rin Bowie., MD [facility]   7/3/2023  8:20 AM SOLOMON Barrios - ELLE [facility]

## 2022-11-08 ENCOUNTER — HOSPITAL ENCOUNTER (OUTPATIENT)
Dept: PHYSICAL THERAPY | Age: 72
Setting detail: RECURRING SERIES
Discharge: HOME | End: 2022-11-11
Payer: MEDICARE

## 2022-11-08 PROCEDURE — 97110 THERAPEUTIC EXERCISES: CPT

## 2022-11-08 PROCEDURE — 97140 MANUAL THERAPY 1/> REGIONS: CPT

## 2022-11-08 ASSESSMENT — PAIN SCALES - GENERAL: PAINLEVEL_OUTOF10: 0

## 2022-11-08 NOTE — PROGRESS NOTES
Ochoa Graham  : 1950  Primary: Medicare Part A And B  Secondary: [address] @ [address] [facility] [address]  Phone: [phone]  Fax: [phone] Plan Frequency: 2 times a week for 8 weeks  Plan of Care/Certification Expiration Date: 22 (start of plan of care 10/14/2022)      PT Visit Info:         Visit Count:  8    OUTPATIENT PHYSICAL THERAPY:OP NOTE TYPE: Progress Report 2022               Episode  Appt Desk         Treatment Diagnosis:  Pain in Left Knee (M25.562)  Other abnormalities of gait and mobility (R26.89)  History of L total knee arthroplasty (Y58.372)  Medical/Referring Diagnosis:  Hx of total knee arthroplasty, left [L97.604]  Referring Physician:  Marisela Lux MD MD Orders:  PT Eval and Treat   Return MD Appt:  10/25/2022  Date of Onset:  Onset Date: 22 (s/p L TKA)     Allergies:  Bee venom, Cephalexin, Penicillins, and Sulfa antibiotics  Restrictions/Precautions:    Restrictions/Precautions: Weight Bearing; Fall Risk  Left Lower Extremity Weight Bearing: Weight Bearing As Tolerated     Medications Last Reviewed:  2022      OBJECTIVE     Patient denies any LE paresthesia. Patient denies any increase of symptoms with cough, sneeze or valsalva. Patient denies any saddle paresthesia or bowel/bladder deficits. Observation/Orthostatic Postural Assessment:          Patient with slightly widened based of support, decreased weight shift to L LE, moderate L LE edema, incision site healing well with steri-strips still intact, but beginning to fall off. Some scabbing and dry skin noted. No signs/ symptoms of infection or DVT present. Small bruising noted medial L knee. 2022: Patient ambulating with good heel toe gait pattern and minimal to no antalgia. Incision site well healed, minimal warmth and redness- no signs/ symptoms of infection or DVT.     Palpation: Moderate tenderness to palpation of gross L knee. Moderate warmth and edema noted, no signs/ symptoms of infection or DVT present. Moderate to significant tenderness to palpation of L distal quadriceps with increased tightness noted. Anthropometric Measurements (cm) Left Right   Knee joint line 43 (from 44.5) 40     ROM:          Moderate hamstring tightness bilaterally  AROM/ PROM Left (degrees) Right (degrees)   Knee Flexion 130 (from 118) 120   Knee Extension 0 (from lacking 4) 0     Strength: Motion Tested Left   (*/5) Right  (*/5)   Knee Extension 4+ (from 4) 5   Hip Flexion 4+ 5   Hip Abduction 4 (from 4-) 4+   Ankle DF 5 5     Passive Accessory Motion:         Moderate patellar limitation on L all directions. Functional Mobility:         Gait/Ambulation:  Patient ambulating independently with minimal to no antalgia (from with straight cane, mild antalgic pattern, decreased heel strike L LE, decreased stance time L, mild lateral sway.)        Transfers:  Minimal to no (from minimal )use of bilateral UE for sit to stand transfers. Patient sleeping without waking due to L knee, easier with car transfers, driving himself, and getting back into some yard work and working on cars. (From difficulty sleeping- typically a side sleeper, difficulty with car transfers, unable to drive at this time, would like to return to hobbies including working on cars and yard work.)    Balance:          Sitting and standing balance grossly intact. ASSESSMENT   Initial Assessment:  Mr. Qian Alvarado is a 71 y.o. male presenting to physical therapy with complaints of L knee pain and stiffness s/p TKA on 9/19/2022. Patient was seen in our clinic following R TKA on 7/23/2021 with great progress and reports no issues with his R knee at this time. Patient had home health physical therapy and reports continuing with his exercises twice a day.  He does report a fall in his home on 10/12/2022 where he stood up and turned, losing his balance and falling between two chairs. Patient reports catching himself and hitting his L thigh, but not landing on his knee. He has minimal complaints of L knee pain, but some tightness and soreness in his L distal quadriceps. He continues to ice three times and day and presents with good ROM in his L knee. Spoke with patient about importance of HEP and progress at home and in therapy. Anticipate good response to therapy as patient is motivated and eager to return to his prior level of activities and independence. Patient presents with increased pain, decreased strength, decreased ROM, decreased flexibility, impaired gait, impaired posture, impaired transfer ability, decreased activity tolerance, and overall impaired functional mobility. Patient is a good candidate for skilled physical therapy interventions to include manual therapy, therapeutic exercise, balance training, gait training, transfer training, postural re-education, body mechanics training, and pain modalities as needed. Progress Note 11/8/2022: Patient has been seen for 8 sessions of physical therapy from 10/14/2022 to 11/8/2022. He reports feeling about 85% better, walking better, getting in and out of the truck easier, and feeling like he is getting stronger. He feels good overall and tries to get out and do what he wants to do, but is cautious. He needs to continue working on strengthening his legs and just getting back to his prior level of activities. He has met some of his goals, is progressing well in therapy sessions and with HEP, and should benefit from continued skilled therapy to address remaining goals and deficits. Problem List: (Impacting functional limitations): Body Structures, Functions, Activity Limitations Requiring Skilled Therapeutic Intervention: Decreased functional mobility ; Decreased ADL status; Decreased ROM; Decreased body mechanics; Decreased tolerance to work activity;  Decreased strength; Decreased endurance; Decreased balance; Increased pain; Decreased posture     Therapy Prognosis:   Therapy Prognosis: Good     PLAN   Effective Dates: 10/14/2022 TO Plan of Care/Certification Expiration Date: 12/13/22 (start of plan of care 10/14/2022)     Frequency/Duration: Plan Frequency: 2 times a week for 8 weeks     Interventions Planned (Treatment may consist of any combination of the following):    Current Treatment Recommendations: Strengthening; ROM; Balance training; Functional mobility training; Transfer training; ADL/Self-care training; Endurance training; Gait training; Stair training; Neuromuscular re-education; Manual Therapy - Soft Tissue Mobilization; Pain management; Return to work related activity; Home exercise program; Safety education & training; Modalities; Integrated dry needling; Therapeutic activities     GOALS: (Goals have been discussed and agreed upon with patient.)  Short-Term Functional Goals: Time Frame: 10/14/2022 to 11/12/2022  Patient demonstrates independence with home exercise program without verbal cueing provided by therapist. -GOAL MET  Patient will report no more than 1/10 L knee pain at rest in order to demonstrate improved self pain control and tolerance. -GOAL MET  Patient will be educated in and demonstrate proper squat lift technique in order to reduce stress on bilateral LE, improve safety, and reduce risk of injury. -GOAL MET  Patient will improve L knee ROM to 0-120 degrees in order to demonstrate full functional and symmetrical ROM. -GOAL MET  Discharge Goals: Time Frame: 10/14/2022 to 12/13/2022  Patient will exhibit no more than 2 cm circumferential difference between L and R knee joint line measurement in order to demonstrate improved edema control. -ONGOING  Patient will improve gross L LE strength to at least 4+/5 in order to return to prior level of activities and independence.  -ONGOING  Patient will be able to perform stairs with reciprocal pattern up and down with use of single handrail in order to improve negotiation around the community. -GOAL MET  Patient will be able to return to yard work and working on cars with minimal to no complaints in order to return to prior recreational activities. -ONGOING  Patient will improve Lower Extremity Functional Scale score to 65/80 from 57/80. -GOAL MET         Outcome Measure: Tool Used: Lower Extremity Functional Scale (LEFS)  Score:  Initial: 57/80 Most Recent: 72/80 (Date: 11/8/2022 )   Interpretation of Score: 20 questions each scored on a 5 point scale with 0 representing \"extreme difficulty or unable to perform\" and 4 representing \"no difficulty\". The lower the score, the greater the functional disability. 80/80 represents no disability. Minimal detectable change is 9 points. Medical Necessity:   > Patient is expected to demonstrate progress in strength, range of motion, balance, coordination, and functional technique to increase independence with ambulation and transfers and improve safety during ambulation, transfers, stairs, and recreational activities. > Skilled intervention continues to be required due to decreased strength and mobility s/p L TKA. Reason For Services/Other Comments:  > Patient continues to require skilled intervention due to decreased activity tolerance hindering quality of life and return to prior independence level. Total Duration:  Time In: 1003  Time Out: 80    Regarding Miles Elias's therapy, I certify that the treatment plan above will be carried out by a therapist or under their direction. Thank you for this referral,  Sonia Aviles, PT     Referring Physician Signature: Chirag Godinez., * No Signature is Required for this note.         Post Session Pain  Charge Capture  PT Visit Info MD Guidelines  Nona

## 2022-11-08 NOTE — PROGRESS NOTES
Brenda Schwartz  : 1950  Primary: Medicare Part A And B  Secondary: [address] @ [address] [facility] [address]  Phone: [phone]  Fax: [phone] Plan Frequency: 2 times a week for 8 weeks  Plan of Care/Certification Expiration Date: 22 (start of plan of care 10/14/2022)      PT Visit Info:  No data recorded   Visit Count:  8   OUTPATIENT PHYSICAL THERAPY:OP NOTE TYPE: Treatment Note 2022       Episode  }Appt Desk             Treatment Diagnosis: Pain in Left Knee (M25.562)  Other abnormalities of gait and mobility (R26.89)  History of L total knee arthroplasty (W30.597)  Medical/Referring Diagnosis:  Hx of total knee arthroplasty, left [A50.159]  Referring Physician:  Jessica Perez MD MD Orders:  PT Eval and Treat   Return MD Appt:  10/25/2022  Date of Onset:  Onset Date: 22 (s/p L TKA)     Allergies:   Bee venom, Cephalexin, Penicillins, and Sulfa antibiotics  Restrictions/Precautions:  Restrictions/Precautions: Weight Bearing; Fall Risk  Left Lower Extremity Weight Bearing: Weight Bearing As Tolerated     Interventions Planned (Treatment may consist of any combination of the following):    Current Treatment Recommendations: Strengthening; ROM; Balance training; Functional mobility training; Transfer training; ADL/Self-care training; Endurance training; Gait training; Stair training; Neuromuscular re-education; Manual Therapy - Soft Tissue Mobilization; Pain management; Return to work related activity; Home exercise program; Safety education & training; Modalities; Integrated dry needling; Therapeutic activities     Subjective Comments:   Patient with no complaints of pain this morning. Still gets some soreness and swelling depending on activity, but progressing very well overall.   Initial:}    0/10Post Session:       3 (L LE tightness/ tired)/10  Medications Last Reviewed: 11/8/2022  Updated Objective Findings:   See Progress Note from today    Treatment   THERAPEUTIC EXERCISE: (40 minutes):    Exercises per grid below to improve mobility, strength, balance, and coordination. Required moderate visual, verbal, manual, and tactile cues to promote proper body alignment, promote proper body posture, and promote proper body mechanics. Progressed resistance, range, repetitions, and complexity of movement as indicated. Date:  11/3/2022 Date:  11/8/2022 Date:  11/1/2022   Activity/Exercise Parameters Parameters Parameters   Prone quad stretch -- --- ---   Heel prop 5 minutes --- ---   Shuttle  Double leg, 150, 2 x 10    Single leg, 75, 2 x 10 L Double leg, 150, 3 x 10    Single leg, 75, 3 x 10 LLE ---    Step ups  8 inch, forward and lateral, 2 x 10 each 8 inch, forward and lateral, 2 x 15 each 8 inch, forward and lateral, 2 x 10 each   Standing hip abduction Red loop, 2 x 10 each Red loop, 2 x 15 each Yellow loop, 2 x 10   Hamstring stretch Strap, 4 x 30 seconds Strap, 4 x 30 seconds ---   Calf stretch Slant board, 4 x 30 seconds Slant board, 3 x 30 seconds Slant board, 4 x 30 seconds   Sit to stand Chair, 2 x 10 Plinth, 3 x 10 Chair, 2 x 10   Calf raises Heel/ toe, 3 x 10 Heel/ toe, 3 x 10 Heel to toe, 2 x 10   NU step Level 5, 3 minutes, level 2, 7 minutes Level 5, x 10 minutes Level 4 x 10 minutes     Time spent with patient reviewing proper muscle recruitment and technique with exercises. MANUAL THERAPY: (15 minutes):   Joint mobilization and Soft tissue mobilization was utilized and necessary because of the patient's restricted joint motion, painful spasm, loss of articular motion, and restricted motion of soft tissue.    Supine soft tissue mobilization to gross L knee anterior and posterior to decrease tightness and swelling  Gentle L patellar mobilizations all directions to improve mobility  Scar mobilization with and without suction cup over incision site to decrease tightness and adhesions    MODALITIES: (0 minutes):        None today      HEP: As above; handouts given to patient for all exercises. Treatment/Session Summary:    Treatment Assessment:  Patient making good progress with functional mobility and strength. Full ROM. Progressing well overall. Changing to one time a week starting next week. Communication/Consultation:   Reviewed HEP and change to once a week starting next week  Equipment provided today:  None today  Recommendations/Intent for next treatment session: Next visit will focus on pain and edema control, manual therapy and modalities as needed, progression of ROM, strength, and functional mobility as tolerated.     Total Treatment Billable Duration:  55 minutes  Time In: 1003  Time Out: [address] [facility] Luz Elena, PT       Charge Capture  }Post Session Pain  PT Visit Info  [employer] Portal  MD Guidelines  Scanned Media  Benefits  MyChart    Future Appointments   Date Time Provider Lenora Hawkins   11/10/2022  9:00 AM Juan Elvin, PTA [facility]   11/14/2022  9:00 AM Leeroy Messenger, PT [facility]   11/22/2022 11:00 AM Leeroy Messenger, PT [facility]   11/29/2022  9:00 AM Juan Elvin, PTA [facility]   12/6/2022  9:00 AM Leeroy Messenger, PT [facility]   12/13/2022  9:00 AM Leeroy Messenger, PT [facility]   3/28/2023  9:45 AM Ron Silveira MD [facility]   7/3/2023  8:20 AM SOLOMON Lieberman - ELLE [facility]

## 2022-11-10 ENCOUNTER — HOSPITAL ENCOUNTER (OUTPATIENT)
Dept: PHYSICAL THERAPY | Age: 72
Setting detail: RECURRING SERIES
Discharge: HOME | End: 2022-11-13
Payer: MEDICARE

## 2022-11-10 PROCEDURE — 97110 THERAPEUTIC EXERCISES: CPT

## 2022-11-10 ASSESSMENT — PAIN SCALES - GENERAL: PAINLEVEL_OUTOF10: 0

## 2022-11-10 NOTE — PROGRESS NOTES
Christie Tavon  : 1950  Primary: Medicare Part A And B  Secondary: [address] [facility] [address] [address]  Phone: [phone]  Fax: [phone] Plan Frequency: 2 times a week for 8 weeks  Plan of Care/Certification Expiration Date: 22 (start of plan of care 10/14/2022)      PT Visit Info:  No data recorded   Visit Count:  9   OUTPATIENT PHYSICAL THERAPY:OP NOTE TYPE: Treatment Note 11/10/2022       Episode  }Appt Desk             Treatment Diagnosis: Pain in Left Knee (M25.562)  Other abnormalities of gait and mobility (R26.89)  History of L total knee arthroplasty (B96.679)  Medical/Referring Diagnosis:  Hx of total knee arthroplasty, left [N99.810]  Referring Physician:  Han Gonzalez MD MD Orders:  PT Eval and Treat   Return MD Appt:  10/25/2022  Date of Onset:  Onset Date: 22 (s/p L TKA)     Allergies:   Bee venom, Cephalexin, Penicillins, and Sulfa antibiotics  Restrictions/Precautions:  Restrictions/Precautions: Weight Bearing; Fall Risk  Left Lower Extremity Weight Bearing: Weight Bearing As Tolerated     Interventions Planned (Treatment may consist of any combination of the following):    Current Treatment Recommendations: Strengthening; ROM; Balance training; Functional mobility training; Transfer training; ADL/Self-care training; Endurance training; Gait training; Stair training; Neuromuscular re-education; Manual Therapy - Soft Tissue Mobilization; Pain management; Return to work related activity; Home exercise program; Safety education & training; Modalities; Integrated dry needling; Therapeutic activities     Subjective Comments:   Pt. reported no pain today and getting along well with no issues  Initial:}    0/10Post Session:       0/10  Medications Last Reviewed:  11/10/2022  Updated Objective Findings:   Pt.   Was compliant with all new leg exercises    Treatment   THERAPEUTIC EXERCISE: (55 minutes):    Exercises per grid below to improve mobility, strength, balance, and coordination. Required moderate visual, verbal, manual, and tactile cues to promote proper body alignment, promote proper body posture, and promote proper body mechanics. Progressed resistance, range, repetitions, and complexity of movement as indicated. Date:  11/3/2022 Date:  11/8/2022 Date:  11/10/22   Activity/Exercise Parameters Parameters Parameters   Knee extension   3x10 reps 22 lb showing    Hamstring curls    3x10 reps 22 lbs showing    Shuttle  Double leg, 150, 2 x 10    Single leg, 75, 2 x 10 L Double leg, 150, 3 x 10    Single leg, 75, 3 x 10 LLE Double leg 150 lbs 3x10 reps     Heel raises 3x10 reps 150 lbs    Step ups  8 inch, forward and lateral, 2 x 10 each 8 inch, forward and lateral, 2 x 15 each 8 inch, forward and lateral, 2 x 10 each   Standing hip abduction Red loop, 2 x 10 each Red loop, 2 x 15 each red loop,3 x 10 each   Hamstring stretch Strap, 4 x 30 seconds Strap, 4 x 30 seconds Strap 4x30 sec hold    Calf stretch Slant board, 4 x 30 seconds Slant board, 3 x 30 seconds Slant board, 4 x 30 seconds   Sit to stand Chair, 2 x 10 Plinth, 3 x 10 Chair, 2 x 10   Calf raises Heel/ toe, 3 x 10 Heel/ toe, 3 x 10 Heel to toe, 2 x 10   NU step Level 5, 3 minutes, level 2, 7 minutes Level 5, x 10 minutes Level 4 x 10 minutes     Time spent with patient reviewing proper muscle recruitment and technique with exercises. MANUAL THERAPY: (0 minutes): NONE TODAY  Joint mobilization and Soft tissue mobilization was utilized and necessary because of the patient's restricted joint motion, painful spasm, loss of articular motion, and restricted motion of soft tissue.    Supine soft tissue mobilization to gross L knee anterior and posterior to decrease tightness and swelling  Gentle L patellar mobilizations all directions to improve mobility  Scar mobilization with and without suction cup over incision site to decrease tightness and adhesions    MODALITIES: (0 minutes):        None today      HEP: As above; handouts given to patient for all exercises. Treatment/Session Summary:    Treatment Assessment:  Pt. continues to show improvements with range of motion and strength  Communication/Consultation:   Reviewed HEP and change to once a week starting next week  Equipment provided today:  None today  Recommendations/Intent for next treatment session: Next visit will focus on pain and edema control, manual therapy and modalities as needed, progression of ROM, strength, and functional mobility as tolerated.     Total Treatment Billable Duration:  55 minutes  Time In: 1000  Time Out: 1100    LIGIA AMAYA PTA       Charge Capture  }Post Session Pain  PT Visit Info  [employer] Portal  MD Guidelines  Scanned Media  Benefits  MyChart    Future Appointments   Date Time Provider Lenora Hawkins   11/14/2022  9:00 AM Leonor Peck, PT [facility]   11/22/2022 11:00 AM Leonor Peck, PT [facility]   11/29/2022  9:00 AM Veronica Katz PTA [facility]   12/6/2022  9:00 AM Leonor Peck, PT [facility]   12/13/2022  9:00 AM Leonor Peck, PT [facility]   3/28/2023  9:45 AM Gabriella Khan MD [facility]   7/3/2023  8:20 AM SOLOMON Duran - CNP [facility]

## 2022-11-14 ENCOUNTER — HOSPITAL ENCOUNTER (OUTPATIENT)
Dept: PHYSICAL THERAPY | Age: 72
Setting detail: RECURRING SERIES
Discharge: HOME | End: 2022-11-17
Payer: MEDICARE

## 2022-11-14 PROCEDURE — 97110 THERAPEUTIC EXERCISES: CPT

## 2022-11-14 ASSESSMENT — PAIN SCALES - GENERAL: PAINLEVEL_OUTOF10: 0

## 2022-11-14 NOTE — PROGRESS NOTES
Red Mota  : 1950  Primary: Medicare Part A And B  Secondary: [address] [facility] [address]  Phone: [phone]  Fax: [phone] Plan Frequency: 2 times a week for 8 weeks  Plan of Care/Certification Expiration Date: 22 (start of plan of care 10/14/2022)      PT Visit Info:  No data recorded   Visit Count:  10   OUTPATIENT PHYSICAL THERAPY:OP NOTE TYPE: Treatment Note 2022       Episode  }Appt Desk             Treatment Diagnosis: Pain in Left Knee (M25.562)  Other abnormalities of gait and mobility (R26.89)  History of L total knee arthroplasty (L38.111)  Medical/Referring Diagnosis:  Hx of total knee arthroplasty, left [M90.137]  Referring Physician:  Daniela Montes MD MD Orders:  PT Eval and Treat   Return MD Appt:  10/25/2022  Date of Onset:  Onset Date: 22 (s/p L TKA)     Allergies:   Bee venom, Cephalexin, Penicillins, and Sulfa antibiotics  Restrictions/Precautions:  Restrictions/Precautions: Weight Bearing; Fall Risk  Left Lower Extremity Weight Bearing: Weight Bearing As Tolerated     Interventions Planned (Treatment may consist of any combination of the following):    Current Treatment Recommendations: Strengthening; ROM; Balance training; Functional mobility training; Transfer training; ADL/Self-care training; Endurance training; Gait training; Stair training; Neuromuscular re-education; Manual Therapy - Soft Tissue Mobilization; Pain management; Return to work related activity; Home exercise program; Safety education & training; Modalities; Integrated dry needling; Therapeutic activities     Subjective Comments:   Patient is pleased with progress and feels like he is improving well.   Initial:}    0/10Post Session:       0/10  Medications Last Reviewed:  2022  Updated Objective Findings:   Patient ROM 0-134 degrees    Treatment   THERAPEUTIC EXERCISE: (55 minutes):    Exercises per grid below to improve mobility, strength, balance, and coordination. Required moderate visual, verbal, manual, and tactile cues to promote proper body alignment, promote proper body posture, and promote proper body mechanics. Progressed resistance, range, repetitions, and complexity of movement as indicated. Date:  11/14/22 Date:  11/8/2022 Date:  11/10/22   Activity/Exercise Parameters Parameters Parameters   Knee extension 3x10 reps 22 lb showing   3x10 reps 22 lb showing    Hamstring curls  3x10 reps 22 lbs showing   3x10 reps 22 lbs showing    Shuttle  Double leg 150 lbs 3x10 reps     Heel raises 3x10 reps 150 lbs  Double leg, 150, 3 x 10    Single leg, 75, 3 x 10 LLE Double leg 150 lbs 3x10 reps     Heel raises 3x10 reps 150 lbs    Step ups  3x12 forward with 10 pound weight to 8 inch step 8 inch, forward and lateral, 2 x 15 each 8 inch, forward and lateral, 2 x 10 each   Standing hip abduction 4x10 sidestepping red loop Red loop, 2 x 15 each red loop,3 x 10 each   Hamstring stretch Strap, 4 x 30 seconds Strap, 4 x 30 seconds Strap 4x30 sec hold    Calf stretch Slant board, 4 x 30 seconds Slant board, 3 x 30 seconds Slant board, 4 x 30 seconds   Sit to stand 4x12 to plinth with 10 pound weight emphasis on slow eccentric Plinth, 3 x 10 Chair, 2 x 10   Calf raises 3x10 off 4 inch step Heel/ toe, 3 x 10 Heel to toe, 2 x 10   NU step Level 4 10 minutes Level 5, x 10 minutes Level 4 x 10 minutes     Time spent with patient reviewing proper muscle recruitment and technique with exercises. MANUAL THERAPY: (0 minutes): NONE TODAY  Joint mobilization and Soft tissue mobilization was utilized and necessary because of the patient's restricted joint motion, painful spasm, loss of articular motion, and restricted motion of soft tissue.    Supine soft tissue mobilization to gross L knee anterior and posterior to decrease tightness and swelling  Gentle L patellar mobilizations all directions to improve mobility  Scar mobilization with and without suction cup over incision site to decrease tightness and adhesions    MODALITIES: (0 minutes):        None today      HEP: As above; handouts given to patient for all exercises. Treatment/Session Summary:    Treatment Assessment:  Progressed by adding resistance to step up and sit to stand. Cued for slow eccentric control with sit to stand today. Patient tolerated session well. Communication/Consultation:   Reviewed HEP and change to once a week starting next week  Equipment provided today:  None today  Recommendations/Intent for next treatment session: Next visit will focus on pain and edema control, manual therapy and modalities as needed, progression of ROM, strength, and functional mobility as tolerated.     Total Treatment Billable Duration:  55 minutes  Time In: 0900  Time Out: Duane PT       Charge Capture  }Post Session Pain  PT Visit Info  [address] Portal  MD Guidelines  Scanned Media  Benefits  MyChart    Future Appointments   Date Time Provider Lenora Hawkins   11/22/2022 11:00 AM Keren Colby, PT [facility]   11/29/2022  9:00 AM [facility]   12/6/2022  9:00 AM Keren Colby, PT [facility]   12/13/2022  9:00 AM Keren Colby, PT [facility]   3/28/2023  9:45 AM Oriana Carranza MD [facility]   7/3/2023  8:20 AM SOLOMON Reid - ELLE [facility] AMB

## 2022-11-18 ENCOUNTER — APPOINTMENT (OUTPATIENT)
Dept: PHYSICAL THERAPY | Age: 72
End: 2022-11-18
Payer: MEDICARE

## 2022-11-21 NOTE — PROGRESS NOTES
Willem Lund  : 1950  Primary: Medicare Part A And B  Secondary: [address] [facility] [address]  Phone: [phone]  Fax: [phone] Plan Frequency: 2 times a week for 8 weeks  Plan of Care/Certification Expiration Date: 22 (start of plan of care 10/14/2022)      PT Visit Info:  No data recorded   Visit Count:  11   OUTPATIENT PHYSICAL THERAPY:OP NOTE TYPE: Treatment Note 2022       Episode  }Appt Desk             Treatment Diagnosis: Pain in Left Knee (M25.562)  Other abnormalities of gait and mobility (R26.89)  History of L total knee arthroplasty (C13.715)  Medical/Referring Diagnosis:  Hx of total knee arthroplasty, left [X44.237]  Referring Physician:  Severa Catchings., MD MD Orders:  PT Eval and Treat   Return MD Appt:  10/25/2022  Date of Onset:  Onset Date: 22 (s/p L TKA)     Allergies:   Bee venom, Cephalexin, Penicillins, and Sulfa antibiotics  Restrictions/Precautions:  Restrictions/Precautions: Weight Bearing; Fall Risk  Left Lower Extremity Weight Bearing: Weight Bearing As Tolerated     Interventions Planned (Treatment may consist of any combination of the following):    Current Treatment Recommendations: Strengthening; ROM; Balance training; Functional mobility training; Transfer training; ADL/Self-care training; Endurance training; Gait training; Stair training; Neuromuscular re-education; Manual Therapy - Soft Tissue Mobilization; Pain management; Return to work related activity; Home exercise program; Safety education & training; Modalities; Integrated dry needling; Therapeutic activities     Subjective Comments:   Patient reports knees feel good today. Initial:}    0/10Post Session:       0/10  Medications Last Reviewed:  2022  Updated Objective Findings:   Patient performs all exercises without pain.     Treatment   THERAPEUTIC EXERCISE: (54 minutes): Exercises per grid below to improve mobility, strength, balance, and coordination. Required moderate visual, verbal, manual, and tactile cues to promote proper body alignment, promote proper body posture, and promote proper body mechanics. Progressed resistance, range, repetitions, and complexity of movement as indicated. Date:  11/14/22 Date:  11/22/22 Date:  11/10/22   Activity/Exercise Parameters Parameters Parameters   Knee extension 3x10 reps 22 lb showing   3x10 reps 22 lb showing    Hamstring curls  3x10 reps 22 lbs showing   3x10 reps 22 lbs showing    Shuttle  Double leg 150 lbs 3x10 reps     Heel raises 3x10 reps 150 lbs  Double leg, 150, 4 x 20    Single leg, 75, 3 x 12 LLE Double leg 150 lbs 3x10 reps     Heel raises 3x10 reps 150 lbs    Step ups  3x12 forward with 10 pound weight to 8 inch step 3x12 forward with 10 pound weight to 8 inch step 8 inch, forward and lateral, 2 x 10 each   Standing hip abduction 4x10 sidestepping red loop 4x10 green loop sidestepping with pink ball red loop,3 x 10 each   Hamstring stretch Strap, 4 x 30 seconds Strap, 4 x 30 seconds Strap 4x30 sec hold    Calf stretch Slant board, 4 x 30 seconds Slant board, 4 x 30 seconds Slant board, 4 x 30 seconds   Sit to stand 4x12 to plinth with 10 pound weight emphasis on slow eccentric 4x12 to plinth with 10 pound weight emphasis on slow eccentric Chair, 2 x 10   Calf raises 3x10 off 4 inch step 3x10 off 4 inch step Heel to toe, 2 x 10   NU step Level 4 10 minutes Level 4 x 10 minutes Level 4 x 10 minutes     Time spent with patient reviewing proper muscle recruitment and technique with exercises. MANUAL THERAPY: (0 minutes): NONE TODAY  Joint mobilization and Soft tissue mobilization was utilized and necessary because of the patient's restricted joint motion, painful spasm, loss of articular motion, and restricted motion of soft tissue.    Supine soft tissue mobilization to gross L knee anterior and posterior to decrease tightness and swelling  Gentle L patellar mobilizations all directions to improve mobility  Scar mobilization with and without suction cup over incision site to decrease tightness and adhesions    MODALITIES: (0 minutes):        None today      HEP: As above; handouts given to patient for all exercises. Treatment/Session Summary:    Treatment Assessment:  Patient tolerated session well but became fatigued and out of breath. Cued for breathing and rest interval between exercises. Communication/Consultation:   Reviewed HEP and change to once a week starting next week  Equipment provided today:  None today  Recommendations/Intent for next treatment session: Next visit will focus on pain and edema control, manual therapy and modalities as needed, progression of ROM, strength, and functional mobility as tolerated.     Total Treatment Billable Duration:  54 minutes  Time In: 1100  Time Out: [address] [facility] [address], PT       Charge Capture  }Post Session Pain  PT Visit Info  [facility]  MD Guidelines  Scanned Media  Benefits  MyChart    Future Appointments   Date Time Provider Lenora Hawkins   11/29/2022  9:00 AM Jazzmine Amato PTA [facility]   12/6/2022  9:00 AM Vishnu Mahajan, PT [facility]   12/13/2022  9:00 AM Vishnu Mahajan PT [facility]   3/28/2023  9:45 AM Bubba Mireles MD [facility]   7/3/2023  8:20 AM SOLOMON Moses - CNP [facility] AMB

## 2022-11-22 ENCOUNTER — HOSPITAL ENCOUNTER (OUTPATIENT)
Dept: PHYSICAL THERAPY | Age: 72
Setting detail: RECURRING SERIES
Discharge: HOME | End: 2022-11-25
Payer: MEDICARE

## 2022-11-22 PROCEDURE — 97110 THERAPEUTIC EXERCISES: CPT

## 2022-11-22 ASSESSMENT — PAIN SCALES - GENERAL: PAINLEVEL_OUTOF10: 0

## 2022-11-29 ENCOUNTER — HOSPITAL ENCOUNTER (OUTPATIENT)
Dept: PHYSICAL THERAPY | Age: 72
Setting detail: RECURRING SERIES
Discharge: HOME | End: 2022-12-02
Payer: MEDICARE

## 2022-11-29 PROCEDURE — 97110 THERAPEUTIC EXERCISES: CPT

## 2022-11-29 PROCEDURE — 97140 MANUAL THERAPY 1/> REGIONS: CPT

## 2022-11-29 ASSESSMENT — PAIN SCALES - GENERAL: PAINLEVEL_OUTOF10: 1

## 2022-11-29 NOTE — PROGRESS NOTES
Lolita Rodgers  : 1950  Primary: Medicare Part A And B  Secondary: [address] [facility] [address]  Phone: [phone]  Fax: [phone] Plan Frequency: 2 times a week for 8 weeks  Plan of Care/Certification Expiration Date: 22 (start of plan of care 10/14/2022)      PT Visit Info:  No data recorded   Visit Count:  12   OUTPATIENT PHYSICAL THERAPY:OP NOTE TYPE: Treatment Note 2022       Episode  }Appt Desk             Treatment Diagnosis: Pain in Left Knee (M25.562)  Other abnormalities of gait and mobility (R26.89)  History of L total knee arthroplasty (Q16.449)  Medical/Referring Diagnosis:  Hx of total knee arthroplasty, left [J63.147]  Referring Physician:  Wendi Stokes MD MD Orders:  PT Eval and Treat   Return MD Appt:  10/25/2022  Date of Onset:  Onset Date: 22 (s/p L TKA)     Allergies:   Bee venom, Cephalexin, Penicillins, and Sulfa antibiotics  Restrictions/Precautions:  Restrictions/Precautions: Weight Bearing; Fall Risk  Left Lower Extremity Weight Bearing: Weight Bearing As Tolerated     Interventions Planned (Treatment may consist of any combination of the following):    Current Treatment Recommendations: Strengthening; ROM; Balance training; Functional mobility training; Transfer training; ADL/Self-care training; Endurance training; Gait training; Stair training; Neuromuscular re-education; Manual Therapy - Soft Tissue Mobilization; Pain management; Return to work related activity; Home exercise program; Safety education & training; Modalities; Integrated dry needling; Therapeutic activities     Subjective Comments:   Pt. reported lateral and posterior left knee  Initial:}    1/10Post Session:       0/10  Medications Last Reviewed:  2022  Updated Objective Findings:   Pt.  Compliant with all exercises with verbal and tactile cuing    Treatment   THERAPEUTIC EXERCISE: (45 minutes):    Exercises per grid below to improve mobility, strength, balance, and coordination. Required moderate visual, verbal, manual, and tactile cues to promote proper body alignment, promote proper body posture, and promote proper body mechanics. Progressed resistance, range, repetitions, and complexity of movement as indicated. Date:  11/14/22 Date:  11/22/22 Date:  11/29/22   Activity/Exercise Parameters Parameters Parameters   Knee extension 3x10 reps 22 lb showing   ------------    Hamstring curls  3x10 reps 22 lbs showing   ----------   Shuttle  Double leg 150 lbs 3x10 reps     Heel raises 3x10 reps 150 lbs  Double leg, 150, 4 x 20    Single leg, 75, 3 x 12 LLE Double leg 150 lbs 3x10 reps     Heel raises 3x10 reps 150 lbs    Step ups  3x12 forward with 10 pound weight to 8 inch step 3x12 forward with 10 pound weight to 8 inch step 8 inch, forward and lateral, 3 x 10 each   Standing hip abduction 4x10 sidestepping red loop 4x10 green loop sidestepping with pink ball red loop,3 x 10 each   Hamstring stretch Strap, 4 x 30 seconds Strap, 4 x 30 seconds Strap 4x30 sec hold    Calf stretch Slant board, 4 x 30 seconds Slant board, 4 x 30 seconds Slant board, 4 x 30 seconds   Sit to stand 4x12 to plinth with 10 pound weight emphasis on slow eccentric 4x12 to plinth with 10 pound weight emphasis on slow eccentric Chair, 2 x 10   Calf raises 3x10 off 4 inch step 3x10 off 4 inch step Heel to toe 3 x 10 reps   NU step Level 4 10 minutes Level 4 x 10 minutes Level 4 x 10 minutes     Time spent with patient reviewing proper muscle recruitment and technique with exercises. MANUAL THERAPY:(15 minutes):  Joint mobilization and Soft tissue mobilization was utilized and necessary because of the patient's restricted joint motion, painful spasm, loss of articular motion, and restricted motion of soft tissue.    PRONE soft tissue mobilization with and without suction cup to decrease tightness and pain in lateral and posterior left knee    MODALITIES: (8 minutes):        Pt.  received ice pack to left knee       HEP: As above; handouts given to patient for all exercises. Treatment/Session Summary:    Treatment Assessment:  Pt. was compliant with all exercises and reported ready for discharge after next visit  Communication/Consultation:   Reviewed HEP and change to once a week starting next week  Equipment provided today:  None today  Recommendations/Intent for next treatment session: Next visit will focus on pain and edema control, manual therapy and modalities as needed, progression of ROM, strength, and functional mobility as tolerated.     Total Treatment Billable Duration:  55 minutes  Time In: 0900  Time Out: 1010    LIGIA AMAYA PTA       Charge Capture  }Post Session Pain  PT Visit Info  [employer] Portal  MD Guidelines  Scanned Media  Benefits  MyChart    Future Appointments   Date Time Provider Lenora Hawkins   12/6/2022  9:00 AM Vada Aase, PT [facility]   12/13/2022  9:00 AM Vada Aase, PT [facility]   3/28/2023  9:45 AM Kami Terry MD [facility]   7/3/2023  8:20 AM SOLOMON Cutler - CNP [facility]

## 2022-12-01 ENCOUNTER — APPOINTMENT (OUTPATIENT)
Dept: PHYSICAL THERAPY | Age: 72
End: 2022-12-01
Payer: MEDICARE

## 2022-12-05 NOTE — THERAPY DISCHARGE
Moriah Barcenas  : 1950  Primary: Medicare Part A And B  Secondary: [address] [facility] [address] [address]  Phone: [phone]  Fax: [phone] Plan Frequency: 2 times a week for 8 weeks  Plan of Care/Certification Expiration Date: 22 (start of plan of care 10/14/2022)      PT Visit Info:         Visit Count:  13    OUTPATIENT PHYSICAL THERAPY:OP NOTE TYPE: Discharge Summary 2022               Episode  Appt Desk         Treatment Diagnosis:  Pain in Left Knee (M25.562)  Other abnormalities of gait and mobility (R26.89)  History of L total knee arthroplasty (Q34.008)  Medical/Referring Diagnosis:  Hx of total knee arthroplasty, left [K49.846]  Referring Physician:  Jackie Lemon MD MD Orders:  PT Eval and Treat   Return MD Appt:  10/25/2022  Date of Onset:  Onset Date: 22 (s/p L TKA)     Allergies:  Bee venom, Cephalexin, Penicillins, and Sulfa antibiotics  Restrictions/Precautions:    Restrictions/Precautions: Weight Bearing; Fall Risk  Left Lower Extremity Weight Bearing: Weight Bearing As Tolerated     Medications Last Reviewed:  2022      OBJECTIVE     Patient denies any LE paresthesia. Patient denies any increase of symptoms with cough, sneeze or valsalva. Patient denies any saddle paresthesia or bowel/bladder deficits. Observation/Orthostatic Postural Assessment:          Patient with reciprocal loading through bilat LE. Palpation:          Slight tenderness to palpation along medial aspect. Anthropometric Measurements (cm) Left Right   Knee joint line 41.5 (from 44.5) 40     ROM:          Moderate hamstring tightness bilaterally  AROM/ PROM Left (degrees) Right (degrees)   Knee Flexion 134 (from 118) 120   Knee Extension 0 (from lacking 4) 0     Strength:     Motion Tested Left   (*/5) Right  (*/5)   Knee Extension 5 5   Hip Flexion 5 5   Hip Abduction 5 5 Ankle DF 5 5     Passive Accessory Motion:         Unremarkable. Functional Mobility:         Gait/Ambulation:  Patient ambulating independently with minimal to no antalgia (from with straight cane, mild antalgic pattern, decreased heel strike L LE, decreased stance time L, mild lateral sway.)        Transfers:  Minimal to no (from minimal )use of bilateral UE for sit to stand transfers. Patient sleeping without waking due to L knee, easier with car transfers, driving himself, and getting back into some yard work and working on cars. (From difficulty sleeping- typically a side sleeper, difficulty with car transfers, unable to drive at this time, would like to return to hobbies including working on cars and yard work.)    Balance:          Sitting and standing balance grossly intact. ASSESSMENT   Initial Assessment:  Mr. Leslie Cervantes is a 71 y.o. male presenting to physical therapy with complaints of L knee pain and stiffness s/p TKA on 9/19/2022. Patient was seen in our clinic following R TKA on 7/23/2021 with great progress and reports no issues with his R knee at this time. Patient had home health physical therapy and reports continuing with his exercises twice a day. He does report a fall in his home on 10/12/2022 where he stood up and turned, losing his balance and falling between two chairs. Patient reports catching himself and hitting his L thigh, but not landing on his knee. He has minimal complaints of L knee pain, but some tightness and soreness in his L distal quadriceps. He continues to ice three times and day and presents with good ROM in his L knee. Spoke with patient about importance of HEP and progress at home and in therapy. Anticipate good response to therapy as patient is motivated and eager to return to his prior level of activities and independence.  Patient presents with increased pain, decreased strength, decreased ROM, decreased flexibility, impaired gait, impaired posture, impaired transfer ability, decreased activity tolerance, and overall impaired functional mobility. Patient is a good candidate for skilled physical therapy interventions to include manual therapy, therapeutic exercise, balance training, gait training, transfer training, postural re-education, body mechanics training, and pain modalities as needed. DISCHARGE Note 12/5/2022: Patient has been seen for 13 sessions of physical therapy from 10/14/2022 to 11/8/2022. He reports feeling about 90% better. Patient still has difficulty and is limited getting onto his knees but feels like he has progressed in all other areas. Patient demonstrating improved strength and ROM since last progress. At this time patient is ready for discharge as he has met all functional goals and is independent with HEP. Problem List: (Impacting functional limitations): Body Structures, Functions, Activity Limitations Requiring Skilled Therapeutic Intervention: Decreased functional mobility ; Decreased ADL status; Decreased ROM; Decreased body mechanics; Decreased tolerance to work activity; Decreased strength; Decreased endurance; Decreased balance; Increased pain; Decreased posture     Therapy Prognosis:   Therapy Prognosis: Good     PLAN   Effective Dates: 10/14/2022 TO Plan of Care/Certification Expiration Date: 12/13/22 (start of plan of care 10/14/2022)     Frequency/Duration: Plan Frequency: 2 times a week for 8 weeks     Interventions Planned (Treatment may consist of any combination of the following):    Current Treatment Recommendations: Strengthening; ROM; Balance training; Functional mobility training; Transfer training; ADL/Self-care training; Endurance training; Gait training; Stair training; Neuromuscular re-education; Manual Therapy - Soft Tissue Mobilization; Pain management; Return to work related activity; Home exercise program; Safety education & training; Modalities; Integrated dry needling;  Therapeutic activities     GOALS: (Goals have been discussed and agreed upon with patient.)  Short-Term Functional Goals: Time Frame: 10/14/2022 to 11/12/2022  Patient demonstrates independence with home exercise program without verbal cueing provided by therapist. -GOAL MET  Patient will report no more than 1/10 L knee pain at rest in order to demonstrate improved self pain control and tolerance. -GOAL MET  Patient will be educated in and demonstrate proper squat lift technique in order to reduce stress on bilateral LE, improve safety, and reduce risk of injury. -GOAL MET  Patient will improve L knee ROM to 0-120 degrees in order to demonstrate full functional and symmetrical ROM. -GOAL MET  Discharge Goals: Time Frame: 10/14/2022 to 12/13/2022  Patient will exhibit no more than 2 cm circumferential difference between L and R knee joint line measurement in order to demonstrate improved edema control. -GOAL MET  Patient will improve gross L LE strength to at least 4+/5 in order to return to prior level of activities and independence. -GOAL MET  Patient will be able to perform stairs with reciprocal pattern up and down with use of single handrail in order to improve negotiation around the community. -GOAL MET  Patient will be able to return to yard work and working on cars with minimal to no complaints in order to return to prior recreational activities. -GOAL MET  Patient will improve Lower Extremity Functional Scale score to 65/80 from 57/80. -GOAL MET         Outcome Measure: Tool Used: Lower Extremity Functional Scale (LEFS)  Score:  Initial: 57/80 Most Recent: 75/80 (Date: 12/6/2022 )   Interpretation of Score: 20 questions each scored on a 5 point scale with 0 representing \"extreme difficulty or unable to perform\" and 4 representing \"no difficulty\". The lower the score, the greater the functional disability. 80/80 represents no disability. Minimal detectable change is 9 points.     Medical Necessity:   > Patient is being discharged at this time.   Total Duration:  Time In: 0900  Time Out: 1000    Regarding Kuldip Elias's therapy, I certify that the treatment plan above will be carried out by a therapist or under their direction. Thank you for this referral,  Shanna Perez, PT     Referring Physician Signature: Telma Lorenzana., * No Signature is Required for this note.         Post Session Pain  Charge Capture  PT Visit Info MD Guidelines  [place]

## 2022-12-05 NOTE — PROGRESS NOTES
Imagene Mins  : 1950  Primary: Medicare Part A And B  Secondary: [address] [facility] [address]  Phone: [phone]  Fax: [phone] Plan Frequency: 2 times a week for 8 weeks  Plan of Care/Certification Expiration Date: 22 (start of plan of care 10/14/2022)      PT Visit Info:  No data recorded   Visit Count:  13   OUTPATIENT PHYSICAL THERAPY:OP NOTE TYPE: Treatment Note 2022       Episode  }Appt Desk             Treatment Diagnosis: Pain in Left Knee (M25.562)  Other abnormalities of gait and mobility (R26.89)  History of L total knee arthroplasty (C06.177)  Medical/Referring Diagnosis:  Hx of total knee arthroplasty, left [N60.815]  Referring Physician:  Mundo Ridley MD MD Orders:  PT Eval and Treat   Return MD Appt:  10/25/2022  Date of Onset:  Onset Date: 22 (s/p L TKA)     Allergies:   Bee venom, Cephalexin, Penicillins, and Sulfa antibiotics  Restrictions/Precautions:  Restrictions/Precautions: Weight Bearing; Fall Risk  Left Lower Extremity Weight Bearing: Weight Bearing As Tolerated     Interventions Planned (Treatment may consist of any combination of the following):    Current Treatment Recommendations: Strengthening; ROM; Balance training; Functional mobility training; Transfer training; ADL/Self-care training; Endurance training; Gait training; Stair training; Neuromuscular re-education; Manual Therapy - Soft Tissue Mobilization; Pain management; Return to work related activity; Home exercise program; Safety education & training; Modalities; Integrated dry needling; Therapeutic activities     Subjective Comments:   Patient reports he is ready for discharge and feeling goood.   Initial:}    0/10Post Session:       0/10  Medications Last Reviewed:  2022  Updated Objective Findings:   see discharge note    Treatment   THERAPEUTIC EXERCISE: (55 minutes): Exercises per grid below to improve mobility, strength, balance, and coordination. Required moderate visual, verbal, manual, and tactile cues to promote proper body alignment, promote proper body posture, and promote proper body mechanics. Progressed resistance, range, repetitions, and complexity of movement as indicated. Date:  12/6/22 Date:  11/22/22 Date:  11/29/22   Activity/Exercise Parameters Parameters Parameters   Knee extension -  ------------    Hamstring curls  -  ----------   Shuttle  Double leg 150 lbs 4x20 reps     Single leg 75 lbs 3x10    Heel raises 3x10 reps 150 lbs  Double leg, 150, 4 x 20    Single leg, 75, 3 x 12 LLE Double leg 150 lbs 3x10 reps     Heel raises 3x10 reps 150 lbs    Step ups  3x12 forward with 15 pound weight to 8 inch step 3x12 forward with 10 pound weight to 8 inch step 8 inch, forward and lateral, 3 x 10 each   Standing hip abduction 4x10 sidestepping red loop 4x10 green loop sidestepping with pink ball red loop,3 x 10 each   Hamstring stretch Strap, 4 x 30 seconds Strap, 4 x 30 seconds Strap 4x30 sec hold    Calf stretch Slant board, 4 x 30 seconds Slant board, 4 x 30 seconds Slant board, 4 x 30 seconds   Sit to stand 3x15 to plinth with 15 pound weight emphasis on slow eccentric 4x12 to plinth with 10 pound weight emphasis on slow eccentric Chair, 2 x 10   Calf raises 3x10 off 4 inch step 3x10 off 4 inch step Heel to toe 3 x 10 reps   NU step Level 4 10 minutes Level 4 x 10 minutes Level 4 x 10 minutes   Lateral Lunge 3x10 with 10 pound weight      Single limb stance 3x30 sec on airrex       Time spent with patient reviewing proper muscle recruitment and technique with exercises. MANUAL THERAPY:(0 minutes):  Joint mobilization and Soft tissue mobilization was utilized and necessary because of the patient's restricted joint motion, painful spasm, loss of articular motion, and restricted motion of soft tissue.    PRONE NONE TODAY    MODALITIES: (0 minutes): None today      HEP: As above; handouts given to patient for all exercises. Treatment/Session Summary:    Treatment Assessment:  Reviewed HEP and clarified questions and importance of continued activity progression. Patient tolerated session well.   Communication/Consultation:   Reviewed HEP and change to once a week starting next week  Equipment provided today:  None today  Recommendations/Intent for next treatment session: Patient discharging with HEP    Total Treatment Billable Duration:  55 minutes  Time In: 0900  Time Out: Duane PT       Charge Capture  }Post Session Pain  PT Visit [id number] [facility] Portal  MD [address]    Future Appointments   Date Time Provider Lenora Hawkins   12/13/2022  9:00 AM Severa Peach, PT [facility]   3/28/2023  9:45 AM Melanie Pete MD [facility]   7/3/2023  8:20 AM SOLOMON Robertson - CNP [facility]

## 2022-12-06 ENCOUNTER — HOSPITAL ENCOUNTER (OUTPATIENT)
Dept: PHYSICAL THERAPY | Age: 72
Setting detail: RECURRING SERIES
Discharge: HOME | End: 2022-12-09
Payer: MEDICARE

## 2022-12-06 PROCEDURE — 97110 THERAPEUTIC EXERCISES: CPT

## 2022-12-06 ASSESSMENT — PAIN SCALES - GENERAL: PAINLEVEL_OUTOF10: 0

## 2022-12-08 ENCOUNTER — APPOINTMENT (OUTPATIENT)
Dept: PHYSICAL THERAPY | Age: 72
End: 2022-12-08
Payer: MEDICARE

## 2022-12-13 ENCOUNTER — APPOINTMENT (OUTPATIENT)
Dept: PHYSICAL THERAPY | Age: 72
End: 2022-12-13
Payer: MEDICARE

## 2023-01-13 ENCOUNTER — OFFICE VISIT (OUTPATIENT)
Dept: UROLOGY | Age: 73
End: 2023-01-13

## 2023-01-13 DIAGNOSIS — N52.9 ERECTILE DYSFUNCTION, UNSPECIFIED ERECTILE DYSFUNCTION TYPE: Primary | ICD-10-CM

## 2023-01-13 DIAGNOSIS — E34.9 HYPOTESTOSTERONEMIA: ICD-10-CM

## 2023-01-13 LAB
BILIRUBIN, URINE, POC: NEGATIVE
BLOOD URINE, POC: NEGATIVE
GLUCOSE URINE, POC: NEGATIVE
KETONES, URINE, POC: NEGATIVE
LEUKOCYTE ESTERASE, URINE, POC: NEGATIVE
NITRITE, URINE, POC: NEGATIVE
PH, URINE, POC: 6 (ref 4.6–8)
PROTEIN,URINE, POC: NEGATIVE
SPECIFIC GRAVITY, URINE, POC: 1.01 (ref 1–1.03)
URINALYSIS CLARITY, POC: ABNORMAL
URINALYSIS COLOR, POC: ABNORMAL
UROBILINOGEN, POC: ABNORMAL

## 2023-01-13 ASSESSMENT — ENCOUNTER SYMPTOMS
EYES NEGATIVE: 1
DIARRHEA: 1
SHORTNESS OF BREATH: 1
CONSTIPATION: 1

## 2023-01-13 NOTE — PROGRESS NOTES
[facility]  [address]  [address] [facility] [place]  [place], [address]  [phone]    Deepa Hernandez  : 1950    Chief Complaint   Patient presents with    New Patient        HPI     Deepa Hernandez is a 71 y.o. male referred by Marisol Wilson for evaluation and treatment of ED. Hx of type II diabetes. No better with Viagra. Has hx of pituitary tumor, inoperable, takes bromocriptine. ., managed by Alexa Lord. Takes testosterone shots every 2 weeks. PSA 0.26 in . Takes tamsulosin 0.4 mg daily. He has used a OLI but is not happy with it.    Past Medical History:   Diagnosis Date    Abnormal blood sugar     Abnormality of cortisol-binding globulin (HCC)     Actinic keratosis     Acute right flank pain     Anaphylactic reaction to bee sting     Anxiety     takes Lorazepam prn    Anxiety disorder     Arthritis     Arthritis of left shoulder region     Asthma 2016    followed by pulmonary; uses inhaler    Backache 2016    Bilateral otitis media     BPH (benign prostatic hyperplasia) 2016    CAD (coronary artery disease) 2011    He had mild nonobstructive CAD at cath by Dr. Nidia Yousif several years ago; cardiac cath:  11    Cervical neck pain with evidence of disc disease     Chronic renal insufficiency 2016    Controlled diabetes mellitus ([address] 2016 A1c 6.0; daily fasting sqbs:  102    COPD (chronic obstructive pulmonary disease) ([address] 2016    Symbicort BID-no PRN inhaler    Corneal foreign body 14    COVID-19 vaccine series completed 2021    Moderna    Depressive disorder 2016    clinical depression    Diarrhea     Dumping syndrome     Dyspepsia and other specified disorders of function of stomach 2012    ED (erectile dysfunction) 2016    Edema     Elevated CPK     Elevated prolactin level     Essential hypertension, benign 2012    GERD (gastroesophageal reflux disease)     Hand pain, right     Hematuria Hyperlipidemia     Hyperprolactinemia ([address]     followed by endocrinologist    Hypertension     Hypogonadism in male     Hypothyroidism     takes levothyroxine    Knee effusion     Knee pain     Low serum testosterone level     Neoplasm of uncertain behavior of skin of neck     Neuralgia     Nevus, non-neoplastic     Numbness and tingling in left arm     Open wound of finger without complication 9/47/20    JHONY on CPAP 8/22/2014    uses CPAP    Osteoarthrosis, unspecified whether generalized or localized, involving lower leg 8/29/2012    Osteoarthrosis, unspecified whether generalized or localized, lower leg 8/29/2012    Other disorder of calcium metabolism 8/29/2012    Other malaise and fatigue 11/27/2012    Pituitary tumor 12/11/2013    Followed by endocrinology    Prolactin increased     Puncture wound 6/11/12    pt stepped on a maximiliano nail    Pure hypercholesterolemia 8/29/2012    Right shoulder pain     RLS (restless legs syndrome) 7/20/2016    Seborrheic keratosis     Seizures ([facility] [address]     30 years ago 1971    Shingles (herpes zoster) polyneuropathy     Shingles rash     Sinusitis      Past Surgical History:   Procedure Laterality Date    BUNIONECTOMY Right     CARDIAC CATHETERIZATION  04/20/2011    CARPAL TUNNEL RELEASE Bilateral     CERVICAL FUSION  11/2007    C3-C4-has metal screws     CHEST SURGERY      pt denies    JOINT REPLACEMENT Right 2021    knee    ORTHOPEDIC SURGERY      bilat carpel tunnel, bunion repair,neck surg foot surg    TONSILLECTOMY      TOTAL KNEE ARTHROPLASTY Left 9/19/2022    lt KNEE TOTAL ARTHROPLASTY ROBOTIC performed by Marychuy Gupta MD at [facility]     Current Outpatient Medications   Medication Sig Dispense Refill    amLODIPine (NORVASC) 10 MG tablet Take 10 mg by mouth nightly      lisinopril (PRINIVIL;ZESTRIL) 10 MG tablet Take 40 mg by mouth nightly      metFORMIN (GLUCOPHAGE-XR) 500 MG extended release tablet Take 1,000 mg by mouth in the morning and at bedtime bromocriptine (PARLODEL) 5 MG capsule Take 5 mg by mouth at bedtime Patient states he takes 4 capsules nightly      tamsulosin (FLOMAX) 0.4 mg capsule Take 0.4 mg by mouth daily      pravastatin (PRAVACHOL) 80 MG tablet Take 80 mg by mouth daily reports taking every morning. levothyroxine (SYNTHROID) 100 MCG tablet Take 100 mcg by mouth every morning (before breakfast)      LORazepam (ATIVAN) 1 MG tablet Take 1 mg by mouth 4 times daily as needed for Anxiety. sildenafil (VIAGRA) 100 MG tablet Take 100 mg by mouth as needed      gabapentin (NEURONTIN) 100 MG capsule Take 1-3 capsules by mouth nightly for 30 days. 90 capsule 0    aspirin EC 81 MG EC tablet Take 1 tablet by mouth in the morning and 1 tablet in the evening. 70 tablet 0    promethazine (PHENERGAN) 25 MG tablet Take 1 tablet by mouth every 8 hours as needed for Nausea 30 tablet 1    methocarbamol (ROBAXIN-750) 750 MG tablet Take 1 tablet by mouth 3 times daily as needed (muscle spasm or cramps) 40 tablet 1    meloxicam (MOBIC) 7.5 MG tablet Take 1-2 tablets by mouth daily as needed for Pain 30 tablet 2    TESTOSTERONE CYPIONATE IM Inject 50 mg into the muscle      acetaminophen (TYLENOL) 500 MG tablet Take 500 mg by mouth every 6 hours as needed for Pain for low pain. budesonide-formoterol (SYMBICORT) 160-4.5 MCG/ACT AERO Inhale 2 puffs into the lungs 2 times daily for COPD. Docusate Sodium 100 MG TABS Take 2 tablets by mouth daily as needed (for constipation)      EPINEPHrine (EPIPEN) 0.3 MG/0.3ML SOAJ injection Inject 0.3 mg into the muscle daily as needed (allergic reaction)      nitroGLYCERIN (NITROSTAT) 0.4 MG SL tablet Place 0.4 mg under the tongue as needed for Chest pain every 5 min as needed. max dose of 3.       omeprazole (PRILOSEC) 40 MG delayed release capsule Take 40 mg by mouth nightly       No current facility-administered medications for this visit.      Allergies   Allergen Reactions    Bee Venom Swelling Cephalexin Hives    Penicillins Other (See Comments)     States causes him to pass out    Sulfa Antibiotics Rash     Social History     Socioeconomic History    Marital status:      Spouse name: Not on file    Number of children: Not on file    Years of education: Not on file    Highest education level: Not on file   Occupational History    Not on file   Tobacco Use    Smoking status: Former     Packs/day: 0.50     Types: Cigarettes     Quit date: 2009     Years since quittin.7    Smokeless tobacco: Former     Quit date: 1986   Substance and Sexual Activity    Alcohol use: No    Drug use: Not Currently     Types: Marijuana Syl Cotton)    Sexual activity: Not on file   Other Topics Concern    Not on file   Social History Narrative    Not on file     Social Determinants of Health     Financial Resource Strain: Not on file   Food Insecurity: Not on file   Transportation Needs: Not on file   Physical Activity: Not on file   Stress: Not on file   Social Connections: Not on file   Intimate Partner Violence: Not on file   Housing Stability: Not on file     Family History   Problem Relation Age of Onset    Diabetes Maternal Grandfather     Cancer Mother         Kidney    Cancer Father         Lymphoma       Review of Systems  Constitutional: Negative  Skin: Negative skin ROS  Eyes: Eyes negative  ENT: HENT negative  Respiratory: Positive for shortness of breath. Cardiovascular: Positive for hypertension and leg pain. GI: Positive for constipation and diarrhea. Genitourinary: Positive for nocturia, slower stream, urgency, leakage w/ urge, frequent urination and erectile dysfunction. Musculoskeletal: Positive for arthralgias and muscle weakness. Psychological: Neg psych ROS  Endocrine: Positive for excessive urination. Hem/Lymphatic: Hematologic/lymphatic negative    There were no vitals taken for this visit. Physical Exam  General   Mental Status - Patient is alert and oriented X3.  General Appearance - Not in acute distress. Build & Nutrition - Well nourished and Well developed. Gait - Normal.      Eye   Pupil - Bilateral - Normal, Equal and Round. Chest and Lung Exam   Auscultation:   Lung Sounds: - Normal, clear to auscultation. Cardiovascular   Cardiovascular examination reveals  - normal heart sounds, regular rate and rhythm with no murmurs. Abdomen   Palpation/Percussion: Palpation and Percussion of the abdomen reveal - No Rebound tenderness, No Rigidity (guarding), No hepatosplenomegaly and No Palpable abdominal masses. Auscultation: Auscultation of the abdomen reveals - Bowel sounds normal.      Male Genitourinary   Evaluation of genitourinary system reveals - scrotum non-tender, no masses, normal testes, no palpable masses, normal epididymides, urethral meatus normal, no discharge, normal penis and normal seminal vesicles. Rectal   Anorectal Exam: Prostate - not enlarged, no nodules. Peripheral Vascular   Lower Extremity: Inspection - Bilateral - Inspection Normal.      Neurologic   Neurologic evaluation reveals  - upper and lower extremity deep tendon reflexes intact bilaterally . Cranial Nerves: - Cranial nerves are grossly intact. Neuropsychiatric   The patient's mood and affect are described as  - normal.      Musculoskeletal  Global Assessment   Examination of related systems reveals - no generalized swelling or edema of extremities. Lymphatic  General Lymphatics   Description - No Generalized lymphadenopathy. Tenderness - Non Tender.     Urinalysis  UA - Dipstick  Results for orders placed or performed in visit on 01/13/23   AMB POC URINALYSIS DIP STICK AUTO W/O MICRO   Result Value Ref Range    Color (UA POC)      Clarity (UA POC)      Glucose, Urine, POC Negative Negative    Bilirubin, Urine, POC Negative Negative    KETONES, Urine, POC Negative Negative    Specific Gravity, Urine, POC 1.015 1.001 - 1.035    Blood (UA POC) Negative Negative    pH, Urine, POC 6.0 4.6 - 8.0    Protein, Urine, POC Negative Negative    Urobilinogen, POC 2 mg/dL     Nitrite, Urine, POC Negative Negative    Leukocyte Esterase, Urine, POC Negative Negative       UA - Micro  WBC - 0  RBC - 0  Bacteria - 0  Epith - 0    Physical Exam    Assessment and Plan    ICD-10-CM    1. Erectile dysfunction, unspecified erectile dysfunction type  N52.9 AMB POC URINALYSIS DIP STICK AUTO W/O MICRO      2. Hypotestosteronemia  E34.9       ED DISCUSSION:  We discussed all treatment options including PDE 5 inhibitors, injection therapy, urethral suppository, vacuum erection device and penile prostheses. He would like to try Trimix. Will give tx for 0.3-0.3 ml prn. .  Patient is knowledgeable in the technique of injection. He was given a brochure with diagrams. He knows to call the office if he has an erection lasting over 3 hours. He knows to wait 24 hours between injections. He knows not to inject the needle in the same place every time. He knows about the risk of scarring and penile curvature. Orders Placed This Encounter   Procedures    AMB POC URINALYSIS DIP STICK AUTO W/O MICRO       Follow-up and Dispositions    Return for return to see NP for Trimix teaching.

## 2023-01-20 ENCOUNTER — OFFICE VISIT (OUTPATIENT)
Dept: UROLOGY | Age: 73
End: 2023-01-20
Payer: MEDICARE

## 2023-01-20 DIAGNOSIS — E34.9 HYPOTESTOSTERONEMIA: ICD-10-CM

## 2023-01-20 DIAGNOSIS — N52.9 ERECTILE DYSFUNCTION, UNSPECIFIED ERECTILE DYSFUNCTION TYPE: Primary | ICD-10-CM

## 2023-01-20 PROCEDURE — G8484 FLU IMMUNIZE NO ADMIN: HCPCS | Performed by: UROLOGY

## 2023-01-20 PROCEDURE — 99213 OFFICE O/P EST LOW 20 MIN: CPT | Performed by: UROLOGY

## 2023-01-20 PROCEDURE — 1123F ACP DISCUSS/DSCN MKR DOCD: CPT | Performed by: UROLOGY

## 2023-01-20 PROCEDURE — 3017F COLORECTAL CA SCREEN DOC REV: CPT | Performed by: UROLOGY

## 2023-01-20 PROCEDURE — G8428 CUR MEDS NOT DOCUMENT: HCPCS | Performed by: UROLOGY

## 2023-01-20 PROCEDURE — G8417 CALC BMI ABV UP PARAM F/U: HCPCS | Performed by: UROLOGY

## 2023-01-20 PROCEDURE — 1036F TOBACCO NON-USER: CPT | Performed by: UROLOGY

## 2023-01-20 NOTE — PROGRESS NOTES
[facility]  [address]  [address] [facility] [place], [address] [facility]  [phone]    Zane Armstrong  : 1950    Chief Complaint   Patient presents with    Other        HPI     Zane Armstrong is a 71 y.o. male referred by Negrita Hoffman for evaluation and treatment of ED. Hx of type II diabetes. No better with Viagra. Has hx of pituitary tumor, inoperable, takes bromocriptine. ., managed by Dago Hooper. Takes testosterone shots every 2 weeks. PSA 0.26 in . Takes tamsulosin 0.4 mg daily. He has used a OLI but is not happy with it.      Past Medical History:   Diagnosis Date    Abnormal blood sugar     Abnormality of cortisol-binding globulin (HCC)     Actinic keratosis     Acute right flank pain     Anaphylactic reaction to bee sting     Anxiety     takes Lorazepam prn    Anxiety disorder     Arthritis     Arthritis of left shoulder region     Asthma 2016    followed by pulmonary; uses inhaler    Backache 2016    Bilateral otitis media     BPH (benign prostatic hyperplasia) 2016    CAD (coronary artery disease) 2011    He had mild nonobstructive CAD at cath by Dr. Javid Bass several years ago; cardiac cath:  11    Cervical neck pain with evidence of disc disease     Chronic renal insufficiency 2016    Controlled diabetes mellitus ([address] 2016 A1c 6.0; daily fasting sqbs:  102    COPD (chronic obstructive pulmonary disease) ([address] 2016    Symbicort BID-no PRN inhaler    Corneal foreign body 14    COVID-19 vaccine series completed 2021    Moderna    Depressive disorder 2016    clinical depression    Diarrhea     Dumping syndrome     Dyspepsia and other specified disorders of function of stomach 2012    ED (erectile dysfunction) 2016    Edema     Elevated CPK     Elevated prolactin level     Essential hypertension, benign 2012    GERD (gastroesophageal reflux disease)     Hand pain, right     Hematuria Hyperlipidemia     Hyperprolactinemia ([address]     followed by endocrinologist    Hypertension     Hypogonadism in male     Hypothyroidism     takes levothyroxine    Knee effusion     Knee pain     Low serum testosterone level     Neoplasm of uncertain behavior of skin of neck     Neuralgia     Nevus, non-neoplastic     Numbness and tingling in left arm     Open wound of finger without complication 2/98/40    JHONY on CPAP 8/22/2014    uses CPAP    Osteoarthrosis, unspecified whether generalized or localized, involving lower leg 8/29/2012    Osteoarthrosis, unspecified whether generalized or localized, lower leg 8/29/2012    Other disorder of calcium metabolism 8/29/2012    Other malaise and fatigue 11/27/2012    Pituitary tumor 12/11/2013    Followed by endocrinology    Prolactin increased     Puncture wound 6/11/12    pt stepped on a maximiliano nail    Pure hypercholesterolemia 8/29/2012    Right shoulder pain     RLS (restless legs syndrome) 7/20/2016    Seborrheic keratosis     Seizures ([facility] [address]     30 years ago 1971    Shingles (herpes zoster) polyneuropathy     Shingles rash     Sinusitis      Past Surgical History:   Procedure Laterality Date    BUNIONECTOMY Right     CARDIAC CATHETERIZATION  04/20/2011    CARPAL TUNNEL RELEASE Bilateral     CERVICAL FUSION  11/2007    C3-C4-has metal screws     CHEST SURGERY      pt denies    JOINT REPLACEMENT Right 2021    knee    ORTHOPEDIC SURGERY      bilat carpel tunnel, bunion repair,neck surg foot surg    TONSILLECTOMY      TOTAL KNEE ARTHROPLASTY Left 9/19/2022    lt KNEE TOTAL ARTHROPLASTY ROBOTIC performed by Wojciech Prajapati MD at [facility]     Current Outpatient Medications   Medication Sig Dispense Refill    gabapentin (NEURONTIN) 100 MG capsule Take 1-3 capsules by mouth nightly for 30 days. 90 capsule 0    aspirin EC 81 MG EC tablet Take 1 tablet by mouth in the morning and 1 tablet in the evening.  70 tablet 0    promethazine (PHENERGAN) 25 MG tablet Take 1 tablet by mouth every 8 hours as needed for Nausea 30 tablet 1    methocarbamol (ROBAXIN-750) 750 MG tablet Take 1 tablet by mouth 3 times daily as needed (muscle spasm or cramps) 40 tablet 1    meloxicam (MOBIC) 7.5 MG tablet Take 1-2 tablets by mouth daily as needed for Pain 30 tablet 2    amLODIPine (NORVASC) 10 MG tablet Take 10 mg by mouth nightly      lisinopril (PRINIVIL;ZESTRIL) 10 MG tablet Take 40 mg by mouth nightly      metFORMIN (GLUCOPHAGE-XR) 500 MG extended release tablet Take 1,000 mg by mouth in the morning and at bedtime      bromocriptine (PARLODEL) 5 MG capsule Take 5 mg by mouth at bedtime Patient states he takes 4 capsules nightly      tamsulosin (FLOMAX) 0.4 mg capsule Take 0.4 mg by mouth daily      pravastatin (PRAVACHOL) 80 MG tablet Take 80 mg by mouth daily reports taking every morning. TESTOSTERONE CYPIONATE IM Inject 50 mg into the muscle      acetaminophen (TYLENOL) 500 MG tablet Take 500 mg by mouth every 6 hours as needed for Pain for low pain. budesonide-formoterol (SYMBICORT) 160-4.5 MCG/ACT AERO Inhale 2 puffs into the lungs 2 times daily for COPD. Docusate Sodium 100 MG TABS Take 2 tablets by mouth daily as needed (for constipation)      EPINEPHrine (EPIPEN) 0.3 MG/0.3ML SOAJ injection Inject 0.3 mg into the muscle daily as needed (allergic reaction)      levothyroxine (SYNTHROID) 100 MCG tablet Take 100 mcg by mouth every morning (before breakfast)      LORazepam (ATIVAN) 1 MG tablet Take 1 mg by mouth 4 times daily as needed for Anxiety. nitroGLYCERIN (NITROSTAT) 0.4 MG SL tablet Place 0.4 mg under the tongue as needed for Chest pain every 5 min as needed. max dose of 3.       omeprazole (PRILOSEC) 40 MG delayed release capsule Take 40 mg by mouth nightly      sildenafil (VIAGRA) 100 MG tablet Take 100 mg by mouth as needed       No current facility-administered medications for this visit.      Allergies   Allergen Reactions    Bee Venom Swelling    Cephalexin Hives    Penicillins Other (See Comments)     States causes him to pass out    Sulfa Antibiotics Rash     Social History     Socioeconomic History    Marital status:      Spouse name: Not on file    Number of children: Not on file    Years of education: Not on file    Highest education level: Not on file   Occupational History    Not on file   Tobacco Use    Smoking status: Former     Packs/day: 0.50     Types: Cigarettes     Quit date: 2009     Years since quittin.7    Smokeless tobacco: Former     Quit date: 1986   Substance and Sexual Activity    Alcohol use: No    Drug use: Not Currently     Types: Marijuana [place])    Sexual activity: Not on file   Other Topics Concern    Not on file   Social History Narrative    Not on file     Social Determinants of Health     Financial Resource Strain: Not on file   Food Insecurity: Not on file   Transportation Needs: Not on file   Physical Activity: Not on file   Stress: Not on file   Social Connections: Not on file   Intimate Partner Violence: Not on file   Housing Stability: Not on file     Family History   Problem Relation Age of Onset    Diabetes Maternal Grandfather     Cancer Mother         Kidney    Cancer Father         Lymphoma       ROS    There were no vitals taken for this visit.     Urinalysis  UA - Dipstick  Results for orders placed or performed in visit on 23   AMB POC URINALYSIS DIP STICK AUTO W/O MICRO   Result Value Ref Range    Color (UA POC)      Clarity (UA POC)      Glucose, Urine, POC Negative Negative    Bilirubin, Urine, POC Negative Negative    KETONES, Urine, POC Negative Negative    Specific Gravity, Urine, POC 1.015 1.001 - 1.035    Blood (UA POC) Negative Negative    pH, Urine, POC 6.0 4.6 - 8.0    Protein, Urine, POC Negative Negative    Urobilinogen, POC 2 mg/dL     Nitrite, Urine, POC Negative Negative    Leukocyte Esterase, Urine, POC Negative Negative       UA - Micro  WBC - 0  RBC - 0  Bacteria - 0  Epith - 0    Physical Exam    Assessment and Plan    ICD-10-CM    1. Erectile dysfunction, unspecified erectile dysfunction type  N52.9       2. Hypotestosteronemia  E34.9       Instructed pt in use of Trimix. Injected 0.1 ml. Patient is knowledgeable in the technique of injection. He was given a brochure with diagrams. He knows to call the office if he has an erection lasting over 3 hours. He knows to wait 24 hours between injections. He knows not to inject the needle in the same place every time. He knows about the risk of scarring and penile curvature. No orders of the defined types were placed in this encounter. Follow-up and Dispositions    Return if symptoms worsen or fail to improve.

## 2023-02-07 ENCOUNTER — OFFICE VISIT (OUTPATIENT)
Dept: ORTHOPEDIC SURGERY | Age: 73
End: 2023-02-07

## 2023-02-07 DIAGNOSIS — M25.562 LEFT KNEE PAIN, UNSPECIFIED CHRONICITY: ICD-10-CM

## 2023-02-07 DIAGNOSIS — Z96.652 PRESENCE OF LEFT ARTIFICIAL KNEE JOINT: Primary | ICD-10-CM

## 2023-02-07 LAB
APPEARANCE FLD: NORMAL
COLOR FLD: NORMAL
NUC CELL # FLD: 741 /CU MM
RBC # FLD: NORMAL /CU MM
SPECIMEN SOURCE FLD: NORMAL

## 2023-02-07 NOTE — PROGRESS NOTES
18 inch knee immobilizer for patients left knee. I instructed patient that the strips of support velco should align on the medial and lateral sides of the leg. Patient was informed to tigthen the bottom strap first to anchor the brace, followed by the strap above the knee and below the knee. Finally tighthening the very top strap Patient read and signed documenting they understand and agree to [facility]'s current DME return policy.

## 2023-02-07 NOTE — H&P (VIEW-ONLY)
Post-op TKA Visit      02/07/23     Allergies   Allergen Reactions    Bee Venom Swelling    Cephalexin Hives    Penicillins Other (See Comments)     States causes him to pass out    Sulfa Antibiotics Rash     Current Outpatient Medications on File Prior to Visit   Medication Sig Dispense Refill    gabapentin (NEURONTIN) 100 MG capsule Take 1-3 capsules by mouth nightly for 30 days. 90 capsule 0    aspirin EC 81 MG EC tablet Take 1 tablet by mouth in the morning and 1 tablet in the evening. 70 tablet 0    promethazine (PHENERGAN) 25 MG tablet Take 1 tablet by mouth every 8 hours as needed for Nausea 30 tablet 1    methocarbamol (ROBAXIN-750) 750 MG tablet Take 1 tablet by mouth 3 times daily as needed (muscle spasm or cramps) 40 tablet 1    meloxicam (MOBIC) 7.5 MG tablet Take 1-2 tablets by mouth daily as needed for Pain 30 tablet 2    amLODIPine (NORVASC) 10 MG tablet Take 10 mg by mouth nightly      lisinopril (PRINIVIL;ZESTRIL) 10 MG tablet Take 40 mg by mouth nightly      metFORMIN (GLUCOPHAGE-XR) 500 MG extended release tablet Take 1,000 mg by mouth in the morning and at bedtime      bromocriptine (PARLODEL) 5 MG capsule Take 5 mg by mouth at bedtime Patient states he takes 4 capsules nightly      tamsulosin (FLOMAX) 0.4 mg capsule Take 0.4 mg by mouth daily      pravastatin (PRAVACHOL) 80 MG tablet Take 80 mg by mouth daily reports taking every morning. TESTOSTERONE CYPIONATE IM Inject 50 mg into the muscle      acetaminophen (TYLENOL) 500 MG tablet Take 500 mg by mouth every 6 hours as needed for Pain for low pain. budesonide-formoterol (SYMBICORT) 160-4.5 MCG/ACT AERO Inhale 2 puffs into the lungs 2 times daily for COPD.        Docusate Sodium 100 MG TABS Take 2 tablets by mouth daily as needed (for constipation)      EPINEPHrine (EPIPEN) 0.3 MG/0.3ML SOAJ injection Inject 0.3 mg into the muscle daily as needed (allergic reaction)      levothyroxine (SYNTHROID) 100 MCG tablet Take 100 mcg by mouth every morning (before breakfast)      LORazepam (ATIVAN) 1 MG tablet Take 1 mg by mouth 4 times daily as needed for Anxiety. nitroGLYCERIN (NITROSTAT) 0.4 MG SL tablet Place 0.4 mg under the tongue as needed for Chest pain every 5 min as needed. max dose of 3.       omeprazole (PRILOSEC) 40 MG delayed release capsule Take 40 mg by mouth nightly      sildenafil (VIAGRA) 100 MG tablet Take 100 mg by mouth as needed       No current facility-administered medications on file prior to visit. 5 months Status Post left TKA     History: The patient returns today for post-op visit following left TKA. Patient states she has done well in the postoperative period thus far. He denies any new injuries, but about a month ago he started having a lot more pain in the posterior knee as well as the knee And since then he has felt like his kneecap shifts laterally which is very painful. He has the most pain with going from sit to stand. He states knee Feels unstable and has become more red and swollen. He denies any fever, chills, nausea, vomiting. He denies any drainage from the incision, or numbness or tingling. Physical Exam:  The patient's incision is well healed. There is moderate swelling and mildly increased warmth. There is mild erythema in the center of the incision. The patella appears laterally displaced. ROM is 5 to 125 degrees. There is no M/L or A/P instability. The calf is soft and non-tender. Neurologic and vascular exams are intact. X-Rays: AP, sunrise and Lateral x-rays of left reveals a cementless TKA, [place] prosthesis. There is good alignment and good position of the femoral and tibial components. The patella is laterally displaced on the sunrise view. X-Ray Diagnosis: Stable appearance of cementless left total knee arthroplasty but patient does appear that he has disrupted his medial repair and has a laterally subluxated/dislocated left patella.     Disposition: I have discussed this with the patient. He denies any history of trauma. I will aspirate his left knee and send the fluid for aerobic anaerobic culture as well as cell count. The patient will also be referred for a CBC, sed rate, and C-reactive protein. If there is no evidence of infection, then I will plan for lateral retinacular release and capsular repair. Surgery will be scheduled for next week. Procedure: The patient knee was sterilely prepped. The skin was anesthetized with 3 cc 1% Xylocaine. The knee was aspirated with an 18-gauge removing 70 cc of bloody joint fluid. This will be sent for aerobic/anaerobic culture and cell count.

## 2023-02-07 NOTE — PROGRESS NOTES
Post-op TKA Visit      02/07/23     Allergies   Allergen Reactions    Bee Venom Swelling    Cephalexin Hives    Penicillins Other (See Comments)     States causes him to pass out    Sulfa Antibiotics Rash     Current Outpatient Medications on File Prior to Visit   Medication Sig Dispense Refill    gabapentin (NEURONTIN) 100 MG capsule Take 1-3 capsules by mouth nightly for 30 days. 90 capsule 0    aspirin EC 81 MG EC tablet Take 1 tablet by mouth in the morning and 1 tablet in the evening. 70 tablet 0    promethazine (PHENERGAN) 25 MG tablet Take 1 tablet by mouth every 8 hours as needed for Nausea 30 tablet 1    methocarbamol (ROBAXIN-750) 750 MG tablet Take 1 tablet by mouth 3 times daily as needed (muscle spasm or cramps) 40 tablet 1    meloxicam (MOBIC) 7.5 MG tablet Take 1-2 tablets by mouth daily as needed for Pain 30 tablet 2    amLODIPine (NORVASC) 10 MG tablet Take 10 mg by mouth nightly      lisinopril (PRINIVIL;ZESTRIL) 10 MG tablet Take 40 mg by mouth nightly      metFORMIN (GLUCOPHAGE-XR) 500 MG extended release tablet Take 1,000 mg by mouth in the morning and at bedtime      bromocriptine (PARLODEL) 5 MG capsule Take 5 mg by mouth at bedtime Patient states he takes 4 capsules nightly      tamsulosin (FLOMAX) 0.4 mg capsule Take 0.4 mg by mouth daily      pravastatin (PRAVACHOL) 80 MG tablet Take 80 mg by mouth daily reports taking every morning. TESTOSTERONE CYPIONATE IM Inject 50 mg into the muscle      acetaminophen (TYLENOL) 500 MG tablet Take 500 mg by mouth every 6 hours as needed for Pain for low pain. budesonide-formoterol (SYMBICORT) 160-4.5 MCG/ACT AERO Inhale 2 puffs into the lungs 2 times daily for COPD.        Docusate Sodium 100 MG TABS Take 2 tablets by mouth daily as needed (for constipation)      EPINEPHrine (EPIPEN) 0.3 MG/0.3ML SOAJ injection Inject 0.3 mg into the muscle daily as needed (allergic reaction)      levothyroxine (SYNTHROID) 100 MCG tablet Take 100 mcg by mouth every morning (before breakfast)      LORazepam (ATIVAN) 1 MG tablet Take 1 mg by mouth 4 times daily as needed for Anxiety. nitroGLYCERIN (NITROSTAT) 0.4 MG SL tablet Place 0.4 mg under the tongue as needed for Chest pain every 5 min as needed. max dose of 3.       omeprazole (PRILOSEC) 40 MG delayed release capsule Take 40 mg by mouth nightly      sildenafil (VIAGRA) 100 MG tablet Take 100 mg by mouth as needed       No current facility-administered medications on file prior to visit. 5 months Status Post left TKA     History: The patient returns today for post-op visit following left TKA. Patient states she has done well in the postoperative period thus far. He denies any new injuries, but about a month ago he started having a lot more pain in the posterior knee as well as the knee And since then he has felt like his kneecap shifts laterally which is very painful. He has the most pain with going from sit to stand. He states knee Feels unstable and has become more red and swollen. He denies any fever, chills, nausea, vomiting. He denies any drainage from the incision, or numbness or tingling. Physical Exam:  The patient's incision is well healed. There is moderate swelling and mildly increased warmth. There is mild erythema in the center of the incision. The patella appears laterally displaced. ROM is 5 to 125 degrees. There is no M/L or A/P instability. The calf is soft and non-tender. Neurologic and vascular exams are intact. X-Rays: AP, sunrise and Lateral x-rays of left reveals a cementless TKA, [place] prosthesis. There is good alignment and good position of the femoral and tibial components. The patella is laterally displaced on the sunrise view. X-Ray Diagnosis: Stable appearance of cementless left total knee arthroplasty but patient does appear that he has disrupted his medial repair and has a laterally subluxated/dislocated left patella.     Disposition: I have discussed this with the patient. He denies any history of trauma. I will aspirate his left knee and send the fluid for aerobic anaerobic culture as well as cell count. The patient will also be referred for a CBC, sed rate, and C-reactive protein. If there is no evidence of infection, then I will plan for lateral retinacular release and capsular repair. Surgery will be scheduled for next week. Procedure: The patient knee was sterilely prepped. The skin was anesthetized with 3 cc 1% Xylocaine. The knee was aspirated with an 18-gauge removing 70 cc of bloody joint fluid. This will be sent for aerobic/anaerobic culture and cell count.

## 2023-02-08 DIAGNOSIS — M25.362 KNEE INSTABILITY, LEFT: ICD-10-CM

## 2023-02-08 DIAGNOSIS — Z96.652 PRESENCE OF LEFT ARTIFICIAL KNEE JOINT: Primary | ICD-10-CM

## 2023-02-10 LAB
BACTERIA SPEC CULT: NORMAL
BACTERIA SPEC CULT: NORMAL
GRAM STN SPEC: NORMAL
GRAM STN SPEC: NORMAL
SERVICE CMNT-IMP: NORMAL
SERVICE CMNT-IMP: NORMAL

## 2023-02-10 NOTE — PERIOP NOTE
Patient verified name and . Order for consent IS NOT found in EHR; patient verifies procedure. Type 1b surgery, phone assessment complete. Orders not received. Labs per surgeon: none  Labs per anesthesia protocol: none    Patient answered medical/surgical history questions at their best of ability. All prior to admission medications documented in [facility]. Patient instructed to take the following medications the day of surgery according to anesthesia guidelines with a small sip of water: Symbicort if needed and bring DOS, Levothyroxine, Ativan, Pravastatin, Flomax   On the day before surgery please take Acetaminophen 1000mg in the morning and then again before bed. You may substitute for Tylenol 650 mg. Hold all vitamins 7 days prior to surgery and NSAIDS 5 days prior to surgery. Patient instructed on the following:    > Arrive at [facility], time of arrival to be called the day before by 1700  > NPO after midnight, unless otherwise indicated, including gum, mints, and ice chips  > Responsible adult must drive patient to the hospital, stay during surgery, and patient will need supervision 24 hours after anesthesia  > Use antibacterial soap in shower the night before surgery and on the morning of surgery  > All piercings must be removed prior to arrival.    > Leave all valuables (money and jewelry) at home but bring insurance card and ID on DOS.   > You may be required to pay a deductible or co-pay on the day of your procedure. You can pre-pay by calling [phone] if your surgery is at the [facility] [place] or [phone] if your surgery is at the [facility]. > Do not wear make-up, nail polish, lotions, cologne, perfumes, powders, or oil on skin. Artificial nails are not permitted.

## 2023-02-16 ENCOUNTER — ANESTHESIA EVENT (OUTPATIENT)
Dept: SURGERY | Age: 73
End: 2023-02-16
Payer: MEDICARE

## 2023-02-16 NOTE — DISCHARGE INSTRUCTIONS
[facility]   Patient Discharge Instructions    Forrest Elias / [id number] : 1950    Admitted (Not on file) Discharged: 2023     IF YOU HAVE ANY PROBLEMS ONCE YOU ARE AT HOME CALL THE FOLLOWING NUMBERS:   Nurse's line: [phone]  Main office number: [phone]      Medications    The medications you are to continue on are listed on the medication reconciliation sheet.   Narcotic pain medications as well as supplemental iron can cause constipation. If this occurs, try over the counter stool softeners or try stopping the narcotic pain medication and/or the iron.   It is important that you take the medication exactly as they are prescribed.  Medications which increase your risk of blood clots are listed to stop for 5 weeks after surgery as well as medications or supplements which increase your risk of bleeding complications.   Keep your medication in the bottles provided by the pharmacist and keep a list of the medication names, dosages, and times to be taken in your wallet.   Do not take other medications without consulting your doctor.  If you need a refill on your pain medication, please note our office is closed over the weekend. It is our office policy that on-call providers cannot refill narcotic pain medications over the weekend. If you will need a refill over the weekend, please call our office before 12pm on Friday or first thing Monday morning.        Important Information    Do NOT smoke as this will greatly increase your risk of infection!    Resume your prehospital diet. If you have excessive nausea or vomitting call your doctor's office     Leg swelling and warmth is normal for 6 months after surgery. If you experience swelling in your leg, elevate your leg while laying down with your toes above your heart. If you have sudden onset severe swelling with leg pain call our office. Use Johny Hose stockings until we see you in the office for your follow up  appointment. The stitches deep inside take approximately 6 months to dissolve. There will be sharp shooting, stinging and burning pain. This is normal and will resolve between 3-6 months after surgery. Difficulty sleeping is normal following total Knee and Hip replacement. You may try melatonin, an over-the-counter sleep aid or benadryl to help with sleep. Most patients will resume sleeping through the night 8 weeks after surgery. Home Physical Therapy is arranged. Home Health will contact you within 48 hrs of discharge that you have chosen. If you have not received a call within this time frame please contact that provider you chose. You should be given this information before you leave the hospital.     You are at a risk for falls. Use the rolling walker when walking. Patients who have had a joint replacement should not drive if they are still taking narcotic pain mediation during the daytime hours. Most patients wean themselves off of pain medication within 2-5 weeks after surgery. You may drive once you discontinue the narcotic pain medication and feel comfortable enough going from gas to break while driving with your right leg. When to Call the office    - If you have a temperature greater then 101 + other symptoms that suggest illness such as nausea/vomiting, altered mental status, extreme fatigue, wound drainage, etc.  - Uncontrolled vomiting   - Loose control of your bladder or bowel function  - Are unable to bear any wieght       Information obtained by :  I understand that if any problems occur once I am at home I am to contact my physician. I understand and acknowledge receipt of the instructions indicated above.                                                                                                                                            Physician's or R.N.'s Signature                                                                  Date/Time Patient or Representative Signature                                                          Date/Time

## 2023-02-17 ENCOUNTER — HOME HEALTH ADMISSION (OUTPATIENT)
Dept: HOME HEALTH SERVICES | Facility: HOME HEALTH | Age: 73
End: 2023-02-17
Payer: MEDICARE

## 2023-02-17 ENCOUNTER — HOSPITAL ENCOUNTER (OUTPATIENT)
Age: 73
Discharge: HOME | End: 2023-02-17
Attending: ORTHOPAEDIC SURGERY | Admitting: ORTHOPAEDIC SURGERY
Payer: MEDICARE

## 2023-02-17 ENCOUNTER — ANESTHESIA (OUTPATIENT)
Dept: SURGERY | Age: 73
End: 2023-02-17
Payer: MEDICARE

## 2023-02-17 VITALS
HEART RATE: 90 BPM | WEIGHT: 205.1 LBS | RESPIRATION RATE: 18 BRPM | DIASTOLIC BLOOD PRESSURE: 83 MMHG | BODY MASS INDEX: 29.36 KG/M2 | OXYGEN SATURATION: 93 % | TEMPERATURE: 97.6 F | HEIGHT: 70 IN | SYSTOLIC BLOOD PRESSURE: 144 MMHG

## 2023-02-17 DIAGNOSIS — S83.005D DISLOCATION OF LEFT PATELLA, SUBSEQUENT ENCOUNTER: Primary | ICD-10-CM

## 2023-02-17 DIAGNOSIS — S83.005D DISLOCATED PATELLA, LEFT, SUBSEQUENT ENCOUNTER: ICD-10-CM

## 2023-02-17 DIAGNOSIS — M25.362 KNEE INSTABILITY, LEFT: ICD-10-CM

## 2023-02-17 PROBLEM — Z96.652 STATUS POST REVISION OF TOTAL KNEE REPLACEMENT, LEFT: Status: ACTIVE | Noted: 2023-02-17

## 2023-02-17 LAB
BACTERIA SPEC CULT: NORMAL
EST. AVERAGE GLUCOSE BLD GHB EST-MCNC: 140 MG/DL
GLUCOSE BLD STRIP.AUTO-MCNC: 141 MG/DL (ref 65–100)
HBA1C MFR BLD: 6.5 % (ref 4.8–5.6)
SERVICE CMNT-IMP: ABNORMAL
SERVICE CMNT-IMP: NORMAL

## 2023-02-17 PROCEDURE — 2580000003 HC RX 258: Performed by: PHYSICIAN ASSISTANT

## 2023-02-17 PROCEDURE — 7100000001 HC PACU RECOVERY - ADDTL 15 MIN: Performed by: ORTHOPAEDIC SURGERY

## 2023-02-17 PROCEDURE — 3600000004 HC SURGERY LEVEL 4 BASE: Performed by: ORTHOPAEDIC SURGERY

## 2023-02-17 PROCEDURE — C1713 ANCHOR/SCREW BN/BN,TIS/BN: HCPCS | Performed by: ORTHOPAEDIC SURGERY

## 2023-02-17 PROCEDURE — 2580000003 HC RX 258: Performed by: ORTHOPAEDIC SURGERY

## 2023-02-17 PROCEDURE — 2709999900 HC NON-CHARGEABLE SUPPLY: Performed by: ORTHOPAEDIC SURGERY

## 2023-02-17 PROCEDURE — 3700000000 HC ANESTHESIA ATTENDED CARE: Performed by: ORTHOPAEDIC SURGERY

## 2023-02-17 PROCEDURE — 6360000002 HC RX W HCPCS: Performed by: ANESTHESIOLOGY

## 2023-02-17 PROCEDURE — 2500000003 HC RX 250 WO HCPCS: Performed by: NURSE ANESTHETIST, CERTIFIED REGISTERED

## 2023-02-17 PROCEDURE — 97535 SELF CARE MNGMENT TRAINING: CPT

## 2023-02-17 PROCEDURE — C1776 JOINT DEVICE (IMPLANTABLE): HCPCS | Performed by: ORTHOPAEDIC SURGERY

## 2023-02-17 PROCEDURE — 2720000010 HC SURG SUPPLY STERILE: Performed by: ORTHOPAEDIC SURGERY

## 2023-02-17 PROCEDURE — 97165 OT EVAL LOW COMPLEX 30 MIN: CPT

## 2023-02-17 PROCEDURE — 97530 THERAPEUTIC ACTIVITIES: CPT

## 2023-02-17 PROCEDURE — 87075 CULTR BACTERIA EXCEPT BLOOD: CPT

## 2023-02-17 PROCEDURE — 82962 GLUCOSE BLOOD TEST: CPT

## 2023-02-17 PROCEDURE — 2580000003 HC RX 258: Performed by: NURSE ANESTHETIST, CERTIFIED REGISTERED

## 2023-02-17 PROCEDURE — 83036 HEMOGLOBIN GLYCOSYLATED A1C: CPT

## 2023-02-17 PROCEDURE — 64447 NJX AA&/STRD FEMORAL NRV IMG: CPT | Performed by: ANESTHESIOLOGY

## 2023-02-17 PROCEDURE — 87070 CULTURE OTHR SPECIMN AEROBIC: CPT

## 2023-02-17 PROCEDURE — 6370000000 HC RX 637 (ALT 250 FOR IP): Performed by: PHYSICIAN ASSISTANT

## 2023-02-17 PROCEDURE — 6360000002 HC RX W HCPCS: Performed by: PHYSICIAN ASSISTANT

## 2023-02-17 PROCEDURE — 97161 PT EVAL LOW COMPLEX 20 MIN: CPT

## 2023-02-17 PROCEDURE — 6360000002 HC RX W HCPCS: Performed by: NURSE ANESTHETIST, CERTIFIED REGISTERED

## 2023-02-17 PROCEDURE — 7100000000 HC PACU RECOVERY - FIRST 15 MIN: Performed by: ORTHOPAEDIC SURGERY

## 2023-02-17 PROCEDURE — 36415 COLL VENOUS BLD VENIPUNCTURE: CPT

## 2023-02-17 PROCEDURE — 27486 REVISE/REPLACE KNEE JOINT: CPT | Performed by: ORTHOPAEDIC SURGERY

## 2023-02-17 PROCEDURE — 2500000003 HC RX 250 WO HCPCS: Performed by: ANESTHESIOLOGY

## 2023-02-17 PROCEDURE — 6370000000 HC RX 637 (ALT 250 FOR IP): Performed by: ANESTHESIOLOGY

## 2023-02-17 PROCEDURE — 3600000014 HC SURGERY LEVEL 4 ADDTL 15MIN: Performed by: ORTHOPAEDIC SURGERY

## 2023-02-17 PROCEDURE — 2500000003 HC RX 250 WO HCPCS: Performed by: ORTHOPAEDIC SURGERY

## 2023-02-17 PROCEDURE — 6360000002 HC RX W HCPCS: Performed by: ORTHOPAEDIC SURGERY

## 2023-02-17 PROCEDURE — 87205 SMEAR GRAM STAIN: CPT

## 2023-02-17 PROCEDURE — 3700000001 HC ADD 15 MINUTES (ANESTHESIA): Performed by: ORTHOPAEDIC SURGERY

## 2023-02-17 DEVICE — STIMULAN® RAPID CURE PROVIDED STERILE FOR SINGLE PATIENT USE. STIMULAN® RAPID CURE CONTAINS CALCIUM SULFATE POWDER AND MIXING SOLUTION IN PRE-MEASURED QUANTITIES SO THAT WHEN MIXED TOGETHER IN A STERILE MIXING BOWL, THE RESULTANT PASTE IS TO BE DIGITALLY PACKED INTO OPEN BONE VOID/GAP TO SET INSITU OR PLACED INTO THE MOULD PROVIDED, THE MIXTURE SETS TO FORM BEADS. THE BIODEGRADABLE, RADIOPAQUE BEADS ARE RESORBED IN APPROXIMATELY 30 – 60 DAYS WHEN USED IN ACCORDANCE WITH THE DEVICE LABELLING. STIMULAN® RAPID CURE IS MANUFACTURED FROM SYNTHETIC IMPLANT GRADE CALCIUM SULFATE DIHYDRATE(CASO4.2H2O) THAT RESORBS AND IS REPLACED WITH BONE DURING THE HEALING PROCESS. ALSO, AS THE BONE VOID FILLER BEADS ARE BIODEGRADABLE AND BIOCOMPATIBLE, THEY MAY BE USED AT AN INFECTED SITE.
Type: IMPLANTABLE DEVICE | Site: KNEE | Status: FUNCTIONAL
Brand: STIMULAN® RAPID CURE

## 2023-02-17 DEVICE — INSERT TIB CNDYL STBL 6 9 MM KNEE 5/PK X3 TRIATHLON: Type: IMPLANTABLE DEVICE | Site: KNEE | Status: FUNCTIONAL

## 2023-02-17 RX ORDER — MELOXICAM 15 MG/1
15 TABLET ORAL DAILY PRN
Qty: 30 TABLET | Refills: 0 | Status: SHIPPED | OUTPATIENT
Start: 2023-02-17

## 2023-02-17 RX ORDER — ONDANSETRON 2 MG/ML
4 INJECTION INTRAMUSCULAR; INTRAVENOUS EVERY 6 HOURS PRN
Status: DISCONTINUED | OUTPATIENT
Start: 2023-02-17 | End: 2023-02-17 | Stop reason: HOSPADM

## 2023-02-17 RX ORDER — SODIUM CHLORIDE 9 MG/ML
INJECTION, SOLUTION INTRAVENOUS PRN
Status: DISCONTINUED | OUTPATIENT
Start: 2023-02-17 | End: 2023-02-17 | Stop reason: HOSPADM

## 2023-02-17 RX ORDER — DEXTROSE MONOHYDRATE 100 MG/ML
INJECTION, SOLUTION INTRAVENOUS CONTINUOUS PRN
Status: DISCONTINUED | OUTPATIENT
Start: 2023-02-17 | End: 2023-02-17 | Stop reason: HOSPADM

## 2023-02-17 RX ORDER — PROMETHAZINE HYDROCHLORIDE 12.5 MG/1
12.5 TABLET ORAL 4 TIMES DAILY PRN
Qty: 20 TABLET | Refills: 2 | Status: SHIPPED | OUTPATIENT
Start: 2023-02-17

## 2023-02-17 RX ORDER — DEXAMETHASONE SODIUM PHOSPHATE 10 MG/ML
INJECTION INTRAMUSCULAR; INTRAVENOUS PRN
Status: DISCONTINUED | OUTPATIENT
Start: 2023-02-17 | End: 2023-02-17 | Stop reason: SDUPTHER

## 2023-02-17 RX ORDER — PANTOPRAZOLE SODIUM 40 MG/1
40 TABLET, DELAYED RELEASE ORAL NIGHTLY
Status: DISCONTINUED | OUTPATIENT
Start: 2023-02-17 | End: 2023-02-17 | Stop reason: HOSPADM

## 2023-02-17 RX ORDER — EPHEDRINE SULFATE/0.9% NACL/PF 50 MG/5 ML
SYRINGE (ML) INTRAVENOUS PRN
Status: DISCONTINUED | OUTPATIENT
Start: 2023-02-17 | End: 2023-02-17 | Stop reason: SDUPTHER

## 2023-02-17 RX ORDER — SODIUM CHLORIDE, SODIUM LACTATE, POTASSIUM CHLORIDE, CALCIUM CHLORIDE 600; 310; 30; 20 MG/100ML; MG/100ML; MG/100ML; MG/100ML
INJECTION, SOLUTION INTRAVENOUS CONTINUOUS
Status: DISCONTINUED | OUTPATIENT
Start: 2023-02-17 | End: 2023-02-17 | Stop reason: HOSPADM

## 2023-02-17 RX ORDER — SODIUM CHLORIDE 0.9 % (FLUSH) 0.9 %
5-40 SYRINGE (ML) INJECTION EVERY 12 HOURS SCHEDULED
Status: DISCONTINUED | OUTPATIENT
Start: 2023-02-17 | End: 2023-02-17 | Stop reason: HOSPADM

## 2023-02-17 RX ORDER — OXYCODONE HYDROCHLORIDE 5 MG/1
10 TABLET ORAL EVERY 4 HOURS PRN
Status: DISCONTINUED | OUTPATIENT
Start: 2023-02-17 | End: 2023-02-17 | Stop reason: HOSPADM

## 2023-02-17 RX ORDER — LEVOTHYROXINE SODIUM 0.1 MG/1
100 TABLET ORAL
Status: DISCONTINUED | OUTPATIENT
Start: 2023-02-18 | End: 2023-02-17 | Stop reason: HOSPADM

## 2023-02-17 RX ORDER — ONDANSETRON 2 MG/ML
4 INJECTION INTRAMUSCULAR; INTRAVENOUS
Status: DISCONTINUED | OUTPATIENT
Start: 2023-02-17 | End: 2023-02-17 | Stop reason: HOSPADM

## 2023-02-17 RX ORDER — ASPIRIN 81 MG/1
81 TABLET ORAL 2 TIMES DAILY
Status: DISCONTINUED | OUTPATIENT
Start: 2023-02-17 | End: 2023-02-17 | Stop reason: HOSPADM

## 2023-02-17 RX ORDER — PRAVASTATIN SODIUM 20 MG
80 TABLET ORAL DAILY
Status: DISCONTINUED | OUTPATIENT
Start: 2023-02-18 | End: 2023-02-17 | Stop reason: HOSPADM

## 2023-02-17 RX ORDER — BROMOCRIPTINE MESYLATE 5 MG/1
5 CAPSULE ORAL NIGHTLY
Status: DISCONTINUED | OUTPATIENT
Start: 2023-02-17 | End: 2023-02-17 | Stop reason: HOSPADM

## 2023-02-17 RX ORDER — DIPHENHYDRAMINE HYDROCHLORIDE 50 MG/ML
25 INJECTION INTRAMUSCULAR; INTRAVENOUS EVERY 6 HOURS PRN
Status: DISCONTINUED | OUTPATIENT
Start: 2023-02-17 | End: 2023-02-17 | Stop reason: HOSPADM

## 2023-02-17 RX ORDER — TOBRAMYCIN 1.2 G/30ML
INJECTION, POWDER, LYOPHILIZED, FOR SOLUTION INTRAVENOUS PRN
Status: DISCONTINUED | OUTPATIENT
Start: 2023-02-17 | End: 2023-02-17 | Stop reason: HOSPADM

## 2023-02-17 RX ORDER — OXYCODONE HYDROCHLORIDE 5 MG/1
5 TABLET ORAL PRN
Status: COMPLETED | OUTPATIENT
Start: 2023-02-17 | End: 2023-02-17

## 2023-02-17 RX ORDER — ASPIRIN 81 MG/1
81 TABLET ORAL EVERY 12 HOURS
Qty: 70 TABLET | Refills: 0 | Status: SHIPPED | OUTPATIENT
Start: 2023-02-17 | End: 2023-03-24

## 2023-02-17 RX ORDER — SODIUM CHLORIDE 0.9 % (FLUSH) 0.9 %
5-40 SYRINGE (ML) INJECTION PRN
Status: DISCONTINUED | OUTPATIENT
Start: 2023-02-17 | End: 2023-02-17 | Stop reason: HOSPADM

## 2023-02-17 RX ORDER — VANCOMYCIN HYDROCHLORIDE 1 G/20ML
INJECTION, POWDER, LYOPHILIZED, FOR SOLUTION INTRAVENOUS PRN
Status: DISCONTINUED | OUTPATIENT
Start: 2023-02-17 | End: 2023-02-17 | Stop reason: HOSPADM

## 2023-02-17 RX ORDER — EPINEPHRINE 0.3 MG/.3ML
0.3 INJECTION SUBCUTANEOUS DAILY PRN
Status: DISCONTINUED | OUTPATIENT
Start: 2023-02-17 | End: 2023-02-17

## 2023-02-17 RX ORDER — TAMSULOSIN HYDROCHLORIDE 0.4 MG/1
0.4 CAPSULE ORAL DAILY
Status: DISCONTINUED | OUTPATIENT
Start: 2023-02-18 | End: 2023-02-17 | Stop reason: HOSPADM

## 2023-02-17 RX ORDER — LISINOPRIL 20 MG/1
40 TABLET ORAL NIGHTLY
Status: DISCONTINUED | OUTPATIENT
Start: 2023-02-17 | End: 2023-02-17 | Stop reason: HOSPADM

## 2023-02-17 RX ORDER — SODIUM CHLORIDE, SODIUM LACTATE, POTASSIUM CHLORIDE, CALCIUM CHLORIDE 600; 310; 30; 20 MG/100ML; MG/100ML; MG/100ML; MG/100ML
INJECTION, SOLUTION INTRAVENOUS CONTINUOUS PRN
Status: DISCONTINUED | OUTPATIENT
Start: 2023-02-17 | End: 2023-02-17 | Stop reason: SDUPTHER

## 2023-02-17 RX ORDER — MIDAZOLAM HYDROCHLORIDE 1 MG/ML
4 INJECTION INTRAMUSCULAR; INTRAVENOUS ONCE
Status: COMPLETED | OUTPATIENT
Start: 2023-02-17 | End: 2023-02-17

## 2023-02-17 RX ORDER — ONDANSETRON 2 MG/ML
INJECTION INTRAMUSCULAR; INTRAVENOUS PRN
Status: DISCONTINUED | OUTPATIENT
Start: 2023-02-17 | End: 2023-02-17 | Stop reason: SDUPTHER

## 2023-02-17 RX ORDER — ACETAMINOPHEN 500 MG
1000 TABLET ORAL EVERY 6 HOURS
Status: DISCONTINUED | OUTPATIENT
Start: 2023-02-17 | End: 2023-02-17 | Stop reason: HOSPADM

## 2023-02-17 RX ORDER — SENNA AND DOCUSATE SODIUM 50; 8.6 MG/1; MG/1
1 TABLET, FILM COATED ORAL 2 TIMES DAILY
Status: DISCONTINUED | OUTPATIENT
Start: 2023-02-17 | End: 2023-02-17 | Stop reason: HOSPADM

## 2023-02-17 RX ORDER — ACETAMINOPHEN 500 MG
1000 TABLET ORAL ONCE
Status: COMPLETED | OUTPATIENT
Start: 2023-02-17 | End: 2023-02-17

## 2023-02-17 RX ORDER — PROPOFOL 10 MG/ML
INJECTION, EMULSION INTRAVENOUS CONTINUOUS PRN
Status: DISCONTINUED | OUTPATIENT
Start: 2023-02-17 | End: 2023-02-17 | Stop reason: SDUPTHER

## 2023-02-17 RX ORDER — TRANEXAMIC ACID 100 MG/ML
INJECTION, SOLUTION INTRAVENOUS PRN
Status: DISCONTINUED | OUTPATIENT
Start: 2023-02-17 | End: 2023-02-17 | Stop reason: SDUPTHER

## 2023-02-17 RX ORDER — PROPOFOL 10 MG/ML
INJECTION, EMULSION INTRAVENOUS PRN
Status: DISCONTINUED | OUTPATIENT
Start: 2023-02-17 | End: 2023-02-17 | Stop reason: SDUPTHER

## 2023-02-17 RX ORDER — LORAZEPAM 1 MG/1
1 TABLET ORAL 4 TIMES DAILY PRN
Status: DISCONTINUED | OUTPATIENT
Start: 2023-02-17 | End: 2023-02-17 | Stop reason: HOSPADM

## 2023-02-17 RX ORDER — ROPIVACAINE HYDROCHLORIDE 2 MG/ML
INJECTION, SOLUTION EPIDURAL; INFILTRATION; PERINEURAL PRN
Status: DISCONTINUED | OUTPATIENT
Start: 2023-02-17 | End: 2023-02-17 | Stop reason: HOSPADM

## 2023-02-17 RX ORDER — OXYCODONE HYDROCHLORIDE 5 MG/1
10 TABLET ORAL PRN
Status: COMPLETED | OUTPATIENT
Start: 2023-02-17 | End: 2023-02-17

## 2023-02-17 RX ORDER — AMLODIPINE BESYLATE 10 MG/1
10 TABLET ORAL NIGHTLY
Status: DISCONTINUED | OUTPATIENT
Start: 2023-02-17 | End: 2023-02-17 | Stop reason: HOSPADM

## 2023-02-17 RX ORDER — LABETALOL HYDROCHLORIDE 5 MG/ML
10 INJECTION, SOLUTION INTRAVENOUS
Status: DISCONTINUED | OUTPATIENT
Start: 2023-02-17 | End: 2023-02-17 | Stop reason: HOSPADM

## 2023-02-17 RX ORDER — FENTANYL CITRATE 50 UG/ML
50 INJECTION, SOLUTION INTRAMUSCULAR; INTRAVENOUS ONCE
Status: COMPLETED | OUTPATIENT
Start: 2023-02-17 | End: 2023-02-17

## 2023-02-17 RX ORDER — NITROGLYCERIN 0.4 MG/1
0.4 TABLET SUBLINGUAL EVERY 5 MIN PRN
Status: DISCONTINUED | OUTPATIENT
Start: 2023-02-17 | End: 2023-02-17 | Stop reason: HOSPADM

## 2023-02-17 RX ORDER — METFORMIN HYDROCHLORIDE 500 MG/1
1000 TABLET, EXTENDED RELEASE ORAL 2 TIMES DAILY
Status: DISCONTINUED | OUTPATIENT
Start: 2023-02-17 | End: 2023-02-17 | Stop reason: HOSPADM

## 2023-02-17 RX ORDER — IPRATROPIUM BROMIDE AND ALBUTEROL SULFATE 2.5; .5 MG/3ML; MG/3ML
1 SOLUTION RESPIRATORY (INHALATION)
Status: DISCONTINUED | OUTPATIENT
Start: 2023-02-17 | End: 2023-02-17 | Stop reason: HOSPADM

## 2023-02-17 RX ORDER — OXYCODONE HYDROCHLORIDE 5 MG/1
5-10 TABLET ORAL EVERY 4 HOURS PRN
Qty: 60 TABLET | Refills: 0 | Status: SHIPPED | OUTPATIENT
Start: 2023-02-17 | End: 2023-02-22

## 2023-02-17 RX ORDER — ROPIVACAINE HYDROCHLORIDE 2 MG/ML
INJECTION, SOLUTION EPIDURAL; INFILTRATION; PERINEURAL
Status: COMPLETED | OUTPATIENT
Start: 2023-02-17 | End: 2023-02-17

## 2023-02-17 RX ORDER — HYDROMORPHONE HYDROCHLORIDE 2 MG/ML
1 INJECTION, SOLUTION INTRAMUSCULAR; INTRAVENOUS; SUBCUTANEOUS
Status: DISCONTINUED | OUTPATIENT
Start: 2023-02-17 | End: 2023-02-17 | Stop reason: HOSPADM

## 2023-02-17 RX ORDER — ONDANSETRON 4 MG/1
4 TABLET, ORALLY DISINTEGRATING ORAL EVERY 8 HOURS PRN
Status: DISCONTINUED | OUTPATIENT
Start: 2023-02-17 | End: 2023-02-17 | Stop reason: HOSPADM

## 2023-02-17 RX ORDER — DIPHENHYDRAMINE HCL 25 MG
25 CAPSULE ORAL EVERY 6 HOURS PRN
Status: DISCONTINUED | OUTPATIENT
Start: 2023-02-17 | End: 2023-02-17 | Stop reason: HOSPADM

## 2023-02-17 RX ORDER — METHOCARBAMOL 750 MG/1
750 TABLET, FILM COATED ORAL 3 TIMES DAILY PRN
Qty: 30 TABLET | Refills: 0 | Status: SHIPPED | OUTPATIENT
Start: 2023-02-17 | End: 2023-02-27

## 2023-02-17 RX ADMIN — Medication 1000 MG: at 11:42

## 2023-02-17 RX ADMIN — SODIUM CHLORIDE, SODIUM LACTATE, POTASSIUM CHLORIDE, AND CALCIUM CHLORIDE: 600; 310; 30; 20 INJECTION, SOLUTION INTRAVENOUS at 11:21

## 2023-02-17 RX ADMIN — DEXAMETHASONE SODIUM PHOSPHATE 4 MG: 4 INJECTION, SOLUTION INTRAMUSCULAR; INTRAVENOUS at 10:33

## 2023-02-17 RX ADMIN — Medication 2 G: at 11:40

## 2023-02-17 RX ADMIN — ACETAMINOPHEN 1000 MG: 500 TABLET, FILM COATED ORAL at 16:31

## 2023-02-17 RX ADMIN — DEXAMETHASONE SODIUM PHOSPHATE 10 MG: 10 INJECTION INTRAMUSCULAR; INTRAVENOUS at 12:02

## 2023-02-17 RX ADMIN — MEPIVACAINE HYDROCHLORIDE 60 MG: 20 INJECTION, SOLUTION EPIDURAL; INFILTRATION at 11:33

## 2023-02-17 RX ADMIN — MIDAZOLAM 3 MG: 1 INJECTION INTRAMUSCULAR; INTRAVENOUS at 10:36

## 2023-02-17 RX ADMIN — HYDROMORPHONE HYDROCHLORIDE 0.5 MG: 1 INJECTION, SOLUTION INTRAMUSCULAR; INTRAVENOUS; SUBCUTANEOUS at 14:15

## 2023-02-17 RX ADMIN — PHENYLEPHRINE HYDROCHLORIDE 100 MCG: 10 INJECTION INTRAVENOUS at 12:38

## 2023-02-17 RX ADMIN — PHENYLEPHRINE HYDROCHLORIDE 100 MCG: 10 INJECTION INTRAVENOUS at 12:39

## 2023-02-17 RX ADMIN — PHENYLEPHRINE HYDROCHLORIDE 100 MCG: 10 INJECTION INTRAVENOUS at 12:49

## 2023-02-17 RX ADMIN — SODIUM CHLORIDE, SODIUM LACTATE, POTASSIUM CHLORIDE, AND CALCIUM CHLORIDE: 600; 310; 30; 20 INJECTION, SOLUTION INTRAVENOUS at 11:55

## 2023-02-17 RX ADMIN — OXYCODONE HYDROCHLORIDE 5 MG: 5 TABLET ORAL at 14:14

## 2023-02-17 RX ADMIN — ACETAMINOPHEN 1000 MG: 500 TABLET, FILM COATED ORAL at 09:17

## 2023-02-17 RX ADMIN — Medication 10 MG: at 12:26

## 2023-02-17 RX ADMIN — ROPIVACAINE HYDROCHLORIDE 20 ML: 2 INJECTION, SOLUTION EPIDURAL; INFILTRATION at 10:33

## 2023-02-17 RX ADMIN — ONDANSETRON 4 MG: 2 INJECTION INTRAMUSCULAR; INTRAVENOUS at 12:02

## 2023-02-17 RX ADMIN — HYDROMORPHONE HYDROCHLORIDE 0.5 MG: 1 INJECTION, SOLUTION INTRAMUSCULAR; INTRAVENOUS; SUBCUTANEOUS at 14:20

## 2023-02-17 RX ADMIN — PROPOFOL 140 MCG/KG/MIN: 10 INJECTION, EMULSION INTRAVENOUS at 11:33

## 2023-02-17 RX ADMIN — OXYCODONE HYDROCHLORIDE 10 MG: 5 TABLET ORAL at 16:30

## 2023-02-17 RX ADMIN — PHENYLEPHRINE HYDROCHLORIDE 100 MCG: 10 INJECTION INTRAVENOUS at 12:14

## 2023-02-17 RX ADMIN — PHENYLEPHRINE HYDROCHLORIDE 100 MCG: 10 INJECTION INTRAVENOUS at 13:01

## 2023-02-17 RX ADMIN — VANCOMYCIN HYDROCHLORIDE 1500 MG: 10 INJECTION, POWDER, LYOPHILIZED, FOR SOLUTION INTRAVENOUS at 16:31

## 2023-02-17 RX ADMIN — TRANEXAMIC ACID 1000 MG: 100 INJECTION, SOLUTION INTRAVENOUS at 11:41

## 2023-02-17 RX ADMIN — PHENYLEPHRINE HYDROCHLORIDE 100 MCG: 10 INJECTION INTRAVENOUS at 12:42

## 2023-02-17 RX ADMIN — PHENYLEPHRINE HYDROCHLORIDE 100 MCG: 10 INJECTION INTRAVENOUS at 11:58

## 2023-02-17 RX ADMIN — Medication 10 MG: at 12:07

## 2023-02-17 RX ADMIN — FENTANYL CITRATE 50 MCG: 50 INJECTION, SOLUTION INTRAMUSCULAR; INTRAVENOUS at 10:36

## 2023-02-17 RX ADMIN — PHENYLEPHRINE HYDROCHLORIDE 100 MCG: 10 INJECTION INTRAVENOUS at 12:19

## 2023-02-17 RX ADMIN — HYDROMORPHONE HYDROCHLORIDE 0.25 MG: 1 INJECTION, SOLUTION INTRAMUSCULAR; INTRAVENOUS; SUBCUTANEOUS at 14:35

## 2023-02-17 RX ADMIN — PROPOFOL 50 MG: 10 INJECTION, EMULSION INTRAVENOUS at 11:33

## 2023-02-17 RX ADMIN — PHENYLEPHRINE HYDROCHLORIDE 100 MCG: 10 INJECTION INTRAVENOUS at 12:31

## 2023-02-17 RX ADMIN — PHENYLEPHRINE HYDROCHLORIDE 100 MCG: 10 INJECTION INTRAVENOUS at 12:05

## 2023-02-17 ASSESSMENT — PAIN DESCRIPTION - DESCRIPTORS
DESCRIPTORS: BURNING

## 2023-02-17 ASSESSMENT — PAIN DESCRIPTION - LOCATION
LOCATION: KNEE

## 2023-02-17 ASSESSMENT — PAIN SCALES - GENERAL
PAINLEVEL_OUTOF10: 6
PAINLEVEL_OUTOF10: 2
PAINLEVEL_OUTOF10: 8
PAINLEVEL_OUTOF10: 4
PAINLEVEL_OUTOF10: 4

## 2023-02-17 ASSESSMENT — COPD QUESTIONNAIRES: CAT_SEVERITY: MILD

## 2023-02-17 ASSESSMENT — PAIN - FUNCTIONAL ASSESSMENT
PAIN_FUNCTIONAL_ASSESSMENT: 0-10
PAIN_FUNCTIONAL_ASSESSMENT: 0-10

## 2023-02-17 ASSESSMENT — PAIN DESCRIPTION - ORIENTATION
ORIENTATION: LEFT

## 2023-02-17 ASSESSMENT — KOOS JR
GOING UP OR DOWN STAIRS: 1
BENDING TO THE FLOOR TO PICK UP OBJECT: 1
STRAIGHTENING KNEE FULLY: 1
TWISING OR PIVOTING ON KNEE: 1
RISING FROM SITTING: 1
KOOS JR TOTAL INTERVAL SCORE: 68.284
HOW SEVERE IS YOUR KNEE STIFFNESS AFTER FIRST WAKING IN MORNING: 1
STANDING UPRIGHT: 1

## 2023-02-17 NOTE — PROGRESS NOTES
TRANSFER - IN REPORT:    Verbal report received from [place], RN on Paradise Lui  being received from PACU for routine post-op      Report consisted of patient's Situation, Background, Assessment and   Recommendations(SBAR). Information from the following report(s) Nurse Handoff Report, Index, Adult Overview, Surgery Report, Intake/Output, and MAR was reviewed with the receiving nurse. Opportunity for questions and clarification was provided. Assessment completed upon patient's arrival to unit and care assumed.

## 2023-02-17 NOTE — CARE COORDINATION
Order received to arrange home health. Patient without preference towards agency. Referral sent to [facility]. Patient denies any equipment needs as patient has a walker. Will follow until discharge. 02/17/23 2660   Service Assessment   Patient Orientation Alert and Oriented   Cognition Alert   History Provided By Patient   Primary Caregiver Self   Social/Functional History   Lives With Spouse   Services At/After Discharge   Transition of Care Consult (CM Consult) [facility] Discharge Home Health;PT   Condition of Participation: Discharge Planning   The Plan for Transition of Care is related to the following treatment goals: improve mobility   The Patient and/or Patient Representative was provided with a Choice of Provider? Patient   The Patient and/Or Patient Representative agree with the Discharge Plan? Yes   Freedom of Choice list was provided with basic dialogue that supports the patient's individualized plan of care/goals, treatment preferences, and shares the quality data associated with the providers?   Yes

## 2023-02-17 NOTE — OP NOTE
[address] [facility]  Revision Total Knee Arthroplasty with poly exchange     Patient: Aurelia Wayne    : 1950   Medical Record Number: [id number]   Pre-operative Diagnosis:  Instability of right total knee replacement  Post-operative Diagnosis: Same  Location: [address] [place] [address],[address], [place]   Assistant: Vipul Whipple    Anesthesia:     Procedure: Knee Incision and Drainage with poly exchange   CPT Code: 50105  The complexity of the total joint surgery requires the use of a first assistant for positioning, retraction and expertise in closure. Tourniquet Time: 0 minutes  EBL: less than 200 cc  Findings:  Prosthesis: Patient had a [place] CR triathlon knee. The components were well fixed which included the femoral tibial and patellar components. Patient had a lateral dislocation/subluxation of the left patella. The medial capsular repair was intact but was stretched out. The patient was found to have a moderate amount of clear joint fluid. Work-up prior to surgery was negative for infection which included cell count, cultures, Synovasure. The patient was found to have synovitis. The knee would stay laterally subluxed with flexion and extension of the knee. There was no medial instability in extension mid range or flexion. Description of procedure: The patient was brought to the operating room and positioned on the operating table. The patient was anethestized with anesthesia. IV antibiotics were administered. Prior to the incision being made a timeout was called identifying the patient, procedure ,operative side and surgeon. .  A pneumatic tourniquet was placed about the limb and the right leg was prepped and draped in the usual sterile manner. The tourniquet had been placed but was not inflated.   An anterior longitudinal incision(excising the old scar) was accomplished just medial to the tibial tubercle and extending approximal 6 centimeters proximal to the superior pole of the patella. A medial parapatellar capsular incision was performed. The medial capsular flap was elevated around to the insertion of the semimembranous tendon. A small amount of nonpurulent knee fluid was encountered. Opening cultures were obtained. Synovectomy and scar tissue removal were then performed in the suprapatellar pouch, medial and lateral gutters and the infrapatellar region. The femoral,tibial, and patella components were inspected for loosening and all components were found to be well fixed. The 9 mm ER tibial bearing surface was removed using a small osteotome. The ectomy was performed in the posterior aspect of the knee. The patella tracked laterally. The knee was stable to medially and laterally in flexion and extension. It was also stable anterior and posterior. The 9 mm PS real poly was then impacted into the tibial baseplate. ROM and stability was the same as the trial.  A lateral retinacular release was then performed. Hemostasis was attained via the Aquamantys and the Bovie. The operative knee was injected with 60cc of Naropin, 10 cc's of morphine  The VMO was then advanced over the medial side of the patella and sutured into place with #5 Ethibond suture. The rest of the capsule was closed using #2 Ethibond suture in interrupted figure-of-eight's. The distal portion of the capsular closure was performed with 0 Vicryl suture. And the capsule layer was oversewn with a #1 strata fix suture. The subcutaneous layers were closed using 2-0 Vicryl interrupted sutures. Finally the skin was closed using 3-0 Vicryl and skin staples, which were applied in occlusive fashion and sterile bandage applied. Sponge count and needle counts were correct.   Gloria Ornelsa  left the operating room this factory condition    Implants: Low triathlon CR 9mm polyethylene      Signed By: Kingston Alejo MD  3/1/2021,  3:01 PM

## 2023-02-17 NOTE — ANESTHESIA PRE PROCEDURE
Department of Anesthesiology  Preprocedure Note       Name:  Jose Mendez   Age:  71 y.o.  :  1950                                          MRN:  [id number]         Date:  2023      Surgeon: Makenzie Carroll):  Lawrence Decker MD    Procedure: Procedure(s):  lt lateral retinacular release and repair of knee capsule    Medications prior to admission:   Prior to Admission medications    Medication Sig Start Date End Date Taking? Authorizing Provider   gabapentin (NEURONTIN) 100 MG capsule Take 1-3 capsules by mouth nightly for 30 days. 10/25/22 11/24/22  KRISTYN Mora   aspirin EC 81 MG EC tablet Take 1 tablet by mouth in the morning and 1 tablet in the evening. 9/19/22 2/10/23  KRISTYN Malone   promethazine (PHENERGAN) 25 MG tablet Take 1 tablet by mouth every 8 hours as needed for Nausea 22   KRISTYN Malone   methocarbamol (ROBAXIN-750) 750 MG tablet Take 1 tablet by mouth 3 times daily as needed (muscle spasm or cramps) 22   KRISTYN Malone   meloxicam (MOBIC) 7.5 MG tablet Take 1-2 tablets by mouth daily as needed for Pain  Patient not taking: Reported on 2/10/2023 9/19/22   KRISTYN Malone   amLODIPine (NORVASC) 10 MG tablet Take 10 mg by mouth nightly 7/15/22   Historical Provider, MD   lisinopril (PRINIVIL;ZESTRIL) 10 MG tablet Take 40 mg by mouth nightly    Historical Provider, MD   metFORMIN (GLUCOPHAGE-XR) 500 MG extended release tablet Take 1,000 mg by mouth in the morning and at bedtime 22   Historical Provider, MD   bromocriptine (PARLODEL) 5 MG capsule Take 5 mg by mouth at bedtime Patient states he takes 4 capsules nightly    Historical Provider, MD   tamsulosin (FLOMAX) 0.4 mg capsule Take 0.4 mg by mouth daily    Historical Provider, MD   pravastatin (PRAVACHOL) 80 MG tablet Take 80 mg by mouth daily reports taking every morning.   22   Historical Provider, MD   TESTOSTERONE CYPIONATE IM Inject 50 mg into the muscle    Ar Automatic Reconciliation acetaminophen (TYLENOL) 500 MG tablet Take 500 mg by mouth every 6 hours as needed for Pain for low pain. Ar Automatic Reconciliation   budesonide-formoterol (SYMBICORT) 160-4.5 MCG/ACT AERO Inhale 2 puffs into the lungs 2 times daily for COPD.  3/30/17   Ar Automatic Reconciliation   Docusate Sodium 100 MG TABS Take 2 tablets by mouth daily as needed (for constipation)  Patient not taking: Reported on 2/10/2023    Ar Automatic Reconciliation   EPINEPHrine (EPIPEN) 0.3 MG/0.3ML SOAJ injection Inject 0.3 mg into the muscle daily as needed (allergic reaction)    Ar Automatic Reconciliation   levothyroxine (SYNTHROID) 100 MCG tablet Take 100 mcg by mouth every morning (before breakfast) 1/19/18   Ar Automatic Reconciliation   LORazepam (ATIVAN) 1 MG tablet Take 1 mg by mouth 4 times daily as needed for Anxiety. 1/19/18   Ar Automatic Reconciliation   nitroGLYCERIN (NITROSTAT) 0.4 MG SL tablet Place 0.4 mg under the tongue as needed for Chest pain every 5 min as needed. max dose of 3.  12/22/16   Ar Automatic Reconciliation   omeprazole (PRILOSEC) 40 MG delayed release capsule Take 40 mg by mouth nightly 1/19/18   Ar Automatic Reconciliation   sildenafil (VIAGRA) 100 MG tablet Take 100 mg by mouth as needed 7/18/17   Ar Automatic Reconciliation       Current medications:    No current facility-administered medications for this visit. No current outpatient medications on file.      Facility-Administered Medications Ordered in Other Visits   Medication Dose Route Frequency Provider Last Rate Last Admin    sodium chloride flush 0.9 % injection 5-40 mL  5-40 mL IntraVENous 2 times per day KRISTYN Mora        sodium chloride flush 0.9 % injection 5-40 mL  5-40 mL IntraVENous PRN KRISTYN Mora        0.9 % sodium chloride infusion   IntraVENous PRN KRISTYN Chapa        ceFAZolin (ANCEF) 2000 mg in sterile water 20 mL IV syringe  2,000 mg IntraVENous On Call to [address], [place] Allergies: Allergies   Allergen Reactions    Bee Venom Swelling    Cephalexin Hives    Penicillins Other (See Comments)     States causes him to pass out    Sulfa Antibiotics Rash       Problem List:    Patient Active Problem List   Diagnosis Code    Asthma J45.909    RLS (restless legs syndrome) G25.81    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 A    Controlled diabetes mellitus ([facility]) E11.9    Pituitary tumor D49.7    COPD (chronic obstructive pulmonary disease) ([facility]) J44.9    ED (erectile dysfunction) N52.9    Hyperlipidemia E78.5    Essential hypertension with goal blood pressure less than 130/85 I10    Cervical neck pain with evidence of disc disease M50.90    Hypothyroidism E03.9    Pure hypercholesterolemia E78.00    Mixed hyperlipidemia E78.2    Status post right knee replacement Z96.651    Family history of MI (myocardial infarction) Z82.49    Right carotid bruit R09.89    Osteoarthritis of right knee M17.11    Chronic renal insufficiency N18.9    Anxiety disorder F41.9    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    JHONY on CPAP G47.33, Z99.89    Closed fracture of orbit ([facility]) S02.85XA    Closed fracture of maxillary sinus ([facility]) S02.401A    Degenerative arthritis of left knee M17.12    Primary osteoarthritis of left knee M17.12    Knee instability, left M25.362    Dislocated patella, left, subsequent encounter S83.005D       Past Medical History:        Diagnosis Date    Abnormal blood sugar     Abnormality of cortisol-binding globulin ([facility])     Actinic keratosis     Acute right flank pain     Anaphylactic reaction to bee sting     Anxiety     takes Lorazepam prn    Anxiety disorder     Arthritis     Arthritis of left shoulder region     Asthma 7/20/2016    followed by pulmonary; uses inhaler    Backache 7/20/2016    Bilateral otitis media     BPH (benign prostatic hyperplasia) 7/20/2016    CAD (coronary artery disease) 04/20/2011    He had mild nonobstructive CAD at cath by Dr. Shamir Crowley several years ago; cardiac cath:  4/20/11    Cervical neck pain with evidence of disc disease     Chronic renal insufficiency 7/20/2016    Controlled diabetes mellitus ([address] 7/20/2016 8/21/22 A1c 6.0; daily fasting sqbs:  102    COPD (chronic obstructive pulmonary disease) ([address] 7/20/2016    Symbicort BID-no PRN inhaler    Corneal foreign body 6/13/14    COVID-19 vaccine series completed 03/22/2021    Moderna    Depressive disorder 7/20/2016    clinical depression    Diarrhea     Dumping syndrome     Dyspepsia and other specified disorders of function of stomach 8/29/2012    ED (erectile dysfunction) 7/20/2016    Edema     Elevated CPK     Elevated prolactin level     Essential hypertension, benign 8/29/2012    GERD (gastroesophageal reflux disease)     managed with medication    Hand pain, right     Hematuria     Hyperlipidemia     Hyperprolactinemia ([address]     followed by endocrinologist    Hypertension     managed with meds    Hypogonadism in male     Hypothyroidism     takes levothyroxine    Knee effusion     Knee pain     Low serum testosterone level     Neoplasm of uncertain behavior of skin of neck     Neuralgia     Nevus, non-neoplastic     Numbness and tingling in left arm     Open wound of finger without complication 6/78/85    JHONY on CPAP 8/22/2014    uses CPAP    Osteoarthrosis, unspecified whether generalized or localized, involving lower leg 8/29/2012    Osteoarthrosis, unspecified whether generalized or localized, lower leg 8/29/2012    Other disorder of calcium metabolism 2012    Other malaise and fatigue 2012    Pituitary tumor 2013    Followed by endocrinology    Prolactin increased     Puncture wound 12    pt stepped on a maximiliano nail    Pure hypercholesterolemia 2012    Right shoulder pain     RLS (restless legs syndrome) 2016    Seborrheic keratosis     Seizures ([address]     30 years ago 1971    Shingles (herpes zoster) polyneuropathy     Shingles rash     Sinusitis        Past Surgical History:        Procedure Laterality Date    BUNIONECTOMY Right     CARDIAC CATHETERIZATION  2011    CARPAL TUNNEL RELEASE Bilateral     CERVICAL FUSION  2007    C3-C4-has metal screws     CHEST SURGERY      pt denies    JOINT REPLACEMENT Right     knee    ORTHOPEDIC SURGERY      bilat carpel tunnel, bunion repair,neck surg foot surg    TONSILLECTOMY      TOTAL KNEE ARTHROPLASTY Left 2022    lt KNEE TOTAL ARTHROPLASTY ROBOTIC performed by Rosalio Espinosa MD at [facility]       Social History:    Social History     Tobacco Use    Smoking status: Former     Packs/day: 0.50     Types: Cigarettes     Quit date: 2009     Years since quittin.8    Smokeless tobacco: Former     Quit date: 1986   Substance Use Topics    Alcohol use: No                                Counseling given: Not Answered      Vital Signs (Current): There were no vitals filed for this visit.                                            BP Readings from Last 3 Encounters:   23 (!) 149/71   10/12/22 138/70   10/05/22 135/65       NPO Status:                                                                                 BMI:   Wt Readings from Last 3 Encounters:   23 205 lb 1.6 oz (93 kg)   22 192 lb 3.2 oz (87.2 kg)   22 198 lb (89.8 kg)     There is no height or weight on file to calculate BMI.    CBC:   Lab Results   Component Value Date/Time    WBC 7.8 2023 12:08 PM    RBC 4.46 2023 12:08 PM    HGB 13.9 02/08/2023 12:08 PM    HCT 42.7 02/08/2023 12:08 PM    MCV 95.7 02/08/2023 12:08 PM    RDW 13.4 02/08/2023 12:08 PM     02/08/2023 12:08 PM       CMP:   Lab Results   Component Value Date/Time     08/31/2022 08:09 AM    K 3.7 08/31/2022 08:09 AM     08/31/2022 08:09 AM    CO2 21 08/31/2022 08:09 AM    BUN 12 08/31/2022 08:09 AM    CREATININE 1.00 08/31/2022 08:09 AM    GFRAA >60 08/31/2022 08:09 AM    LABGLOM >60 08/31/2022 08:09 AM    GLUCOSE 205 08/31/2022 08:09 AM    PROT 7.4 06/01/2022 04:22 AM    CALCIUM 9.9 08/31/2022 08:09 AM    BILITOT 0.5 06/01/2022 04:22 AM    ALKPHOS 54 06/01/2022 04:22 AM    AST 26 06/01/2022 04:22 AM    ALT 48 06/01/2022 04:22 AM       POC Tests:   Recent Labs     02/17/23  0916   POCGLU 141*       Coags:   Lab Results   Component Value Date/Time    PROTIME 14.0 08/31/2022 08:09 AM    INR 1.0 08/31/2022 08:09 AM    APTT 32.2 08/31/2022 08:09 AM    APTT 30.1 07/12/2021 12:13 PM       HCG (If Applicable): No results found for: PREGTESTUR, PREGSERUM, HCG, HCGQUANT     ABGs: No results found for: PHART, PO2ART, IMR4ENW, NCU4NZT, BEART, V2NWBEYK     Type & Screen (If Applicable):  No results found for: LABABO, LABRH    Drug/Infectious Status (If Applicable):  Lab Results   Component Value Date/Time    HEPCAB <0.1 03/22/2017 08:50 AM       COVID-19 Screening (If Applicable): No results found for: COVID19        Anesthesia Evaluation  Patient summary reviewed and Nursing notes reviewed  Airway: Mallampati: II  TM distance: >3 FB   Neck ROM: full  Mouth opening: > = 3 FB   Dental:          Pulmonary:Negative Pulmonary ROS and normal exam    (+) COPD: mild,  sleep apnea: on CPAP,                             Cardiovascular:  Exercise tolerance: good (>4 METS),   (+) hypertension: mild,         Rhythm: regular  Rate: normal                    Neuro/Psych:   (+) depression/anxiety             GI/Hepatic/Renal: Neg GI/Hepatic/Renal ROS  (+) GERD: well controlled, Endo/Other: Negative Endo/Other ROS   (+) DiabetesType II DM, well controlled, , hypothyroidism::., .          Pt had no PAT visit       Abdominal:             Vascular: negative vascular ROS. Other Findings:             Anesthesia Plan      spinal     ASA 3       Induction: intravenous. Anesthetic plan and risks discussed with patient.               Post-op pain plan if not by surgeon: single peripheral nerve block            Javi Paez MD   2/17/2023

## 2023-02-17 NOTE — ANESTHESIA PROCEDURE NOTES
Peripheral Block    Patient location during procedure: procedure area  Reason for block: post-op pain management and at surgeon's request  Start time: 2/17/2023 10:33 AM  End time: 2/17/2023 10:35 AM  Staffing  Performed: anesthesiologist   Anesthesiologist: Kareem Edwards MD  Preanesthetic Checklist  Completed: patient identified, IV checked, site marked, risks and benefits discussed, surgical/procedural consents, equipment checked, pre-op evaluation, timeout performed, anesthesia consent given, oxygen available, monitors applied/VS acknowledged, fire risk safety assessment completed and verbalized and blood product R/B/A discussed and consented  Peripheral Block   Patient position: supine  Prep: ChloraPrep  Provider prep: mask and sterile gloves  Patient monitoring: cardiac monitor, continuous pulse ox, frequent blood pressure checks, IV access, oxygen and responsive to questions  Block type: Femoral  Adductor canal  Laterality: left  Injection technique: single-shot  Guidance: ultrasound guided    Needle   Needle type: insulated echogenic nerve stimulator needle   Needle gauge: 20 G  Needle localization: ultrasound guidance  Needle insertion depth: 4 cm  Needle length: 8 cm  Assessment   Injection assessment: negative aspiration for heme, no paresthesia on injection, local visualized surrounding nerve on ultrasound and no intravascular symptoms  Slow fractionated injection: yes  Hemodynamics: stable  Real-time US image taken/store: yes  Outcomes: uncomplicated    Additional Notes  Potential access sites were examined with ultrasound and the acceptable patent access site was selected (site noted above). The needle path and vein access were visualized in real time using ultrasonography, and an image was recorded for permanent record.      0.2 % Ropivacaine with 4 mg decadron + epi  Medications Administered  dexamethasone 4 MG/ML - Perineural   4 mg - 2/17/2023 10:33:00 AM  ropivacaine (NAROPIN) injection 0.2% - Perineural   20 mL - 2/17/2023 10:33:00 AM

## 2023-02-17 NOTE — INTERVAL H&P NOTE
Update History & Physical    The patient's History and Physical of February 7, H&P was reviewed with the patient and I examined the patient. There was no change. The surgical site was confirmed by the patient and me. Plan: The risks, benefits, expected outcome, and alternative to the recommended procedure have been discussed with the patient. Patient understands and wants to proceed with the procedure.      Electronically signed by Genevieve Shay MD on 2/17/2023 at 10:08 AM

## 2023-02-17 NOTE — PROGRESS NOTES
ACUTE PHYSICAL THERAPY GOALS:   (Developed with and agreed upon by patient and/or caregiver.)  GOALS (1-4 days):  (1.)Mr. Godfrey Bob will move from supine to sit and sit to supine  in bed with INDEPENDENT. (2.)Mr. Godfrey Bob will transfer from bed to chair and chair to bed with SUPERVISION using the least restrictive device. (3.)Mr. Godfrey Bob will ambulate with SUPERVISION for 300 feet with the least restrictive device.     ________________________________________________________________________________________________           PHYSICAL THERAPY JOINT CAMP: TOTAL KNEE ARTHROPLASTY Initial Assessment and PM  (Link to Caseload Tracking: PT Visit Days : 1  Acknowledge Orders  Time In/Out  PT Charge Capture  Rehab Caseload Tracker  Episode   Maldonado Magallanes is a 71 y.o. male   PRIMARY DIAGNOSIS: Status post revision of total knee replacement, left  Knee instability, left [M25.362]  Dislocated patella, left, subsequent encounter [S83.005D]  Procedure(s) (LRB):  lt lateral retinacular release and repair of knee capsule (Left)  Day of Surgery  Reason for Referral: Pain in Left Knee (M25.562)  Stiffness of Left Knee, Not elsewhere classified (M25.662)  Difficulty in walking, Not elsewhere classified (R26.2)  Outpatient in a bed: Payor: MEDICARE / Plan: MEDICARE PART A AND B / Product Type: *No Product type* /     REHAB RECOMMENDATIONS:   Recommendation to date pending progress:  Setting:  Home Health Therapy    Equipment:     Pt has rolling walker     RANGE OF MOTION:   NO ROM / ki ON AT ALL TIMES     GAIT: I Mod I S SBA CGA Min Mod Max Total  NT x2 Comments:   Level of Assistance [] [] [] [x] [] [] [] [] [] [] []            Weightbearing Status  Left Lower Extremity Weight Bearing: Weight Bearing As Tolerated    Distance  additional 150ft after an initial 50ft gati assessment     Gait Quality Antalgic, Decreased marcos , Decreased step clearance, Decreased step length, and Decreased stance    DME Rolling Walker     Stairs Pt reviewed stair ambulation technique verbally, pt felt he could do a single step without practicing    Ramp N/A    I=Independent, Mod I=Modified Independent, S=Supervision, SBA=Standby Assistance, CGA=Contact Guard Assistance,   Min=Minimal Assistance, Mod=Moderate Assistance, Max=Maximal Assistance, Total=Total Assistance, NT=Not Tested    ASSESSMENT:   ASSESSMENT:  Mr. Lulú Jimenez  presented with impaired strength & mobility s/p left TKA with a poly exchange & repair of knee capsule. This patient had decreased stability during out of bed activity but he is currently functioning at a level that would be manageable for a caregiver in the home environment. This patient wishes to return home day of surgery. Reviewed with patient  PT expectations & general safety for discharge to home later this pm. This pt is expected to progress quickly with follow up therapy. .     Outcome Measure:   KOOS-JR:  Jr CATIA. Knee Survey Score: 7    SUBJECTIVE:   Mr. Lulú Jimenez states, that he is agreeable to therapy activity    Home Environment/Prior Level of Function Lives With: Spouse  Type of Home: House  Home Layout: One level  Home Access: Stairs to enter without rails  Entrance Stairs - Number of Steps: 1  Ambulation Assistance: Independent  Transfer Assistance: Independent    OBJECTIVE:     PAIN: VITAL SIGNS: LINES/DRAINS:   Pre Treatment:  Pain Assessment: 0-10  Pain Level: 2  Pain Location: Knee  Pain Orientation: Left  Non-Pharmaceutical Pain Intervention(s): Cold pack; Ambulation/Increased Activity; Exercise      Post Treatment: 2/10 Vitals NT       Oxygen NT RA    IV    RESTRICTIONS/PRECAUTIONS:  Restrictions/Precautions: Weight Bearing, Fall Risk  Left Lower Extremity Weight Bearing: Weight Bearing As Tolerated        NO ROM / KI ON AT ALL TIMES        LOWER EXTREMITY GROSS EVALUATION:  RIGHT LE   Within Functional Limits   Abnormal   Comments   Strength [x] []     Range of Motion [x] []        LEFT LE Within Functional Limits   Abnormal Comments   Strength [] [x]  Decreased but functional   Range of Motion [] [x]  KI on at all times     UPPER EXTREMITY GROSS EVALUATION:     Within Functional Limits   Abnormal   Comments   Strength [x] []    Range of Motion [x] []      SENSATION  Sensation [x]  grossly     COGNITION/  PERCEPTION: Intact Impaired (Comments):   Orientation [x]     Vision [x]     Hearing [x]     Cognition  [x]       MOBILITY: I Mod I S SBA CGA Min Mod Max Total  NT x2 Comments:   Bed Mobility    Rolling [] [] [] [x] [] [] [] [] [] [] []    Supine to Sit [] [] [] [x] [] [] [] [] [] [] []    Scooting [] [] [] [x] [] [] [] [] [] [] []    Sit to Supine [] [] [] [] [] [] [] [] [] [] []    Transfers    Sit to Stand [] [] [] [x] [] [] [] [] [] [] []    Bed to Chair [] [] [] [x] [] [] [] [] [] [] [] With walker   Stand to Sit [] [] [] [x] [] [] [] [] [] [] []    Stand Pivot [] [] [] [x] [] [] [] [] [] [] []    Toilet [] [] [] [] [] [] [] [] [] [] []     [] [] [] [] [] [] [] [] [] [] []    I=Independent, Mod I=Modified Independent, S=Supervision, SBA=Standby Assistance, CGA=Contact Guard Assistance,   Min=Minimal Assistance, Mod=Moderate Assistance, Max=Maximal Assistance, Total=Total Assistance, NT=Not Tested    BALANCE: Good Fair+ Fair Fair- Poor NT Comments   Sitting Static [] [] [x] [] [] []    Sitting Dynamic [] [] [x] [] [] []              Standing Static [] [] [x] [x] [] [] With walker   Standing Dynamic [] [] [x] [x] [] [] With walker     PLAN:   FREQUENCY AND DURATION: BID for duration of hospital stay or until stated goals are met, whichever comes first.    THERAPY PROGNOSIS: Good    PROBLEM LIST:   (Skilled intervention is medically necessary to address:)  Decreased ADL/Functional Activities, Decreased Activity Tolerance, Decreased AROM/PROM, Decreased Gait Ability, Decreased Strength, Decreased Transfer Abilities, and Increased Pain   INTERVENTIONS PLANNED:   (Benefits and precautions of physical therapy have been discussed with the patient.)  Therapeutic Activity, Therapeutic Exercise/HEP, Gait Training, Modalities, and Education       TREATMENT:   EVALUATION: LOW COMPLEXITY: (Untimed Charge)    TREATMENT:   Therapeutic Activity (23 Minutes): Therapeutic activity included LE exercises with NO ROM left Knee , review of PT role, POC & PT expectations for DC, reviewed precautions of No knee flexion, reviewed diagonal wt shift shift to reduce risk of a blood clot & for prep to wean off walker, reviewed brace/KI application, progressive gait training an additional 150 ft after an initial 50 ft gait assessment , verbally reviewed with pt single step stair ambulation technique to improve functional Activity tolerance, Balance, Coordination, Mobility, and Strength.     TREATMENT GRID:  THERAPEUTIC  EXERCISES: DATE:  2/17 DATE:   DATE:      AM PM AM PM AM PM    [] [] [] [] [] []   Ankle Pumps  20       Quad Sets  20       Gluteal Sets  20       Hip Abd/ADduction  20       Straight Leg Raises  20       Knee Slides  NO       Short Arc Quads  NO       Chair Slides  NO                         B = bilateral; AA = active assistive; A = active; P = passive      EDUCATION: Education Given To: Patient  Education Provided: Role of Therapy, Plan of Care, Home Exercise Program, Precautions, Transfer Training, IADL Safety, Family Education, Equipment, Fall Prevention Strategies  Education Method: Demonstration, Verbal, Teach Back, Printed Information/Hand-outs  Education Outcome: Verbalized understanding  EDUCATION:  [x] Home Exercises  [x] Fall Precautions  [x] No Pillow Under Knee  [x] D/C Instruction Review [x] Cryocuff  [x] Walker Management/Safety  [] Adaptive Equipment as Needed     AFTER TREATMENT PRECAUTIONS: Bed/Chair Locked, Call light within reach, Chair, Needs within reach, RN notified, and Visitors at bedside    INTERDISCIPLINARY COLLABORATION:  RN/ PCT, OT/ XIE, and RN Case Manager/      COMPLIANCE WITH PROGRAM/EXERCISE: compliant all of the time. RECOMMENDATIONS/INTENT FOR NEXT TREATMENT SESSION: Treatment next visit will focus on increasing Mr. Lia Hernandez independence with bed mobility, transfers, gait training, strength/ROM exercises, modalities for pain, and patient education. TIME IN/OUT:  Time In: 1609  Time Out: [facility]  Minutes: 27    Mary Sequeira.  Miguel Schneider

## 2023-02-17 NOTE — ANESTHESIA PROCEDURE NOTES
Spinal Block    Patient location during procedure: OR  End time: 2/17/2023 11:33 AM  Reason for block: primary anesthetic  Staffing  Performed: anesthesiologist   Anesthesiologist: Darling Stover MD  Spinal Block  Patient position: sitting  Prep: ChloraPrep  Patient monitoring: cardiac monitor, continuous pulse ox, frequent blood pressure checks and oxygen  Approach: midline  Location: L2/L3  Provider prep: mask and sterile gloves  Local infiltration: lidocaine  Needle  Needle type: Quincke   Needle gauge: 22 G  Needle length: 4 in  Needle insertion depth: 3 cm  Assessment  Sensory level: T6  Swirl obtained: Yes  CSF: clear  Attempts: 1  Hemodynamics: stable  Preanesthetic Checklist  Completed: patient identified, IV checked, site marked, risks and benefits discussed, surgical/procedural consents, equipment checked, pre-op evaluation, timeout performed, anesthesia consent given, oxygen available, monitors applied/VS acknowledged, fire risk safety assessment completed and verbalized and blood product R/B/A discussed and consented

## 2023-02-17 NOTE — PERIOP NOTE
TRANSFER - OUT REPORT:    Verbal report given to DAVID Galvin on [place]  being transferred to [phone] for routine post-op       Report consisted of patient's Situation, Background, Assessment and   Recommendations(SBAR). Information from the following report(s) Nurse Handoff Report, Index, Adult Overview, Surgery Report, Intake/Output, and MAR was reviewed with the receiving nurse. [place] Assessment: No data recorded  Lines:   Peripheral IV 02/17/23 Right;Posterior Hand (Active)   Site Assessment Clean, dry & intact 02/17/23 1350   Line Status Infusing 02/17/23 1350   Phlebitis Assessment No symptoms 02/17/23 1350   Infiltration Assessment 0 02/17/23 1350   Dressing Status Clean, dry & intact 02/17/23 1350   Dressing Type Transparent 02/17/23 1350        Opportunity for questions and clarification was provided.       Patient transported with:  O2 @ 2lpm

## 2023-02-17 NOTE — ANESTHESIA POSTPROCEDURE EVALUATION
Department of Anesthesiology  Postprocedure Note    Patient: Ayad Morgan  MRN: [id number]  YOB: 1950  Date of evaluation: 2/17/2023      Procedure Summary     Date: 02/17/23 Room / Location: [facility] / [facility]    Anesthesia Start: 1121 Anesthesia Stop: 2057    Procedure: lt lateral retinacular release and repair of knee capsule (Left: Knee) Diagnosis:       Knee instability, left      (Knee instability, left [M25.362])    Surgeons: Rosalio Espinosa MD Responsible Provider: Aleksey Nix MD    Anesthesia Type: spinal ASA Status: 3          Anesthesia Type: No value filed.     Maggie Phase I: Maggie Score: 10    Maggie Phase II:        Anesthesia Post Evaluation    Patient location during evaluation: PACU  Patient participation: complete - patient participated  Level of consciousness: awake and alert  Pain score: 0  Airway patency: patent  Nausea & Vomiting: no nausea and no vomiting  Complications: no  Cardiovascular status: hemodynamically stable  Respiratory status: acceptable, nonlabored ventilation and spontaneous ventilation  Hydration status: euvolemic  Comments: /71   Pulse 75   Temp 97.6 °F (36.4 °C) (Temporal)   Resp 16   Ht 5' 10\" (1.778 m)   Wt 205 lb 1.6 oz (93 kg)   SpO2 97%   BMI 29.43 kg/m²     Multimodal analgesia pain management approach

## 2023-02-17 NOTE — PROGRESS NOTES
OCCUPATIONAL THERAPY Initial Assessment, Daily Note, Discharge, and PM      (Link to Caseload Tracking:    OT Orders   Time  OT Charge Capture  Rehab Caseload Tracker  Episode     Kelly Chapman is a 71 y.o. male   PRIMARY DIAGNOSIS: Status post revision of total knee replacement, left  Knee instability, left [M25.362]  Dislocated patella, left, subsequent encounter [S83.005D]  Procedure(s) (LRB):  lt lateral retinacular release and repair of knee capsule (Left)  Day of Surgery  Reason for Referral: Pain in Left Knee (M25.562)  Stiffness of Left Knee, Not elsewhere classified (I23.022)  Outpatient in a bed: Payor: MEDICARE / Plan: MEDICARE PART A AND B / Product Type: *No Product type* /     ASSESSMENT:     REHAB RECOMMENDATIONS:   Recommendation to date pending progress:  Setting:  Home Health Therapy    Equipment:    None pt has his equipment from original knee surgery in September      ASSESSMENT:  Mr. Sabra Rudolph is s/p left Knee instability, left Dislocated patella, left  and presents with decreased independence with functional mobility and activities of daily living as compared to baseline level of function and safety. Patient would benefit from skilled Occupational Therapy to maximize independence and safety with self-care task and functional mobility. Patient seen in room with family present. Pt with knee immobilizer and with good understanding of doffing and donning. Pt donned clothes and moves quiet well. Pt's questions and concerned addressed. Pt should do well at home with support. Patient is hopeful to return home day of surgery.        [address] [facility] [address] [address] AM-PAC 6 Clicks Daily Activity Inpatient Short Form:    AM-PAC Daily Activity - Inpatient   How much help is needed for putting on and taking off regular lower body clothing?: A Little  How much help is needed for bathing (which includes washing, rinsing, drying)?: None  How much help is needed for toileting (which includes using toilet, bedpan, or urinal)?: None  How much help is needed for putting on and taking off regular upper body clothing?: None  How much help is needed for taking care of personal grooming?: None  How much help for eating meals?: None  AM-PAC Inpatient Daily Activity Raw Score: 23  AM-PAC Inpatient ADL T-Scale Score : 51.12  ADL Inpatient CMS 0-100% Score: 15.86  ADL Inpatient CMS G-Code Modifier : CI     SUBJECTIVE:     Mr. Brianna Garcia states, \"I am going home\"      Social/Functional   Lives With: Spouse  Type of Home: House  Home Layout: One level  Home Access: Stairs to enter without rails  Entrance Stairs - Number of Steps: 1  Ambulation Assistance: Independent  Transfer Assistance: Independent    OBJECTIVE:     Srinivasan Cabrera / Whitney Lamb / AIRWAY: IV    RESTRICTIONS/PRECAUTIONS:  Restrictions/Precautions: Weight Bearing, Fall Risk  Left Lower Extremity Weight Bearing: Weight Bearing As Tolerated    PAIN: VITALS / O2:   Pre Treatment:          Post Treatment: none Vitals          Oxygen        GROSS EVALUATION: INTACT IMPAIRED   (See Comments)   UE AROM [x] []   UE PROM [x] []   Strength [x]       Posture / Balance [x]     Sensation [x]     Coordination [x]       Tone [x]       Edema []    Activity Tolerance [x]       Hand Dominance R [] L []      COGNITION/  PERCEPTION: INTACT IMPAIRED   (See Comments)   Orientation [x]     Vision [x]     Hearing [x]     Cognition  [x]     Perception [x]       MOBILITY: I Mod I S SBA CGA Min Mod Max Total  NT x2 Comments:   Bed Mobility    Rolling [] [] [] [] [] [] [] [] [] [] []    Supine to Sit [] [] [] [] [] [] [] [] [] [] []    Scooting [] [] [] [] [] [] [] [] [] [] []    Sit to Supine [] [] [] [] [] [] [] [] [] [] []    Transfers    Sit to Stand [] [] [] [x] [] [] [] [] [] [] []    Bed to Chair [] [] [] [x] [] [] [] [] [] [] []    Stand to Sit [] [] [] [x] [] [] [] [] [] [] []    Tub/Shower [] [] [] [x] [] [] [] [] [] [] []       Toilet [] [] [] [x] [] [] [] [] [] [] []        [] [] [] [] [] [] [] [] [] [] []    I=Independent, Mod I=Modified Independent, S=Supervision/Setup, SBA=Standby Assistance, CGA=Contact Guard Assistance, Min=Minimal Assistance, Mod=Moderate Assistance, Max=Maximal Assistance, Total=Total Assistance, NT=Not Tested    ACTIVITIES OF DAILY LIVING: I Mod I S SBA CGA Min Mod Max Total NT Comments   BASIC ADLs:              Upper Body Bathing [] [] [] [x] [] [] [] [] [] []    Lower Body Bathing [] [] [] [x] [] [] [] [] [] []    Toileting [] [] [] [x] [] [] [] [] [] []    Upper Body Dressing [] [] [] [x] [] [] [] [] [] []    Lower Body Dressing [] [] [] [] [] [x] [] [] [] []    Feeding [x] [] [] [] [] [] [] [] [] []    Grooming [] [] [] [x] [] [] [] [] [] []    Personal Device Care [] [] [] [] [] [] [] [] [] []    Functional Mobility [] [] [] [x] [] [] [] [] [] []    I=Independent, Mod I=Modified Independent, S=Supervision/Setup, SBA=Standby Assistance, CGA=Contact Guard Assistance, Min=Minimal Assistance, Mod=Moderate Assistance, Max=Maximal Assistance, Total=Total Assistance, NT=Not Tested    PLAN:     FREQUENCY/DURATION   OT Plan of Care:  (1 treatment) for duration of hospital stay or until stated goals are met, whichever comes first.    ACUTE OCCUPATIONAL THERAPY GOALS:   (Developed with and agreed upon by patient and/or caregiver.)    GOALS:   DISCHARGE GOALS (in preparation for going home/rehab):  3 days  1. Mr. Lieutenant Shukla will perform lower body dressing activity with stand by assist required to demonstrate improved functional mobility and safety. 2.  Mr. Lieutenant Shukla will perform bathing activity with stand by assist required to demonstrate improved functional mobility and safety. 3.  Mr. Lieutenant Shukla will perform toileting activity with  stand by assist to demonstrate improved functional mobility and safety. 4.  Mr. Lieutenant Shukla will perform all functional transfers transfer with stand by assist to demonstrate improved functional mobility and safety.       PROBLEM LIST:   (Skilled intervention is medically necessary to address:)  Decreased ADL/Functional Activities  Decreased Balance  Increased Pain   INTERVENTIONS PLANNED:   (Benefits and precautions of occupational therapy have been discussed with the patient.)  Self Care Training  Education         TREATMENT:     EVALUATION: LOW COMPLEXITY: (Untimed Charge)    TREATMENT:   Self Care: (25 min): Procedure(s) (per grid) utilized to improve and/or restore self-care/home management as related to dressing and functional mobility . Required minimal verbal and   cueing to facilitate activities of daily living skills and compensatory activities.     AFTER TREATMENT PRECAUTIONS: Bed/Chair Locked, Call light within reach, Chair, Needs within reach, RN notified, and Visitors at bedside    INTERDISCIPLINARY COLLABORATION:  RN/ PCT, PT/ PTA, and OT/ XEI    EDUCATION:  Education Given To: Patient, Family  Education Provided: Role of Therapy, Plan of Care, Precautions, ADL Adaptive Strategies, Transfer Training, IADL Safety, Equipment, Fall Prevention Strategies  Education Outcome: Verbalized understanding, Demonstrated understanding  [x] Safe And Effective Hygiene  [x] Fall Precautions  [] Hip Precautions  [x] D/C Instruction Review [] Prosthesis Review  [x] Walker Management/Safety  [x] Adaptive Equipment as Needed  [x] Therapeutic Resting Position of Joint       TOTAL TREATMENT DURATION AND TIME:  Time In: 1700  Time Out: [address]  Minutes: 5420 Radhika Arias, OT

## 2023-02-19 LAB
BACTERIA SPEC CULT: NORMAL
GRAM STN SPEC: NORMAL
SERVICE CMNT-IMP: NORMAL

## 2023-02-20 ENCOUNTER — TELEPHONE (OUTPATIENT)
Dept: ORTHOPEDIC SURGERY | Age: 73
End: 2023-02-20

## 2023-02-20 ENCOUNTER — HOME CARE VISIT (OUTPATIENT)
Dept: SCHEDULING | Facility: HOME HEALTH | Age: 73
End: 2023-02-20

## 2023-02-20 LAB
BACTERIA SPEC CULT: NORMAL
GRAM STN SPEC: NORMAL
SERVICE CMNT-IMP: NORMAL

## 2023-02-20 PROCEDURE — 0221000100 HH NO PAY CLAIM PROCEDURE

## 2023-02-20 PROCEDURE — G0151 HHCP-SERV OF PT,EA 15 MIN: HCPCS

## 2023-02-20 NOTE — TELEPHONE ENCOUNTER
Left Home Health number a VM confirming the patient needs to be in the immobilizer until we see him again at his next visit, unless he is in the shower but no bending.  Left contact number in case 8

## 2023-02-21 VITALS
SYSTOLIC BLOOD PRESSURE: 124 MMHG | TEMPERATURE: 98 F | HEART RATE: 83 BPM | DIASTOLIC BLOOD PRESSURE: 72 MMHG | OXYGEN SATURATION: 97 % | RESPIRATION RATE: 18 BRPM

## 2023-02-21 ASSESSMENT — ENCOUNTER SYMPTOMS
PAIN LOCATION - PAIN QUALITY: SHARP
DYSPNEA ACTIVITY LEVEL: AFTER AMBULATING MORE THAN 20 FT

## 2023-02-23 ENCOUNTER — HOME CARE VISIT (OUTPATIENT)
Dept: SCHEDULING | Facility: HOME HEALTH | Age: 73
End: 2023-02-23

## 2023-02-23 VITALS
OXYGEN SATURATION: 94 % | TEMPERATURE: 98.2 F | RESPIRATION RATE: 16 BRPM | DIASTOLIC BLOOD PRESSURE: 80 MMHG | HEART RATE: 78 BPM | SYSTOLIC BLOOD PRESSURE: 138 MMHG

## 2023-02-23 PROCEDURE — G0157 HHC PT ASSISTANT EA 15: HCPCS

## 2023-02-24 LAB
BACTERIA SPEC CULT: NORMAL
SERVICE CMNT-IMP: NORMAL

## 2023-02-27 ENCOUNTER — HOME CARE VISIT (OUTPATIENT)
Dept: SCHEDULING | Facility: HOME HEALTH | Age: 73
End: 2023-02-27
Payer: MEDICARE

## 2023-02-27 VITALS
HEART RATE: 72 BPM | DIASTOLIC BLOOD PRESSURE: 70 MMHG | OXYGEN SATURATION: 94 % | SYSTOLIC BLOOD PRESSURE: 132 MMHG | RESPIRATION RATE: 15 BRPM | TEMPERATURE: 98.3 F

## 2023-02-27 PROCEDURE — G0157 HHC PT ASSISTANT EA 15: HCPCS

## 2023-02-27 ASSESSMENT — ENCOUNTER SYMPTOMS: PAIN LOCATION - PAIN QUALITY: SORE

## 2023-03-01 ENCOUNTER — HOME CARE VISIT (OUTPATIENT)
Dept: SCHEDULING | Facility: HOME HEALTH | Age: 73
End: 2023-03-01
Payer: MEDICARE

## 2023-03-01 VITALS
SYSTOLIC BLOOD PRESSURE: 170 MMHG | RESPIRATION RATE: 16 BRPM | TEMPERATURE: 98 F | DIASTOLIC BLOOD PRESSURE: 78 MMHG | HEART RATE: 93 BPM | OXYGEN SATURATION: 98 %

## 2023-03-01 PROCEDURE — G0151 HHCP-SERV OF PT,EA 15 MIN: HCPCS

## 2023-03-01 ASSESSMENT — ENCOUNTER SYMPTOMS: PAIN LOCATION - PAIN QUALITY: ACHE

## 2023-03-06 ENCOUNTER — HOME CARE VISIT (OUTPATIENT)
Dept: SCHEDULING | Facility: HOME HEALTH | Age: 73
End: 2023-03-06
Payer: MEDICARE

## 2023-03-06 VITALS
RESPIRATION RATE: 15 BRPM | SYSTOLIC BLOOD PRESSURE: 138 MMHG | HEART RATE: 89 BPM | TEMPERATURE: 98.9 F | OXYGEN SATURATION: 94 % | DIASTOLIC BLOOD PRESSURE: 70 MMHG

## 2023-03-06 PROCEDURE — G0157 HHC PT ASSISTANT EA 15: HCPCS

## 2023-03-08 ENCOUNTER — HOME CARE VISIT (OUTPATIENT)
Dept: SCHEDULING | Facility: HOME HEALTH | Age: 73
End: 2023-03-08
Payer: MEDICARE

## 2023-03-08 VITALS
TEMPERATURE: 98.4 F | OXYGEN SATURATION: 94 % | HEART RATE: 88 BPM | DIASTOLIC BLOOD PRESSURE: 66 MMHG | SYSTOLIC BLOOD PRESSURE: 144 MMHG | RESPIRATION RATE: 16 BRPM

## 2023-03-08 PROCEDURE — G0157 HHC PT ASSISTANT EA 15: HCPCS

## 2023-03-08 ASSESSMENT — ENCOUNTER SYMPTOMS: PAIN LOCATION - PAIN QUALITY: STINGING

## 2023-03-13 ENCOUNTER — HOME CARE VISIT (OUTPATIENT)
Dept: SCHEDULING | Facility: HOME HEALTH | Age: 73
End: 2023-03-13
Payer: MEDICARE

## 2023-03-13 VITALS
HEART RATE: 89 BPM | TEMPERATURE: 98.9 F | SYSTOLIC BLOOD PRESSURE: 138 MMHG | OXYGEN SATURATION: 95 % | RESPIRATION RATE: 15 BRPM | DIASTOLIC BLOOD PRESSURE: 70 MMHG

## 2023-03-13 PROCEDURE — G0157 HHC PT ASSISTANT EA 15: HCPCS

## 2023-03-13 ASSESSMENT — ENCOUNTER SYMPTOMS: PAIN LOCATION - PAIN QUALITY: SORE

## 2023-03-16 ENCOUNTER — HOME CARE VISIT (OUTPATIENT)
Dept: SCHEDULING | Facility: HOME HEALTH | Age: 73
End: 2023-03-16
Payer: MEDICARE

## 2023-03-16 VITALS
HEART RATE: 87 BPM | OXYGEN SATURATION: 98 % | RESPIRATION RATE: 16 BRPM | DIASTOLIC BLOOD PRESSURE: 70 MMHG | TEMPERATURE: 98 F | SYSTOLIC BLOOD PRESSURE: 138 MMHG

## 2023-03-16 PROCEDURE — G0151 HHCP-SERV OF PT,EA 15 MIN: HCPCS

## 2023-03-16 ASSESSMENT — ENCOUNTER SYMPTOMS: PAIN LOCATION - PAIN QUALITY: ACHE

## 2023-03-28 ENCOUNTER — OFFICE VISIT (OUTPATIENT)
Dept: ORTHOPEDIC SURGERY | Age: 73
End: 2023-03-28

## 2023-03-28 DIAGNOSIS — Z98.890 STATUS POST LEFT KNEE SURGERY: Primary | ICD-10-CM

## 2023-03-28 NOTE — PROGRESS NOTES
Name: Olivia Ruano  YOB: 1950  Gender: male  MRN: [id number]    CC: Post-Op Check (Post op -lt tka)       HPI: Olivia Ruano is a 71 y.o. male who presents with Post-Op Check (Post op -lt tka)  The patient returns today for follow-up of lateral retinacular release and repair of the medial capsule. He is doing well with minimal pain. Still notes some swelling in the knee. He has been wearing a knee immobilizer. History was obtained by patient    ROS/Meds/PSH/PMH/FH/SH: I personally reviewed the patients standard intake form.       Current Outpatient Medications:     aspirin EC 81 MG EC tablet, Take 1 tablet by mouth in the morning and 1 tablet in the evening., Disp: 70 tablet, Rfl: 0    meloxicam (MOBIC) 15 MG tablet, Take 1 tablet by mouth daily as needed for Pain, Disp: 30 tablet, Rfl: 0    promethazine (PHENERGAN) 12.5 MG tablet, Take 1 tablet by mouth 4 times daily as needed for Nausea, Disp: 20 tablet, Rfl: 2    amLODIPine (NORVASC) 10 MG tablet, Take 10 mg by mouth nightly, Disp: , Rfl:     lisinopril (PRINIVIL;ZESTRIL) 10 MG tablet, Take 40 mg by mouth nightly, Disp: , Rfl:     metFORMIN (GLUCOPHAGE-XR) 500 MG extended release tablet, Take 1,000 mg by mouth in the morning and at bedtime, Disp: , Rfl:     bromocriptine (PARLODEL) 5 MG capsule, Take 5 mg by mouth at bedtime Patient states he takes 4 capsules nightly, Disp: , Rfl:     tamsulosin (FLOMAX) 0.4 mg capsule, Take 0.4 mg by mouth daily, Disp: , Rfl:     pravastatin (PRAVACHOL) 80 MG tablet, Take 80 mg by mouth daily reports taking every morning. , Disp: , Rfl:     TESTOSTERONE CYPIONATE IM, Inject 50 mg into the muscle, Disp: , Rfl:     acetaminophen (TYLENOL) 500 MG tablet, Take 500 mg by mouth every 6 hours as needed for Pain for low pain. , Disp: , Rfl:     budesonide-formoterol (SYMBICORT) 160-4.5 MCG/ACT AERO, Inhale 2 puffs into the lungs 2 times daily for COPD. , Disp: , Rfl:     EPINEPHrine (EPIPEN) 0.3

## 2023-04-03 NOTE — PROGRESS NOTES
ambulate normally on all surfaces  Able to carry groceries into the house  Able to do all household chores  Patient will report 0/10 pain L knee  Able to reciprocate stairs in community without deviation  Able to ambulate safely for community distances without assistive device on all surfaces  Improve LEFS to >/= 25% functional deficit   Resume normal activities/ADLs as discussed in evaluation   Discharged from PT     [address]: [email or web address]  URL: [email or web address]  Date: 04/04/2023  Prepared by:  Endy Pruitt    Exercises  - Supine Knee Extension Strengthening  - 1 x daily - 7 x weekly - 1 sets - 20 reps - don't hold  - Seated Long Arc Quad  - 1 x daily - 7 x weekly - 1 sets - 20 reps - don't hold  - Small Range Straight Leg Raise  - 1 x daily - 7 x weekly - 1 sets - 20 reps - don't hold  - Seated Knee Flexion Slide  - 3 x daily - 7 x weekly - 1 sets - 10 reps - 5 seconds  hold  - Long Sitting Quad Set  - 2 x daily - 7 x weekly - 1 sets - 20 reps - 5 seconds hold  - Seated Hamstring Stretch with Chair  - 2 x daily - 7 x weekly - 1 sets - 1 reps - 1 minute hold

## 2023-04-04 ENCOUNTER — OFFICE VISIT (OUTPATIENT)
Age: 73
End: 2023-04-04
Payer: MEDICARE

## 2023-04-04 DIAGNOSIS — R26.2 DIFFICULTY WALKING: ICD-10-CM

## 2023-04-04 DIAGNOSIS — M25.662 JOINT STIFFNESS OF LEFT LOWER LEG: ICD-10-CM

## 2023-04-04 DIAGNOSIS — Z74.09 IMPAIRED MOBILITY AND ADLS: ICD-10-CM

## 2023-04-04 DIAGNOSIS — M25.562 CHRONIC PAIN OF LEFT KNEE: Primary | ICD-10-CM

## 2023-04-04 DIAGNOSIS — M62.81 MUSCLE WEAKNESS: ICD-10-CM

## 2023-04-04 DIAGNOSIS — G89.29 CHRONIC PAIN OF LEFT KNEE: Primary | ICD-10-CM

## 2023-04-04 DIAGNOSIS — M79.89 LEFT LEG SWELLING: ICD-10-CM

## 2023-04-04 DIAGNOSIS — Z78.9 IMPAIRED MOBILITY AND ADLS: ICD-10-CM

## 2023-04-04 PROCEDURE — 97162 PT EVAL MOD COMPLEX 30 MIN: CPT | Performed by: PHYSICAL THERAPIST

## 2023-04-04 PROCEDURE — 97110 THERAPEUTIC EXERCISES: CPT | Performed by: PHYSICAL THERAPIST

## 2023-04-04 PROCEDURE — 97016 VASOPNEUMATIC DEVICE THERAPY: CPT | Performed by: PHYSICAL THERAPIST

## 2023-04-05 NOTE — PROGRESS NOTES
Patient has improved AROM left knee. He is wearing knee immobilizer to ambulate. He has antalgic gait due to knee immobilizer prohibiting left knee flexion. He uses a cane for safety. He is unable to reciprocate stairs. He has good quad set left LE. He has quad lag with SLR. Mild pain reported left knee. He has moderate edema left lateral knee. PLAN     Continue with stretching and strengthening exercises to improve function and gait. Progress as tolerated. He is to wear knee immobilizer to ambulate until he sees Dr. Jaycob Turner. Continue with modalities to reduce edema to improve function. PLAN OF CARE     Effective Dates: 4/4/2023 TO 7/3/2023 (90 days). Frequency/Duration: 2x/week for 90 Day(s)      GOALS     Short term goals to be met by 5/2/2023  (4 weeks): Independent with HEP  Increase AROM L knee flexion to 115 degrees -<ET  Alleviate quad lag with SLR left LE  Able to move sit to stand with minimal deviation from 20\" seat  Able to ambulate with normal gait using heel strike and toe off L LE  Able to step up on 6\" step with L left with minimal deviation  Able to reciprocate stairs in clinic with minimal deviation  Reduce painful symptoms to less than 1/10  Discontinue knee immobilizer with stable left knee in gait    Long term goals to be met by 6/27/2023  (12 weeks): Increase AROM L knee flexion to 125 degrees   Improve MMT to +4/5 or greater left LE  Able to do yard work  Able to able to ambulate normally on all surfaces  Able to carry groceries into the house  Able to do all household chores  Patient will report 0/10 pain L knee  Able to reciprocate stairs in community without deviation  Able to ambulate safely for community distances without assistive device on all surfaces  Improve LEFS to >/= 25% functional deficit   Resume normal activities/ADLs as discussed in evaluation   Discharged from [facility]:  [id number]

## 2023-04-06 ENCOUNTER — OFFICE VISIT (OUTPATIENT)
Age: 73
End: 2023-04-06

## 2023-04-06 DIAGNOSIS — M62.81 MUSCLE WEAKNESS: ICD-10-CM

## 2023-04-06 DIAGNOSIS — Z78.9 IMPAIRED MOBILITY AND ADLS: ICD-10-CM

## 2023-04-06 DIAGNOSIS — M79.89 LEFT LEG SWELLING: ICD-10-CM

## 2023-04-06 DIAGNOSIS — G89.29 CHRONIC PAIN OF LEFT KNEE: Primary | ICD-10-CM

## 2023-04-06 DIAGNOSIS — Z74.09 IMPAIRED MOBILITY AND ADLS: ICD-10-CM

## 2023-04-06 DIAGNOSIS — M25.662 JOINT STIFFNESS OF LEFT LOWER LEG: ICD-10-CM

## 2023-04-06 DIAGNOSIS — M25.562 CHRONIC PAIN OF LEFT KNEE: Primary | ICD-10-CM

## 2023-04-06 DIAGNOSIS — R26.2 DIFFICULTY WALKING: ICD-10-CM

## 2023-04-17 ENCOUNTER — OFFICE VISIT (OUTPATIENT)
Age: 73
End: 2023-04-17
Payer: MEDICARE

## 2023-04-17 DIAGNOSIS — M25.662 JOINT STIFFNESS OF LEFT LOWER LEG: ICD-10-CM

## 2023-04-17 DIAGNOSIS — Z78.9 IMPAIRED MOBILITY AND ADLS: ICD-10-CM

## 2023-04-17 DIAGNOSIS — G89.29 CHRONIC PAIN OF LEFT KNEE: Primary | ICD-10-CM

## 2023-04-17 DIAGNOSIS — M25.562 CHRONIC PAIN OF LEFT KNEE: Primary | ICD-10-CM

## 2023-04-17 DIAGNOSIS — R26.2 DIFFICULTY WALKING: ICD-10-CM

## 2023-04-17 DIAGNOSIS — Z74.09 IMPAIRED MOBILITY AND ADLS: ICD-10-CM

## 2023-04-17 DIAGNOSIS — M62.81 MUSCLE WEAKNESS: ICD-10-CM

## 2023-04-17 DIAGNOSIS — M79.89 LEFT LEG SWELLING: ICD-10-CM

## 2023-04-17 PROCEDURE — 97140 MANUAL THERAPY 1/> REGIONS: CPT | Performed by: PHYSICAL THERAPY ASSISTANT

## 2023-04-17 PROCEDURE — 97016 VASOPNEUMATIC DEVICE THERAPY: CPT | Performed by: PHYSICAL THERAPY ASSISTANT

## 2023-04-17 PROCEDURE — 97110 THERAPEUTIC EXERCISES: CPT | Performed by: PHYSICAL THERAPY ASSISTANT

## 2023-04-17 NOTE — PROGRESS NOTES
[facility]  [address] [address]  Dept: [phone]      Physical Therapy Daily Note     Insurance: Today is 4/20 visits [facility])      Total Timed Procedure Codes: 45 min, Total Time: 60 min Modifier needed: No      Referring MD: Ruthine Felty., *  Surgery: Revision L TKA with poly exchange with left lateral retinacular release and capsule repair  Surgery Date: 3/14/2023  Onset Date: 1/1/2023    Diagnosis:     ICD-10-CM    1. Chronic pain of left knee  M25.562     G89.29       2. Left leg swelling  M79.89       3. Joint stiffness of left lower leg  M25.662       4. Difficulty walking  R26.2       5. Impaired mobility and ADLs  Z74.09     Z78.9       6. Muscle weakness  M62.81              Therapy precautions: Knee immobilizer to ambulate. May remove brace to AROM and strengthening exercises. Co-morbidities affecting plan of care: CAD, HTN, Asthma, Anxiety, OA left shoulder, chronic low back pain, DM, COPD. Allergy to bees, restless leg syndrome, R TKA (2021), L TKA (2022), C-spine fusion. SUBJECTIVE     Patient arrives wearing knee immobilizer amb with straight cane. Pt denies L pain left knee. He says is anxious to D/C use of knee brace/immobilizer. OBJECTIVE     Treatment provided today:    Manual therapy (30084) x 10 min utilizing techniques to improve joint and/or soft tissue mobility, ROM, and function as well as helping to decrease pain/spasms and swelling. Palpation and assessment of soft tissue, muscles, and landmarks   STM L knee, including scar mobs    Therapeutic exercise (86362) x 35 min to develop ROM, strength, endurance and flexibility.   Included:   Quad sets 30x  SLR 15x2 with 1#  SAQ 30x  LAQ 30x with 2#    Ball squeeze hip adduction 30x  SL hip abduction 15x2 with 1#  Prone hip extension 15x2 with 1#  Prone HS 15x2 with 2#    Seated HS stretch 1 minute    Standing TKE - red tb x30  Standing calf raises x20  Slantboard stretch 1 min

## 2023-04-20 NOTE — PROGRESS NOTES
[facility] [facility]  [address] [address]  Dept: [phone]      Physical Therapy Daily Note     Insurance: Today is 6/20 visits [facility])      Total Timed Procedure Codes: 40 min, Total Time: 55 min Modifier needed: No      Referring MD: Valdene Severs., *  Surgery: Revision L TKA with poly exchange with left lateral retinacular release and capsule repair  Surgery Date: 3/14/2023  Onset Date: 1/1/2023    Diagnosis:     ICD-10-CM    1. Chronic pain of left knee  M25.562     G89.29       2. Left leg swelling  M79.89       3. Joint stiffness of left lower leg  M25.662       4. Difficulty walking  R26.2       5. Impaired mobility and ADLs  Z74.09     Z78.9       6. Muscle weakness  M62.81              Therapy precautions: Per Dr. Johnnie Rodriguez, patient my discontinue knee immobilizer (4/20/2023)  Co-morbidities affecting plan of care: CAD, HTN, Asthma, Anxiety, OA left shoulder, chronic low back pain, DM, COPD. Allergy to bees, restless leg syndrome, R TKA (2021), L TKA (2022), C-spine fusion. SUBJECTIVE     Patient states he was sick with food poisoning on Wednesday. He has no c/o pain left knee. He states it feels good to progress exercise program today with immobilizer. He states left LE is tired after PREs today. OBJECTIVE     Treatment provided today:    Therapeutic exercise (19976) x 40 min to develop ROM, strength, endurance and flexibility. Included:   Bike L2 for 5 minutes    Sit to stand from 24\" seat 20x  Leg press 80# for 30x  Single LP 40# for 30x  HS at 40# for 30x    Quad sets 30x  SLR 15x2 with 1#  SAQ 30x  LAQ 30x with 2#    Standing hip 3 way with teal loop     Seated HS stretch 1 minute    Slantboard stretch 1 min      Vasopneumatic Compression (35720) with cold x 15 minutes: to left knee in order to reduce inflammation and swelling/joint effusion which will help improve ROM and manage pain. Patient supine with B LE elevated.     .    A/PROM Measures:

## 2023-04-21 ENCOUNTER — TREATMENT (OUTPATIENT)
Age: 73
End: 2023-04-21

## 2023-04-21 DIAGNOSIS — M79.89 LEFT LEG SWELLING: ICD-10-CM

## 2023-04-21 DIAGNOSIS — M25.562 CHRONIC PAIN OF LEFT KNEE: Primary | ICD-10-CM

## 2023-04-21 DIAGNOSIS — Z74.09 IMPAIRED MOBILITY AND ADLS: ICD-10-CM

## 2023-04-21 DIAGNOSIS — M62.81 MUSCLE WEAKNESS: ICD-10-CM

## 2023-04-21 DIAGNOSIS — M25.662 JOINT STIFFNESS OF LEFT LOWER LEG: ICD-10-CM

## 2023-04-21 DIAGNOSIS — R26.2 DIFFICULTY WALKING: ICD-10-CM

## 2023-04-21 DIAGNOSIS — Z78.9 IMPAIRED MOBILITY AND ADLS: ICD-10-CM

## 2023-04-21 DIAGNOSIS — G89.29 CHRONIC PAIN OF LEFT KNEE: Primary | ICD-10-CM

## 2023-04-24 ENCOUNTER — TREATMENT (OUTPATIENT)
Age: 73
End: 2023-04-24
Payer: MEDICARE

## 2023-04-24 DIAGNOSIS — M25.562 CHRONIC PAIN OF LEFT KNEE: Primary | ICD-10-CM

## 2023-04-24 DIAGNOSIS — M79.89 LEFT LEG SWELLING: ICD-10-CM

## 2023-04-24 DIAGNOSIS — M25.662 JOINT STIFFNESS OF LEFT LOWER LEG: ICD-10-CM

## 2023-04-24 DIAGNOSIS — M62.81 MUSCLE WEAKNESS: ICD-10-CM

## 2023-04-24 DIAGNOSIS — R26.2 DIFFICULTY WALKING: ICD-10-CM

## 2023-04-24 DIAGNOSIS — Z74.09 IMPAIRED MOBILITY AND ADLS: ICD-10-CM

## 2023-04-24 DIAGNOSIS — Z78.9 IMPAIRED MOBILITY AND ADLS: ICD-10-CM

## 2023-04-24 DIAGNOSIS — G89.29 CHRONIC PAIN OF LEFT KNEE: Primary | ICD-10-CM

## 2023-04-24 PROCEDURE — 97016 VASOPNEUMATIC DEVICE THERAPY: CPT | Performed by: PHYSICAL THERAPIST

## 2023-04-24 PROCEDURE — 97110 THERAPEUTIC EXERCISES: CPT | Performed by: PHYSICAL THERAPIST

## 2023-04-25 ENCOUNTER — OFFICE VISIT (OUTPATIENT)
Dept: ORTHOPEDIC SURGERY | Age: 73
End: 2023-04-25
Payer: MEDICARE

## 2023-04-25 DIAGNOSIS — Z96.652 PRESENCE OF LEFT ARTIFICIAL KNEE JOINT: ICD-10-CM

## 2023-04-25 DIAGNOSIS — Z98.890 STATUS POST LEFT KNEE SURGERY: Primary | ICD-10-CM

## 2023-04-25 PROCEDURE — 1123F ACP DISCUSS/DSCN MKR DOCD: CPT | Performed by: ORTHOPAEDIC SURGERY

## 2023-04-25 PROCEDURE — 3017F COLORECTAL CA SCREEN DOC REV: CPT | Performed by: ORTHOPAEDIC SURGERY

## 2023-04-25 PROCEDURE — G8417 CALC BMI ABV UP PARAM F/U: HCPCS | Performed by: ORTHOPAEDIC SURGERY

## 2023-04-25 PROCEDURE — 99214 OFFICE O/P EST MOD 30 MIN: CPT | Performed by: ORTHOPAEDIC SURGERY

## 2023-04-25 PROCEDURE — G8428 CUR MEDS NOT DOCUMENT: HCPCS | Performed by: ORTHOPAEDIC SURGERY

## 2023-04-25 PROCEDURE — 1036F TOBACCO NON-USER: CPT | Performed by: ORTHOPAEDIC SURGERY

## 2023-04-25 NOTE — PROGRESS NOTES
Name: Christie Montes De Oca  YOB: 1950  Gender: male  MRN: [id number]    CC: failure of capsular repair left TKA       HPI: Christie Montes De Oca is a 71 y.o. male who presents with follow-up. The patient returns today for follow-up of lateral retinacular release and repair of the medial capsule. He is doing well with minimal pain. Still notes some swelling in the knee. He h discontinued his knee immobilizer last week    History was obtained by patient    ROS/Meds/PSH/PMH/FH/SH: I personally reviewed the patients standard intake form.       Current Outpatient Medications:     aspirin EC 81 MG EC tablet, Take 1 tablet by mouth in the morning and 1 tablet in the evening., Disp: 70 tablet, Rfl: 0    meloxicam (MOBIC) 15 MG tablet, Take 1 tablet by mouth daily as needed for Pain, Disp: 30 tablet, Rfl: 0    promethazine (PHENERGAN) 12.5 MG tablet, Take 1 tablet by mouth 4 times daily as needed for Nausea, Disp: 20 tablet, Rfl: 2    amLODIPine (NORVASC) 10 MG tablet, Take 10 mg by mouth nightly, Disp: , Rfl:     lisinopril (PRINIVIL;ZESTRIL) 10 MG tablet, Take 40 mg by mouth nightly, Disp: , Rfl:     metFORMIN (GLUCOPHAGE-XR) 500 MG extended release tablet, Take 1,000 mg by mouth in the morning and at bedtime, Disp: , Rfl:     bromocriptine (PARLODEL) 5 MG capsule, Take 5 mg by mouth at bedtime Patient states he takes 4 capsules nightly, Disp: , Rfl:     tamsulosin (FLOMAX) 0.4 mg capsule, Take 0.4 mg by mouth daily, Disp: , Rfl:     pravastatin (PRAVACHOL) 80 MG tablet, Take 80 mg by mouth daily reports taking every morning. , Disp: , Rfl:     TESTOSTERONE CYPIONATE IM, Inject 50 mg into the muscle, Disp: , Rfl:     acetaminophen (TYLENOL) 500 MG tablet, Take 500 mg by mouth every 6 hours as needed for Pain for low pain. , Disp: , Rfl:     budesonide-formoterol (SYMBICORT) 160-4.5 MCG/ACT AERO, Inhale 2 puffs into the lungs 2 times daily for COPD. , Disp: , Rfl:     EPINEPHrine (EPIPEN) 0.3 MG/0.3ML

## 2023-04-26 NOTE — PROGRESS NOTES
[facility]  [address] [address]  Dept: [phone]      Physical Therapy Daily Note     Insurance: Today is 8/20 visits [facility])      Total Timed Procedure Codes: 40 min, Total Time: 55 min Modifier needed: No      Referring MD: Micah Sadler., *  Surgery: Revision L TKA with poly exchange with left lateral retinacular release and capsule repair  Surgery Date: 3/14/2023  Onset Date: 1/1/2023    Diagnosis:     ICD-10-CM    1. Chronic pain of left knee  M25.562     G89.29       2. Left leg swelling  M79.89       3. Joint stiffness of left lower leg  M25.662       4. Difficulty walking  R26.2       5. Impaired mobility and ADLs  Z74.09     Z78.9       6. Muscle weakness  M62.81              Therapy precautions: Per Dr. Myrna Payne, patient my discontinue knee immobilizer (4/20/2023)  Co-morbidities affecting plan of care: CAD, HTN, Asthma, Anxiety, OA left shoulder, chronic low back pain, DM, COPD. Allergy to bees, restless leg syndrome, R TKA (2021), L TKA (2022), C-spine fusion. SUBJECTIVE     Patient states left knee feels pretty good today. He states he saw Dr. Myrna Payne Tuesday and the doctor told him everything looks good. Patient states he has very mild lateral left knee pain today. OBJECTIVE     Treatment provided today:    Therapeutic exercise (57736) x 40 min to develop ROM, strength, endurance and flexibility.   Included:   Bike L2 for 5 minutes    Sit to stand from 20\" seat 20x  Leg press 100# for 30x  Single LP 50# for 30x  HS at 50# for 30x    Step up on 9\" step 25x  Step over on 9\" step 20x    LAQ 30x with 3#  Quad sets    Monster walk with teal loop 1x  Side step with teal loop 2x    Seated HS stretch 1 minute  Step stretch for knee flexion 10x  Calf stretch on slant board 1 minute      Vasopneumatic Compression (63768) with cold x 15 minutes: to left knee in order to reduce inflammation and swelling/joint effusion which will help improve ROM and manage

## 2023-04-27 ENCOUNTER — TREATMENT (OUTPATIENT)
Age: 73
End: 2023-04-27

## 2023-04-27 DIAGNOSIS — M79.89 LEFT LEG SWELLING: ICD-10-CM

## 2023-04-27 DIAGNOSIS — M25.662 JOINT STIFFNESS OF LEFT LOWER LEG: ICD-10-CM

## 2023-04-27 DIAGNOSIS — M25.562 CHRONIC PAIN OF LEFT KNEE: Primary | ICD-10-CM

## 2023-04-27 DIAGNOSIS — Z78.9 IMPAIRED MOBILITY AND ADLS: ICD-10-CM

## 2023-04-27 DIAGNOSIS — R26.2 DIFFICULTY WALKING: ICD-10-CM

## 2023-04-27 DIAGNOSIS — G89.29 CHRONIC PAIN OF LEFT KNEE: Primary | ICD-10-CM

## 2023-04-27 DIAGNOSIS — M62.81 MUSCLE WEAKNESS: ICD-10-CM

## 2023-04-27 DIAGNOSIS — Z74.09 IMPAIRED MOBILITY AND ADLS: ICD-10-CM

## 2023-05-01 NOTE — PROGRESS NOTES
[address] [place] [facility]  [address] [facility],[address] [address]  Dept: [phone]      Physical Therapy Progress Report     Insurance: Today is 9/20 visits [facility])      Total Timed Procedure Codes: 30 min, Total Time: 45 min Modifier needed: No      Referring MD: Ziggy Adams., *  Surgery: Revision L TKA with poly exchange with left lateral retinacular release and capsule repair  Surgery Date: 3/14/2023  Onset Date: 1/1/2023    Diagnosis:     ICD-10-CM    1. Chronic pain of left knee  M25.562     G89.29       2. Left leg swelling  M79.89       3. Joint stiffness of left lower leg  M25.662       4. Difficulty walking  R26.2       5. Impaired mobility and ADLs  Z74.09     Z78.9       6. Muscle weakness  M62.81              Therapy precautions: Per Dr. Johna Frankel, patient my discontinue knee immobilizer (4/20/2023)  Co-morbidities affecting plan of care: CAD, HTN, Asthma, Anxiety, OA left shoulder, chronic low back pain, DM, COPD. Allergy to bees, restless leg syndrome, R TKA (2021), L TKA (2022), C-spine fusion. SUBJECTIVE     Patient states no pain left knee. He states he is able to reciprocate stairs at home. He walked a mile this week in his neighborhood with no problem. He states left knee feels good. OBJECTIVE     Treatment provided today:    Therapeutic exercise (55351) x 30 min to develop ROM, strength, endurance and flexibility. Included:   Bike L2 for 7 minutes    Sit to stand from 20\" seat 20x  Leg press 100# for 30x  Single LP 50# for 30x  HS at 50# for 30x    Step up on 12\" step 25x  Step over on 9\" step 20x    Monster walk with teal loop 1x  Side step with teal loop 2x        Vasopneumatic Compression (60000) with cold x 15 minutes: to left knee in order to reduce inflammation and swelling/joint effusion which will help improve ROM and manage pain. Patient supine with B LE elevated.     .    A/PROM Measures:      Right Left Comment   Knee Flexion 129A 133A     Knee Extension 0 0

## 2023-05-02 ENCOUNTER — TREATMENT (OUTPATIENT)
Age: 73
End: 2023-05-02
Payer: MEDICARE

## 2023-05-02 DIAGNOSIS — M25.662 JOINT STIFFNESS OF LEFT LOWER LEG: ICD-10-CM

## 2023-05-02 DIAGNOSIS — Z74.09 IMPAIRED MOBILITY AND ADLS: ICD-10-CM

## 2023-05-02 DIAGNOSIS — M79.89 LEFT LEG SWELLING: ICD-10-CM

## 2023-05-02 DIAGNOSIS — Z78.9 IMPAIRED MOBILITY AND ADLS: ICD-10-CM

## 2023-05-02 DIAGNOSIS — R26.2 DIFFICULTY WALKING: ICD-10-CM

## 2023-05-02 DIAGNOSIS — G89.29 CHRONIC PAIN OF LEFT KNEE: Primary | ICD-10-CM

## 2023-05-02 DIAGNOSIS — M25.562 CHRONIC PAIN OF LEFT KNEE: Primary | ICD-10-CM

## 2023-05-02 DIAGNOSIS — M62.81 MUSCLE WEAKNESS: ICD-10-CM

## 2023-05-02 PROCEDURE — 97016 VASOPNEUMATIC DEVICE THERAPY: CPT | Performed by: PHYSICAL THERAPIST

## 2023-05-02 PROCEDURE — 97110 THERAPEUTIC EXERCISES: CPT | Performed by: PHYSICAL THERAPIST

## 2023-05-03 NOTE — PROGRESS NOTES
[address] [place] [facility]  [address] [facility],[address] [address]  Dept: [phone]      Physical Therapy Daily Note     Insurance: Today is 10/20 visits [facility])      Total Timed Procedure Codes: 45 min, Total Time: 60 min Modifier needed: No      Referring MD: Blair Sullivan., *  Surgery: Revision L TKA with poly exchange with left lateral retinacular release and capsule repair  Surgery Date: 3/14/2023  Onset Date: 1/1/2023    Diagnosis:     ICD-10-CM    1. Chronic pain of left knee  M25.562     G89.29       2. Left leg swelling  M79.89       3. Joint stiffness of left lower leg  M25.662       4. Difficulty walking  R26.2       5. Impaired mobility and ADLs  Z74.09     Z78.9       6. Muscle weakness  M62.81              Therapy precautions: Per Dr. Natalie Loza, patient my discontinue knee immobilizer (4/20/2023)  Co-morbidities affecting plan of care: CAD, HTN, Asthma, Anxiety, OA left shoulder, chronic low back pain, DM, COPD. Allergy to bees, restless leg syndrome, R TKA (2021), L TKA (2022), C-spine fusion. SUBJECTIVE     Patient states mild pain left knee yesterday from over use. He states left knee was sore after stepping down exercise. He states doing step down from 6\" step is fine today. He states he has resumed normal activity and wants to be discharged from PT next week. Dr. Natalie Loza was agreeable to this discharge. OBJECTIVE     Treatment provided today:    Therapeutic exercise (57869) x 45 min to develop ROM, strength, endurance and flexibility.   Included:   Bike L2 for 7 minutes    Sit to stand from 20\" seat 30x  Leg press 100# for 30x  Single LP 50# for 30x  HS at 50# for 30x    Step up on 12\" step 25x  Step over on 6\" step 20x    Monster walk with teal loop 2x  Side step with teal loop 4x    Seated HS stretch        Vasopneumatic Compression (32568) with cold x 15 minutes: to left knee in order to reduce inflammation and swelling/joint effusion which will help improve ROM and manage

## 2023-05-04 ENCOUNTER — TREATMENT (OUTPATIENT)
Age: 73
End: 2023-05-04
Payer: MEDICARE

## 2023-05-04 DIAGNOSIS — R26.2 DIFFICULTY WALKING: ICD-10-CM

## 2023-05-04 DIAGNOSIS — M62.81 MUSCLE WEAKNESS: ICD-10-CM

## 2023-05-04 DIAGNOSIS — Z78.9 IMPAIRED MOBILITY AND ADLS: ICD-10-CM

## 2023-05-04 DIAGNOSIS — M25.562 CHRONIC PAIN OF LEFT KNEE: Primary | ICD-10-CM

## 2023-05-04 DIAGNOSIS — M79.89 LEFT LEG SWELLING: ICD-10-CM

## 2023-05-04 DIAGNOSIS — M25.662 JOINT STIFFNESS OF LEFT LOWER LEG: ICD-10-CM

## 2023-05-04 DIAGNOSIS — G89.29 CHRONIC PAIN OF LEFT KNEE: Primary | ICD-10-CM

## 2023-05-04 DIAGNOSIS — Z74.09 IMPAIRED MOBILITY AND ADLS: ICD-10-CM

## 2023-05-04 PROCEDURE — 97110 THERAPEUTIC EXERCISES: CPT | Performed by: PHYSICAL THERAPIST

## 2023-05-04 PROCEDURE — 97016 VASOPNEUMATIC DEVICE THERAPY: CPT | Performed by: PHYSICAL THERAPIST

## 2023-05-08 NOTE — PROGRESS NOTES
[address] [place] [facility]  [address] [facility],[address] [address]  Dept: [phone]      Physical Therapy Daily Note     Insurance: Today is 11/20 visits [facility])      Total Timed Procedure Codes: 35 min, Total Time: 50 min Modifier needed: No      Referring MD: Clover Sanders., *  Surgery: Revision L TKA with poly exchange with left lateral retinacular release and capsule repair  Surgery Date: 3/14/2023  Onset Date: 1/1/2023    Diagnosis:     ICD-10-CM    1. Chronic pain of left knee  M25.562     G89.29       2. Left leg swelling  M79.89       3. Joint stiffness of left lower leg  M25.662       4. Difficulty walking  R26.2       5. Impaired mobility and ADLs  Z74.09     Z78.9       6. Muscle weakness  M62.81              Therapy precautions: Per Dr. Negar Goldstein, patient my discontinue knee immobilizer (4/20/2023)  Co-morbidities affecting plan of care: CAD, HTN, Asthma, Anxiety, OA left shoulder, chronic low back pain, DM, COPD. Allergy to bees, restless leg syndrome, R TKA (2021), L TKA (2022), C-spine fusion. SUBJECTIVE     Patient states no pain left knee over the last week. He states no instability or feeling of buckling this past week. He no longer needs cane to ambulate safely for community distances. No pain left knee stepping up or down today. He states he has resumed normal activity and wants to be discharged from PT next visit. Dr. Negar Goldstein was agreeable to this discharge plan. OBJECTIVE     Treatment provided today:    Therapeutic exercise (33229) x 35 min to develop ROM, strength, endurance and flexibility.   Included:   Bike L2 for 7 minutes    Leg press 100# for 30x  Single LP 50# for 30x  HS at 50# for 30x    Step up on 12\" step 20x  Step over on 9\" step 20x    Monster walk with teal loop 2x  Side step with teal loop 4x    Seated HS stretch 1 minute  Quad sets 20x        Vasopneumatic Compression (75613) with cold x 15 minutes: to left knee in order to reduce inflammation and

## 2023-05-09 ENCOUNTER — TREATMENT (OUTPATIENT)
Age: 73
End: 2023-05-09
Payer: MEDICARE

## 2023-05-09 DIAGNOSIS — Z74.09 IMPAIRED MOBILITY AND ADLS: ICD-10-CM

## 2023-05-09 DIAGNOSIS — M25.562 CHRONIC PAIN OF LEFT KNEE: Primary | ICD-10-CM

## 2023-05-09 DIAGNOSIS — G89.29 CHRONIC PAIN OF LEFT KNEE: Primary | ICD-10-CM

## 2023-05-09 DIAGNOSIS — M62.81 MUSCLE WEAKNESS: ICD-10-CM

## 2023-05-09 DIAGNOSIS — Z78.9 IMPAIRED MOBILITY AND ADLS: ICD-10-CM

## 2023-05-09 DIAGNOSIS — R26.2 DIFFICULTY WALKING: ICD-10-CM

## 2023-05-09 DIAGNOSIS — M25.662 JOINT STIFFNESS OF LEFT LOWER LEG: ICD-10-CM

## 2023-05-09 DIAGNOSIS — M79.89 LEFT LEG SWELLING: ICD-10-CM

## 2023-05-09 PROCEDURE — 97016 VASOPNEUMATIC DEVICE THERAPY: CPT | Performed by: PHYSICAL THERAPIST

## 2023-05-09 PROCEDURE — 97110 THERAPEUTIC EXERCISES: CPT | Performed by: PHYSICAL THERAPIST

## 2023-05-10 NOTE — PROGRESS NOTES
chores-MET  Patient will report 0/10 pain L knee-MET  Able to reciprocate stairs in community without deviation-MET  Able to ambulate safely for community distances without assistive device on all surfaces-MET  Improve LEFS to >/= 25% functional deficit -MET  Resume normal activities/ADLs as discussed in evaluation -MET  Discharged from PT -MET    [facility]  Access Code:  [email or web address]

## 2023-05-11 ENCOUNTER — TREATMENT (OUTPATIENT)
Age: 73
End: 2023-05-11

## 2023-05-11 DIAGNOSIS — R26.2 DIFFICULTY WALKING: ICD-10-CM

## 2023-05-11 DIAGNOSIS — Z74.09 IMPAIRED MOBILITY AND ADLS: ICD-10-CM

## 2023-05-11 DIAGNOSIS — M25.662 JOINT STIFFNESS OF LEFT LOWER LEG: ICD-10-CM

## 2023-05-11 DIAGNOSIS — M62.81 MUSCLE WEAKNESS: ICD-10-CM

## 2023-05-11 DIAGNOSIS — M25.562 CHRONIC PAIN OF LEFT KNEE: Primary | ICD-10-CM

## 2023-05-11 DIAGNOSIS — G89.29 CHRONIC PAIN OF LEFT KNEE: Primary | ICD-10-CM

## 2023-05-11 DIAGNOSIS — M79.89 LEFT LEG SWELLING: ICD-10-CM

## 2023-05-11 DIAGNOSIS — Z78.9 IMPAIRED MOBILITY AND ADLS: ICD-10-CM

## 2023-08-07 NOTE — PROGRESS NOTES
[address] [place] [address], [address] [place] [address]  [phone]    Patient Name:  Myra Gamez  YOB: 1950    Telehealth encounter is a billable service, with coverage as determined by the insurance carrier. The patient was located at Home: [address]  The provider was located at Facility [facility] CT Dept): [address] [facility]  [address] [facility]  [phone]       Services were provided through a video synchronous discussion virtually to substitute for in-person clinic visit. Myra Gamez is a 71 y.o. male who was seen by synchronous (real-time) audio-video technology on 8/10/2023. Consent:  He and/or his healthcare decision maker is aware that this patient-initiated Telehealth encounter is a billable service, with coverage as determined by his insurance carrier. He is aware that he may receive a bill and has provided verbal consent to proceed: Yes        Office Visit 8/10/2023    CHIEF COMPLAINT:    Chief Complaint   Patient presents with    Follow-up       HISTORY OF PRESENT ILLNESS:  Patient is being seen today via virtual visit for follow-up of sleep apnea. Patient had a sleep study in 2014 with AHI 23.6 events per hour with lowest oxygen saturation 78%. He is prescribed CPAP 11 cm using nasal mask. Download reveals 100% compliance over the last year with average nightly use seven hours and 50 minutes. AHI is 1.3 events per hour. He denies any problems with mask or pressure. He changes to a full face mask if he has nasal congestion and feels this works best during that time. He had his knee surgery in September and also has had a revision. He states that he needs another surgery again soon. He is current weight is 196 pounds. He denies any lower extremity edema.       [address] [facility]        Sleep Medicine 8/10/2023 6/28/2022   Sitting and reading 0 0   Watching TV 2 1   Sitting,

## 2023-08-10 ENCOUNTER — TELEMEDICINE (OUTPATIENT)
Dept: SLEEP MEDICINE | Age: 73
End: 2023-08-10
Payer: MEDICARE

## 2023-08-10 DIAGNOSIS — Z99.89 OSA ON CPAP: Primary | ICD-10-CM

## 2023-08-10 DIAGNOSIS — G47.33 OSA ON CPAP: Primary | ICD-10-CM

## 2023-08-10 PROCEDURE — 1123F ACP DISCUSS/DSCN MKR DOCD: CPT | Performed by: NURSE PRACTITIONER

## 2023-08-10 PROCEDURE — 3017F COLORECTAL CA SCREEN DOC REV: CPT | Performed by: NURSE PRACTITIONER

## 2023-08-10 PROCEDURE — G8427 DOCREV CUR MEDS BY ELIG CLIN: HCPCS | Performed by: NURSE PRACTITIONER

## 2023-08-10 PROCEDURE — 99213 OFFICE O/P EST LOW 20 MIN: CPT | Performed by: NURSE PRACTITIONER

## 2023-08-10 ASSESSMENT — SLEEP AND FATIGUE QUESTIONNAIRES
HOW LIKELY ARE YOU TO NOD OFF OR FALL ASLEEP WHILE SITTING QUIETLY AFTER LUNCH WITHOUT ALCOHOL: 0
HOW LIKELY ARE YOU TO NOD OFF OR FALL ASLEEP WHILE LYING DOWN TO REST IN THE AFTERNOON WHEN CIRCUMSTANCES PERMIT: 0
HOW LIKELY ARE YOU TO NOD OFF OR FALL ASLEEP WHILE WATCHING TV: 2
HOW LIKELY ARE YOU TO NOD OFF OR FALL ASLEEP IN A CAR, WHILE STOPPED FOR A FEW MINUTES IN TRAFFIC: 0
ESS TOTAL SCORE: 2
HOW LIKELY ARE YOU TO NOD OFF OR FALL ASLEEP WHILE SITTING AND TALKING TO SOMEONE: 0
HOW LIKELY ARE YOU TO NOD OFF OR FALL ASLEEP WHEN YOU ARE A PASSENGER IN A CAR FOR AN HOUR WITHOUT A BREAK: 0
HOW LIKELY ARE YOU TO NOD OFF OR FALL ASLEEP WHILE SITTING AND READING: 0
HOW LIKELY ARE YOU TO NOD OFF OR FALL ASLEEP WHILE SITTING INACTIVE IN A PUBLIC PLACE: 0

## 2023-09-14 ENCOUNTER — OFFICE VISIT (OUTPATIENT)
Dept: ORTHOPEDIC SURGERY | Age: 73
End: 2023-09-14

## 2023-09-14 DIAGNOSIS — Z96.652 PRESENCE OF LEFT ARTIFICIAL KNEE JOINT: ICD-10-CM

## 2023-09-14 DIAGNOSIS — Z96.652 PRESENCE OF LEFT ARTIFICIAL KNEE JOINT: Primary | ICD-10-CM

## 2023-09-14 DIAGNOSIS — M25.562 LEFT KNEE PAIN, UNSPECIFIED CHRONICITY: ICD-10-CM

## 2023-09-14 LAB
APPEARANCE FLD: NORMAL
BASOPHILS # BLD: 0.1 K/UL (ref 0–0.2)
BASOPHILS NFR BLD: 1 % (ref 0–2)
COLOR FLD: NORMAL
CRP SERPL-MCNC: <0.3 MG/DL (ref 0–0.9)
DIFFERENTIAL METHOD BLD: ABNORMAL
EOSINOPHIL # BLD: 0.2 K/UL (ref 0–0.8)
EOSINOPHIL NFR BLD: 2 % (ref 0.5–7.8)
ERYTHROCYTE [DISTWIDTH] IN BLOOD BY AUTOMATED COUNT: 13.1 % (ref 11.9–14.6)
ERYTHROCYTE [SEDIMENTATION RATE] IN BLOOD: 8 MM/HR
HCT VFR BLD AUTO: 42.4 % (ref 41.1–50.3)
HGB BLD-MCNC: 13.5 G/DL (ref 13.6–17.2)
IMM GRANULOCYTES # BLD AUTO: 0 K/UL (ref 0–0.5)
IMM GRANULOCYTES NFR BLD AUTO: 0 % (ref 0–5)
LYMPHOCYTES # BLD: 1.6 K/UL (ref 0.5–4.6)
LYMPHOCYTES NFR BLD: 22 % (ref 13–44)
LYMPHOCYTES NFR BRONCH MANUAL: 91 %
MCH RBC QN AUTO: 30.8 PG (ref 26.1–32.9)
MCHC RBC AUTO-ENTMCNC: 31.8 G/DL (ref 31.4–35)
MCV RBC AUTO: 96.8 FL (ref 82–102)
MONOCYTES # BLD: 0.6 K/UL (ref 0.1–1.3)
MONOCYTES NFR BLD: 8 % (ref 4–12)
NEUTROPHILS NFR BRONCH MANUAL: 9 %
NEUTS SEG # BLD: 4.8 K/UL (ref 1.7–8.2)
NEUTS SEG NFR BLD: 67 % (ref 43–78)
NRBC # BLD: 0 K/UL (ref 0–0.2)
NUC CELL # FLD: 235 /CU MM
PLATELET # BLD AUTO: 234 K/UL (ref 150–450)
PMV BLD AUTO: 11.3 FL (ref 9.4–12.3)
RBC # BLD AUTO: 4.38 M/UL (ref 4.23–5.6)
RBC # FLD: NORMAL /CU MM
SPECIMEN SOURCE FLD: NORMAL
WBC # BLD AUTO: 7.2 K/UL (ref 4.3–11.1)

## 2023-09-14 NOTE — PROGRESS NOTES
returns today for post-op visit following left TKA. Their pain is still present on the medial and lateral side of the knees. He has difficulty getting up and down from chair. . They are ambulating without any walking aid. They have completed outpatient therapy. They are pleased with their results thus far. Denies any new complaints today. Physical Exam:  The patient's incision is well healed. There is moderate swelling and moderate increased warmth. ROM is 0 to 1210 degrees. There is no M/L or A/P instability. The calf is soft and non-tender. Neurologic and vascular exams are intact. X-Rays: AP and Lateral views of the left knee reveals a cementless TKA, [place] prosthesis. There is cemented patella arthroplasty. There is good alignment and good position of the components but there is tilting and subluxation of the patella. X-Ray Diagnosis: Satisfactory appearance left TKA but there is tilting and subluxation of the patella     Procedure note: The left knee was sterilely prepped. The left knee was aspirated with an 18-gauge needle. Approximately 60 cc of bloody joint fluid was aspirated from the knee. This will be sent for aerobic anaerobic culture as well as cell count. Disposition:   The patient continues to have pain and discomfort in left total knee arthroplasty knee with subluxation of the patella. Has a large effusion. I have aspirated the left knee and will send him for laboratory work which will include a CBC, sed rate, and C-reactive protein. The patient will also be sent for a CT scan of the left knee to rule out malrotation of the distal femur.

## 2023-09-17 LAB
BACTERIA SPEC CULT: NORMAL
GRAM STN SPEC: NORMAL
GRAM STN SPEC: NORMAL
SERVICE CMNT-IMP: NORMAL

## 2023-09-20 LAB
BACTERIA SPEC CULT: NORMAL
SERVICE CMNT-IMP: NORMAL

## 2023-09-22 ENCOUNTER — HOSPITAL ENCOUNTER (OUTPATIENT)
Dept: CT IMAGING | Age: 73
End: 2023-09-22
Attending: ORTHOPAEDIC SURGERY
Payer: MEDICARE

## 2023-09-22 DIAGNOSIS — Z96.652 PRESENCE OF LEFT ARTIFICIAL KNEE JOINT: ICD-10-CM

## 2023-09-22 DIAGNOSIS — M25.562 LEFT KNEE PAIN, UNSPECIFIED CHRONICITY: ICD-10-CM

## 2023-09-22 PROCEDURE — 73700 CT LOWER EXTREMITY W/O DYE: CPT

## 2023-09-29 ENCOUNTER — TELEPHONE (OUTPATIENT)
Dept: ORTHOPEDIC SURGERY | Age: 73
End: 2023-09-29

## 2023-10-05 ENCOUNTER — OFFICE VISIT (OUTPATIENT)
Dept: ORTHOPEDIC SURGERY | Age: 73
End: 2023-10-05
Payer: MEDICARE

## 2023-10-05 DIAGNOSIS — S83.002D PATELLAR SUBLUXATION, LEFT, SUBSEQUENT ENCOUNTER: Primary | ICD-10-CM

## 2023-10-05 DIAGNOSIS — Z98.890 STATUS POST LEFT KNEE SURGERY: ICD-10-CM

## 2023-10-05 PROCEDURE — G8484 FLU IMMUNIZE NO ADMIN: HCPCS | Performed by: ORTHOPAEDIC SURGERY

## 2023-10-05 PROCEDURE — 99214 OFFICE O/P EST MOD 30 MIN: CPT | Performed by: ORTHOPAEDIC SURGERY

## 2023-10-05 PROCEDURE — 1123F ACP DISCUSS/DSCN MKR DOCD: CPT | Performed by: ORTHOPAEDIC SURGERY

## 2023-10-05 PROCEDURE — 3017F COLORECTAL CA SCREEN DOC REV: CPT | Performed by: ORTHOPAEDIC SURGERY

## 2023-10-05 PROCEDURE — 1036F TOBACCO NON-USER: CPT | Performed by: ORTHOPAEDIC SURGERY

## 2023-10-05 PROCEDURE — G8417 CALC BMI ABV UP PARAM F/U: HCPCS | Performed by: ORTHOPAEDIC SURGERY

## 2023-10-05 PROCEDURE — G8428 CUR MEDS NOT DOCUMENT: HCPCS | Performed by: ORTHOPAEDIC SURGERY

## 2023-10-05 NOTE — PROGRESS NOTES
returns today for post-op visit following left TKA and to review CT results. Their pain is still present on the medial and lateral side of the knees. He has difficulty getting up and down from chair. . They are ambulating without any walking aid. They have completed outpatient therapy. Physical Exam:  The patient's incision is well healed. There is moderate swelling and moderate increased warmth. ROM is 0 to 1210 degrees. There is no M/L or A/P instability. The calf is soft and non-tender. Neurologic and vascular exams are intact. X-Rays: AP and Lateral views of the left knee reveals a cementless TKA, [place] prosthesis. There is cemented patella arthroplasty. There is good alignment and good position of the components but there is tilting and subluxation of the patella. X-Ray Diagnosis: Satisfactory appearance left TKA but there is tilting and subluxation of the patella     CT Result (most recent):  CT FEMUR LEFT WO CONTRAST 09/22/2023    Narrative  CT LEFT FEMUR WITHOUT CONTRAST 9/22/2023    HISTORY: Knee replacement. Evaluate for screw loosening. TECHNIQUE: Noncontrast axial images were obtained through the left femur. Metal  motion reduction techniques were utilized. Multiplanar reformatted images were  generated. All CT scans at this facility used dose modulation, iterative  reconstruction and/or weight based dosing when appropriate to reduce radiation  dose to as low as reasonably achievable. COMPARISON: Left knee radiographs 9/14/2023    FINDINGS: There is no abnormal lucency around the hardware of the tibial  component of the prosthesis. The femoral component is more difficult to assess  because of artifact. On the noise reduction MAR images there appears to be  increased demineralization around the femoral component, particularly along the  posterior aspect of the prosthesis. A knee effusion is present. Enthesopathy is present at the ischial tuberosity.  Osteoarthritis is present

## 2023-10-13 DIAGNOSIS — T84.093A FAILED TOTAL LEFT KNEE REPLACEMENT, INITIAL ENCOUNTER (HCC): ICD-10-CM

## 2023-10-13 DIAGNOSIS — Z96.652 HX OF TOTAL KNEE ARTHROPLASTY, LEFT: Primary | ICD-10-CM

## 2023-11-15 ENCOUNTER — PREP FOR PROCEDURE (OUTPATIENT)
Dept: ORTHOPEDIC SURGERY | Age: 73
End: 2023-11-15

## 2023-11-15 DIAGNOSIS — S83.002D PATELLAR SUBLUXATION, LEFT, SUBSEQUENT ENCOUNTER: Primary | ICD-10-CM

## 2023-11-15 RX ORDER — SODIUM CHLORIDE 9 MG/ML
INJECTION, SOLUTION INTRAVENOUS PRN
Status: CANCELLED | OUTPATIENT
Start: 2023-11-15

## 2023-11-15 RX ORDER — SODIUM CHLORIDE 0.9 % (FLUSH) 0.9 %
5-40 SYRINGE (ML) INJECTION EVERY 12 HOURS SCHEDULED
Status: CANCELLED | OUTPATIENT
Start: 2023-11-15

## 2023-11-15 RX ORDER — SODIUM CHLORIDE 0.9 % (FLUSH) 0.9 %
5-40 SYRINGE (ML) INJECTION PRN
Status: CANCELLED | OUTPATIENT
Start: 2023-11-15

## 2023-11-15 RX ORDER — ACETAMINOPHEN 325 MG/1
1000 TABLET ORAL ONCE
Status: CANCELLED | OUTPATIENT
Start: 2023-11-15 | End: 2023-11-15

## 2023-11-21 NOTE — PROGRESS NOTES
revision of total knee replacement, left    Failed total knee, left (HCC)    Hx of total knee arthroplasty, left         Assessment:   1. failed left total knee      Plan:    1. Proceed with scheduled revision left knee replacement       All material risks, benefits, and reasonable alternatives including postponing the procedure were discussed. The patient does wish to proceed with the procedure at this time.          Signed By: KRISTYN Choe  November 22, 2023

## 2023-11-21 NOTE — H&P (VIEW-ONLY)
[facility]  Pre Operative History and Physical Exam    Patient ID:  Forrest Elias  [id number]  73 y.o.  1950    Today: November 22, 2023           CC: Left knee pain s/p left TKA    HPI:   The patient has a failed left total knee. The patient was evaluated and examined during a consultation prior to this office visit.  There have been no changes to the patient's orthopedic condition since the initial consultation. The patient has failed previous conservative treatment for this condition including antiinflammatories , and lifestyle modifications. The necessity for joint replacement is present. 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 Baca several years ago; cardiac cath:  4/20/11    Cervical neck pain with evidence of disc disease     Chronic renal insufficiency 7/20/2016    Controlled diabetes mellitus ([facility]) 7/20/2016 7/14/23 A1c 6.4; daily fasting sqbs:   ave    COPD (chronic obstructive pulmonary disease) ([facility]) 7/20/2016    Symbicort BID-no PRN inhaler    COVID-19 vaccine series completed 03/22/2021    Moderna    Depressive disorder 7/20/2016    clinical depression    Dumping syndrome     Dyspepsia and other specified disorders of function of stomach 8/29/2012    ED (erectile dysfunction) 7/20/2016    Edema     Essential hypertension, benign 8/29/2012    GERD (gastroesophageal reflux disease)     managed with medication    Hematuria      glucose/consistent with pre-diabetes mellitus  > 126 mg/dl Fasting glucose consistent with overt diabetes mellitus      BUN 11/22/2023 15  8 - 23 MG/DL Final    Creatinine 11/22/2023 0.99  0.8 - 1.5 MG/DL Final    Est, Bernardino Filt Rate 11/22/2023 >60  >60 ml/min/1.73m2 Final    Comment:    Pediatric calculator link: Karyn[employer]petuall.at. org/professionals/kdoqi/gfr_calculatorped     These results are not intended for use in patients <25years of age. eGFR results are calculated without a race factor using  the 2021 CKD-EPI equation. Careful clinical correlation is recommended, particularly when comparing to results calculated using previous equations. The CKD-EPI equation is less accurate in patients with extremes of muscle mass, extra-renal metabolism of creatinine, excessive creatine ingestion, or following therapy that affects renal tubular secretion.       Calcium 11/22/2023 9.5  8.3 - 10.4 MG/DL Final    PTT 11/22/2023 31.9  24.5 - 34.2 SEC Final    Comment: Heparin Therapeutic Range = 74 - 123 seconds  In addition to factor deficiency, monitoring heparin therapy, etc., evaluation of a prolonged aPTT result should include consideration of preanalytic variables such as heparin flush contamination, specimen integrity issues, etc.      Ventricular Rate 11/22/2023 69  BPM Final    Atrial Rate 11/22/2023 69  BPM Final    P-R Interval 11/22/2023 166  ms Final    QRS Duration 11/22/2023 84  ms Final    Q-T Interval 11/22/2023 370  ms Final    QTc Calculation (Bazett) 11/22/2023 396  ms Final    P Axis 11/22/2023 55  degrees Final    R Axis 11/22/2023 44  degrees Final    T Axis 11/22/2023 32  degrees Final    Diagnosis 11/22/2023    Final                    Value:Normal sinus rhythm  Normal ECG  When compared with ECG of 31-AUG-2022 08:11,  No significant change was found  Confirmed by [facility] CALL MD (), OLYA ANTOINE ([id number]) on 11/22/2023 9:51:59 AM     Orders Only on 09/14/2023   Component Date Value Ref Range Status revision of total knee replacement, left    Failed total knee, left (HCC)    Hx of total knee arthroplasty, left         Assessment:   1. failed left total knee      Plan:    1. Proceed with scheduled revision left knee replacement       All material risks, benefits, and reasonable alternatives including postponing the procedure were discussed. The patient does wish to proceed with the procedure at this time.          Signed By: KRISTYN Dupont  November 22, 2023

## 2023-11-22 ENCOUNTER — OFFICE VISIT (OUTPATIENT)
Dept: ORTHOPEDIC SURGERY | Age: 73
End: 2023-11-22

## 2023-11-22 ENCOUNTER — HOSPITAL ENCOUNTER (OUTPATIENT)
Dept: SURGERY | Age: 73
Discharge: HOME | End: 2023-11-25
Payer: MEDICARE

## 2023-11-22 VITALS
TEMPERATURE: 98.8 F | SYSTOLIC BLOOD PRESSURE: 155 MMHG | RESPIRATION RATE: 16 BRPM | DIASTOLIC BLOOD PRESSURE: 79 MMHG | WEIGHT: 207 LBS | HEIGHT: 70 IN | HEART RATE: 69 BPM | BODY MASS INDEX: 29.63 KG/M2 | OXYGEN SATURATION: 96 %

## 2023-11-22 DIAGNOSIS — G89.18 POST-OP PAIN: ICD-10-CM

## 2023-11-22 DIAGNOSIS — S83.002D PATELLAR SUBLUXATION, LEFT, SUBSEQUENT ENCOUNTER: ICD-10-CM

## 2023-11-22 DIAGNOSIS — T84.093D FAILED TOTAL LEFT KNEE REPLACEMENT, SUBSEQUENT ENCOUNTER: Primary | ICD-10-CM

## 2023-11-22 DIAGNOSIS — Z01.818 ENCOUNTER FOR PRE-OPERATIVE EXAMINATION: ICD-10-CM

## 2023-11-22 LAB
ANION GAP SERPL CALC-SCNC: 8 MMOL/L (ref 2–11)
APTT PPP: 31.9 SEC (ref 24.5–34.2)
BASOPHILS # BLD: 0.1 K/UL (ref 0–0.2)
BASOPHILS NFR BLD: 1 % (ref 0–2)
BUN SERPL-MCNC: 15 MG/DL (ref 8–23)
CALCIUM SERPL-MCNC: 9.5 MG/DL (ref 8.3–10.4)
CHLORIDE SERPL-SCNC: 108 MMOL/L (ref 101–110)
CO2 SERPL-SCNC: 25 MMOL/L (ref 21–32)
CREAT SERPL-MCNC: 0.99 MG/DL (ref 0.8–1.5)
DIFFERENTIAL METHOD BLD: NORMAL
EKG ATRIAL RATE: 69 BPM
EKG DIAGNOSIS: NORMAL
EKG P AXIS: 55 DEGREES
EKG P-R INTERVAL: 166 MS
EKG Q-T INTERVAL: 370 MS
EKG QRS DURATION: 84 MS
EKG QTC CALCULATION (BAZETT): 396 MS
EKG R AXIS: 44 DEGREES
EKG T AXIS: 32 DEGREES
EKG VENTRICULAR RATE: 69 BPM
EOSINOPHIL # BLD: 0.2 K/UL (ref 0–0.8)
EOSINOPHIL NFR BLD: 2 % (ref 0.5–7.8)
ERYTHROCYTE [DISTWIDTH] IN BLOOD BY AUTOMATED COUNT: 13.3 % (ref 11.9–14.6)
EST. AVERAGE GLUCOSE BLD GHB EST-MCNC: 134 MG/DL
GLUCOSE SERPL-MCNC: 117 MG/DL (ref 65–100)
HBA1C MFR BLD: 6.3 % (ref 4.8–5.6)
HCT VFR BLD AUTO: 44 % (ref 41.1–50.3)
HGB BLD-MCNC: 14.4 G/DL (ref 13.6–17.2)
IMM GRANULOCYTES # BLD AUTO: 0 K/UL (ref 0–0.5)
IMM GRANULOCYTES NFR BLD AUTO: 0 % (ref 0–5)
INR PPP: 1
LYMPHOCYTES # BLD: 1.5 K/UL (ref 0.5–4.6)
LYMPHOCYTES NFR BLD: 21 % (ref 13–44)
MCH RBC QN AUTO: 30.5 PG (ref 26.1–32.9)
MCHC RBC AUTO-ENTMCNC: 32.7 G/DL (ref 31.4–35)
MCV RBC AUTO: 93.2 FL (ref 82–102)
MONOCYTES # BLD: 0.7 K/UL (ref 0.1–1.3)
MONOCYTES NFR BLD: 11 % (ref 4–12)
MRSA DNA SPEC QL NAA+PROBE: NOT DETECTED
NEUTS SEG # BLD: 4.6 K/UL (ref 1.7–8.2)
NEUTS SEG NFR BLD: 66 % (ref 43–78)
NRBC # BLD: 0 K/UL (ref 0–0.2)
PLATELET # BLD AUTO: 261 K/UL (ref 150–450)
PMV BLD AUTO: 10 FL (ref 9.4–12.3)
POTASSIUM SERPL-SCNC: 4.7 MMOL/L (ref 3.5–5.1)
PROTHROMBIN TIME: 13.4 SEC (ref 12.6–14.3)
RBC # BLD AUTO: 4.72 M/UL (ref 4.23–5.6)
S AUREUS CPE NOSE QL NAA+PROBE: DETECTED
SODIUM SERPL-SCNC: 141 MMOL/L (ref 133–143)
WBC # BLD AUTO: 7.1 K/UL (ref 4.3–11.1)

## 2023-11-22 PROCEDURE — 85610 PROTHROMBIN TIME: CPT

## 2023-11-22 PROCEDURE — PREOPEXAM PRE-OP EXAM: Performed by: PHYSICIAN ASSISTANT

## 2023-11-22 PROCEDURE — 83036 HEMOGLOBIN GLYCOSYLATED A1C: CPT

## 2023-11-22 PROCEDURE — 87641 MR-STAPH DNA AMP PROBE: CPT

## 2023-11-22 PROCEDURE — 93010 ELECTROCARDIOGRAM REPORT: CPT | Performed by: INTERNAL MEDICINE

## 2023-11-22 PROCEDURE — 85730 THROMBOPLASTIN TIME PARTIAL: CPT

## 2023-11-22 PROCEDURE — 80048 BASIC METABOLIC PNL TOTAL CA: CPT

## 2023-11-22 PROCEDURE — 93005 ELECTROCARDIOGRAM TRACING: CPT | Performed by: ANESTHESIOLOGY

## 2023-11-22 PROCEDURE — 85025 COMPLETE CBC W/AUTO DIFF WBC: CPT

## 2023-11-22 RX ORDER — OXYCODONE HYDROCHLORIDE 5 MG/1
5-10 TABLET ORAL
Qty: 60 TABLET | Refills: 0 | Status: SHIPPED | OUTPATIENT
Start: 2023-11-28 | End: 2023-12-03

## 2023-11-22 RX ORDER — PROMETHAZINE HYDROCHLORIDE 12.5 MG/1
12.5 TABLET ORAL 4 TIMES DAILY PRN
Qty: 20 TABLET | Refills: 2 | Status: SHIPPED | OUTPATIENT
Start: 2023-11-28

## 2023-11-22 RX ORDER — ASPIRIN 81 MG/1
81 TABLET ORAL 2 TIMES DAILY
Qty: 70 TABLET | Refills: 0 | Status: SHIPPED | OUTPATIENT
Start: 2023-11-28 | End: 2024-01-02

## 2023-11-22 RX ORDER — ASCORBIC ACID 500 MG
500 TABLET ORAL DAILY
COMMUNITY

## 2023-11-22 RX ORDER — MONTELUKAST SODIUM 10 MG/1
10 TABLET ORAL NIGHTLY PRN
COMMUNITY

## 2023-11-22 RX ORDER — MELOXICAM 7.5 MG/1
7.5-15 TABLET ORAL DAILY
Qty: 60 TABLET | Refills: 1 | Status: SHIPPED | OUTPATIENT
Start: 2023-11-28

## 2023-11-22 RX ORDER — METHOCARBAMOL 750 MG/1
750 TABLET, FILM COATED ORAL 3 TIMES DAILY PRN
Qty: 40 TABLET | Refills: 1 | Status: SHIPPED | OUTPATIENT
Start: 2023-11-28

## 2023-11-22 RX ORDER — FLUTICASONE PROPIONATE 50 MCG
1 SPRAY, SUSPENSION (ML) NASAL PRN
COMMUNITY

## 2023-11-22 RX ORDER — GABAPENTIN 300 MG/1
300 CAPSULE ORAL 2 TIMES DAILY
COMMUNITY

## 2023-11-22 ASSESSMENT — PAIN DESCRIPTION - ORIENTATION: ORIENTATION: LEFT

## 2023-11-22 ASSESSMENT — PAIN DESCRIPTION - LOCATION: LOCATION: KNEE

## 2023-11-22 ASSESSMENT — PAIN SCALES - GENERAL: PAINLEVEL_OUTOF10: 3

## 2023-11-22 NOTE — PROGRESS NOTES
Latest Reference Range & Units 11/22/23 09:08   Sodium 133 - 143 mmol/L 141   Potassium 3.5 - 5.1 mmol/L 4.7   Chloride 101 - 110 mmol/L 108   CO2 21 - 32 mmol/L 25   BUN,BUNPL 8 - 23 MG/DL 15   Creatinine 0.8 - 1.5 MG/DL 0.99   Anion Gap 2 - 11 mmol/L 8   Est, Glom Filt Rate >60 ml/min/1.73m2 >60   Glucose, Random 65 - 100 mg/dL 117 (H)   CALCIUM, SERUM, 403064 8.3 - 10.4 MG/DL 9.5   Hemoglobin A1C 4.8 - 5.6 % 6.3 (H)   eAG (mg/dL) mg/dL 134   WBC 4.3 - 11.1 K/uL 7.1   RBC 4.23 - 5.6 M/uL 4.72   Hemoglobin Quant 13.6 - 17.2 g/dL 14.4   Hematocrit 41.1 - 50.3 % 44.0   MCV 82.0 - 102.0 FL 93.2   MCH 26.1 - 32.9 PG 30.5   MCHC 31.4 - 35.0 g/dL 32.7   MPV 9.4 - 12.3 FL 10.0   RDW 11.9 - 14.6 % 13.3   Platelet Count 689 - 450 K/uL 261   Neutrophils % 43 - 78 % 66   Lymphocyte % 13 - 44 % 21   Monocytes % 4.0 - 12.0 % 11   Eosinophils % 0.5 - 7.8 % 2   Basophils % 0.0 - 2.0 % 1   Neutrophils Absolute 1.7 - 8.2 K/UL 4.6   Lymphocytes Absolute 0.5 - 4.6 K/UL 1.5   Monocytes Absolute 0.1 - 1.3 K/UL 0.7   Eosinophils Absolute 0.0 - 0.8 K/UL 0.2   Basophils Absolute 0.0 - 0.2 K/UL 0.1   Differential Type -   AUTOMATED   Immature Granulocytes 0.0 - 5.0 % 0   Nucleated Red Blood Cells 0.0 - 0.2 K/uL 0.00   Absolute Immature Granulocyte 0.0 - 0.5 K/UL 0.0   Prothrombin Time 12.6 - 14.3 sec 13.4   INR -   1.0   PTT 24.5 - 34.2 SEC 31.9   (H): Data is abnormally high
PLEASE CONTINUE TAKING ALL PRESCRIPTION MEDICATIONS UP TO THE DAY OF SURGERY UNLESS OTHERWISE DIRECTED BELOW. DISCONTINUE all vitamins, herbals and supplements 3 weeks prior to surgery. DISCONTINUE Non-Steroidal Anti-Inflammatory (NSAIDS) such as Advil, Ibuprofen, Motrin, Naproxen and Aleve 5 days prior to surgery. Home Medications to take  the day of surgery    Lorazepam                Bromocriptine   Symbicort inhaler       Tamsulosin   Pravastatin     Levothyroxine     Gabapentin     Home Medications   to Hold           Comments   On the day before surgery please take Acetaminophen 1000mg in the morning and then again before bed. You may substitute for Tylenol 650 mg.      Bring Dynahex wash and Incentive Spirometer with you to hospital on the day of surgery. BRING CPAP and Omeprazole in original bottle; inhaler and nasal spray       Please do not bring home medications with you on the day of surgery unless otherwise directed by your nurse. If you are instructed to bring home medications, please give them to your nurse as they will be administered by the nursing staff. If you have any questions, please call [address] [place] [address] [phone]. A copy of this note was provided to the patient for reference.
Patient verified name and . Order for consent  found in EHR and matches case posting; patient verified. Type 3 surgery, joint assessment complete. Labs per surgeon: CBC,BMP, PT/INR, Hgb A1c ; results within anesthesia guidelines. Labs per anesthesia protocol: no additional  EKG:completed today per protocol and within anesthesia guidelines    MRSA/MSSA swab collected; pharmacy to review and dose antibiotic as appropriate. Hospital approved surgical skin cleanser and instructions to return bottle on DOS given per hospital policy. Patient provided with handouts including Guide to Surgery, Pain Management, Hand Hygiene, Blood Transfusion Education, and [facility] Brochure. Patient answered medical/surgical history questions at their best of ability. All prior to admission medications documented in [facility]. Original medication prescription bottle  visualized during patient appointment. Patient instructed to hold all vitamins 3 weeks prior to surgery and NSAIDS 5 days prior to surgery. Patient teach back successful and patient demonstrates knowledge of instruction.
spirometer when you aren't using it

## 2023-11-29 ENCOUNTER — ANESTHESIA EVENT (OUTPATIENT)
Dept: SURGERY | Age: 73
End: 2023-11-29
Payer: MEDICARE

## 2023-11-29 RX ORDER — SODIUM CHLORIDE 9 MG/ML
INJECTION, SOLUTION INTRAVENOUS PRN
Status: CANCELLED | OUTPATIENT
Start: 2023-11-29

## 2023-11-29 RX ORDER — SODIUM CHLORIDE 0.9 % (FLUSH) 0.9 %
5-40 SYRINGE (ML) INJECTION PRN
Status: CANCELLED | OUTPATIENT
Start: 2023-11-29

## 2023-11-29 RX ORDER — SODIUM CHLORIDE 0.9 % (FLUSH) 0.9 %
5-40 SYRINGE (ML) INJECTION EVERY 12 HOURS SCHEDULED
Status: CANCELLED | OUTPATIENT
Start: 2023-11-29

## 2023-11-29 NOTE — DISCHARGE INSTRUCTIONS
[facility]   Patient Discharge Instructions    Breonna Davis / [id number] : 1950    Admitted (Not on file) Discharged: 2023     IF YOU HAVE ANY PROBLEMS ONCE YOU ARE AT HOME CALL THE FOLLOWING NUMBERS:   Nurse's line: [phone]  Main office number: [phone]      Medications    The medications you are to continue on are listed on the medication reconciliation sheet. Narcotic pain medications as well as supplemental iron can cause constipation. If this occurs, try over the counter stool softeners or try stopping the narcotic pain medication and/or the iron. It is important that you take the medication exactly as they are prescribed. Medications which increase your risk of blood clots are listed to stop for 5 weeks after surgery as well as medications or supplements which increase your risk of bleeding complications. Keep your medication in the bottles provided by the pharmacist and keep a list of the medication names, dosages, and times to be taken in your wallet. Do not take other medications without consulting your doctor. If you need a refill on your pain medication, please note our office is closed over the weekend. It is our office policy that on-call providers cannot refill narcotic pain medications over the weekend. If you will need a refill over the weekend, please call our office before 12pm on Friday or first thing Monday morning. Important Information    Do NOT smoke as this will greatly increase your risk of infection! Resume your prehospital diet. If you have excessive nausea or vomitting call your doctor's office     Leg swelling and warmth is normal for 6 months after surgery. If you experience swelling in your leg, elevate your leg while laying down with your toes above your heart. If you have sudden onset severe swelling with leg pain call our office.  Use Johny Hose stockings until we see you in the office for your follow up

## 2023-11-30 ENCOUNTER — HOME HEALTH ADMISSION (OUTPATIENT)
Dept: HOME HEALTH SERVICES | Facility: HOME HEALTH | Age: 73
End: 2023-11-30
Payer: MEDICARE

## 2023-11-30 ENCOUNTER — ANESTHESIA (OUTPATIENT)
Dept: SURGERY | Age: 73
End: 2023-11-30
Payer: MEDICARE

## 2023-11-30 ENCOUNTER — APPOINTMENT (OUTPATIENT)
Dept: GENERAL RADIOLOGY | Age: 73
DRG: 468 | End: 2023-11-30
Attending: ORTHOPAEDIC SURGERY
Payer: MEDICARE

## 2023-11-30 ENCOUNTER — HOSPITAL ENCOUNTER (INPATIENT)
Age: 73
LOS: 1 days | Discharge: HOME | DRG: 468 | End: 2023-12-01
Attending: ORTHOPAEDIC SURGERY | Admitting: ORTHOPAEDIC SURGERY
Payer: MEDICARE

## 2023-11-30 LAB
ABO + RH BLD: NORMAL
BLOOD BANK CMNT PATIENT-IMP: NORMAL
BLOOD GROUP ANTIBODIES SERPL: NORMAL
GLUCOSE BLD STRIP.AUTO-MCNC: 116 MG/DL (ref 65–100)
SERVICE CMNT-IMP: ABNORMAL
SPECIMEN EXP DATE BLD: NORMAL

## 2023-11-30 PROCEDURE — 6370000000 HC RX 637 (ALT 250 FOR IP): Performed by: ANESTHESIOLOGY

## 2023-11-30 PROCEDURE — 86850 RBC ANTIBODY SCREEN: CPT

## 2023-11-30 PROCEDURE — 3700000001 HC ADD 15 MINUTES (ANESTHESIA): Performed by: ORTHOPAEDIC SURGERY

## 2023-11-30 PROCEDURE — 3E0U029 INTRODUCTION OF OTHER ANTI-INFECTIVE INTO JOINTS, OPEN APPROACH: ICD-10-PCS | Performed by: ORTHOPAEDIC SURGERY

## 2023-11-30 PROCEDURE — 0SRD0J9 REPLACEMENT OF LEFT KNEE JOINT WITH SYNTHETIC SUBSTITUTE, CEMENTED, OPEN APPROACH: ICD-10-PCS | Performed by: ORTHOPAEDIC SURGERY

## 2023-11-30 PROCEDURE — 86900 BLOOD TYPING SEROLOGIC ABO: CPT

## 2023-11-30 PROCEDURE — 2580000003 HC RX 258: Performed by: ANESTHESIOLOGY

## 2023-11-30 PROCEDURE — 6360000002 HC RX W HCPCS: Performed by: ORTHOPAEDIC SURGERY

## 2023-11-30 PROCEDURE — 6360000002 HC RX W HCPCS: Performed by: PHYSICIAN ASSISTANT

## 2023-11-30 PROCEDURE — 0SPD0JZ REMOVAL OF SYNTHETIC SUBSTITUTE FROM LEFT KNEE JOINT, OPEN APPROACH: ICD-10-PCS | Performed by: ORTHOPAEDIC SURGERY

## 2023-11-30 PROCEDURE — 2580000003 HC RX 258: Performed by: NURSE ANESTHETIST, CERTIFIED REGISTERED

## 2023-11-30 PROCEDURE — 6370000000 HC RX 637 (ALT 250 FOR IP): Performed by: PHYSICIAN ASSISTANT

## 2023-11-30 PROCEDURE — 7100000001 HC PACU RECOVERY - ADDTL 15 MIN: Performed by: ORTHOPAEDIC SURGERY

## 2023-11-30 PROCEDURE — 3600000015 HC SURGERY LEVEL 5 ADDTL 15MIN: Performed by: ORTHOPAEDIC SURGERY

## 2023-11-30 PROCEDURE — 27487 REVISE/REPLACE KNEE JOINT: CPT | Performed by: ORTHOPAEDIC SURGERY

## 2023-11-30 PROCEDURE — 86901 BLOOD TYPING SEROLOGIC RH(D): CPT

## 2023-11-30 PROCEDURE — 3700000000 HC ANESTHESIA ATTENDED CARE: Performed by: ORTHOPAEDIC SURGERY

## 2023-11-30 PROCEDURE — 97530 THERAPEUTIC ACTIVITIES: CPT

## 2023-11-30 PROCEDURE — 6360000002 HC RX W HCPCS: Performed by: NURSE ANESTHETIST, CERTIFIED REGISTERED

## 2023-11-30 PROCEDURE — 87070 CULTURE OTHR SPECIMN AEROBIC: CPT

## 2023-11-30 PROCEDURE — 2709999900 HC NON-CHARGEABLE SUPPLY: Performed by: ORTHOPAEDIC SURGERY

## 2023-11-30 PROCEDURE — 6360000002 HC RX W HCPCS: Performed by: ANESTHESIOLOGY

## 2023-11-30 PROCEDURE — 73560 X-RAY EXAM OF KNEE 1 OR 2: CPT

## 2023-11-30 PROCEDURE — 64447 NJX AA&/STRD FEMORAL NRV IMG: CPT | Performed by: ANESTHESIOLOGY

## 2023-11-30 PROCEDURE — 97161 PT EVAL LOW COMPLEX 20 MIN: CPT

## 2023-11-30 PROCEDURE — 3600000005 HC SURGERY LEVEL 5 BASE: Performed by: ORTHOPAEDIC SURGERY

## 2023-11-30 PROCEDURE — 94761 N-INVAS EAR/PLS OXIMETRY MLT: CPT

## 2023-11-30 PROCEDURE — 2720000010 HC SURG SUPPLY STERILE: Performed by: ORTHOPAEDIC SURGERY

## 2023-11-30 PROCEDURE — 2500000003 HC RX 250 WO HCPCS: Performed by: NURSE ANESTHETIST, CERTIFIED REGISTERED

## 2023-11-30 PROCEDURE — 87075 CULTR BACTERIA EXCEPT BLOOD: CPT

## 2023-11-30 PROCEDURE — 87205 SMEAR GRAM STAIN: CPT

## 2023-11-30 PROCEDURE — 2580000003 HC RX 258: Performed by: PHYSICIAN ASSISTANT

## 2023-11-30 PROCEDURE — C1776 JOINT DEVICE (IMPLANTABLE): HCPCS | Performed by: ORTHOPAEDIC SURGERY

## 2023-11-30 PROCEDURE — 7100000000 HC PACU RECOVERY - FIRST 15 MIN: Performed by: ORTHOPAEDIC SURGERY

## 2023-11-30 PROCEDURE — C1713 ANCHOR/SCREW BN/BN,TIS/BN: HCPCS | Performed by: ORTHOPAEDIC SURGERY

## 2023-11-30 PROCEDURE — 82962 GLUCOSE BLOOD TEST: CPT

## 2023-11-30 DEVICE — IMPLANTABLE DEVICE: Type: IMPLANTABLE DEVICE | Site: KNEE | Status: FUNCTIONAL

## 2023-11-30 DEVICE — STEM FEM 14X30MM CEM ATTUNE: Type: IMPLANTABLE DEVICE | Site: KNEE | Status: FUNCTIONAL

## 2023-11-30 DEVICE — STIMULAN® RAPID CURE PROVIDED STERILE FOR SINGLE PATIENT USE. STIMULAN® RAPID CURE CONTAINS CALCIUM SULFATE POWDER AND MIXING SOLUTION IN PRE-MEASURED QUANTITIES SO THAT WHEN MIXED TOGETHER IN A STERILE MIXING BOWL, THE RESULTANT PASTE IS TO BE DIGITALLY PACKED INTO OPEN BONE VOID/GAP TO SET INSITU OR PLACED INTO THE MOULD PROVIDED, THE MIXTURE SETS TO FORM BEADS. THE BIODEGRADABLE, RADIOPAQUE BEADS ARE RESORBED IN APPROXIMATELY 30 – 60 DAYS WHEN USED IN ACCORDANCE WITH THE DEVICE LABELLING. STIMULAN® RAPID CURE IS MANUFACTURED FROM SYNTHETIC IMPLANT GRADE CALCIUM SULFATE DIHYDRATE(CASO4.2H2O) THAT RESORBS AND IS REPLACED WITH BONE DURING THE HEALING PROCESS. ALSO, AS THE BONE VOID FILLER BEADS ARE BIODEGRADABLE AND BIOCOMPATIBLE, THEY MAY BE USED AT AN INFECTED SITE.
Type: IMPLANTABLE DEVICE | Site: KNEE | Status: FUNCTIONAL
Brand: STIMULAN® RAPID CURE

## 2023-11-30 DEVICE — COMPONENT FEM SZ 7 L KNEE CO CHROME MOLYBDENUM CEM W ASYM: Type: IMPLANTABLE DEVICE | Site: KNEE | Status: FUNCTIONAL

## 2023-11-30 DEVICE — BASEPLATE TIB SZ 6 ROT PLATFRM CO CHROM MOLYBDENUM TI ALLY: Type: IMPLANTABLE DEVICE | Site: KNEE | Status: FUNCTIONAL

## 2023-11-30 DEVICE — CEMENT BONE 40 GM W/ GENTMYCN HI VISC PALACOS R+G: Type: IMPLANTABLE DEVICE | Site: KNEE | Status: FUNCTIONAL

## 2023-11-30 RX ORDER — HYDRALAZINE HYDROCHLORIDE 20 MG/ML
10 INJECTION INTRAMUSCULAR; INTRAVENOUS
Status: DISCONTINUED | OUTPATIENT
Start: 2023-11-30 | End: 2023-11-30 | Stop reason: HOSPADM

## 2023-11-30 RX ORDER — ROPIVACAINE HYDROCHLORIDE 2 MG/ML
INJECTION, SOLUTION EPIDURAL; INFILTRATION; PERINEURAL PRN
Status: DISCONTINUED | OUTPATIENT
Start: 2023-11-30 | End: 2023-11-30 | Stop reason: ALTCHOICE

## 2023-11-30 RX ORDER — PROCHLORPERAZINE EDISYLATE 5 MG/ML
5 INJECTION INTRAMUSCULAR; INTRAVENOUS
Status: DISCONTINUED | OUTPATIENT
Start: 2023-11-30 | End: 2023-11-30 | Stop reason: HOSPADM

## 2023-11-30 RX ORDER — OXYCODONE HYDROCHLORIDE 5 MG/1
10 TABLET ORAL EVERY 4 HOURS PRN
Status: DISCONTINUED | OUTPATIENT
Start: 2023-11-30 | End: 2023-12-01 | Stop reason: HOSPADM

## 2023-11-30 RX ORDER — ONDANSETRON 2 MG/ML
4 INJECTION INTRAMUSCULAR; INTRAVENOUS EVERY 6 HOURS PRN
Status: DISCONTINUED | OUTPATIENT
Start: 2023-11-30 | End: 2023-12-01 | Stop reason: HOSPADM

## 2023-11-30 RX ORDER — LIDOCAINE HYDROCHLORIDE 10 MG/ML
1 INJECTION, SOLUTION INFILTRATION; PERINEURAL
Status: DISCONTINUED | OUTPATIENT
Start: 2023-11-30 | End: 2023-11-30 | Stop reason: HOSPADM

## 2023-11-30 RX ORDER — ONDANSETRON 4 MG/1
4 TABLET, ORALLY DISINTEGRATING ORAL EVERY 8 HOURS PRN
Status: DISCONTINUED | OUTPATIENT
Start: 2023-11-30 | End: 2023-12-01 | Stop reason: HOSPADM

## 2023-11-30 RX ORDER — AMLODIPINE BESYLATE 10 MG/1
10 TABLET ORAL NIGHTLY
Status: DISCONTINUED | OUTPATIENT
Start: 2023-11-30 | End: 2023-12-01 | Stop reason: HOSPADM

## 2023-11-30 RX ORDER — SUCCINYLCHOLINE CHLORIDE 20 MG/ML
INJECTION INTRAMUSCULAR; INTRAVENOUS PRN
Status: DISCONTINUED | OUTPATIENT
Start: 2023-11-30 | End: 2023-11-30 | Stop reason: SDUPTHER

## 2023-11-30 RX ORDER — ROPIVACAINE HYDROCHLORIDE 2 MG/ML
INJECTION, SOLUTION EPIDURAL; INFILTRATION; PERINEURAL
Status: COMPLETED | OUTPATIENT
Start: 2023-11-30 | End: 2023-11-30

## 2023-11-30 RX ORDER — GENTAMICIN SULFATE 80 MG/100ML
INJECTION, SOLUTION INTRAVENOUS CONTINUOUS PRN
Status: COMPLETED | OUTPATIENT
Start: 2023-11-30 | End: 2023-11-30

## 2023-11-30 RX ORDER — GABAPENTIN 300 MG/1
300 CAPSULE ORAL 2 TIMES DAILY
Status: DISCONTINUED | OUTPATIENT
Start: 2023-11-30 | End: 2023-12-01 | Stop reason: HOSPADM

## 2023-11-30 RX ORDER — SENNA AND DOCUSATE SODIUM 50; 8.6 MG/1; MG/1
1 TABLET, FILM COATED ORAL 2 TIMES DAILY
Status: DISCONTINUED | OUTPATIENT
Start: 2023-11-30 | End: 2023-12-01 | Stop reason: HOSPADM

## 2023-11-30 RX ORDER — ACETAMINOPHEN 500 MG
1000 TABLET ORAL ONCE
Status: COMPLETED | OUTPATIENT
Start: 2023-11-30 | End: 2023-11-30

## 2023-11-30 RX ORDER — KETOROLAC TROMETHAMINE 30 MG/ML
INJECTION, SOLUTION INTRAMUSCULAR; INTRAVENOUS PRN
Status: DISCONTINUED | OUTPATIENT
Start: 2023-11-30 | End: 2023-11-30 | Stop reason: ALTCHOICE

## 2023-11-30 RX ORDER — KETAMINE HYDROCHLORIDE 50 MG/ML
INJECTION, SOLUTION, CONCENTRATE INTRAMUSCULAR; INTRAVENOUS PRN
Status: DISCONTINUED | OUTPATIENT
Start: 2023-11-30 | End: 2023-11-30 | Stop reason: SDUPTHER

## 2023-11-30 RX ORDER — LISINOPRIL 20 MG/1
40 TABLET ORAL NIGHTLY
Status: DISCONTINUED | OUTPATIENT
Start: 2023-11-30 | End: 2023-12-01 | Stop reason: HOSPADM

## 2023-11-30 RX ORDER — EPHEDRINE SULFATE/0.9% NACL/PF 50 MG/5 ML
SYRINGE (ML) INTRAVENOUS PRN
Status: DISCONTINUED | OUTPATIENT
Start: 2023-11-30 | End: 2023-11-30 | Stop reason: SDUPTHER

## 2023-11-30 RX ORDER — HYDROMORPHONE HYDROCHLORIDE 1 MG/ML
0.5 INJECTION, SOLUTION INTRAMUSCULAR; INTRAVENOUS; SUBCUTANEOUS EVERY 5 MIN PRN
Status: DISCONTINUED | OUTPATIENT
Start: 2023-11-30 | End: 2023-11-30 | Stop reason: HOSPADM

## 2023-11-30 RX ORDER — VANCOMYCIN HYDROCHLORIDE 1 G/20ML
INJECTION, POWDER, LYOPHILIZED, FOR SOLUTION INTRAVENOUS PRN
Status: DISCONTINUED | OUTPATIENT
Start: 2023-11-30 | End: 2023-11-30 | Stop reason: ALTCHOICE

## 2023-11-30 RX ORDER — DIPHENHYDRAMINE HCL 25 MG
25 CAPSULE ORAL EVERY 6 HOURS PRN
Status: DISCONTINUED | OUTPATIENT
Start: 2023-11-30 | End: 2023-12-01 | Stop reason: HOSPADM

## 2023-11-30 RX ORDER — BUPIVACAINE HYDROCHLORIDE 7.5 MG/ML
INJECTION, SOLUTION INTRASPINAL
Status: DISCONTINUED | OUTPATIENT
Start: 2023-11-30 | End: 2023-11-30 | Stop reason: SDUPTHER

## 2023-11-30 RX ORDER — LORAZEPAM 1 MG/1
1 TABLET ORAL 4 TIMES DAILY PRN
Status: DISCONTINUED | OUTPATIENT
Start: 2023-11-30 | End: 2023-12-01 | Stop reason: HOSPADM

## 2023-11-30 RX ORDER — SODIUM CHLORIDE 0.9 % (FLUSH) 0.9 %
5-40 SYRINGE (ML) INJECTION EVERY 12 HOURS SCHEDULED
Status: DISCONTINUED | OUTPATIENT
Start: 2023-11-30 | End: 2023-12-01 | Stop reason: HOSPADM

## 2023-11-30 RX ORDER — MIDAZOLAM HYDROCHLORIDE 2 MG/2ML
2 INJECTION, SOLUTION INTRAMUSCULAR; INTRAVENOUS
Status: COMPLETED | OUTPATIENT
Start: 2023-11-30 | End: 2023-11-30

## 2023-11-30 RX ORDER — DEXAMETHASONE SODIUM PHOSPHATE 4 MG/ML
INJECTION, SOLUTION INTRA-ARTICULAR; INTRALESIONAL; INTRAMUSCULAR; INTRAVENOUS; SOFT TISSUE
Status: COMPLETED | OUTPATIENT
Start: 2023-11-30 | End: 2023-11-30

## 2023-11-30 RX ORDER — ROCURONIUM BROMIDE 10 MG/ML
INJECTION, SOLUTION INTRAVENOUS PRN
Status: DISCONTINUED | OUTPATIENT
Start: 2023-11-30 | End: 2023-11-30 | Stop reason: SDUPTHER

## 2023-11-30 RX ORDER — ONDANSETRON 2 MG/ML
4 INJECTION INTRAMUSCULAR; INTRAVENOUS
Status: DISCONTINUED | OUTPATIENT
Start: 2023-11-30 | End: 2023-11-30 | Stop reason: HOSPADM

## 2023-11-30 RX ORDER — ACETAMINOPHEN 500 MG
1000 TABLET ORAL EVERY 6 HOURS
Status: DISCONTINUED | OUTPATIENT
Start: 2023-11-30 | End: 2023-12-01 | Stop reason: HOSPADM

## 2023-11-30 RX ORDER — DIPHENHYDRAMINE HYDROCHLORIDE 50 MG/ML
25 INJECTION INTRAMUSCULAR; INTRAVENOUS EVERY 6 HOURS PRN
Status: DISCONTINUED | OUTPATIENT
Start: 2023-11-30 | End: 2023-12-01 | Stop reason: HOSPADM

## 2023-11-30 RX ORDER — SODIUM CHLORIDE, SODIUM LACTATE, POTASSIUM CHLORIDE, CALCIUM CHLORIDE 600; 310; 30; 20 MG/100ML; MG/100ML; MG/100ML; MG/100ML
INJECTION, SOLUTION INTRAVENOUS CONTINUOUS
Status: DISCONTINUED | OUTPATIENT
Start: 2023-11-30 | End: 2023-11-30 | Stop reason: HOSPADM

## 2023-11-30 RX ORDER — PROPOFOL 10 MG/ML
INJECTION, EMULSION INTRAVENOUS PRN
Status: DISCONTINUED | OUTPATIENT
Start: 2023-11-30 | End: 2023-11-30 | Stop reason: SDUPTHER

## 2023-11-30 RX ORDER — ASPIRIN 81 MG/1
81 TABLET ORAL 2 TIMES DAILY
Status: DISCONTINUED | OUTPATIENT
Start: 2023-11-30 | End: 2023-12-01 | Stop reason: HOSPADM

## 2023-11-30 RX ORDER — FENTANYL CITRATE 50 UG/ML
100 INJECTION, SOLUTION INTRAMUSCULAR; INTRAVENOUS
Status: COMPLETED | OUTPATIENT
Start: 2023-11-30 | End: 2023-11-30

## 2023-11-30 RX ORDER — TRANEXAMIC ACID 100 MG/ML
INJECTION, SOLUTION INTRAVENOUS PRN
Status: DISCONTINUED | OUTPATIENT
Start: 2023-11-30 | End: 2023-11-30 | Stop reason: SDUPTHER

## 2023-11-30 RX ORDER — SODIUM CHLORIDE 9 MG/ML
INJECTION, SOLUTION INTRAVENOUS PRN
Status: DISCONTINUED | OUTPATIENT
Start: 2023-11-30 | End: 2023-12-01 | Stop reason: HOSPADM

## 2023-11-30 RX ORDER — ALBUTEROL SULFATE 2.5 MG/3ML
2.5 SOLUTION RESPIRATORY (INHALATION) EVERY 6 HOURS PRN
Status: DISCONTINUED | OUTPATIENT
Start: 2023-11-30 | End: 2023-12-01 | Stop reason: HOSPADM

## 2023-11-30 RX ORDER — TAMSULOSIN HYDROCHLORIDE 0.4 MG/1
0.4 CAPSULE ORAL DAILY
Status: DISCONTINUED | OUTPATIENT
Start: 2023-12-01 | End: 2023-12-01 | Stop reason: HOSPADM

## 2023-11-30 RX ORDER — BROMOCRIPTINE MESYLATE 2.5 MG/1
5 TABLET ORAL 2 TIMES DAILY
Status: DISCONTINUED | OUTPATIENT
Start: 2023-11-30 | End: 2023-12-01 | Stop reason: HOSPADM

## 2023-11-30 RX ORDER — HYDROMORPHONE HYDROCHLORIDE 1 MG/ML
0.25 INJECTION, SOLUTION INTRAMUSCULAR; INTRAVENOUS; SUBCUTANEOUS EVERY 5 MIN PRN
Status: DISCONTINUED | OUTPATIENT
Start: 2023-11-30 | End: 2023-11-30 | Stop reason: HOSPADM

## 2023-11-30 RX ORDER — GLYCOPYRROLATE 0.2 MG/ML
INJECTION INTRAMUSCULAR; INTRAVENOUS PRN
Status: DISCONTINUED | OUTPATIENT
Start: 2023-11-30 | End: 2023-11-30 | Stop reason: SDUPTHER

## 2023-11-30 RX ORDER — NEOSTIGMINE METHYLSULFATE 1 MG/ML
INJECTION, SOLUTION INTRAVENOUS PRN
Status: DISCONTINUED | OUTPATIENT
Start: 2023-11-30 | End: 2023-11-30 | Stop reason: SDUPTHER

## 2023-11-30 RX ORDER — LABETALOL HYDROCHLORIDE 5 MG/ML
10 INJECTION, SOLUTION INTRAVENOUS
Status: DISCONTINUED | OUTPATIENT
Start: 2023-11-30 | End: 2023-11-30 | Stop reason: HOSPADM

## 2023-11-30 RX ORDER — LEVOTHYROXINE SODIUM 0.1 MG/1
100 TABLET ORAL
Status: DISCONTINUED | OUTPATIENT
Start: 2023-12-01 | End: 2023-12-01 | Stop reason: HOSPADM

## 2023-11-30 RX ORDER — ONDANSETRON 2 MG/ML
INJECTION INTRAMUSCULAR; INTRAVENOUS PRN
Status: DISCONTINUED | OUTPATIENT
Start: 2023-11-30 | End: 2023-11-30 | Stop reason: SDUPTHER

## 2023-11-30 RX ORDER — ACETAMINOPHEN 500 MG
1000 TABLET ORAL ONCE
Status: DISCONTINUED | OUTPATIENT
Start: 2023-11-30 | End: 2023-11-30

## 2023-11-30 RX ORDER — METFORMIN HYDROCHLORIDE 500 MG/1
1000 TABLET, EXTENDED RELEASE ORAL 2 TIMES DAILY
Status: DISCONTINUED | OUTPATIENT
Start: 2023-12-01 | End: 2023-12-01 | Stop reason: HOSPADM

## 2023-11-30 RX ORDER — OXYCODONE HYDROCHLORIDE 5 MG/1
5 TABLET ORAL
Status: COMPLETED | OUTPATIENT
Start: 2023-11-30 | End: 2023-11-30

## 2023-11-30 RX ORDER — PRAVASTATIN SODIUM 80 MG/1
80 TABLET ORAL DAILY
Status: DISCONTINUED | OUTPATIENT
Start: 2023-12-01 | End: 2023-12-01 | Stop reason: HOSPADM

## 2023-11-30 RX ORDER — HYDROMORPHONE HYDROCHLORIDE 1 MG/ML
1 INJECTION, SOLUTION INTRAMUSCULAR; INTRAVENOUS; SUBCUTANEOUS
Status: DISCONTINUED | OUTPATIENT
Start: 2023-11-30 | End: 2023-12-01 | Stop reason: HOSPADM

## 2023-11-30 RX ORDER — SODIUM CHLORIDE 0.9 % (FLUSH) 0.9 %
5-40 SYRINGE (ML) INJECTION PRN
Status: DISCONTINUED | OUTPATIENT
Start: 2023-11-30 | End: 2023-12-01 | Stop reason: HOSPADM

## 2023-11-30 RX ADMIN — Medication 3 MG: at 15:13

## 2023-11-30 RX ADMIN — CEFAZOLIN 2000 MG: 10 INJECTION, POWDER, FOR SOLUTION INTRAVENOUS at 20:47

## 2023-11-30 RX ADMIN — BUPIVACAINE HYDROCHLORIDE IN DEXTROSE 15 MG: 7.5 INJECTION, SOLUTION SUBARACHNOID at 11:58

## 2023-11-30 RX ADMIN — Medication 10 MG: at 14:41

## 2023-11-30 RX ADMIN — GLYCOPYRROLATE 0.4 MG: 0.2 INJECTION INTRAMUSCULAR; INTRAVENOUS at 15:13

## 2023-11-30 RX ADMIN — TRANEXAMIC ACID 1000 MG: 100 INJECTION, SOLUTION INTRAVENOUS at 14:50

## 2023-11-30 RX ADMIN — ROPIVACAINE HYDROCHLORIDE 20 ML: 2 INJECTION, SOLUTION EPIDURAL; INFILTRATION at 10:57

## 2023-11-30 RX ADMIN — Medication 10 MG: at 13:13

## 2023-11-30 RX ADMIN — DOCUSATE SODIUM 50MG AND SENNOSIDES 8.6MG 1 TABLET: 8.6; 5 TABLET, FILM COATED ORAL at 20:47

## 2023-11-30 RX ADMIN — OXYCODONE 10 MG: 5 TABLET ORAL at 17:50

## 2023-11-30 RX ADMIN — ROCURONIUM BROMIDE 20 MG: 50 INJECTION, SOLUTION INTRAVENOUS at 14:06

## 2023-11-30 RX ADMIN — LISINOPRIL 40 MG: 20 TABLET ORAL at 20:47

## 2023-11-30 RX ADMIN — PROPOFOL 75 MCG/KG/MIN: 10 INJECTION, EMULSION INTRAVENOUS at 12:09

## 2023-11-30 RX ADMIN — PROPOFOL 100 MG: 10 INJECTION, EMULSION INTRAVENOUS at 12:05

## 2023-11-30 RX ADMIN — ACETAMINOPHEN 1000 MG: 500 TABLET, FILM COATED ORAL at 09:17

## 2023-11-30 RX ADMIN — Medication 10 MG: at 12:32

## 2023-11-30 RX ADMIN — SODIUM CHLORIDE, SODIUM LACTATE, POTASSIUM CHLORIDE, AND CALCIUM CHLORIDE: 600; 310; 30; 20 INJECTION, SOLUTION INTRAVENOUS at 09:11

## 2023-11-30 RX ADMIN — FENTANYL CITRATE 50 MCG: 50 INJECTION INTRAMUSCULAR; INTRAVENOUS at 10:58

## 2023-11-30 RX ADMIN — PHENYLEPHRINE HYDROCHLORIDE 100 MCG: 0.1 INJECTION, SOLUTION INTRAVENOUS at 15:43

## 2023-11-30 RX ADMIN — PHENYLEPHRINE HYDROCHLORIDE 100 MCG: 0.1 INJECTION, SOLUTION INTRAVENOUS at 12:48

## 2023-11-30 RX ADMIN — AMLODIPINE BESYLATE 10 MG: 10 TABLET ORAL at 20:47

## 2023-11-30 RX ADMIN — SODIUM CHLORIDE, PRESERVATIVE FREE 10 ML: 5 INJECTION INTRAVENOUS at 20:48

## 2023-11-30 RX ADMIN — PROPOFOL 80 MG: 10 INJECTION, EMULSION INTRAVENOUS at 12:02

## 2023-11-30 RX ADMIN — PROPOFOL 150 MG: 10 INJECTION, EMULSION INTRAVENOUS at 12:42

## 2023-11-30 RX ADMIN — MIDAZOLAM 2 MG: 1 INJECTION INTRAMUSCULAR; INTRAVENOUS at 10:58

## 2023-11-30 RX ADMIN — DEXAMETHASONE SODIUM PHOSPHATE 5 MG: 4 INJECTION, SOLUTION INTRAMUSCULAR; INTRAVENOUS at 10:57

## 2023-11-30 RX ADMIN — ROCURONIUM BROMIDE 30 MG: 50 INJECTION, SOLUTION INTRAVENOUS at 12:56

## 2023-11-30 RX ADMIN — Medication 10 MG: at 12:58

## 2023-11-30 RX ADMIN — Medication 2000 MG: at 11:46

## 2023-11-30 RX ADMIN — BROMOCRIPTINE MESYLATE 5 MG: 2.5 TABLET ORAL at 20:47

## 2023-11-30 RX ADMIN — PHENYLEPHRINE HYDROCHLORIDE 100 MCG: 0.1 INJECTION, SOLUTION INTRAVENOUS at 14:43

## 2023-11-30 RX ADMIN — KETAMINE HYDROCHLORIDE 50 MG: 50 INJECTION, SOLUTION INTRAMUSCULAR; INTRAVENOUS at 12:15

## 2023-11-30 RX ADMIN — Medication 10 MG: at 14:18

## 2023-11-30 RX ADMIN — OXYCODONE 5 MG: 5 TABLET ORAL at 16:36

## 2023-11-30 RX ADMIN — TRANEXAMIC ACID 1000 MG: 100 INJECTION, SOLUTION INTRAVENOUS at 12:15

## 2023-11-30 RX ADMIN — ACETAMINOPHEN 1000 MG: 500 TABLET, FILM COATED ORAL at 23:14

## 2023-11-30 RX ADMIN — GABAPENTIN 300 MG: 300 CAPSULE ORAL at 20:47

## 2023-11-30 RX ADMIN — SODIUM CHLORIDE, SODIUM LACTATE, POTASSIUM CHLORIDE, AND CALCIUM CHLORIDE: 600; 310; 30; 20 INJECTION, SOLUTION INTRAVENOUS at 12:54

## 2023-11-30 RX ADMIN — OXYCODONE 10 MG: 5 TABLET ORAL at 20:47

## 2023-11-30 RX ADMIN — ASPIRIN 81 MG: 81 TABLET, COATED ORAL at 20:47

## 2023-11-30 RX ADMIN — ONDANSETRON 4 MG: 2 INJECTION INTRAMUSCULAR; INTRAVENOUS at 15:13

## 2023-11-30 RX ADMIN — Medication 180 MG: at 12:42

## 2023-11-30 RX ADMIN — ACETAMINOPHEN 1000 MG: 500 TABLET, FILM COATED ORAL at 17:50

## 2023-11-30 RX ADMIN — PHENYLEPHRINE HYDROCHLORIDE 30 MCG: 10 INJECTION INTRAVENOUS at 12:30

## 2023-11-30 ASSESSMENT — KOOS JR
BENDING TO THE FLOOR TO PICK UP OBJECT: 1
HOW SEVERE IS YOUR KNEE STIFFNESS AFTER FIRST WAKING IN MORNING: 1
RISING FROM SITTING: 1
TWISING OR PIVOTING ON KNEE: 2
GOING UP OR DOWN STAIRS: 2
KOOS JR TOTAL INTERVAL SCORE: 61.583
STRAIGHTENING KNEE FULLY: 2
STANDING UPRIGHT: 1

## 2023-11-30 ASSESSMENT — PAIN DESCRIPTION - ORIENTATION
ORIENTATION: LEFT

## 2023-11-30 ASSESSMENT — PAIN SCALES - GENERAL
PAINLEVEL_OUTOF10: 6
PAINLEVEL_OUTOF10: 6
PAINLEVEL_OUTOF10: 5

## 2023-11-30 ASSESSMENT — PAIN DESCRIPTION - LOCATION
LOCATION: KNEE

## 2023-11-30 ASSESSMENT — PAIN - FUNCTIONAL ASSESSMENT: PAIN_FUNCTIONAL_ASSESSMENT: 0-10

## 2023-11-30 ASSESSMENT — COPD QUESTIONNAIRES: CAT_SEVERITY: MILD

## 2023-11-30 NOTE — PROGRESS NOTES
Admission assessment complete. Patient in bed. Instructed on use of lighting, meal, fall risk and incentive spirometer 10x hourly while awake. Dressing dry and intact. Lower extremities numb and tingling, warm with palpable pulses bilaterally. Positive dorsiflexion and plantar flexion. Patient denies needs at present. Instructed not to get up by themselves and call for assistance or any needs. Patient verbalized understanding. Call bell within reach. Side rails up x3. Bed low and locked. No distress noted. Family member at bedside.

## 2023-11-30 NOTE — PERIOP NOTE
TRANSFER - OUT REPORT:    Verbal report given to [address] [place] [address] on Violetta Shoulder  being transferred to [facility] for routine progression of patient care       Report consisted of patient's Situation, Background, Assessment and   Recommendations(SBAR). Information from the following report(s) Nurse Handoff Report, Adult Overview, Surgery Report, MAR, and Cardiac Rhythm SR  was reviewed with the receiving nurse. Lines:   Peripheral IV 11/30/23 Right; Anterior Wrist (Active)   Site Assessment Clean, dry & intact 11/30/23 1614   Line Status Infusing 11/30/23 [facility]. Connections checked and tightened 11/30/23 1614   Phlebitis Assessment No symptoms 11/30/23 1614   Infiltration Assessment 0 11/30/23 1614   Alcohol Cap Used No 11/30/23 1614   Dressing Status Clean, dry & intact 11/30/23 1614   Dressing Type Transparent 11/30/23 1614        Opportunity for questions and clarification was provided.       Patient transported with:  O2 @ 2lpm    \

## 2023-11-30 NOTE — PERIOP NOTE
MD Fuentes Eis at bedside with patient. Pt VSS stable. Pain and Nausea controlled at this time. Verbal sign out per MD when pacu care is completed. Plan of care continues.

## 2023-11-30 NOTE — PROGRESS NOTES
TRANSFER - IN REPORT:    Verbal report received from [id number] on Zara Linares  being received from PACU for routine post-op      Report consisted of patient's Situation, Background, Assessment and   Recommendations(SBAR). Information from the following report(s) Nurse Handoff Report, Index, Adult Overview, Surgery Report, Intake/Output, MAR, and Recent Results was reviewed with the receiving nurse. Opportunity for questions and clarification was provided. Assessment completed upon patient's arrival to unit and care assumed.

## 2023-11-30 NOTE — ANESTHESIA PROCEDURE NOTES
Spinal Block    Patient location during procedure: OR  End time: 11/30/2023 11:58 AM  Reason for block: primary anesthetic and at surgeon's request  Staffing  Performed: anesthesiologist   Anesthesiologist: Augie Jackson DO  Performed by: Augie Jackson DO  Authorized by: Augie Jackson DO    Spinal Block  Patient position: sitting  Prep: ChloraPrep  Patient monitoring: cardiac monitor, continuous pulse ox, continuous capnometry, frequent blood pressure checks and oxygen  Approach: midline  Location: L4/L5  Provider prep: mask and sterile gloves  Local infiltration: lidocaine  Needle  Needle type:  Russ   Needle gauge: 25 G  Needle length: 3.5 in  Assessment  Swirl obtained: Yes  CSF: clear  Attempts: 1  Hemodynamics: stable  Additional Notes  Uneventful spinal placement  Preanesthetic Checklist  Completed: patient identified, IV checked, site marked, risks and benefits discussed, surgical/procedural consents, equipment checked, pre-op evaluation, timeout performed, anesthesia consent given, oxygen available and monitors applied/VS acknowledged

## 2023-11-30 NOTE — INTERVAL H&P NOTE
Update History & Physical    The patient's History and Physical of November 22, H&P was reviewed with the patient and I examined the patient. There was no change. The surgical site was confirmed by the patient and me. Plan: The risks, benefits, expected outcome, and alternative to the recommended procedure have been discussed with the patient. Patient understands and wants to proceed with the procedure.      Electronically signed by Jovita Poole MD on 11/30/2023 at 10:16 AM

## 2023-11-30 NOTE — ANESTHESIA PROCEDURE NOTES
Peripheral Block    Patient location during procedure: pre-op  Reason for block: post-op pain management and at surgeon's request  Start time: 11/30/2023 10:57 AM  End time: 11/30/2023 11:00 AM  Staffing  Performed: anesthesiologist   Anesthesiologist: Jose R Tee DO  Performed by: Jose R Tee DO  Authorized by: Jose R Tee DO    Preanesthetic Checklist  Completed: patient identified, IV checked, site marked, risks and benefits discussed, surgical/procedural consents, equipment checked, pre-op evaluation, timeout performed, anesthesia consent given, oxygen available and monitors applied/VS acknowledged  Peripheral Block   Patient position: supine  Prep: ChloraPrep  Provider prep: mask and sterile gloves  Patient monitoring: cardiac monitor, continuous pulse ox, frequent blood pressure checks, responsive to questions and oxygen  Block type: Femoral  Adductor canal  Laterality: left  Injection technique: single-shot  Guidance: ultrasound guided  Infiltration strength: 1 %  Dose: 3 mL    Needle   Needle type: insulated echogenic nerve stimulator needle   Needle gauge: 20 G  Needle localization: ultrasound guidance  Needle length: 10 cm  Assessment   Injection assessment: negative aspiration for heme, no paresthesia on injection, local visualized surrounding nerve on ultrasound and no intravascular symptoms  Paresthesia pain: none  Slow fractionated injection: yes  Hemodynamics: stable  Real-time US image taken/store: yes  Outcomes: uncomplicated and patient tolerated procedure well    Additional Notes  R/B/I discussed at length with pt including damage to nerve or muscle. Needle inserted under real time Ultrasound guidance and placed in close proximity to nerve being blocked. Ultrasound was used in real time to visualize spread of local anesthetic around nerve being blocked.   Nerve and surrounding structures appeared grossly normal.  A permanent Ultrasound image was stored in the

## 2023-11-30 NOTE — ANESTHESIA PRE PROCEDURE
Department of Anesthesiology  Preprocedure Note       Name:  Tasneem Bocanegra   Age:  68 y.o.  :  1950                                          MRN:  [id number]         Date:  2023      Surgeon: Krunal Aguilar):  Rubin Rodriguez MD    Procedure: Procedure(s):  lt KNEE TOTAL ARTHROPLASTY REVISION with capsule repair    Medications prior to admission:   Prior to Admission medications    Medication Sig Start Date End Date Taking? Authorizing Provider   montelukast (SINGULAIR) 10 MG tablet Take 1 tablet by mouth nightly as needed    ProviderPeggy MD   gabapentin (NEURONTIN) 300 MG capsule Take 1 capsule by mouth in the morning and at bedtime. ProviderPeggy MD   fluticasone (FLONASE) 50 MCG/ACT nasal spray 1 spray by Each Nostril route as needed for Rhinitis    ProviderPeggy MD   ascorbic acid (VITAMIN C) 500 MG tablet Take 1 tablet by mouth daily    ProviderPeggy MD   aspirin (ASPIRIN 81) 81 MG EC tablet Take 1 tablet by mouth in the morning and at bedtime 23  Rivka Nicholson PA   oxyCODONE (ROXICODONE) 5 MG immediate release tablet Take 1-2 tablets by mouth every 4-6 hours as needed for Pain for up to 5 days. Intended supply: 5 days.  Take lowest dose possible to manage pain Max Daily Amount: 60 mg  Patient not taking: Reported on 2023 11/28/23 12/3/23  KRISTYN Lockhart   promethazine (PHENERGAN) 12.5 MG tablet Take 1 tablet by mouth 4 times daily as needed for Nausea  Patient not taking: Reported on 2023   KRISTYN Lockhart   methocarbamol (ROBAXIN-750) 750 MG tablet Take 1 tablet by mouth 3 times daily as needed (muscle spasm or cramps)  Patient not taking: Reported on 2023   KRISTYN Lockhart   meloxicam (MOBIC) 7.5 MG tablet Take 1-2 tablets by mouth daily  Patient not taking: Reported on 2023   KRISTYN Lindsey   amLODIPine (NORVASC) 10 MG tablet Take 1 tablet by mouth

## 2023-12-01 VITALS
WEIGHT: 210.6 LBS | TEMPERATURE: 98.3 F | BODY MASS INDEX: 30.15 KG/M2 | DIASTOLIC BLOOD PRESSURE: 74 MMHG | HEIGHT: 70 IN | HEART RATE: 72 BPM | RESPIRATION RATE: 16 BRPM | SYSTOLIC BLOOD PRESSURE: 110 MMHG | OXYGEN SATURATION: 92 %

## 2023-12-01 LAB
BACTERIA SPEC CULT: NORMAL
HCT VFR BLD AUTO: 33.9 % (ref 41.1–50.3)
HGB BLD-MCNC: 11.1 G/DL (ref 13.6–17.2)
SERVICE CMNT-IMP: NORMAL

## 2023-12-01 PROCEDURE — 85018 HEMOGLOBIN: CPT

## 2023-12-01 PROCEDURE — 85014 HEMATOCRIT: CPT

## 2023-12-01 PROCEDURE — 97165 OT EVAL LOW COMPLEX 30 MIN: CPT

## 2023-12-01 PROCEDURE — 99024 POSTOP FOLLOW-UP VISIT: CPT | Performed by: ORTHOPAEDIC SURGERY

## 2023-12-01 PROCEDURE — 2580000003 HC RX 258: Performed by: PHYSICIAN ASSISTANT

## 2023-12-01 PROCEDURE — 97535 SELF CARE MNGMENT TRAINING: CPT

## 2023-12-01 PROCEDURE — 1100000000 HC RM PRIVATE

## 2023-12-01 PROCEDURE — 6360000002 HC RX W HCPCS: Performed by: PHYSICIAN ASSISTANT

## 2023-12-01 PROCEDURE — 6370000000 HC RX 637 (ALT 250 FOR IP): Performed by: PHYSICIAN ASSISTANT

## 2023-12-01 PROCEDURE — 97530 THERAPEUTIC ACTIVITIES: CPT

## 2023-12-01 PROCEDURE — 36415 COLL VENOUS BLD VENIPUNCTURE: CPT

## 2023-12-01 RX ADMIN — METFORMIN HYDROCHLORIDE 1000 MG: 500 TABLET, EXTENDED RELEASE ORAL at 08:25

## 2023-12-01 RX ADMIN — OXYCODONE 10 MG: 5 TABLET ORAL at 03:57

## 2023-12-01 RX ADMIN — LEVOTHYROXINE SODIUM 100 MCG: 0.1 TABLET ORAL at 05:40

## 2023-12-01 RX ADMIN — TAMSULOSIN HYDROCHLORIDE 0.4 MG: 0.4 CAPSULE ORAL at 08:25

## 2023-12-01 RX ADMIN — OXYCODONE 10 MG: 5 TABLET ORAL at 00:26

## 2023-12-01 RX ADMIN — OXYCODONE 10 MG: 5 TABLET ORAL at 08:25

## 2023-12-01 RX ADMIN — DOCUSATE SODIUM 50MG AND SENNOSIDES 8.6MG 1 TABLET: 8.6; 5 TABLET, FILM COATED ORAL at 08:25

## 2023-12-01 RX ADMIN — CEFAZOLIN 2000 MG: 10 INJECTION, POWDER, FOR SOLUTION INTRAVENOUS at 03:57

## 2023-12-01 RX ADMIN — ASPIRIN 81 MG: 81 TABLET, COATED ORAL at 08:25

## 2023-12-01 RX ADMIN — PRAVASTATIN SODIUM 80 MG: 80 TABLET ORAL at 08:25

## 2023-12-01 RX ADMIN — GABAPENTIN 300 MG: 300 CAPSULE ORAL at 08:25

## 2023-12-01 RX ADMIN — ACETAMINOPHEN 1000 MG: 500 TABLET, FILM COATED ORAL at 05:40

## 2023-12-01 RX ADMIN — BROMOCRIPTINE MESYLATE 5 MG: 2.5 TABLET ORAL at 08:31

## 2023-12-01 ASSESSMENT — PAIN DESCRIPTION - LOCATION
LOCATION: KNEE
LOCATION: KNEE

## 2023-12-01 ASSESSMENT — PAIN DESCRIPTION - DESCRIPTORS
DESCRIPTORS: THROBBING
DESCRIPTORS: ACHING

## 2023-12-01 ASSESSMENT — PAIN DESCRIPTION - ORIENTATION
ORIENTATION: LEFT
ORIENTATION: LEFT

## 2023-12-01 ASSESSMENT — PAIN SCALES - GENERAL
PAINLEVEL_OUTOF10: 7
PAINLEVEL_OUTOF10: 2

## 2023-12-01 NOTE — CARE COORDINATION
Patient is a 68y.o. year old male admitted for Left TKA revision. Patient plans to return home on discharge. Order received to arrange home health. Patient without preference towards agency. Referral sent to [facility]. Patient has a walker. Will follow until discharge. 12/01/23 3371   Service Assessment   Patient Orientation Alert and Oriented   Cognition Alert   History Provided By Patient   Primary Caregiver Self   Support Systems Spouse/Significant Other   PCP Verified by CM Yes   Prior Functional Level Independent in ADLs/IADLs   Current Functional Level Assistance with the following:   Can patient return to prior living arrangement Yes   Ability to make needs known: Good   Family able to assist with home care needs: Yes   Would you like for me to discuss the discharge plan with any other family members/significant others, and if so, who? No   Condition of Participation: Discharge Planning   The Patient and/or Patient Representative was provided with a Choice of Provider? Patient   Freedom of Choice list was provided with basic dialogue that supports the patient's individualized plan of care/goals, treatment preferences, and shares the quality data associated with the providers?   Yes SPOKE WITH TL AT [facility],  HE STATED THAT PATIENT HAS PICKED UP ZOLPIDEM ON 12/15/2021

## 2023-12-01 NOTE — PROGRESS NOTES
Patient received resting in bedside recliner. Shift assessment completed. Multiple family members at bedside, will monitor.

## 2023-12-01 NOTE — PROGRESS NOTES
OCCUPATIONAL THERAPY Initial Assessment, Daily Note, Discharge, and AM      (Link to Caseload Tracking: OT Visit Days: 1  OT Orders   Time  OT Charge Capture  Rehab Caseload Tracker  Ebony Mclaughlin is a 68 y.o. 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]  Hx of total knee arthroplasty, left [Z96.652]  Primary osteoarthritis of left knee [M17.12]  Procedure(s) (LRB):  lt KNEE TOTAL ARTHROPLASTY REVISION with capsule repair (Left)  1 Day Post-Op  Reason for Referral: Pain in Left Knee (M25.562)  Stiffness of Left Knee, Not elsewhere classified (X34.023)  Inpatient: Payor: [employer] / Plan: MEDICARE PART A AND B / Product Type: *No Product type* /     ASSESSMENT:     REHAB RECOMMENDATIONS:   Recommendation to date pending progress:  Setting:  No further skilled occupational therapy after discharge from hospital    Equipment:    None     ASSESSMENT:  Mr. Lizbeth Sandoval is s/p left TKA revision with capsule repair and is in a knee embolizer to limit knee flexion, and presents with decreased independence with functional mobility and activities of daily living as compared to baseline level of function and safety. Patient would benefit from skilled Occupational Therapy to maximize independence and safety with self-care task and functional mobility. Patient completed shower and dressing as charted below in ADL grid and is ambulating with rolling walker and stand by assist.  Pt and wife familiar with knee brace and with good understanding of it's use. Wife present and able to assist. Pt and family educated on safety with all self care and functional mobility at home, as well as education on Hibiclens to reduce risk of an infection. Patient has met 4/4 goals and plans to return home with good family support. Family able to provide patient with appropriate level of assistance at this time.   OT reviewed safety precautions throughout session and therapy Independent, S=Supervision/Setup, SBA=Standby Assistance, CGA=Contact Guard Assistance, Min=Minimal Assistance, Mod=Moderate Assistance, Max=Maximal Assistance, Total=Total Assistance, NT=Not Tested    ACTIVITIES OF DAILY LIVING: I Mod I S SBA CGA Min Mod Max Total NT Comments   BASIC ADLs:              Upper Body Bathing [] [] [] [x] [] [] [] [] [] []    Lower Body Bathing [] [] [] [x] [] [] [] [] [] []    Toileting [] [] [] [x] [] [] [] [] [] []    Upper Body Dressing [] [] [] [x] [] [] [] [] [] []    Lower Body Dressing [] [] [] [x] [] [] [] [] [] []    Feeding [x] [] [] [] [] [] [] [] [] []    Grooming [] [] [] [x] [] [] [] [] [] []    Personal Device Care [] [] [] [] [] [] [] [] [] []    Functional Mobility [] [] [] [x] [] [] [] [] [] [] RW   I=Independent, Mod I=Modified Independent, S=Supervision/Setup, SBA=Standby Assistance, CGA=Contact Guard Assistance, Min=Minimal Assistance, Mod=Moderate Assistance, Max=Maximal Assistance, Total=Total Assistance, NT=Not Tested    PLAN:     FREQUENCY/DURATION   OT Plan of Care:  (no further OT after today) for duration of hospital stay or until stated goals are met, whichever comes first.    ACUTE OCCUPATIONAL THERAPY GOALS:   (Developed with and agreed upon by patient and/or caregiver.)    GOALS:   DISCHARGE GOALS (in preparation for going home/rehab):  3 days  1.  Mr. Elias will perform lower body dressing activity with stand by assist required to demonstrate improved functional mobility and safety.     2.  Mr. Elias will perform bathing activity with stand by assist required to demonstrate improved functional mobility and safety.  3.  Mr. Elias will perform toileting activity with  stand by assist to demonstrate improved functional mobility and safety.  4.  Mr. Elias will perform all functional transfers transfer with stand by assist to demonstrate improved functional mobility and safety.      PROBLEM LIST:   (Skilled intervention is medically necessary to  address:)  Decreased ADL/Functional Activities  Decreased Balance   INTERVENTIONS PLANNED:   (Benefits and precautions of occupational therapy have been discussed with the patient.)  Self Care Training  Therapeutic Activity  Education         TREATMENT:     EVALUATION: LOW COMPLEXITY: (Untimed Charge)    TREATMENT:   Self Care (40 minutes): Patient participated in upper body bathing, lower body bathing, toileting, upper body dressing, lower body dressing, grooming, and functional mobility ADLs in unsupported sitting and standing with minimal verbal cueing to decrease assistance required, increase activity tolerance, and increase safety awareness. Patient also participated in functional mobility and adaptive equipment training to decrease assistance required, increase activity tolerance, and increase safety awareness.      AFTER TREATMENT PRECAUTIONS: Bed, Bed/Chair Locked, Call light within reach, Needs within reach, RN notified, and Visitors at bedside    INTERDISCIPLINARY COLLABORATION:  RN/ PCT, PT/ PTA, and OT/ XIE    Interdisciplinary Patient Education  (Reference education tab)    [x] Safe And Effective Hygiene  [x] Fall Precautions  [] Hip Precautions  [x] D/C Instruction Review [x] Self Care Training and Home Safety  [x] Walker Management/Safety  [x] Adaptive Equipment as Needed  [x] Therapeutic Resting Position of Joint     TOTAL TREATMENT DURATION AND TIME:  Time In: 1970  Time Out: 0930  Minutes: 111  [facility], OT

## 2023-12-02 ENCOUNTER — HOME CARE VISIT (OUTPATIENT)
Dept: SCHEDULING | Facility: HOME HEALTH | Age: 73
End: 2023-12-02

## 2023-12-02 VITALS
OXYGEN SATURATION: 94 % | TEMPERATURE: 98 F | DIASTOLIC BLOOD PRESSURE: 75 MMHG | SYSTOLIC BLOOD PRESSURE: 140 MMHG | RESPIRATION RATE: 18 BRPM | HEIGHT: 70 IN | WEIGHT: 217 LBS | HEART RATE: 81 BPM | BODY MASS INDEX: 31.07 KG/M2

## 2023-12-02 VITALS
TEMPERATURE: 99.2 F | SYSTOLIC BLOOD PRESSURE: 124 MMHG | RESPIRATION RATE: 20 BRPM | OXYGEN SATURATION: 97 % | HEART RATE: 90 BPM | DIASTOLIC BLOOD PRESSURE: 66 MMHG

## 2023-12-02 PROCEDURE — 0221000100 HH NO PAY CLAIM PROCEDURE

## 2023-12-02 PROCEDURE — G0151 HHCP-SERV OF PT,EA 15 MIN: HCPCS

## 2023-12-02 PROCEDURE — G0299 HHS/HOSPICE OF RN EA 15 MIN: HCPCS

## 2023-12-02 ASSESSMENT — ENCOUNTER SYMPTOMS
PAIN LOCATION - PAIN QUALITY: SORE
DYSPNEA ACTIVITY LEVEL: AFTER AMBULATING 10 - 20 FT
DYSPNEA ACTIVITY LEVEL: AFTER AMBULATING 10 - 20 FT

## 2023-12-03 LAB
BACTERIA SPEC CULT: NORMAL
GRAM STN SPEC: NORMAL
SERVICE CMNT-IMP: NORMAL

## 2023-12-04 ENCOUNTER — HOME CARE VISIT (OUTPATIENT)
Dept: SCHEDULING | Facility: HOME HEALTH | Age: 73
End: 2023-12-04

## 2023-12-04 VITALS
SYSTOLIC BLOOD PRESSURE: 132 MMHG | OXYGEN SATURATION: 95 % | RESPIRATION RATE: 16 BRPM | TEMPERATURE: 98.5 F | HEART RATE: 84 BPM | DIASTOLIC BLOOD PRESSURE: 82 MMHG

## 2023-12-04 VITALS
SYSTOLIC BLOOD PRESSURE: 142 MMHG | HEART RATE: 96 BPM | RESPIRATION RATE: 16 BRPM | OXYGEN SATURATION: 98 % | TEMPERATURE: 98 F | DIASTOLIC BLOOD PRESSURE: 66 MMHG

## 2023-12-04 PROCEDURE — G0299 HHS/HOSPICE OF RN EA 15 MIN: HCPCS

## 2023-12-04 PROCEDURE — G0151 HHCP-SERV OF PT,EA 15 MIN: HCPCS

## 2023-12-04 ASSESSMENT — ENCOUNTER SYMPTOMS
DYSPNEA ACTIVITY LEVEL: AFTER AMBULATING 10 - 20 FT
PAIN LOCATION - PAIN QUALITY: BURNING
STOOL DESCRIPTION: SOFT FORMED
PAIN LOCATION - PAIN QUALITY: ACHE

## 2023-12-06 ENCOUNTER — HOME CARE VISIT (OUTPATIENT)
Dept: HOME HEALTH SERVICES | Facility: HOME HEALTH | Age: 73
End: 2023-12-06

## 2023-12-06 LAB
BACTERIA SPEC CULT: NORMAL
SERVICE CMNT-IMP: NORMAL

## 2023-12-07 ENCOUNTER — HOME CARE VISIT (OUTPATIENT)
Dept: SCHEDULING | Facility: HOME HEALTH | Age: 73
End: 2023-12-07

## 2023-12-07 VITALS
HEART RATE: 99 BPM | SYSTOLIC BLOOD PRESSURE: 168 MMHG | TEMPERATURE: 97.3 F | RESPIRATION RATE: 16 BRPM | OXYGEN SATURATION: 98 % | DIASTOLIC BLOOD PRESSURE: 78 MMHG

## 2023-12-07 VITALS
TEMPERATURE: 98.2 F | SYSTOLIC BLOOD PRESSURE: 136 MMHG | RESPIRATION RATE: 16 BRPM | HEART RATE: 95 BPM | OXYGEN SATURATION: 94 % | DIASTOLIC BLOOD PRESSURE: 74 MMHG

## 2023-12-07 PROCEDURE — G0151 HHCP-SERV OF PT,EA 15 MIN: HCPCS

## 2023-12-07 PROCEDURE — G0299 HHS/HOSPICE OF RN EA 15 MIN: HCPCS

## 2023-12-07 ASSESSMENT — ENCOUNTER SYMPTOMS
STOOL DESCRIPTION: FORMED
PAIN LOCATION - PAIN QUALITY: ACHE
PAIN LOCATION - PAIN QUALITY: ACHE

## 2023-12-11 ENCOUNTER — HOME CARE VISIT (OUTPATIENT)
Dept: SCHEDULING | Facility: HOME HEALTH | Age: 73
End: 2023-12-11
Payer: MEDICARE

## 2023-12-11 VITALS
DIASTOLIC BLOOD PRESSURE: 84 MMHG | SYSTOLIC BLOOD PRESSURE: 134 MMHG | HEART RATE: 98 BPM | TEMPERATURE: 98.7 F | OXYGEN SATURATION: 97 %

## 2023-12-11 VITALS
RESPIRATION RATE: 16 BRPM | SYSTOLIC BLOOD PRESSURE: 174 MMHG | HEART RATE: 100 BPM | OXYGEN SATURATION: 94 % | TEMPERATURE: 98.6 F | DIASTOLIC BLOOD PRESSURE: 84 MMHG

## 2023-12-11 PROCEDURE — G0157 HHC PT ASSISTANT EA 15: HCPCS

## 2023-12-11 PROCEDURE — G0299 HHS/HOSPICE OF RN EA 15 MIN: HCPCS

## 2023-12-11 ASSESSMENT — ENCOUNTER SYMPTOMS
DYSPNEA ACTIVITY LEVEL: AFTER AMBULATING LESS THAN 10 FT
PAIN LOCATION - PAIN QUALITY: BURNING
STOOL DESCRIPTION: LARGE

## 2023-12-14 ENCOUNTER — HOME CARE VISIT (OUTPATIENT)
Dept: SCHEDULING | Facility: HOME HEALTH | Age: 73
End: 2023-12-14
Payer: MEDICARE

## 2023-12-14 VITALS
SYSTOLIC BLOOD PRESSURE: 144 MMHG | DIASTOLIC BLOOD PRESSURE: 58 MMHG | RESPIRATION RATE: 16 BRPM | OXYGEN SATURATION: 95 % | HEART RATE: 80 BPM | TEMPERATURE: 98.5 F

## 2023-12-14 DIAGNOSIS — Z96.652 STATUS POST TOTAL LEFT KNEE REPLACEMENT: Primary | ICD-10-CM

## 2023-12-14 PROCEDURE — G0157 HHC PT ASSISTANT EA 15: HCPCS

## 2023-12-14 PROCEDURE — G0299 HHS/HOSPICE OF RN EA 15 MIN: HCPCS

## 2023-12-14 RX ORDER — OXYCODONE HYDROCHLORIDE 5 MG/1
5 TABLET ORAL EVERY 4 HOURS PRN
Qty: 30 TABLET | Refills: 0 | Status: SHIPPED | OUTPATIENT
Start: 2023-12-14 | End: 2023-12-19

## 2023-12-16 VITALS
OXYGEN SATURATION: 93 % | HEART RATE: 77 BPM | SYSTOLIC BLOOD PRESSURE: 132 MMHG | TEMPERATURE: 98.8 F | DIASTOLIC BLOOD PRESSURE: 66 MMHG | RESPIRATION RATE: 16 BRPM

## 2023-12-20 ENCOUNTER — HOME CARE VISIT (OUTPATIENT)
Dept: SCHEDULING | Facility: HOME HEALTH | Age: 73
End: 2023-12-20
Payer: MEDICARE

## 2023-12-20 VITALS
RESPIRATION RATE: 16 BRPM | TEMPERATURE: 98.4 F | HEART RATE: 84 BPM | OXYGEN SATURATION: 98 % | DIASTOLIC BLOOD PRESSURE: 70 MMHG | SYSTOLIC BLOOD PRESSURE: 140 MMHG

## 2023-12-20 PROCEDURE — G0151 HHCP-SERV OF PT,EA 15 MIN: HCPCS

## 2024-01-04 ENCOUNTER — OFFICE VISIT (OUTPATIENT)
Dept: ORTHOPEDIC SURGERY | Age: 74
End: 2024-01-04

## 2024-01-04 DIAGNOSIS — Z96.652 STATUS POST TOTAL LEFT KNEE REPLACEMENT: Primary | ICD-10-CM

## 2024-01-04 NOTE — PROGRESS NOTES
Post-op TKA Visit      01/04/24     Allergies   Allergen Reactions    Bee Venom Swelling    Cephalexin Hives    Nefazodone      syncope    Penicillins Other (See Comments)     States causes him to pass out    Sulfa Antibiotics Rash     Current Outpatient Medications on File Prior to Visit   Medication Sig Dispense Refill    montelukast (SINGULAIR) 10 MG tablet Take 1 tablet by mouth nightly as needed      gabapentin (NEURONTIN) 300 MG capsule Take 1 capsule by mouth in the morning and at bedtime.      fluticasone (FLONASE) 50 MCG/ACT nasal spray 1 spray by Each Nostril route as needed for Rhinitis      aspirin (ASPIRIN 81) 81 MG EC tablet Take 1 tablet by mouth in the morning and at bedtime 70 tablet 0    promethazine (PHENERGAN) 12.5 MG tablet Take 1 tablet by mouth 4 times daily as needed for Nausea 20 tablet 2    methocarbamol (ROBAXIN-750) 750 MG tablet Take 1 tablet by mouth 3 times daily as needed (muscle spasm or cramps) 40 tablet 1    meloxicam (MOBIC) 7.5 MG tablet Take 1-2 tablets by mouth daily 60 tablet 1    amLODIPine (NORVASC) 10 MG tablet Take 1 tablet by mouth nightly      lisinopril (PRINIVIL;ZESTRIL) 10 MG tablet Take 4 tablets by mouth nightly      metFORMIN (GLUCOPHAGE-XR) 500 MG extended release tablet Take 2 tablets by mouth in the morning and at bedtime      bromocriptine (PARLODEL) 5 MG capsule Take 1 capsule by mouth in the morning and at bedtime Patient states he takes 4 capsules of 2.5 mg tablets BID      tamsulosin (FLOMAX) 0.4 mg capsule Take 1 capsule by mouth daily      pravastatin (PRAVACHOL) 80 MG tablet Take 1 tablet by mouth daily reports taking every morning.      TESTOSTERONE CYPIONATE IM Inject 50 mg into the muscle every 14 days administer every other Friday evening.      acetaminophen (TYLENOL) 500 MG tablet Take 1 tablet by mouth every 6 hours as needed for Pain for low pain.      budesonide-formoterol (SYMBICORT) 160-4.5 MCG/ACT AERO Inhale 2 puffs into the lungs 2 times

## 2024-01-05 NOTE — PROGRESS NOTES
[facility]  [address] [place] [id number]  Dept: [phone]      Physical Therapy Initial Assessment     Insurance: Today is 1/20 visits ()    Total Timed Codes: 20 min, Total Treatment Time: 60 min Modifier needed: No        Referring MD: Flip Schmidt Jr., *  Surgery: Left TKA Revision   Surgery Date: 11/30/2023  Previous Surgery Dates for L TKA and revision: 3/14/2023 (revision L TKA), 9/19/2022 (left TKA)      Diagnosis:     ICD-10-CM    1. Chronic pain of left knee  M25.562     G89.29       2. Left leg swelling  M79.89       3. Joint stiffness of left lower leg  M25.662       4. Difficulty walking  R26.2       5. Impaired mobility and ADLs  Z74.09     Z78.9       6. Muscle weakness  M62.81            Therapy precautions:Diabetes/neuropathy- sensory precautions and takes 2 aspirin per day  Per MD/AROM only no forced ROM at this time      Co-morbidities affecting plan of care:  CAD, HTN, Asthma, Anxiety, OA left shoulder, chronic low back pain, DM, COPD. Allergy to bees, restless leg syndrome, R TKA (2021), L TKA (2022), C-spine fusion.          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 Baca several years ago; cardiac cath:  4/20/11    Cervical neck pain with evidence of disc disease     Chronic renal insufficiency 7/20/2016    Controlled diabetes mellitus (HCC) 7/20/2016 7/14/23 A1c 6.4; daily fasting sqbs:   ave    COPD (chronic obstructive pulmonary disease) (HCC) 7/20/2016    Symbicort

## 2024-01-08 ENCOUNTER — EVALUATION (OUTPATIENT)
Age: 74
End: 2024-01-08
Payer: MEDICARE

## 2024-01-08 DIAGNOSIS — Z78.9 IMPAIRED MOBILITY AND ADLS: ICD-10-CM

## 2024-01-08 DIAGNOSIS — Z74.09 IMPAIRED MOBILITY AND ADLS: ICD-10-CM

## 2024-01-08 DIAGNOSIS — M25.562 CHRONIC PAIN OF LEFT KNEE: Primary | ICD-10-CM

## 2024-01-08 DIAGNOSIS — G89.29 CHRONIC PAIN OF LEFT KNEE: Primary | ICD-10-CM

## 2024-01-08 DIAGNOSIS — M79.89 LEFT LEG SWELLING: ICD-10-CM

## 2024-01-08 DIAGNOSIS — R26.2 DIFFICULTY WALKING: ICD-10-CM

## 2024-01-08 DIAGNOSIS — M62.81 MUSCLE WEAKNESS: ICD-10-CM

## 2024-01-08 DIAGNOSIS — M25.662 JOINT STIFFNESS OF LEFT LOWER LEG: ICD-10-CM

## 2024-01-08 PROCEDURE — 97016 VASOPNEUMATIC DEVICE THERAPY: CPT | Performed by: PHYSICAL THERAPIST

## 2024-01-08 PROCEDURE — 97162 PT EVAL MOD COMPLEX 30 MIN: CPT | Performed by: PHYSICAL THERAPIST

## 2024-01-08 PROCEDURE — 97110 THERAPEUTIC EXERCISES: CPT | Performed by: PHYSICAL THERAPIST

## 2024-01-09 NOTE — PROGRESS NOTES
[facility]  [address] [place] [address]  Dept: [phone]      Physical Therapy Daily Note     Insurance: Today is 2/20 visits ()    Total Timed Codes: 40 min, Total Treatment Time: 55 min Modifier needed: No        Referring MD: Flip Schmidt Jr., *  Surgery: Left TKA Revision   Surgery Date: 11/30/2023  Previous Surgery Dates for L TKA and revision: 3/14/2023 (revision L TKA), 9/19/2022 (left TKA)      Diagnosis:     ICD-10-CM    1. Chronic pain of left knee  M25.562     G89.29       2. Left leg swelling  M79.89       3. Joint stiffness of left lower leg  M25.662       4. Difficulty walking  R26.2       5. Impaired mobility and ADLs  Z74.09     Z78.9       6. Muscle weakness  M62.81            Therapy precautions:Diabetes/neuropathy- sensory precautions and takes 2 aspirin per day  Per MD/AROM only no forced ROM at this time      Co-morbidities affecting plan of care:  CAD, HTN, Asthma, Anxiety, OA left shoulder, chronic low back pain, DM, COPD. Allergy to bees, restless leg syndrome, R TKA (2021), L TKA (2022), C-spine fusion.       SUBJECTIVE     Patient reports mild pain left knee. He states he is compliant with HEP. He is doing exercises daily. He is using ice at home to reduce swelling. He states he will wear left knee immobilizer tomorrow and try going without it on Friday per Dr. Chua's instructions.      Medications: no changes since last session    OBJECTIVE     Treatment provided today:  Therapeutic exercise (67291) x 40 min to develop ROM, strength, endurance and flexibility.  Included:   Ball squeeze 30x    SAQ 30x  LAQ 30x  Quad sets 20x  SLR 15x2  SL hip abduction 15x2  Seated HS stretch 1 minutes      Vasopneumatic Compression (38505) with cold x 15 minutes: to left knee in order to reduce inflammation and swelling/joint effusion which will help improve ROM and manage pain.       Patient Education on the condition/pathology, involved anatomy, and  Female

## 2024-01-10 ENCOUNTER — TREATMENT (OUTPATIENT)
Age: 74
End: 2024-01-10
Payer: MEDICARE

## 2024-01-10 DIAGNOSIS — M25.662 JOINT STIFFNESS OF LEFT LOWER LEG: ICD-10-CM

## 2024-01-10 DIAGNOSIS — R26.2 DIFFICULTY WALKING: ICD-10-CM

## 2024-01-10 DIAGNOSIS — M62.81 MUSCLE WEAKNESS: ICD-10-CM

## 2024-01-10 DIAGNOSIS — G89.29 CHRONIC PAIN OF LEFT KNEE: Primary | ICD-10-CM

## 2024-01-10 DIAGNOSIS — Z74.09 IMPAIRED MOBILITY AND ADLS: ICD-10-CM

## 2024-01-10 DIAGNOSIS — M25.562 CHRONIC PAIN OF LEFT KNEE: Primary | ICD-10-CM

## 2024-01-10 DIAGNOSIS — M79.89 LEFT LEG SWELLING: ICD-10-CM

## 2024-01-10 DIAGNOSIS — Z78.9 IMPAIRED MOBILITY AND ADLS: ICD-10-CM

## 2024-01-10 PROCEDURE — 97110 THERAPEUTIC EXERCISES: CPT | Performed by: PHYSICAL THERAPIST

## 2024-01-10 PROCEDURE — 97016 VASOPNEUMATIC DEVICE THERAPY: CPT | Performed by: PHYSICAL THERAPIST

## 2024-01-15 ENCOUNTER — TELEPHONE (OUTPATIENT)
Age: 74
End: 2024-01-15

## 2024-01-17 NOTE — TELEPHONE ENCOUNTER
He is requesting a refill on Oxycodone to [facility] @ [address] in [facility].
Statement Selected

## 2024-01-22 ENCOUNTER — TREATMENT (OUTPATIENT)
Age: 74
End: 2024-01-22
Payer: MEDICARE

## 2024-01-22 DIAGNOSIS — R26.2 DIFFICULTY WALKING: ICD-10-CM

## 2024-01-22 DIAGNOSIS — M62.81 MUSCLE WEAKNESS: ICD-10-CM

## 2024-01-22 DIAGNOSIS — M25.662 JOINT STIFFNESS OF LEFT LOWER LEG: ICD-10-CM

## 2024-01-22 DIAGNOSIS — M25.562 CHRONIC PAIN OF LEFT KNEE: Primary | ICD-10-CM

## 2024-01-22 DIAGNOSIS — G89.29 CHRONIC PAIN OF LEFT KNEE: Primary | ICD-10-CM

## 2024-01-22 DIAGNOSIS — Z74.09 IMPAIRED MOBILITY AND ADLS: ICD-10-CM

## 2024-01-22 DIAGNOSIS — M79.89 LEFT LEG SWELLING: ICD-10-CM

## 2024-01-22 DIAGNOSIS — Z78.9 IMPAIRED MOBILITY AND ADLS: ICD-10-CM

## 2024-01-22 PROCEDURE — 97016 VASOPNEUMATIC DEVICE THERAPY: CPT | Performed by: PHYSICAL THERAPIST

## 2024-01-22 PROCEDURE — 97110 THERAPEUTIC EXERCISES: CPT | Performed by: PHYSICAL THERAPIST

## 2024-01-22 NOTE — PROGRESS NOTES
[facility]  [address] [place] [id number]  Dept: [phone]      Physical Therapy Daily Note     Insurance: Today is 3/20 visits ()    Total Timed Codes: 40 min, Total Treatment Time: 55 min Modifier needed: No        Referring MD: Roxana, Flip LANE Jr., *  Surgery: Left TKA Revision   Surgery Date: 11/30/2023  Previous Surgery Dates for L TKA and revision: 3/14/2023 (revision L TKA), 9/19/2022 (left TKA)      Diagnosis:     ICD-10-CM    1. Chronic pain of left knee  M25.562     G89.29       2. Left leg swelling  M79.89       3. Joint stiffness of left lower leg  M25.662       4. Difficulty walking  R26.2       5. Impaired mobility and ADLs  Z74.09     Z78.9       6. Muscle weakness  M62.81            Therapy precautions:Diabetes/neuropathy- sensory precautions and takes 2 aspirin per day  Per MD/AROM only no forced ROM at this time      Co-morbidities affecting plan of care:  CAD, HTN, Asthma, Anxiety, OA left shoulder, chronic low back pain, DM, COPD. Allergy to bees, restless leg syndrome, R TKA (2021), L TKA (2022), C-spine fusion.       SUBJECTIVE     Patient reports mild pain left knee. Left knee feels much better than prior to surgery. He states he is compliant with HEP. He had to miss therapy last week due to COVID. He is doing exercises daily. He has no instability with discontinuing brace. He uses cane for community ambulation for safety. He states he has an appointment next week with Dr. Schmidt.    Medications: no changes since last session    OBJECTIVE     Treatment provided today:  Therapeutic exercise (49311) x 40 min to develop ROM, strength, endurance and flexibility.  Included:   Bike L2 (3 holes showing on seat) for 5 minutes    SAQ 30x with 2#  LAQ 30x with 2#  Quad sets 20x  SLR 10x2    Seated HS stretch 1 minutes    Standing hip 3 way with teal loop 20x  Step up on 6\" step 20x    Vasopneumatic Compression (24916) with cold x 15 minutes: to left knee in

## 2024-01-23 NOTE — PROGRESS NOTES
[facility] PT [facility] ORTHOPAEDICS  [address] [place] [address]  Dept: [phone]      Physical Therapy Daily Note   \"Sam\"       Insurance: Today is 4/20 visits ()    Total Timed Codes: 40 min, Total Treatment Time: 55 min Modifier needed: No        Referring MD: Flip Schmidt Jr., *  Surgery: Left TKA Revision   Surgery Date: 11/30/2023  Previous Surgery Dates for L TKA and revision: 3/14/2023 (revision L TKA), 9/19/2022 (left TKA)      Diagnosis:     ICD-10-CM    1. Chronic pain of left knee  M25.562     G89.29       2. Left leg swelling  M79.89       3. Joint stiffness of left lower leg  M25.662       4. Difficulty walking  R26.2       5. Impaired mobility and ADLs  Z74.09     Z78.9       6. Muscle weakness  M62.81            Therapy precautions:Diabetes/neuropathy- sensory precautions and takes 2 aspirin per day  Per MD/AROM only no forced ROM at this time      Co-morbidities affecting plan of care:  CAD, HTN, Asthma, Anxiety, OA left shoulder, chronic low back pain, DM, COPD. Allergy to bees, restless leg syndrome, R TKA (2021), L TKA (2022), C-spine fusion.       SUBJECTIVE     Patient reports no pain left knee. Left knee feels much better than prior to surgery. He states he is compliant with HEP. He states no pain with exercises.    Medications: no changes since last session    OBJECTIVE     Treatment provided today:  Therapeutic exercise (16085) x 40 min to develop ROM, strength, endurance and flexibility.  Included:   Bike L3 (3 holes showing on seat) for 6 minutes  Leg press (set with 7 holes in back showing) 90# 30x  Single LP (set with 7 holes in back showing) 45# 30x    SAQ 30x with 2#  LAQ 30x with 2#  Quad sets 20x  SLR 10x2    Sit to stand from 20\" seat 10x2    Seated HS stretch 1 minutes    Standing hip 3 way with teal loop 30x  Step up on 6\" step 30x    Vasopneumatic Compression (92755) with cold x 15 minutes: to left knee in order to reduce inflammation and swelling/joint

## 2024-01-24 ENCOUNTER — TREATMENT (OUTPATIENT)
Age: 74
End: 2024-01-24

## 2024-01-24 DIAGNOSIS — G89.29 CHRONIC PAIN OF LEFT KNEE: Primary | ICD-10-CM

## 2024-01-24 DIAGNOSIS — Z74.09 IMPAIRED MOBILITY AND ADLS: ICD-10-CM

## 2024-01-24 DIAGNOSIS — Z78.9 IMPAIRED MOBILITY AND ADLS: ICD-10-CM

## 2024-01-24 DIAGNOSIS — M25.562 CHRONIC PAIN OF LEFT KNEE: Primary | ICD-10-CM

## 2024-01-24 DIAGNOSIS — M62.81 MUSCLE WEAKNESS: ICD-10-CM

## 2024-01-24 DIAGNOSIS — M25.662 JOINT STIFFNESS OF LEFT LOWER LEG: ICD-10-CM

## 2024-01-24 DIAGNOSIS — M79.89 LEFT LEG SWELLING: ICD-10-CM

## 2024-01-24 DIAGNOSIS — R26.2 DIFFICULTY WALKING: ICD-10-CM

## 2024-01-29 ENCOUNTER — TREATMENT (OUTPATIENT)
Age: 74
End: 2024-01-29
Payer: MEDICARE

## 2024-01-29 DIAGNOSIS — M25.562 CHRONIC PAIN OF LEFT KNEE: Primary | ICD-10-CM

## 2024-01-29 DIAGNOSIS — M62.81 MUSCLE WEAKNESS: ICD-10-CM

## 2024-01-29 DIAGNOSIS — R26.2 DIFFICULTY WALKING: ICD-10-CM

## 2024-01-29 DIAGNOSIS — Z78.9 IMPAIRED MOBILITY AND ADLS: ICD-10-CM

## 2024-01-29 DIAGNOSIS — M79.89 LEFT LEG SWELLING: ICD-10-CM

## 2024-01-29 DIAGNOSIS — M25.662 JOINT STIFFNESS OF LEFT LOWER LEG: ICD-10-CM

## 2024-01-29 DIAGNOSIS — G89.29 CHRONIC PAIN OF LEFT KNEE: Primary | ICD-10-CM

## 2024-01-29 DIAGNOSIS — Z74.09 IMPAIRED MOBILITY AND ADLS: ICD-10-CM

## 2024-01-29 PROCEDURE — 97016 VASOPNEUMATIC DEVICE THERAPY: CPT | Performed by: PHYSICAL THERAPIST

## 2024-01-29 PROCEDURE — 97110 THERAPEUTIC EXERCISES: CPT | Performed by: PHYSICAL THERAPIST

## 2024-01-29 NOTE — PROGRESS NOTES
[facility] PT [facility] ORTHOPAEDICS  [address] [place] [id number]  Dept: [phone]      Physical Therapy Daily Note   \"Sam\"       Insurance: Today is 5/20 visits ()    Total Timed Codes: 40 min, Total Treatment Time: 60 min Modifier needed: No        Referring MD: Flip Schmidt Jr., *  Surgery: Left TKA Revision   Surgery Date: 11/30/2023  Previous Surgery Dates for L TKA and revision: 3/14/2023 (revision L TKA), 9/19/2022 (left TKA)      Diagnosis:     ICD-10-CM    1. Chronic pain of left knee  M25.562     G89.29       2. Left leg swelling  M79.89       3. Joint stiffness of left lower leg  M25.662       4. Difficulty walking  R26.2       5. Impaired mobility and ADLs  Z74.09     Z78.9       6. Muscle weakness  M62.81            Therapy precautions:Diabetes/neuropathy- sensory precautions and takes 2 aspirin per day  Per MD/AROM only no forced ROM at this time      Co-morbidities affecting plan of care:  CAD, HTN, Asthma, Anxiety, OA left shoulder, chronic low back pain, DM, COPD. Allergy to bees, restless leg syndrome, R TKA (2021), L TKA (2022), C-spine fusion.       SUBJECTIVE     Patient reports no pain left knee. He states left knee is doing good. Left knee feels much better than prior to surgery. He states he is compliant with HEP. He states no pain with exercises.    Medications: no changes since last session    OBJECTIVE     Treatment provided today:  Therapeutic exercise (99445) x 40 min to develop ROM, strength, endurance and flexibility.  Included:   Bike L3 (3 holes showing on seat) for 6 minutes  Leg press (set with 7 holes in back showing) 90# 30x  Single LP (set with 7 holes in back showing) 45# 30x    SAQ 30x with 3#  LAQ 30x with 3#  Quad sets 20x  Prone HS with 3# 30x  SLR 10x2    Seated HS stretch 1 minutes    Standing hip 3 way with teal loop 30x  Step up on 9\" step 10x2  Reciprocate stairs ascending and descending 5x    Vasopneumatic Compression (97928) with cold x 15

## 2024-01-30 NOTE — PROGRESS NOTES
[facility] PT [facility] ORTHOPAEDICS  [address] [place] [address]  Dept: [phone]      Physical Therapy Daily Note   \"Sam\"       Insurance: Today is 6/20 visits ()    Total Timed Codes: 40 min, Total Treatment Time: 55 min Modifier needed: No        Referring MD: Flip Schmidt Jr., *  Surgery: Left TKA Revision   Surgery Date: 11/30/2023  Previous Surgery Dates for L TKA and revision: 3/14/2023 (revision L TKA), 9/19/2022 (left TKA)      Diagnosis:     ICD-10-CM    1. Chronic pain of left knee  M25.562     G89.29       2. Left leg swelling  M79.89       3. Joint stiffness of left lower leg  M25.662       4. Difficulty walking  R26.2       5. Impaired mobility and ADLs  Z74.09     Z78.9       6. Muscle weakness  M62.81            Therapy precautions:Diabetes/neuropathy- sensory precautions and takes 2 aspirin per day  Per MD/AROM only no forced ROM at this time      Co-morbidities affecting plan of care:  CAD, HTN, Asthma, Anxiety, OA left shoulder, chronic low back pain, DM, COPD. Allergy to bees, restless leg syndrome, R TKA (2021), L TKA (2022), C-spine fusion.       SUBJECTIVE     Patient reports no pain left knee. He states left knee is doing good. Left knee feels much better than prior to surgery. He is pleased with results of surgery. He states he has an appointment with Dr. Schmidt tomorrow. He states he is compliant with HEP. He states no pain with exercises.    Medications: no changes since last session    OBJECTIVE     Treatment provided today:  Therapeutic exercise (95009) x 40 min to develop ROM, strength, endurance and flexibility.  Included:   Bike L3 (3 holes showing on seat) for 6 minutes  Leg press (set with 7 holes in back showing) 100# 30x  Single LP (set with 7 holes in back showing) 45# 30x  HS 40# 30x    SAQ 30x with 3#  LAQ 30x with 3#  Quad sets 20x    Seated HS stretch 1 minutes    Standing hip 3 way with teal loop 30x  Step up on 9\" step 20x  Reciprocate stairs

## 2024-01-31 ENCOUNTER — TREATMENT (OUTPATIENT)
Age: 74
End: 2024-01-31
Payer: MEDICARE

## 2024-01-31 DIAGNOSIS — M25.662 JOINT STIFFNESS OF LEFT LOWER LEG: ICD-10-CM

## 2024-01-31 DIAGNOSIS — Z78.9 IMPAIRED MOBILITY AND ADLS: ICD-10-CM

## 2024-01-31 DIAGNOSIS — Z74.09 IMPAIRED MOBILITY AND ADLS: ICD-10-CM

## 2024-01-31 DIAGNOSIS — R26.2 DIFFICULTY WALKING: ICD-10-CM

## 2024-01-31 DIAGNOSIS — M79.89 LEFT LEG SWELLING: ICD-10-CM

## 2024-01-31 DIAGNOSIS — M62.81 MUSCLE WEAKNESS: ICD-10-CM

## 2024-01-31 DIAGNOSIS — G89.29 CHRONIC PAIN OF LEFT KNEE: Primary | ICD-10-CM

## 2024-01-31 DIAGNOSIS — M25.562 CHRONIC PAIN OF LEFT KNEE: Primary | ICD-10-CM

## 2024-01-31 PROCEDURE — 97016 VASOPNEUMATIC DEVICE THERAPY: CPT | Performed by: PHYSICAL THERAPIST

## 2024-01-31 PROCEDURE — 97110 THERAPEUTIC EXERCISES: CPT | Performed by: PHYSICAL THERAPIST

## 2024-02-01 ENCOUNTER — OFFICE VISIT (OUTPATIENT)
Dept: ORTHOPEDIC SURGERY | Age: 74
End: 2024-02-01

## 2024-02-01 DIAGNOSIS — Z96.652 STATUS POST TOTAL LEFT KNEE REPLACEMENT: Primary | ICD-10-CM

## 2024-02-01 NOTE — PROGRESS NOTES
Post-op TKA Visit      02/01/24     Allergies   Allergen Reactions    Bee Venom Swelling    Cephalexin Hives    Nefazodone      syncope    Penicillins Other (See Comments)     States causes him to pass out    Sulfa Antibiotics Rash     Current Outpatient Medications on File Prior to Visit   Medication Sig Dispense Refill    montelukast (SINGULAIR) 10 MG tablet Take 1 tablet by mouth nightly as needed      gabapentin (NEURONTIN) 300 MG capsule Take 1 capsule by mouth in the morning and at bedtime.      fluticasone (FLONASE) 50 MCG/ACT nasal spray 1 spray by Each Nostril route as needed for Rhinitis      aspirin (ASPIRIN 81) 81 MG EC tablet Take 1 tablet by mouth in the morning and at bedtime 70 tablet 0    promethazine (PHENERGAN) 12.5 MG tablet Take 1 tablet by mouth 4 times daily as needed for Nausea 20 tablet 2    methocarbamol (ROBAXIN-750) 750 MG tablet Take 1 tablet by mouth 3 times daily as needed (muscle spasm or cramps) 40 tablet 1    meloxicam (MOBIC) 7.5 MG tablet Take 1-2 tablets by mouth daily 60 tablet 1    amLODIPine (NORVASC) 10 MG tablet Take 1 tablet by mouth nightly      lisinopril (PRINIVIL;ZESTRIL) 10 MG tablet Take 4 tablets by mouth nightly      metFORMIN (GLUCOPHAGE-XR) 500 MG extended release tablet Take 2 tablets by mouth in the morning and at bedtime      bromocriptine (PARLODEL) 5 MG capsule Take 1 capsule by mouth in the morning and at bedtime Patient states he takes 4 capsules of 2.5 mg tablets BID      tamsulosin (FLOMAX) 0.4 mg capsule Take 1 capsule by mouth daily      pravastatin (PRAVACHOL) 80 MG tablet Take 1 tablet by mouth daily reports taking every morning.      TESTOSTERONE CYPIONATE IM Inject 50 mg into the muscle every 14 days administer every other Friday evening.      acetaminophen (TYLENOL) 500 MG tablet Take 1 tablet by mouth every 6 hours as needed for Pain for low pain.      budesonide-formoterol (SYMBICORT) 160-4.5 MCG/ACT AERO Inhale 2 puffs into the lungs 2 times

## 2024-02-02 NOTE — PROGRESS NOTES
[facility] PT [facility] ORTHOPAEDICS  [address] [place] [address]  Dept: [phone]      Physical Therapy Daily Note   \"Sam\"       Insurance: Today is 7/20 visits ()    Total Timed Codes: 40 min, Total Treatment Time: 55 min Modifier needed: No        Referring MD: Flip Schmidt Jr., *  Surgery: Left TKA Revision   Surgery Date: 11/30/2023  Previous Surgery Dates for L TKA and revision: 3/14/2023 (revision L TKA), 9/19/2022 (left TKA)      Diagnosis:     ICD-10-CM    1. Chronic pain of left knee  M25.562     G89.29       2. Left leg swelling  M79.89       3. Joint stiffness of left lower leg  M25.662       4. Difficulty walking  R26.2       5. Impaired mobility and ADLs  Z74.09     Z78.9       6. Muscle weakness  M62.81            Therapy precautions:Diabetes/neuropathy- sensory precautions and takes 2 aspirin per day  Per MD/AROM only no forced ROM at this time      Co-morbidities affecting plan of care:  CAD, HTN, Asthma, Anxiety, OA left shoulder, chronic low back pain, DM, COPD. Allergy to bees, restless leg syndrome, R TKA (2021), L TKA (2022), C-spine fusion.       SUBJECTIVE     Patient reports no pain left knee. He states left knee is doing good. He was able to reciprocate stairs at [employer] yesterday (ascending and descending) without pain or difficulty. Left knee feels much better than prior to surgery. He is pleased with results of surgery.  He states he is compliant with HEP. He states no pain with exercises. He states Dr. Schmidt told him he could do HEP independently at this time and did not need to continue with skilled therapy after this week. Patient will continue to work on quad strengthening and understands HEP.    Medications: no changes since last session    OBJECTIVE     Treatment provided today:  Therapeutic exercise (40482) x 40 min to develop ROM, strength, endurance and flexibility.  Included:   Bike L3 (3 holes showing on seat) for 6 minutes  Leg press (set with 7 holes

## 2024-02-05 ENCOUNTER — TREATMENT (OUTPATIENT)
Age: 74
End: 2024-02-05
Payer: MEDICARE

## 2024-02-05 DIAGNOSIS — M25.662 JOINT STIFFNESS OF LEFT LOWER LEG: ICD-10-CM

## 2024-02-05 DIAGNOSIS — M25.562 CHRONIC PAIN OF LEFT KNEE: Primary | ICD-10-CM

## 2024-02-05 DIAGNOSIS — R26.2 DIFFICULTY WALKING: ICD-10-CM

## 2024-02-05 DIAGNOSIS — Z74.09 IMPAIRED MOBILITY AND ADLS: ICD-10-CM

## 2024-02-05 DIAGNOSIS — M62.81 MUSCLE WEAKNESS: ICD-10-CM

## 2024-02-05 DIAGNOSIS — Z78.9 IMPAIRED MOBILITY AND ADLS: ICD-10-CM

## 2024-02-05 DIAGNOSIS — G89.29 CHRONIC PAIN OF LEFT KNEE: Primary | ICD-10-CM

## 2024-02-05 DIAGNOSIS — M79.89 LEFT LEG SWELLING: ICD-10-CM

## 2024-02-05 PROCEDURE — 97110 THERAPEUTIC EXERCISES: CPT | Performed by: PHYSICAL THERAPIST

## 2024-02-05 PROCEDURE — 97016 VASOPNEUMATIC DEVICE THERAPY: CPT | Performed by: PHYSICAL THERAPIST

## 2024-02-06 NOTE — PROGRESS NOTES
[facility] PT [facility] ORTHOPAEDICS  [address] [place] [address]  Dept: [phone]      Physical Therapy Discharge   \"Sam\"       Insurance: Today is 8/20 visits ()    Total Timed Codes: 45 min, Total Treatment Time: 60 min Modifier needed: No        Referring MD: Flip Schmidt Jr., *  Surgery: Left TKA Revision   Surgery Date: 11/30/2023  Previous Surgery Dates for L TKA and revision: 3/14/2023 (revision L TKA), 9/19/2022 (left TKA)      Diagnosis:     ICD-10-CM    1. Chronic pain of left knee  M25.562     G89.29       2. Left leg swelling  M79.89       3. Joint stiffness of left lower leg  M25.662       4. Difficulty walking  R26.2       5. Impaired mobility and ADLs  Z74.09     Z78.9       6. Muscle weakness  M62.81            Therapy precautions:Diabetes/neuropathy- sensory precautions and takes 2 aspirin per day  Per MD/AROM only no forced ROM at this time      Co-morbidities affecting plan of care:  CAD, HTN, Asthma, Anxiety, OA left shoulder, chronic low back pain, DM, COPD. Allergy to bees, restless leg syndrome, R TKA (2021), L TKA (2022), C-spine fusion.       SUBJECTIVE     Patient reports no pain left knee. He states left knee is doing good. He is able to reciprocate stairs in community (ascending and descending) without pain or difficulty. Left knee feels much better than prior to surgery. He is pleased with results of surgery.  He states he is compliant with HEP. Patient states he will continue to work on quad strengthening/LE strengthening and understands HEP.    Medications: no changes since last session    OBJECTIVE     Treatment provided today:  Therapeutic exercise (20959) x 45 min to develop ROM, strength, endurance and flexibility.  Included:   Bike L3 (3 holes showing on seat) for 6 minutes  Leg press (set with 7 holes in back showing) 100# 30x  Single LP (set with 7 holes in back showing) 50# 30x    SAQ 30x with 3#  LAQ 30x with 3#  Quad sets 20x  SLR 30x    Seated HS

## 2024-02-07 ENCOUNTER — TREATMENT (OUTPATIENT)
Age: 74
End: 2024-02-07
Payer: MEDICARE

## 2024-02-07 DIAGNOSIS — M25.662 JOINT STIFFNESS OF LEFT LOWER LEG: ICD-10-CM

## 2024-02-07 DIAGNOSIS — G89.29 CHRONIC PAIN OF LEFT KNEE: Primary | ICD-10-CM

## 2024-02-07 DIAGNOSIS — Z78.9 IMPAIRED MOBILITY AND ADLS: ICD-10-CM

## 2024-02-07 DIAGNOSIS — M25.562 CHRONIC PAIN OF LEFT KNEE: Primary | ICD-10-CM

## 2024-02-07 DIAGNOSIS — M62.81 MUSCLE WEAKNESS: ICD-10-CM

## 2024-02-07 DIAGNOSIS — M79.89 LEFT LEG SWELLING: ICD-10-CM

## 2024-02-07 DIAGNOSIS — Z74.09 IMPAIRED MOBILITY AND ADLS: ICD-10-CM

## 2024-02-07 DIAGNOSIS — R26.2 DIFFICULTY WALKING: ICD-10-CM

## 2024-02-07 PROCEDURE — 97016 VASOPNEUMATIC DEVICE THERAPY: CPT | Performed by: PHYSICAL THERAPIST

## 2024-02-07 PROCEDURE — 97110 THERAPEUTIC EXERCISES: CPT | Performed by: PHYSICAL THERAPIST

## 2024-04-25 ENCOUNTER — OFFICE VISIT (OUTPATIENT)
Dept: ORTHOPEDIC SURGERY | Age: 74
End: 2024-04-25
Payer: MEDICARE

## 2024-04-25 DIAGNOSIS — Z96.652 STATUS POST TOTAL LEFT KNEE REPLACEMENT: Primary | ICD-10-CM

## 2024-04-25 DIAGNOSIS — M54.32 SCIATICA OF LEFT SIDE: ICD-10-CM

## 2024-04-25 DIAGNOSIS — G89.18 POST-OP PAIN: ICD-10-CM

## 2024-04-25 PROCEDURE — 1036F TOBACCO NON-USER: CPT | Performed by: ORTHOPAEDIC SURGERY

## 2024-04-25 PROCEDURE — 3017F COLORECTAL CA SCREEN DOC REV: CPT | Performed by: ORTHOPAEDIC SURGERY

## 2024-04-25 PROCEDURE — G8417 CALC BMI ABV UP PARAM F/U: HCPCS | Performed by: ORTHOPAEDIC SURGERY

## 2024-04-25 PROCEDURE — G8428 CUR MEDS NOT DOCUMENT: HCPCS | Performed by: ORTHOPAEDIC SURGERY

## 2024-04-25 PROCEDURE — 99214 OFFICE O/P EST MOD 30 MIN: CPT | Performed by: ORTHOPAEDIC SURGERY

## 2024-04-25 PROCEDURE — 1123F ACP DISCUSS/DSCN MKR DOCD: CPT | Performed by: ORTHOPAEDIC SURGERY

## 2024-04-25 RX ORDER — MELOXICAM 7.5 MG/1
7.5-15 TABLET ORAL DAILY
Qty: 60 TABLET | Refills: 1 | Status: SHIPPED | OUTPATIENT
Start: 2024-04-25

## 2024-04-25 RX ORDER — GABAPENTIN 100 MG/1
100-300 CAPSULE ORAL NIGHTLY
Qty: 90 CAPSULE | Refills: 0 | Status: SHIPPED | OUTPATIENT
Start: 2024-04-25 | End: 2024-05-25

## 2024-04-25 NOTE — PROGRESS NOTES
X-rays: AP lateral lumbar spine with spot lateral L5-S1 reveals degenerative arthritis and degenerative disc disease of the lumbar spine.  He has a grade 1 anterior listhesis L4 on 5.  X-ray diagnosis: Degenerative arthritis and degenerative disc disease of the lumbar spine with anterior listhesis of L4 on L5.

## 2024-04-25 NOTE — PROGRESS NOTES
Post-op TKA Visit      04/25/24     Allergies   Allergen Reactions    Bee Venom Swelling    Cephalexin Hives    Nefazodone      syncope    Penicillins Other (See Comments)     States causes him to pass out    Sulfa Antibiotics Rash     Current Outpatient Medications on File Prior to Visit   Medication Sig Dispense Refill    montelukast (SINGULAIR) 10 MG tablet Take 1 tablet by mouth nightly as needed      gabapentin (NEURONTIN) 300 MG capsule Take 1 capsule by mouth in the morning and at bedtime.      fluticasone (FLONASE) 50 MCG/ACT nasal spray 1 spray by Each Nostril route as needed for Rhinitis      aspirin (ASPIRIN 81) 81 MG EC tablet Take 1 tablet by mouth in the morning and at bedtime 70 tablet 0    promethazine (PHENERGAN) 12.5 MG tablet Take 1 tablet by mouth 4 times daily as needed for Nausea 20 tablet 2    methocarbamol (ROBAXIN-750) 750 MG tablet Take 1 tablet by mouth 3 times daily as needed (muscle spasm or cramps) 40 tablet 1    meloxicam (MOBIC) 7.5 MG tablet Take 1-2 tablets by mouth daily 60 tablet 1    amLODIPine (NORVASC) 10 MG tablet Take 1 tablet by mouth nightly      lisinopril (PRINIVIL;ZESTRIL) 10 MG tablet Take 4 tablets by mouth nightly      metFORMIN (GLUCOPHAGE-XR) 500 MG extended release tablet Take 2 tablets by mouth in the morning and at bedtime      bromocriptine (PARLODEL) 5 MG capsule Take 1 capsule by mouth in the morning and at bedtime Patient states he takes 4 capsules of 2.5 mg tablets BID      tamsulosin (FLOMAX) 0.4 mg capsule Take 1 capsule by mouth daily      pravastatin (PRAVACHOL) 80 MG tablet Take 1 tablet by mouth daily reports taking every morning.      TESTOSTERONE CYPIONATE IM Inject 50 mg into the muscle every 14 days administer every other Friday evening.      acetaminophen (TYLENOL) 500 MG tablet Take 1 tablet by mouth every 6 hours as needed for Pain for low pain.      budesonide-formoterol (SYMBICORT) 160-4.5 MCG/ACT AERO Inhale 2 puffs into the lungs 2 times

## 2024-05-15 ENCOUNTER — OFFICE VISIT (OUTPATIENT)
Age: 74
End: 2024-05-15
Payer: MEDICARE

## 2024-05-15 DIAGNOSIS — M48.061 SPINAL STENOSIS OF LUMBAR REGION WITHOUT NEUROGENIC CLAUDICATION: Primary | ICD-10-CM

## 2024-05-15 DIAGNOSIS — M43.16 SPONDYLOLISTHESIS, LUMBAR REGION: ICD-10-CM

## 2024-05-15 PROCEDURE — 1036F TOBACCO NON-USER: CPT | Performed by: ORTHOPAEDIC SURGERY

## 2024-05-15 PROCEDURE — 99203 OFFICE O/P NEW LOW 30 MIN: CPT | Performed by: ORTHOPAEDIC SURGERY

## 2024-05-15 PROCEDURE — 3017F COLORECTAL CA SCREEN DOC REV: CPT | Performed by: ORTHOPAEDIC SURGERY

## 2024-05-15 PROCEDURE — 1123F ACP DISCUSS/DSCN MKR DOCD: CPT | Performed by: ORTHOPAEDIC SURGERY

## 2024-05-15 PROCEDURE — G8417 CALC BMI ABV UP PARAM F/U: HCPCS | Performed by: ORTHOPAEDIC SURGERY

## 2024-05-15 PROCEDURE — G8427 DOCREV CUR MEDS BY ELIG CLIN: HCPCS | Performed by: ORTHOPAEDIC SURGERY

## 2024-05-15 NOTE — PROGRESS NOTES
Name: Forrest Elias  YOB: 1950  Gender: male  MRN: [id number]  Age: 73 y.o.      Chief Complaint: Back pain and bilateral leg pain    History of Present Illness:      This is a very pleasant 73 y.o. male who presents with a history of back pain and bilateral leg pain left leg is affected more than the right leg.  He states that he gets shooting pain down the posterior lateral aspect of his left thigh and calf.  He states is been going on for months.  He also has is not able to walk very far because his legs will start to feel heavy.  If he sits down or leans forward his symptoms go away.  He has to lean on the shopping cart when he goes grocery shopping with his wife.  He states his pain is 7/10 in severity and intermittent.  He describes the pain in his legs as throbbing.  He also states that he has numbness in his feet.  He has not had any lumbar spine surgery.  He has had neck surgery done by a neurosurgeon Dr. Iverson.  He does not smoke.  He does have diabetes his last A1c was 6.8..      Medications:       Current Outpatient Medications:     meloxicam (MOBIC) 7.5 MG tablet, Take 1-2 tablets by mouth daily, Disp: 60 tablet, Rfl: 1    gabapentin (NEURONTIN) 100 MG capsule, Take 1-3 capsules by mouth nightly for 30 days., Disp: 90 capsule, Rfl: 0    montelukast (SINGULAIR) 10 MG tablet, Take 1 tablet by mouth nightly as needed, Disp: , Rfl:     gabapentin (NEURONTIN) 300 MG capsule, Take 1 capsule by mouth in the morning and at bedtime., Disp: , Rfl:     fluticasone (FLONASE) 50 MCG/ACT nasal spray, 1 spray by Each Nostril route as needed for Rhinitis, Disp: , Rfl:     aspirin (ASPIRIN 81) 81 MG EC tablet, Take 1 tablet by mouth in the morning and at bedtime, Disp: 70 tablet, Rfl: 0    promethazine (PHENERGAN) 12.5 MG tablet, Take 1 tablet by mouth 4 times daily as needed for Nausea, Disp: 20 tablet, Rfl: 2    methocarbamol (ROBAXIN-750) 750 MG tablet, Take 1 tablet by mouth 3 times daily as

## 2024-06-04 NOTE — PROGRESS NOTES
[facility] PT [facility] ORTHOPAEDICS  [address] [place] [address]  Dept: [phone]      Physical Therapy Initial Assessment       Insurance: Today is 10/20 visits ()  Payor: MEDICARE   Billing pattern: Government- total time     Total Timed Procedure Codes: 25 min, Total Time: 45 min Modifier needed: No      Referring MD: Flip Menezes MD  Referral for: Low back pain  Onset Date: 4/1/2024    Diagnosis:     ICD-10-CM    1. Acute left-sided low back pain with left-sided sciatica  M54.42       2. Joint stiffness of spine  M25.60       3. Muscle weakness  M62.81       4. Impaired mobility and ADLs  Z74.09     Z78.9       5. Difficulty walking  R26.2             Therapy precautions:Diabetes/neuropathy- sensory precautions and Blood thinnersDiabetes/neuropathy- sensory precautions and takes 2 baby aspirins per day   Co-morbidities affecting plan of care: CAD, HTN, Asthma, Anxiety, OA left shoulder, chronic low back pain, DM, COPD. Allergy to bees, restless leg syndrome, R TKA (2021), L TKA (2022), C-spine fusion.       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 Baca several years ago; cardiac cath:  4/20/11    Cervical neck pain with evidence of disc disease     Chronic renal insufficiency 7/20/2016    Controlled diabetes mellitus (HCC) 7/20/2016 7/14/23 A1c 6.4; daily fasting sqbs:   ave    COPD (chronic obstructive pulmonary disease) (HCC) 7/20/2016    Symbicort BID-no PRN inhaler    COVID-19 vaccine series completed

## 2024-06-05 ENCOUNTER — EVALUATION (OUTPATIENT)
Age: 74
End: 2024-06-05
Payer: MEDICARE

## 2024-06-05 DIAGNOSIS — M25.60 JOINT STIFFNESS OF SPINE: ICD-10-CM

## 2024-06-05 DIAGNOSIS — M54.42 ACUTE LEFT-SIDED LOW BACK PAIN WITH LEFT-SIDED SCIATICA: Primary | ICD-10-CM

## 2024-06-05 DIAGNOSIS — Z78.9 IMPAIRED MOBILITY AND ADLS: ICD-10-CM

## 2024-06-05 DIAGNOSIS — Z74.09 IMPAIRED MOBILITY AND ADLS: ICD-10-CM

## 2024-06-05 DIAGNOSIS — R26.2 DIFFICULTY WALKING: ICD-10-CM

## 2024-06-05 DIAGNOSIS — M62.81 MUSCLE WEAKNESS: ICD-10-CM

## 2024-06-05 PROCEDURE — 97110 THERAPEUTIC EXERCISES: CPT | Performed by: PHYSICAL THERAPIST

## 2024-06-05 PROCEDURE — 97162 PT EVAL MOD COMPLEX 30 MIN: CPT | Performed by: PHYSICAL THERAPIST

## 2024-06-05 PROCEDURE — 97140 MANUAL THERAPY 1/> REGIONS: CPT | Performed by: PHYSICAL THERAPIST

## 2024-06-06 NOTE — PROGRESS NOTES
condition/pathology, involved anatomy, and exercise rationale.    ASSESSMENT     Patient has moderate tenderness with palpation to left piriformis and glut med. He is able to move sit to stand and walk with minimal pain now. He is independent with HEP. He has no pain with sit to stand and walking after sitting for 5 minutes. No pain after manual therapy today.      PLAN     Continue with ROM and strengthening exercises to improve function and gait. Progress exercises to patient tolerance. Continue with manual therapy to improve ROM, reduce edema, reduce painful symptoms and improve function. Use modalities as needed to reduce edema, reduce painful symptoms and improve function..      PLAN OF CARE     Effective Dates/Duration: 6/5/2024 TO 8/4/2024 (60 days).    Frequency: 2x/week       GOALS     Short term goals to be met by 7/3/2024  (4 weeks):  Patient will be independent with HEP  Patient will be non-tender with palpation to left piriformis  Patient will be able to reciprocate stairs in clinic without deviation  Patient will be able to initiate gait when he first stands without difficulty or having to stand 5 seconds    Patient will be able to ambulate safely for community distances without pain left LE  Patient will be able to stand to perform all ADLS without pain left LE  Patient will report 2/10 pain left LE at worst left LE  Patient will be able to move sit to stand from 20\" seat without difficulty     Long term goals to be met by 8/4/2024 (60 days):  Patient will demonstrate 3/4 pain-free ROM trunk flexion without left LE pain to do household chores  MMT > +4/5 all planes L LE to reciprocate stairs in community without difficulty or deviation  Patient will be able to sit and travel by car or 2 hours without increased left LE pain  Bilateral hamstrings flexibility will increase by  10 degrees each LE to allow patient to bend over with less difficulty  Patient will be able to stand for 30 minutes without pain

## 2024-06-07 ENCOUNTER — TREATMENT (OUTPATIENT)
Age: 74
End: 2024-06-07
Payer: MEDICARE

## 2024-06-07 DIAGNOSIS — M62.81 MUSCLE WEAKNESS: ICD-10-CM

## 2024-06-07 DIAGNOSIS — Z74.09 IMPAIRED MOBILITY AND ADLS: ICD-10-CM

## 2024-06-07 DIAGNOSIS — Z78.9 IMPAIRED MOBILITY AND ADLS: ICD-10-CM

## 2024-06-07 DIAGNOSIS — M54.42 ACUTE LEFT-SIDED LOW BACK PAIN WITH LEFT-SIDED SCIATICA: Primary | ICD-10-CM

## 2024-06-07 DIAGNOSIS — R26.2 DIFFICULTY WALKING: ICD-10-CM

## 2024-06-07 DIAGNOSIS — M25.60 JOINT STIFFNESS OF SPINE: ICD-10-CM

## 2024-06-07 PROCEDURE — 97110 THERAPEUTIC EXERCISES: CPT | Performed by: PHYSICAL THERAPIST

## 2024-06-07 PROCEDURE — 97140 MANUAL THERAPY 1/> REGIONS: CPT | Performed by: PHYSICAL THERAPIST

## 2024-06-10 NOTE — PROGRESS NOTES
[facility] PT [facility] ORTHOPAEDICS  [facility] [address]  Dept: [phone]      Physical Therapy Daily Note       Insurance: Today is 12/20 visits ()  Payor: MEDICARE   Billing pattern: Government- total time     Total Timed Procedure Codes: 55 min, Total Time: 55 min Modifier needed: No      Referring MD: Flip Menezes MD  Referral for: Low back pain  Onset Date: 4/1/2024    Diagnosis:     ICD-10-CM    1. Acute left-sided low back pain with left-sided sciatica  M54.42       2. Joint stiffness of spine  M25.60       3. Muscle weakness  M62.81       4. Impaired mobility and ADLs  Z74.09     Z78.9       5. Difficulty walking  R26.2             Therapy precautions:Diabetes/neuropathy- sensory precautions and Blood thinnersDiabetes/neuropathy- sensory precautions and takes 2 baby aspirins per day   Co-morbidities affecting plan of care: CAD, HTN, Asthma, Anxiety, OA left shoulder, chronic low back pain, DM, COPD. Allergy to bees, restless leg syndrome, R TKA (2021), L TKA (2022), C-spine fusion.       SUBJECTIVE     Patient reports he has moderate pain left buttock today. He states he had increased pain yesterday doing self-massage with tennis ball. He states after manual therapy last visit, right buttock pain had decreased significantly. He is compliant with HEP. He has tenderness with palpation left buttock. He is not putting wallet in back pocket now. He states left foot turns inward in walking. After stretching, he states he is ambulating normally and foot is not turning inward.      Medications: no changes since last session    OBJECTIVE     Treatment provided today:  Therapeutic exercise (12341) x 40 min to develop ROM, strength, endurance and flexibility.  Included:   Piriformis stretch 2x  Seated HS stretch 1 minute  LTR 2x    Clams 30x  Sit to stand from 20\" seat 20x  SLR 20x 2  Bridges 30x    Standing hip 3 way with teal loop 20x    Followed by Manual therapy (70585) x 15 min

## 2024-06-11 ENCOUNTER — TREATMENT (OUTPATIENT)
Age: 74
End: 2024-06-11
Payer: MEDICARE

## 2024-06-11 DIAGNOSIS — Z74.09 IMPAIRED MOBILITY AND ADLS: ICD-10-CM

## 2024-06-11 DIAGNOSIS — Z78.9 IMPAIRED MOBILITY AND ADLS: ICD-10-CM

## 2024-06-11 DIAGNOSIS — M25.60 JOINT STIFFNESS OF SPINE: ICD-10-CM

## 2024-06-11 DIAGNOSIS — M62.81 MUSCLE WEAKNESS: ICD-10-CM

## 2024-06-11 DIAGNOSIS — R26.2 DIFFICULTY WALKING: ICD-10-CM

## 2024-06-11 DIAGNOSIS — M54.42 ACUTE LEFT-SIDED LOW BACK PAIN WITH LEFT-SIDED SCIATICA: Primary | ICD-10-CM

## 2024-06-11 PROCEDURE — 97110 THERAPEUTIC EXERCISES: CPT | Performed by: PHYSICAL THERAPIST

## 2024-06-11 PROCEDURE — 97140 MANUAL THERAPY 1/> REGIONS: CPT | Performed by: PHYSICAL THERAPIST

## 2024-06-13 NOTE — PROGRESS NOTES
demonstrate 3/4 pain-free ROM trunk flexion without left LE pain to do household chores  MMT > +4/5 all planes L LE to reciprocate stairs in community without difficulty or deviation  Patient will be able to sit and travel by car or 2 hours without increased left LE pain  Bilateral hamstrings flexibility will increase by  10 degrees each LE to allow patient to bend over with less difficulty  Patient will be able to stand for 30 minutes without pain left LE    Patient will return to previous level of function with ADL  Improve Oswestry to >/= 10% functional deficit  Patient is discharged from PT     [facility]  Access Code: [email or web address]

## 2024-06-14 ENCOUNTER — TREATMENT (OUTPATIENT)
Age: 74
End: 2024-06-14

## 2024-06-14 DIAGNOSIS — M54.42 ACUTE LEFT-SIDED LOW BACK PAIN WITH LEFT-SIDED SCIATICA: Primary | ICD-10-CM

## 2024-06-14 DIAGNOSIS — M62.81 MUSCLE WEAKNESS: ICD-10-CM

## 2024-06-14 DIAGNOSIS — Z74.09 IMPAIRED MOBILITY AND ADLS: ICD-10-CM

## 2024-06-14 DIAGNOSIS — Z78.9 IMPAIRED MOBILITY AND ADLS: ICD-10-CM

## 2024-06-14 DIAGNOSIS — M25.60 JOINT STIFFNESS OF SPINE: ICD-10-CM

## 2024-06-14 DIAGNOSIS — R26.2 DIFFICULTY WALKING: ICD-10-CM

## 2024-06-17 NOTE — PROGRESS NOTES
will demonstrate 3/4 pain-free ROM trunk flexion without left LE pain to do household chores  MMT > +4/5 all planes L LE to reciprocate stairs in community without difficulty or deviation  Patient will be able to sit and travel by car or 2 hours without increased left LE pain  Bilateral hamstrings flexibility will increase by  10 degrees each LE to allow patient to bend over with less difficulty  Patient will be able to stand for 30 minutes without pain left LE    Patient will return to previous level of function with ADL  Improve Oswestry to >/= 10% functional deficit  Patient is discharged from [facility]  Access Code: [email or web address]

## 2024-06-18 ENCOUNTER — TREATMENT (OUTPATIENT)
Age: 74
End: 2024-06-18
Payer: MEDICARE

## 2024-06-18 DIAGNOSIS — Z74.09 IMPAIRED MOBILITY AND ADLS: ICD-10-CM

## 2024-06-18 DIAGNOSIS — Z78.9 IMPAIRED MOBILITY AND ADLS: ICD-10-CM

## 2024-06-18 DIAGNOSIS — M62.81 MUSCLE WEAKNESS: ICD-10-CM

## 2024-06-18 DIAGNOSIS — M54.42 ACUTE LEFT-SIDED LOW BACK PAIN WITH LEFT-SIDED SCIATICA: Primary | ICD-10-CM

## 2024-06-18 DIAGNOSIS — M25.60 JOINT STIFFNESS OF SPINE: ICD-10-CM

## 2024-06-18 DIAGNOSIS — R26.2 DIFFICULTY WALKING: ICD-10-CM

## 2024-06-18 PROCEDURE — 97140 MANUAL THERAPY 1/> REGIONS: CPT | Performed by: PHYSICAL THERAPIST

## 2024-06-18 PROCEDURE — 97110 THERAPEUTIC EXERCISES: CPT | Performed by: PHYSICAL THERAPIST

## 2024-06-20 NOTE — PROGRESS NOTES
without difficulty -MET    Long term goals to be met by 8/4/2024 (60 days):  Patient will demonstrate 3/4 pain-free ROM trunk flexion without left LE pain to do household chores  MMT > +4/5 all planes L LE to reciprocate stairs in community without difficulty or deviation  Patient will be able to sit and travel by car or 2 hours without increased left LE pain  Bilateral hamstrings flexibility will increase by  10 degrees each LE to allow patient to bend over with less difficulty  Patient will be able to stand for 30 minutes without pain left LE    Patient will return to previous level of function with ADL  Improve Oswestry to >/= 10% functional deficit  Patient is discharged from [facility]  Access Code: [email or web address]

## 2024-06-21 ENCOUNTER — TREATMENT (OUTPATIENT)
Age: 74
End: 2024-06-21

## 2024-06-21 DIAGNOSIS — R26.2 DIFFICULTY WALKING: ICD-10-CM

## 2024-06-21 DIAGNOSIS — M54.42 ACUTE LEFT-SIDED LOW BACK PAIN WITH LEFT-SIDED SCIATICA: Primary | ICD-10-CM

## 2024-06-21 DIAGNOSIS — M25.60 JOINT STIFFNESS OF SPINE: ICD-10-CM

## 2024-06-21 DIAGNOSIS — Z74.09 IMPAIRED MOBILITY AND ADLS: ICD-10-CM

## 2024-06-21 DIAGNOSIS — M62.81 MUSCLE WEAKNESS: ICD-10-CM

## 2024-06-21 DIAGNOSIS — Z78.9 IMPAIRED MOBILITY AND ADLS: ICD-10-CM

## 2024-06-24 NOTE — PROGRESS NOTES
LE  Patient will be able to move sit to stand from 20\" seat without difficulty -MET    Long term goals to be met by 8/4/2024 (60 days):  Patient will demonstrate 3/4 pain-free ROM trunk flexion without left LE pain to do household chores  MMT > +4/5 all planes L LE to reciprocate stairs in community without difficulty or deviation  Patient will be able to sit and travel by car or 2 hours without increased left LE pain  Bilateral hamstrings flexibility will increase by  10 degrees each LE to allow patient to bend over with less difficulty  Patient will be able to stand for 30 minutes without pain left LE    Patient will return to previous level of function with ADL  Improve Oswestry to >/= 10% functional deficit  Patient is discharged from [facility]  Access Code: [email or web address]  
on unit

## 2024-06-25 ENCOUNTER — TREATMENT (OUTPATIENT)
Age: 74
End: 2024-06-25
Payer: MEDICARE

## 2024-06-25 DIAGNOSIS — M54.42 ACUTE LEFT-SIDED LOW BACK PAIN WITH LEFT-SIDED SCIATICA: Primary | ICD-10-CM

## 2024-06-25 DIAGNOSIS — R26.2 DIFFICULTY WALKING: ICD-10-CM

## 2024-06-25 DIAGNOSIS — M25.60 JOINT STIFFNESS OF SPINE: ICD-10-CM

## 2024-06-25 DIAGNOSIS — Z78.9 IMPAIRED MOBILITY AND ADLS: ICD-10-CM

## 2024-06-25 DIAGNOSIS — M62.81 MUSCLE WEAKNESS: ICD-10-CM

## 2024-06-25 DIAGNOSIS — Z74.09 IMPAIRED MOBILITY AND ADLS: ICD-10-CM

## 2024-06-25 PROCEDURE — 97140 MANUAL THERAPY 1/> REGIONS: CPT | Performed by: PHYSICAL THERAPIST

## 2024-06-25 PROCEDURE — 97110 THERAPEUTIC EXERCISES: CPT | Performed by: PHYSICAL THERAPIST

## 2024-06-27 NOTE — PROGRESS NOTES
[facility]  [address] [address]  Dept: [phone]      Physical Therapy Discharge       Insurance: Today is 17/20 visits ()  Payor: MEDICARE   Billing pattern: Government- total time     Total Timed Procedure Codes: 40 min, Total Time: 40 min Modifier needed: No      Referring MD: Flip Menezes MD  Referral for: Low back pain  Onset Date: 4/1/2024    Diagnosis:     ICD-10-CM    1. Acute left-sided low back pain with left-sided sciatica  M54.42       2. Joint stiffness of spine  M25.60       3. Muscle weakness  M62.81       4. Impaired mobility and ADLs  Z74.09     Z78.9       5. Difficulty walking  R26.2             Therapy precautions:Diabetes/neuropathy- sensory precautions and Blood thinnersDiabetes/neuropathy- sensory precautions and takes 2 baby aspirins per day   Co-morbidities affecting plan of care: CAD, HTN, Asthma, Anxiety, OA left shoulder, chronic low back pain, DM, COPD. Allergy to bees, restless leg syndrome, R TKA (2021), L TKA (2022), C-spine fusion.       SUBJECTIVE     Patient reports he no pain left buttock (0/10) today. He has left LE pain for a few minutes this morning when he was standing at sink to brush his teeth. Pain only lasted a few minutes. He has no c/o left LE pain now. He occasionally has pain down left LE to knee with trunk rotation to left.  He is compliant with HEP.     Medications: no changes since last session    OBJECTIVE     Treatment provided today:  Therapeutic exercise (90398) x 40 min to develop ROM, strength, endurance and flexibility.  Included:   Piriformis stretch 2x  Seated HS stretch 1 minute x2  LTR 2x    Clams 30x  Sit to stand from 20\" seat 30x  SLR 20x 2  Bridges with teal loop 30x    Seated IR with ball using teal loop 30x      Side step with teal loop 2x        Patient Education on the condition/pathology, involved anatomy, and exercise rationale.    AROM Lumbar Spine: normal is 4/4     Lumbar Spine Right Left

## 2024-06-28 ENCOUNTER — TREATMENT (OUTPATIENT)
Age: 74
End: 2024-06-28
Payer: MEDICARE

## 2024-06-28 DIAGNOSIS — R26.2 DIFFICULTY WALKING: ICD-10-CM

## 2024-06-28 DIAGNOSIS — Z78.9 IMPAIRED MOBILITY AND ADLS: ICD-10-CM

## 2024-06-28 DIAGNOSIS — M54.42 ACUTE LEFT-SIDED LOW BACK PAIN WITH LEFT-SIDED SCIATICA: Primary | ICD-10-CM

## 2024-06-28 DIAGNOSIS — M62.81 MUSCLE WEAKNESS: ICD-10-CM

## 2024-06-28 DIAGNOSIS — Z74.09 IMPAIRED MOBILITY AND ADLS: ICD-10-CM

## 2024-06-28 DIAGNOSIS — M25.60 JOINT STIFFNESS OF SPINE: ICD-10-CM

## 2024-06-28 PROCEDURE — 97110 THERAPEUTIC EXERCISES: CPT | Performed by: PHYSICAL THERAPIST

## 2024-07-18 ENCOUNTER — OFFICE VISIT (OUTPATIENT)
Dept: ORTHOPEDIC SURGERY | Age: 74
End: 2024-07-18
Payer: MEDICARE

## 2024-07-18 DIAGNOSIS — G89.18 POST-OP PAIN: ICD-10-CM

## 2024-07-18 DIAGNOSIS — M54.32 SCIATICA OF LEFT SIDE: ICD-10-CM

## 2024-07-18 PROCEDURE — G8417 CALC BMI ABV UP PARAM F/U: HCPCS | Performed by: ORTHOPAEDIC SURGERY

## 2024-07-18 PROCEDURE — G8428 CUR MEDS NOT DOCUMENT: HCPCS | Performed by: ORTHOPAEDIC SURGERY

## 2024-07-18 PROCEDURE — 99214 OFFICE O/P EST MOD 30 MIN: CPT | Performed by: ORTHOPAEDIC SURGERY

## 2024-07-18 PROCEDURE — 3017F COLORECTAL CA SCREEN DOC REV: CPT | Performed by: ORTHOPAEDIC SURGERY

## 2024-07-18 PROCEDURE — 1036F TOBACCO NON-USER: CPT | Performed by: ORTHOPAEDIC SURGERY

## 2024-07-18 PROCEDURE — 1123F ACP DISCUSS/DSCN MKR DOCD: CPT | Performed by: ORTHOPAEDIC SURGERY

## 2024-07-18 RX ORDER — MELOXICAM 7.5 MG/1
7.5-15 TABLET ORAL DAILY
Qty: 60 TABLET | Refills: 1 | Status: SHIPPED | OUTPATIENT
Start: 2024-07-18

## 2024-07-18 RX ORDER — METHOCARBAMOL 500 MG/1
500 TABLET, FILM COATED ORAL 3 TIMES DAILY
Qty: 30 TABLET | Refills: 0 | Status: SHIPPED | OUTPATIENT
Start: 2024-07-18 | End: 2024-07-28

## 2024-07-18 NOTE — PROGRESS NOTES
Post-op TKA Visit      07/18/24     Allergies   Allergen Reactions    Bee Venom Swelling    Cephalexin Hives    Misc. Sulfonamide Containing Compounds     Nefazodone      syncope    Penicillins Other (See Comments)     States causes him to pass out    Sulfa Antibiotics Rash     Current Outpatient Medications on File Prior to Visit   Medication Sig Dispense Refill    meloxicam (MOBIC) 7.5 MG tablet Take 1-2 tablets by mouth daily 60 tablet 1    gabapentin (NEURONTIN) 100 MG capsule Take 1-3 capsules by mouth nightly for 30 days. 90 capsule 0    montelukast (SINGULAIR) 10 MG tablet Take 1 tablet by mouth nightly as needed      gabapentin (NEURONTIN) 300 MG capsule Take 1 capsule by mouth in the morning and at bedtime.      fluticasone (FLONASE) 50 MCG/ACT nasal spray 1 spray by Each Nostril route as needed for Rhinitis      aspirin (ASPIRIN 81) 81 MG EC tablet Take 1 tablet by mouth in the morning and at bedtime 70 tablet 0    promethazine (PHENERGAN) 12.5 MG tablet Take 1 tablet by mouth 4 times daily as needed for Nausea 20 tablet 2    methocarbamol (ROBAXIN-750) 750 MG tablet Take 1 tablet by mouth 3 times daily as needed (muscle spasm or cramps) 40 tablet 1    amLODIPine (NORVASC) 10 MG tablet Take 1 tablet by mouth nightly      lisinopril (PRINIVIL;ZESTRIL) 10 MG tablet Take 4 tablets by mouth nightly      metFORMIN (GLUCOPHAGE-XR) 500 MG extended release tablet Take 2 tablets by mouth in the morning and at bedtime      bromocriptine (PARLODEL) 5 MG capsule Take 1 capsule by mouth in the morning and at bedtime Patient states he takes 4 capsules of 2.5 mg tablets BID      tamsulosin (FLOMAX) 0.4 mg capsule Take 1 capsule by mouth daily      pravastatin (PRAVACHOL) 80 MG tablet Take 1 tablet by mouth daily reports taking every morning.      TESTOSTERONE CYPIONATE IM Inject 50 mg into the muscle every 14 days administer every other Friday evening.      acetaminophen (TYLENOL) 500 MG tablet Take 1 tablet by mouth

## 2024-07-19 ENCOUNTER — TELEPHONE (OUTPATIENT)
Dept: ORTHOPEDIC SURGERY | Age: 74
End: 2024-07-19

## 2024-07-19 DIAGNOSIS — M54.16 LUMBAR RADICULOPATHY: Primary | ICD-10-CM

## 2024-07-30 ENCOUNTER — OFFICE VISIT (OUTPATIENT)
Dept: ORTHOPEDIC SURGERY | Age: 74
End: 2024-07-30
Payer: MEDICARE

## 2024-07-30 DIAGNOSIS — M48.061 SPINAL STENOSIS OF LUMBAR REGION WITHOUT NEUROGENIC CLAUDICATION: ICD-10-CM

## 2024-07-30 DIAGNOSIS — M54.16 LUMBAR RADICULOPATHY: Primary | ICD-10-CM

## 2024-07-30 PROCEDURE — 62323 NJX INTERLAMINAR LMBR/SAC: CPT | Performed by: PHYSICAL MEDICINE & REHABILITATION

## 2024-07-30 RX ORDER — BETAMETHASONE SODIUM PHOSPHATE AND BETAMETHASONE ACETATE 3; 3 MG/ML; MG/ML
12 INJECTION, SUSPENSION INTRA-ARTICULAR; INTRALESIONAL; INTRAMUSCULAR; SOFT TISSUE ONCE
Status: COMPLETED | OUTPATIENT
Start: 2024-07-30 | End: 2024-07-30

## 2024-07-30 RX ADMIN — BETAMETHASONE SODIUM PHOSPHATE AND BETAMETHASONE ACETATE 12 MG: 3; 3 INJECTION, SUSPENSION INTRA-ARTICULAR; INTRALESIONAL; INTRAMUSCULAR; SOFT TISSUE at 16:19

## 2024-07-30 NOTE — PROGRESS NOTES
Name: Forrest Elias  YOB: 1950  Gender: male  MRN: [id number]        Interlaminar IRMA Procedure Note      Procedure: L4-L5 interlaminar epidural steroid injection    Precautions: Forrest Elias denies prior sensitivity to steroid, local anesthetic, iodine, or shellfish.       Consent:  Consent was obtained prior to the procedure. The procedure was discussed at length with Forrest Elais. He was given the opportunity to ask questions regarding the procedure and its associated risks.  In addition to the potential risks associated with the procedure itself, the patient was informed both verbally and in writing of potential side effects of the use glucocorticoids.  The patient appeared to comprehend the informed consent and desired to have the procedure performed, and informed consent was signed.     He was placed in a prone position on the fluoroscopy table and the skin was prepped and draped in a routine sterile fashion. The areas to be injected were each anesthetized with 1 ml of 1% Lidocaine. A 22 gauge 3.5 inch spinal needle was carefully advanced under fluoroscopic guidance to the L4-L5 interlaminar space (left paramedian). 0.5 ml of 70% of Omnipaque was injected to confirm proper needle placement and absence of subdural or vascular flow Once proper placement was confirmed, 2ml of sterile water and 12 mg of betamethasone were injected through the spinal needle.     This reproduced his usual low back/left leg pain.    Fluoroscopic guidance was used intermittently over a 10-minute period to insure proper needle placement and his safety. A hard copy of the fluoroscopic image has been placed in his chart and is saved on the C-arm hard drive. He was monitored for 30 minutes after the procedure and discharged home in a stable fashion with a routine follow up.    Procedural Diagnosis:     ICD-10-CM    1. Lumbar radiculopathy  M54.16 XR INJ SPINE THER SUBST LUM/SAC W IMG

## 2024-08-09 NOTE — PROGRESS NOTES
[facility]  [address]  [place], [place] [address] [phone]    Patient Name:  Forrest Elias  YOB: 1950    Forrest Elias, was evaluated through a synchronous (real-time) audio-video encounter. The patient (or guardian if applicable) is aware that this is a billable service, which includes applicable co-pays. This Virtual Visit was conducted with patient's (and/or legal guardian's) consent. Patient identification was verified, and a caregiver was present when appropriate.   The patient was located at Home: [address] [place] [id number]  Provider was located at Home (Appt Dept State): [place]  Confirm you are appropriately licensed, registered, or certified to deliver care in the state where the patient is located as indicated above. If you are not or unsure, please re-schedule the visit: Yes, I confirm.          --SOLOMON Gutiérrez - CNP on 8/12/2024 at 11:57 AM             Office Visit 8/12/2024    CHIEF COMPLAINT:    Chief Complaint   Patient presents with    Sleep Apnea       HISTORY OF PRESENT ILLNESS:  Patient is being seen today via phone call. He was unable to get his camera enabled for the call. Patient had a sleep study in 2014 with AHI 23.6 events per hour with lowest oxygen saturation 78%. He is prescribed CPAP 11 cm using nasal mask. Download reveals excellent compliance but download stops on December 21, 2023.  AHI is 1.6/hr.  He has the new S11 machine and it should be recording remotely. Will reach out to [facility] to have his machine serviced.     He is having dry mouth occasionally and we discussed how to adjust climate control settings to help with this. He states that he has sciatica and some back pain. He had his knee replaced for a second time and is doing better. He reports weight fluctuations between 195-212 lbs.                    8/12/2024     9:46 AM 8/10/2023     9:45 AM 6/28/2022     9:49 AM   Sleep Medicine   Sitting and

## 2024-08-12 ENCOUNTER — TELEMEDICINE (OUTPATIENT)
Dept: SLEEP MEDICINE | Age: 74
End: 2024-08-12
Payer: MEDICARE

## 2024-08-12 DIAGNOSIS — G47.33 OSA ON CPAP: Primary | ICD-10-CM

## 2024-08-12 PROCEDURE — 99442 PR PHYS/QHP TELEPHONE EVALUATION 11-20 MIN: CPT | Performed by: NURSE PRACTITIONER

## 2024-08-12 ASSESSMENT — SLEEP AND FATIGUE QUESTIONNAIRES
HOW LIKELY ARE YOU TO NOD OFF OR FALL ASLEEP WHILE SITTING QUIETLY AFTER LUNCH WITHOUT ALCOHOL: WOULD NEVER DOZE
HOW LIKELY ARE YOU TO NOD OFF OR FALL ASLEEP WHILE SITTING INACTIVE IN A PUBLIC PLACE: WOULD NEVER DOZE
ESS TOTAL SCORE: 2
HOW LIKELY ARE YOU TO NOD OFF OR FALL ASLEEP WHILE SITTING AND READING: WOULD NEVER DOZE
HOW LIKELY ARE YOU TO NOD OFF OR FALL ASLEEP WHILE WATCHING TV: MODERATE CHANCE OF DOZING
HOW LIKELY ARE YOU TO NOD OFF OR FALL ASLEEP WHILE LYING DOWN TO REST IN THE AFTERNOON WHEN CIRCUMSTANCES PERMIT: WOULD NEVER DOZE
HOW LIKELY ARE YOU TO NOD OFF OR FALL ASLEEP IN A CAR, WHILE STOPPED FOR A FEW MINUTES IN TRAFFIC: WOULD NEVER DOZE
HOW LIKELY ARE YOU TO NOD OFF OR FALL ASLEEP WHILE SITTING AND TALKING TO SOMEONE: WOULD NEVER DOZE
HOW LIKELY ARE YOU TO NOD OFF OR FALL ASLEEP WHEN YOU ARE A PASSENGER IN A CAR FOR AN HOUR WITHOUT A BREAK: WOULD NEVER DOZE

## 2024-08-20 ENCOUNTER — OFFICE VISIT (OUTPATIENT)
Age: 74
End: 2024-08-20
Payer: MEDICARE

## 2024-08-20 DIAGNOSIS — M48.062 SPINAL STENOSIS OF LUMBAR REGION WITH NEUROGENIC CLAUDICATION: Primary | ICD-10-CM

## 2024-08-20 PROCEDURE — 3017F COLORECTAL CA SCREEN DOC REV: CPT | Performed by: ORTHOPAEDIC SURGERY

## 2024-08-20 PROCEDURE — 99213 OFFICE O/P EST LOW 20 MIN: CPT | Performed by: ORTHOPAEDIC SURGERY

## 2024-08-20 PROCEDURE — 1036F TOBACCO NON-USER: CPT | Performed by: ORTHOPAEDIC SURGERY

## 2024-08-20 PROCEDURE — G8417 CALC BMI ABV UP PARAM F/U: HCPCS | Performed by: ORTHOPAEDIC SURGERY

## 2024-08-20 PROCEDURE — G8427 DOCREV CUR MEDS BY ELIG CLIN: HCPCS | Performed by: ORTHOPAEDIC SURGERY

## 2024-08-20 PROCEDURE — 1123F ACP DISCUSS/DSCN MKR DOCD: CPT | Performed by: ORTHOPAEDIC SURGERY

## 2024-08-20 NOTE — PROGRESS NOTES
2=normal; NT= not tested  Reflexes    Reflexes   Right Left   Quadriceps (L4) 2 2   Achilles (S1) 2 2     Strength testing in the lower extremity reveals the following based on the 5 point grading scale:     HF  (L2) KE (L3/4) ADF (L4) EHL (L5) APF (S1)   Right 5 5 5 5 5   Left 5 5 5 5 5   0=total paralysis; 1=palpable or visible contraction; 2= active movement with gravity eliminated; 3= active movement against gravity; 4= active movement against moderate resistance; 5= active movement against full resistance     Straight leg raise testing is negative    Radiographic Studies:     X-rays including AP and lateral views of the lumbar spine were reviewed and interpreted:     There is noted to be a L4-5 spondylolisthesis. Also, there are multiple spondylotic changes noted including loss of disk height and osteophytes. No destructive lesion.  Bone quality appears normal.       Radiographic Impression:    Normal lumbar spine  Lumbar spondylosis    MRI of the lumbar spine images were reviewed and interpreted: MRI lumbar spine shows spondylolisthesis at L4-5.  There is severe spinal stenosis at that level.  There is moderate spinal stenosis at L3-4    Diagnosis:      ICD-10-CM    1. Spinal stenosis of lumbar region with neurogenic claudication  M48.062           Assessment/Plan:      -I discussed with the patient the diagnosis as well as the imaging findings.  At this time I recommend patient consider surgical intervention which would be an L4-5 laminectomy and instrumented fusion with a TLIF. Risk of surgery includes but not limited to risk of anesthesia; spinal fluid leakage requiring repair; blood loss needing transfusion; permanent neurologic deficit; need for additional procedures or repeat operations; failure of instrumentation if a fusion is done; infection; continued back pain.  I did discuss him that the goal of surgery was to improve his walking tolerance and leg pain..  He will let us know if he decides to

## 2024-10-18 ENCOUNTER — TELEPHONE (OUTPATIENT)
Dept: ORTHOPEDIC SURGERY | Age: 74
End: 2024-10-18

## 2024-10-18 NOTE — TELEPHONE ENCOUNTER
Returned call patient states last injection helped for 2 days and now he's having bilateral leg pain and would like an injection. Patient is aware he will need to come back in to be evaluate since last injection didn't help. Patient is scheduled with Dr. Menezes on 10/22/24.

## 2024-10-18 NOTE — TELEPHONE ENCOUNTER
Appointment Request  (Newest Message First)  Forrest Elias \"Sam\"  [facility]  Staff17 hours ago (7:54 PM)       Appointment Request From: Forrest Elias     With Provider: Dr. Thania Gresham MD [[facility]]     Preferred Date Range: 10/21/2024 - 10/24/2024     Preferred Times: Any Time     Reason for visit: Request an Appointment     Comments:  Another injection in my back

## 2024-10-22 ENCOUNTER — OFFICE VISIT (OUTPATIENT)
Age: 74
End: 2024-10-22
Payer: MEDICARE

## 2024-10-22 DIAGNOSIS — M43.16 SPONDYLOLISTHESIS, LUMBAR REGION: ICD-10-CM

## 2024-10-22 DIAGNOSIS — M48.062 SPINAL STENOSIS OF LUMBAR REGION WITH NEUROGENIC CLAUDICATION: Primary | ICD-10-CM

## 2024-10-22 PROCEDURE — 1036F TOBACCO NON-USER: CPT | Performed by: ORTHOPAEDIC SURGERY

## 2024-10-22 PROCEDURE — G8427 DOCREV CUR MEDS BY ELIG CLIN: HCPCS | Performed by: ORTHOPAEDIC SURGERY

## 2024-10-22 PROCEDURE — 1123F ACP DISCUSS/DSCN MKR DOCD: CPT | Performed by: ORTHOPAEDIC SURGERY

## 2024-10-22 PROCEDURE — 99213 OFFICE O/P EST LOW 20 MIN: CPT | Performed by: ORTHOPAEDIC SURGERY

## 2024-10-22 PROCEDURE — G8417 CALC BMI ABV UP PARAM F/U: HCPCS | Performed by: ORTHOPAEDIC SURGERY

## 2024-10-22 PROCEDURE — G8484 FLU IMMUNIZE NO ADMIN: HCPCS | Performed by: ORTHOPAEDIC SURGERY

## 2024-10-22 PROCEDURE — 3017F COLORECTAL CA SCREEN DOC REV: CPT | Performed by: ORTHOPAEDIC SURGERY

## 2024-10-22 RX ORDER — FUROSEMIDE 40 MG/1
TABLET ORAL
COMMUNITY
Start: 2024-09-04

## 2024-10-22 RX ORDER — DAPAGLIFLOZIN 10 MG/1
TABLET, FILM COATED ORAL
COMMUNITY
Start: 2024-09-26

## 2024-10-22 RX ORDER — POTASSIUM CHLORIDE 750 MG/1
TABLET, EXTENDED RELEASE ORAL
COMMUNITY
Start: 2024-09-04

## 2024-10-22 RX ORDER — FINASTERIDE 5 MG/1
TABLET, FILM COATED ORAL
COMMUNITY
Start: 2024-09-04

## 2024-10-22 NOTE — PROGRESS NOTES
bilaterally.      Pulses in the lower extremities are palpable and equal  Sensory testing:     L2 L3 L4 L5 S1 S2   Right 2 2 2 2 2 2   Left 2 2 2 2 2 2     0=absent; 1=altered; 2=normal; NT= not tested  Reflexes    Reflexes   Right Left   Quadriceps (L4) 2 2   Achilles (S1) 2 2     Strength testing in the lower extremity reveals the following based on the 5 point grading scale:     HF  (L2) KE (L3/4) ADF (L4) EHL (L5) APF (S1)   Right 5 5 5 5 5   Left 5 5 5 5 5   0=total paralysis; 1=palpable or visible contraction; 2= active movement with gravity eliminated; 3= active movement against gravity; 4= active movement against moderate resistance; 5= active movement against full resistance     Straight leg raise testing is negative    Radiographic Studies:     X-rays including AP and lateral views of the lumbar spine were reviewed and interpreted:     There is noted to be a L4-5 spondylolisthesis. Also, there are multiple spondylotic changes noted including loss of disk height and osteophytes. No destructive lesion.  Bone quality appears normal.       Radiographic Impression:    Lumbar spondylosis  Lumbar spondylolisthesis    MRI of the lumbar spine images were reviewed and interpreted: MRI of the lumbar spine shows severe spinal stenosis at L4-5.  Spondylolisthesis at L4-5.  Moderate spinal stenosis at L3-4    Diagnosis:      ICD-10-CM    1. Spinal stenosis of lumbar region with neurogenic claudication  M48.062 XR LUMBAR SPINE (MIN 4 VIEWS)      2. Spondylolisthesis, lumbar region  M43.16           Assessment/Plan:      -I discussed with the patient the diagnosis as well as the imaging findings.  At this time I recommend patient consider surgical intervention which be an L4-5 laminectomy and fusion with TLIF. Risk of surgery includes but not limited to risk of anesthesia; spinal fluid leakage requiring repair; blood loss needing transfusion; permanent neurologic deficit; need for additional procedures or repeat

## 2024-10-23 ENCOUNTER — PREP FOR PROCEDURE (OUTPATIENT)
Age: 74
End: 2024-10-23

## 2024-10-23 ENCOUNTER — TELEPHONE (OUTPATIENT)
Age: 74
End: 2024-10-23

## 2024-10-23 DIAGNOSIS — M43.16 SPONDYLOLISTHESIS, LUMBAR REGION: ICD-10-CM

## 2024-10-23 DIAGNOSIS — M48.062 SPINAL STENOSIS OF LUMBAR REGION WITH NEUROGENIC CLAUDICATION: Primary | ICD-10-CM

## 2024-10-23 NOTE — TELEPHONE ENCOUNTER
I called spoke to patient he is aware pre assessment appointment is scheduled on 11/7/24 at 8:45 am. Patient is aware of date, time, and location. Patient verbalized understanding.

## 2024-11-07 ENCOUNTER — HOSPITAL ENCOUNTER (OUTPATIENT)
Dept: SURGERY | Age: 74
Discharge: HOME | End: 2024-11-10
Payer: MEDICARE

## 2024-11-07 VITALS
HEIGHT: 70 IN | HEART RATE: 86 BPM | WEIGHT: 207.1 LBS | RESPIRATION RATE: 16 BRPM | SYSTOLIC BLOOD PRESSURE: 160 MMHG | TEMPERATURE: 97.5 F | OXYGEN SATURATION: 94 % | BODY MASS INDEX: 29.65 KG/M2 | DIASTOLIC BLOOD PRESSURE: 63 MMHG

## 2024-11-07 LAB
ANION GAP SERPL CALC-SCNC: 13 MMOL/L (ref 7–16)
BUN SERPL-MCNC: 20 MG/DL (ref 8–23)
CALCIUM SERPL-MCNC: 10.3 MG/DL (ref 8.8–10.2)
CHLORIDE SERPL-SCNC: 102 MMOL/L (ref 98–107)
CO2 SERPL-SCNC: 23 MMOL/L (ref 20–29)
CREAT SERPL-MCNC: 1 MG/DL (ref 0.8–1.3)
EKG ATRIAL RATE: 78 BPM
EKG DIAGNOSIS: NORMAL
EKG P AXIS: 75 DEGREES
EKG P-R INTERVAL: 166 MS
EKG Q-T INTERVAL: 340 MS
EKG QRS DURATION: 88 MS
EKG QTC CALCULATION (BAZETT): 387 MS
EKG R AXIS: 25 DEGREES
EKG T AXIS: 47 DEGREES
EKG VENTRICULAR RATE: 78 BPM
ERYTHROCYTE [DISTWIDTH] IN BLOOD BY AUTOMATED COUNT: 14.1 % (ref 11.9–14.6)
GLUCOSE SERPL-MCNC: 101 MG/DL (ref 70–99)
HCT VFR BLD AUTO: 40.4 % (ref 41.1–50.3)
HGB BLD-MCNC: 13 G/DL (ref 13.6–17.2)
MCH RBC QN AUTO: 29.5 PG (ref 26.1–32.9)
MCHC RBC AUTO-ENTMCNC: 32.2 G/DL (ref 31.4–35)
MCV RBC AUTO: 91.8 FL (ref 82–102)
MRSA DNA SPEC QL NAA+PROBE: NOT DETECTED
NRBC # BLD: 0 K/UL (ref 0–0.2)
PLATELET # BLD AUTO: 265 K/UL (ref 150–450)
PMV BLD AUTO: 9.9 FL (ref 9.4–12.3)
POTASSIUM SERPL-SCNC: 5 MMOL/L (ref 3.5–5.1)
RBC # BLD AUTO: 4.4 M/UL (ref 4.23–5.6)
S AUREUS CPE NOSE QL NAA+PROBE: DETECTED
SODIUM SERPL-SCNC: 138 MMOL/L (ref 136–145)
WBC # BLD AUTO: 6.5 K/UL (ref 4.3–11.1)

## 2024-11-07 PROCEDURE — 85027 COMPLETE CBC AUTOMATED: CPT

## 2024-11-07 PROCEDURE — 93005 ELECTROCARDIOGRAM TRACING: CPT | Performed by: ORTHOPAEDIC SURGERY

## 2024-11-07 PROCEDURE — 80048 BASIC METABOLIC PNL TOTAL CA: CPT

## 2024-11-07 PROCEDURE — 93010 ELECTROCARDIOGRAM REPORT: CPT | Performed by: INTERNAL MEDICINE

## 2024-11-07 PROCEDURE — 87641 MR-STAPH DNA AMP PROBE: CPT

## 2024-11-07 RX ORDER — ASCORBIC ACID 500 MG
1000 TABLET ORAL DAILY
COMMUNITY

## 2024-11-07 ASSESSMENT — PAIN DESCRIPTION - DESCRIPTORS: DESCRIPTORS: SHOOTING;SHARP

## 2024-11-07 ASSESSMENT — PAIN DESCRIPTION - LOCATION: LOCATION: BACK;LEG

## 2024-11-07 ASSESSMENT — PAIN DESCRIPTION - ORIENTATION: ORIENTATION: LEFT

## 2024-11-07 ASSESSMENT — PAIN SCALES - GENERAL: PAINLEVEL_OUTOF10: 4

## 2024-11-07 NOTE — PERIOP NOTE
How to Use Your Incentive Spirometer       About Your Incentive Spirometer  An incentive spirometer is a device that will expand your lungs by helping you to breathe more deeply and fully. The parts of your incentive spirometer are labeled in Figure 1.    Using your incentive spirometer  When you're using your incentive spirometer, make sure to breathe through your mouth. If you breathe through your nose, the incentive spirometer won't work properly. You can hold your nose if you have trouble. DO NOT BLOW INTO THE DEVICE. If you feel dizzy at any time, stop and rest. Try again at a later time.  Sit upright in a chair or in bed. Hold the incentive spirometer at eye level.   Put the mouthpiece in your mouth and close your lips tightly around it. Slowly breathe out (exhale) completely.  Breathe in (inhale) slowly through your mouth as deeply as you can. As you take the breath, you will see the piston rise inside the large column. While the piston rises, the indicator on the right should move upwards. It should stay in between the 2 arrows (see Figure 1).  Try to get the piston as high as you can, while keeping the indicator between the arrows. If the indicator doesn't stay between the arrows, you're breathing either too fast or too slow.  When you get it as high as you can, hold your breath for 10 seconds, or as long as possible. While you're holding your breath, the piston will slowly fall to the base of the spirometer.  Once the piston reaches the bottom of the spirometer, breathe out slowly through your mouth. Rest for a few seconds.  Repeat 10 times. Try to get the piston to the same level with each breath.  After each set of 10 breaths, try to cough as coughing will help loosen or clear any mucus in your lungs.  Put the marker at the level the piston reached on your incentive spirometer. This will be your goal next time.  Repeat these steps every hour that you're awake.  Cover the mouthpiece of the incentive  spirometer when you aren't using it   PLEASE CONTINUE TAKING ALL PRESCRIPTION MEDICATIONS UP TO THE DAY OF SURGERY UNLESS OTHERWISE DIRECTED BELOW. You may take Tylenol, allergy,  and/or indigestion medications.     TAKE ONLY THESE MEDICATIONS ON THE DAY OF SURGERY   Lorazepam (Ativan) if needed    Use Symbicort inhaler     Fluticasone propionate (Flonase)    Finasteride (Proscar)     Tamsulosin (Flomax)    Levothyroxine (Synthroid)       DISCONTINUE all vitamins and supplements 7 days prior to surgery. DISCONTINUE Non-Steroidal Anti-Inflammatory (NSAIDS) such as Advil and Aleve 5 days prior to surgery.     Home Medications to Hold- please continue all other medications except these.    Hold Viagra for 24 hours prior to surgery.         Comments   Please drink 32 ounces of non-caffeinated clear liquids 2 hours prior to your arrival to avoid dehydration.   Example: 3 bottles of water, or 1 large gatorade.    On the day before surgery please take 2 Tylenol in the morning and then again before bed. You may use either regular or extra strength.    Bring spine book with you on day of surgery.        Please do not bring home medications with you on the day of surgery unless otherwise directed by your nurse.  If you are instructed to bring home medications, please give them to your nurse as they will be administered by the nursing staff.    If you have any questions, please call [facility] [phone].    A copy of this note was provided to the patient for reference.

## 2024-11-07 NOTE — PROGRESS NOTES
Pre-Assessment Surgery Review      Patient ID:  Forrest Elias  [id number]  74 y.o.  1950  Surgeon: Dr Menezes  Date of Surgery: 11/18/2024  Procedure:   L4-5 laminectomy and fusion with transforminal lumbar interbody fusion   Primary Care Physician: Joaquin Woo Jr., [phone]  Specialty Physician(s):      Subjective:   Forrest Elias is a 74 y.o. White (non-) male who presents for preoperative evaluation. This is a preoperative chart review note based on data collected by the nurse at the surgical Pre-Assessment visit.    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 Baca several years ago; cardiac cath:  4/20/11    Cervical neck pain with evidence of disc disease     Chronic renal insufficiency 7/20/2016    Controlled diabetes mellitus ([facility]) 7/20/2016 7/2024 A1c 6.9; daily fasting sqbs:   ave    COPD (chronic obstructive pulmonary disease) ([facility]) 7/20/2016    Symbicort BID-no PRN inhaler    COVID-19 vaccine series completed 03/22/2021    Moderna    Depressive disorder 7/20/2016    clinical depression    Dumping syndrome     Dyspepsia and other specified disorders of function of stomach 8/29/2012    ED (erectile dysfunction) 7/20/2016    Edema     Essential hypertension, benign 8/29/2012    GERD (gastroesophageal reflux disease)     managed with medication    Hematuria     Hyperlipidemia     Hyperprolactinemia ([facility])     followed by endocrinologist    Hypogonadism in male     Hypothyroidism     takes levothyroxine    Knee effusion     Knee pain     Neuralgia     Numbness

## 2024-11-07 NOTE — PERIOP NOTE
Patient verified name, , and surgery as listed in [facility]. Patient provided medical/health information and PTA medications to the best of their ability.    TYPE  CASE: 3  Orders per surgeon: not received  Labs per surgeon: BMP, CBC, T&S s/h dos. Results: pending  Labs per anesthesia protocol: none  [id number]     MRSA/MSSA swab collected; pharmacy to review and dose antibiotic as appropriate.     Patient provided with and instructed on education handouts including Guide to Surgery,  Preventing Infections, Pain Management, and [facility] brochure.     Road to Recovery Spine surgery patient guide given. Instructed on incentive spirometry with return demonstration. Patient viewed spine prehab video.     Surgical skin cleanser and instructions given per hospital policy.    Original medication prescription bottles were visualized during patient appointment.     Patient teach back successful and patient demonstrates knowledge of instruction.

## 2024-11-07 NOTE — PERIOP NOTE
Preoperative Nutrition Screen (BO)   Patient's Age: 74 y.o.    Last Serum Albumin Level:  No results found for: \"LABALBU\"  Patient's BMI: Estimated body mass index is 29.72 kg/m² as calculated from the following:    Height as of this encounter: 1.778 m (5' 10\").    Weight as of this encounter: 93.9 kg (207 lb 1.6 oz).     If the answer to any of the following is Yes, then recommend prescribe Oral Nutrition Supplements (ONS) for at least 7 days prior to surgery and/or order referral to dietitian for further assessment and nutrition therapy.    1. Does the patient have a documented serum albumin less than 3.0 within the last 90 days?    Unknown = 0      2. Is patient's BMI less than 18.5 (or less than 20 if age over 65)?  No = 0       3. Has the patient had an unplanned weight loss of 10% of body weight or more in the last 6 months? No = 0   4. Has the patient been eating less than 50% of their normal diet in the preceding week? No = 0   BO Score (number of Yes responses), 0-4 0     Plan:   No Nutrition Intervention indicated    Electronically signed by SHIRA OROSCO RN on 11/7/24 at 8:57 AM EST

## 2024-11-07 NOTE — PERIOP NOTE
Labs except calcium within anesthesia guidelines Labs automatically routed to ordering provider via Epic documentation. Chart sent down for anesthesia review abnormal calcium 10.3. CN to follow up.

## 2024-11-07 NOTE — PERIOP NOTE
will also drink fluids, eat as soon as you can, and try to walk a little. Different types of pain medicine may be used after surgery. This can also reduce the need for opioids and reduce side effects like constipation.  These recovery tips may be recommended for many types of surgeries, such as:  Colorectal surgery.  Surgery on the veins, arteries, heart, lungs, spine, or other organs.  Surgery to remove a growth (tumor).  Knowing what to expect before, during, and after surgery can help ease your fears. It can also help you take an active role in your recovery. Ask the doctor any questions you may have.  How do you prepare for surgery?  Eat healthy foods in the week leading up to your surgery. Your doctor will tell you how long before surgery to stop eating.  Try to get more exercise in the weeks leading up to your surgery. Even a little walking can help. The better shape you are in, the sooner you are likely to recover.  Drink plenty of liquids. Your doctor will tell you when you should stop drinking liquids.  Your doctor will also tell you if you should stop taking any medicines.  Do not smoke. Smoking can delay recovery. If you need help quitting, talk to your doctor about stop-smoking programs and medicines. These can increase your chances of quitting for good.  What can you expect during your surgery?  You will get medicine so that you relax and don't feel pain during your surgery. The surgical team will closely watch your fluid levels during the surgery to keep you well hydrated.  What can you expect after surgery?  You will drink water and eat healthy foods as soon as you can after surgery. Your care team will urge you to sit up in a chair while you eat.  The doctor or nurse will encourage you to get up and walk as soon as you can. The more you can move, or at least sit up in a chair, the better.  Your doctor may give you a shot of medicine. This is to block the pain from the affected area of your body. You  may also be given medicine like acetaminophen or ibuprofen. If opioids are used, your doctor will give you the lowest dose for the shortest possible time. Opioids can make your recovery harder. And they can be less safe than other medicines.  Follow-up care is a key part of your treatment and safety. Be sure to make and go to all appointments, and call your doctor if you are having problems. It's also a good idea to know your test results and keep a list of the medicines you take.    Current as of: July 26, 2023  Content Version: 14.2  © 2024 [employer].   Care instructions adapted under license by [employer]. If you have questions about a medical condition or this instruction, always ask your healthcare professional. [employer] disclaims any warranty or liability for your use of this information.

## 2024-11-17 ENCOUNTER — ANESTHESIA EVENT (OUTPATIENT)
Dept: SURGERY | Age: 74
DRG: 451 | End: 2024-11-17
Payer: MEDICARE

## 2024-11-17 NOTE — ANESTHESIA PRE PROCEDURE
Department of Anesthesiology  Preprocedure Note       Name:  Forrest Elias   Age:  74 y.o.  :  1950                                          MRN:  [id number]         Date:  2024      Surgeon: Surgeon(s):  Flip Menezes MD    Procedure: Procedure(s):  ERAS/L4-5 laminectomy and fusion with transforminal lumbar interbody fusion with allograft and instrumentation    Medications prior to admission:   Prior to Admission medications    Medication Sig Start Date End Date Taking? Authorizing Provider   vitamin C (ASCORBIC ACID) 500 MG tablet Take 2 tablets by mouth daily    Peggy Flores MD   furosemide (LASIX) 40 MG tablet  24   Peggy Flores MD   FARXIGA 10 MG tablet  24   Peggy Flores MD   finasteride (PROSCAR) 5 MG tablet  24   Peggy Flores MD   potassium chloride (KLOR-CON) 10 MEQ extended release tablet  24   Peggy Flores MD   meloxicam (MOBIC) 7.5 MG tablet Take 1-2 tablets by mouth daily 24   Flip Schmidt Jr., MD   montelukast (SINGULAIR) 10 MG tablet Take 1 tablet by mouth nightly as needed    Peggy Flores MD   gabapentin (NEURONTIN) 300 MG capsule Take 1 capsule by mouth in the morning and at bedtime.    Peggy Flores MD   fluticasone (FLONASE) 50 MCG/ACT nasal spray 1 spray by Each Nostril route as needed for Rhinitis    Peggy Flores MD   promethazine (PHENERGAN) 12.5 MG tablet Take 1 tablet by mouth 4 times daily as needed for Nausea 23   Marcela Nicholson PA   methocarbamol (ROBAXIN-750) 750 MG tablet Take 1 tablet by mouth 3 times daily as needed (muscle spasm or cramps) 23   Marcela Nicholson PA   amLODIPine (NORVASC) 10 MG tablet Take 1 tablet by mouth nightly  Patient not taking: Reported on 2024 7/15/22   Peggy Flores MD   lisinopril (PRINIVIL;ZESTRIL) 10 MG tablet Take 4 tablets by mouth nightly    Peggy Flores MD   metFORMIN (GLUCOPHAGE-XR) 500 MG

## 2024-11-18 ENCOUNTER — APPOINTMENT (OUTPATIENT)
Dept: GENERAL RADIOLOGY | Age: 74
DRG: 451 | End: 2024-11-18
Attending: ORTHOPAEDIC SURGERY
Payer: MEDICARE

## 2024-11-18 ENCOUNTER — HOSPITAL ENCOUNTER (INPATIENT)
Age: 74
LOS: 1 days | Discharge: HOME HEALTH | DRG: 451 | End: 2024-11-19
Attending: ORTHOPAEDIC SURGERY | Admitting: ORTHOPAEDIC SURGERY
Payer: MEDICARE

## 2024-11-18 ENCOUNTER — ANESTHESIA (OUTPATIENT)
Dept: SURGERY | Age: 74
DRG: 451 | End: 2024-11-18
Payer: MEDICARE

## 2024-11-18 DIAGNOSIS — Z98.1 S/P LUMBAR SPINAL FUSION: Primary | ICD-10-CM

## 2024-11-18 LAB
ABO + RH BLD: NORMAL
BLOOD GROUP ANTIBODIES SERPL: NORMAL
GLUCOSE BLD STRIP.AUTO-MCNC: 127 MG/DL (ref 65–100)
POTASSIUM BLD-SCNC: 4.4 MMOL/L (ref 3.5–5.1)
SERVICE CMNT-IMP: ABNORMAL
SPECIMEN EXP DATE BLD: NORMAL

## 2024-11-18 PROCEDURE — 7100000000 HC PACU RECOVERY - FIRST 15 MIN: Performed by: ORTHOPAEDIC SURGERY

## 2024-11-18 PROCEDURE — 86901 BLOOD TYPING SEROLOGIC RH(D): CPT

## 2024-11-18 PROCEDURE — 6370000000 HC RX 637 (ALT 250 FOR IP): Performed by: ORTHOPAEDIC SURGERY

## 2024-11-18 PROCEDURE — 6360000002 HC RX W HCPCS: Performed by: ORTHOPAEDIC SURGERY

## 2024-11-18 PROCEDURE — 84132 ASSAY OF SERUM POTASSIUM: CPT

## 2024-11-18 PROCEDURE — 3600000014 HC SURGERY LEVEL 4 ADDTL 15MIN: Performed by: ORTHOPAEDIC SURGERY

## 2024-11-18 PROCEDURE — 22840 INSERT SPINE FIXATION DEVICE: CPT | Performed by: ORTHOPAEDIC SURGERY

## 2024-11-18 PROCEDURE — 3700000000 HC ANESTHESIA ATTENDED CARE: Performed by: ORTHOPAEDIC SURGERY

## 2024-11-18 PROCEDURE — 2720000010 HC SURG SUPPLY STERILE: Performed by: ORTHOPAEDIC SURGERY

## 2024-11-18 PROCEDURE — 0SG00K1 FUSION OF LUMBAR VERTEBRAL JOINT WITH NONAUTOLOGOUS TISSUE SUBSTITUTE, POSTERIOR APPROACH, POSTERIOR COLUMN, OPEN APPROACH: ICD-10-PCS | Performed by: ORTHOPAEDIC SURGERY

## 2024-11-18 PROCEDURE — 6360000002 HC RX W HCPCS: Performed by: STUDENT IN AN ORGANIZED HEALTH CARE EDUCATION/TRAINING PROGRAM

## 2024-11-18 PROCEDURE — 3600000004 HC SURGERY LEVEL 4 BASE: Performed by: ORTHOPAEDIC SURGERY

## 2024-11-18 PROCEDURE — 72100 X-RAY EXAM L-S SPINE 2/3 VWS: CPT

## 2024-11-18 PROCEDURE — 2500000003 HC RX 250 WO HCPCS

## 2024-11-18 PROCEDURE — 63048 LAM FACETEC &FORAMOT EA ADDL: CPT | Performed by: ORTHOPAEDIC SURGERY

## 2024-11-18 PROCEDURE — 6370000000 HC RX 637 (ALT 250 FOR IP): Performed by: STUDENT IN AN ORGANIZED HEALTH CARE EDUCATION/TRAINING PROGRAM

## 2024-11-18 PROCEDURE — 86850 RBC ANTIBODY SCREEN: CPT

## 2024-11-18 PROCEDURE — 94640 AIRWAY INHALATION TREATMENT: CPT

## 2024-11-18 PROCEDURE — 2500000003 HC RX 250 WO HCPCS: Performed by: ORTHOPAEDIC SURGERY

## 2024-11-18 PROCEDURE — 7100000001 HC PACU RECOVERY - ADDTL 15 MIN: Performed by: ORTHOPAEDIC SURGERY

## 2024-11-18 PROCEDURE — 3700000001 HC ADD 15 MINUTES (ANESTHESIA): Performed by: ORTHOPAEDIC SURGERY

## 2024-11-18 PROCEDURE — 2580000003 HC RX 258: Performed by: ORTHOPAEDIC SURGERY

## 2024-11-18 PROCEDURE — 63047 LAM FACETEC & FORAMOT LUMBAR: CPT | Performed by: ORTHOPAEDIC SURGERY

## 2024-11-18 PROCEDURE — C1713 ANCHOR/SCREW BN/BN,TIS/BN: HCPCS | Performed by: ORTHOPAEDIC SURGERY

## 2024-11-18 PROCEDURE — 86900 BLOOD TYPING SEROLOGIC ABO: CPT

## 2024-11-18 PROCEDURE — 01NB0ZZ RELEASE LUMBAR NERVE, OPEN APPROACH: ICD-10-PCS | Performed by: ORTHOPAEDIC SURGERY

## 2024-11-18 PROCEDURE — 82962 GLUCOSE BLOOD TEST: CPT

## 2024-11-18 PROCEDURE — 2709999900 HC NON-CHARGEABLE SUPPLY: Performed by: ORTHOPAEDIC SURGERY

## 2024-11-18 PROCEDURE — 94760 N-INVAS EAR/PLS OXIMETRY 1: CPT

## 2024-11-18 PROCEDURE — 22612 ARTHRD PST TQ 1NTRSPC LUMBAR: CPT | Performed by: ORTHOPAEDIC SURGERY

## 2024-11-18 PROCEDURE — 1100000000 HC RM PRIVATE

## 2024-11-18 PROCEDURE — 6360000002 HC RX W HCPCS

## 2024-11-18 PROCEDURE — 20930 SP BONE ALGRFT MORSEL ADD-ON: CPT | Performed by: ORTHOPAEDIC SURGERY

## 2024-11-18 PROCEDURE — 2580000003 HC RX 258

## 2024-11-18 PROCEDURE — C1889 IMPLANT/INSERT DEVICE, NOC: HCPCS | Performed by: ORTHOPAEDIC SURGERY

## 2024-11-18 PROCEDURE — 2580000003 HC RX 258: Performed by: STUDENT IN AN ORGANIZED HEALTH CARE EDUCATION/TRAINING PROGRAM

## 2024-11-18 DEVICE — BIO DBM PLUS PUTTY (WITH CANCELLOUS)
Type: IMPLANTABLE DEVICE | Site: SPINE LUMBAR | Status: FUNCTIONAL
Brand: BIO DBM

## 2024-11-18 DEVICE — BLOCKER
Type: IMPLANTABLE DEVICE | Site: SPINE LUMBAR | Status: FUNCTIONAL
Brand: XIA 3

## 2024-11-18 DEVICE — TI ALLOY RAD ROD
Type: IMPLANTABLE DEVICE | Site: SPINE LUMBAR | Status: FUNCTIONAL
Brand: XIA 3

## 2024-11-18 DEVICE — GRAFT BNE MED: Type: IMPLANTABLE DEVICE | Site: SPINE LUMBAR | Status: FUNCTIONAL

## 2024-11-18 DEVICE — POLYAXIAL SCREW
Type: IMPLANTABLE DEVICE | Site: SPINE LUMBAR | Status: FUNCTIONAL
Brand: XIA 3 SYSTEM - SERRATO

## 2024-11-18 DEVICE — ALLOGRAFT BNE CHIP 1-4 MM 15 CC CRUSH CANC: Type: IMPLANTABLE DEVICE | Site: SPINE LUMBAR | Status: FUNCTIONAL

## 2024-11-18 RX ORDER — HYDROMORPHONE HYDROCHLORIDE 2 MG/ML
0.5 INJECTION, SOLUTION INTRAMUSCULAR; INTRAVENOUS; SUBCUTANEOUS EVERY 5 MIN PRN
Status: DISCONTINUED | OUTPATIENT
Start: 2024-11-18 | End: 2024-11-18 | Stop reason: HOSPADM

## 2024-11-18 RX ORDER — PROCHLORPERAZINE EDISYLATE 5 MG/ML
5 INJECTION INTRAMUSCULAR; INTRAVENOUS
Status: DISCONTINUED | OUTPATIENT
Start: 2024-11-18 | End: 2024-11-18 | Stop reason: HOSPADM

## 2024-11-18 RX ORDER — BROMOCRIPTINE MESYLATE 2.5 MG/1
5 TABLET ORAL 2 TIMES DAILY
Status: DISCONTINUED | OUTPATIENT
Start: 2024-11-18 | End: 2024-11-19 | Stop reason: HOSPADM

## 2024-11-18 RX ORDER — OXYCODONE HYDROCHLORIDE 5 MG/1
10 TABLET ORAL PRN
Status: COMPLETED | OUTPATIENT
Start: 2024-11-18 | End: 2024-11-18

## 2024-11-18 RX ORDER — TRANEXAMIC ACID 100 MG/ML
INJECTION, SOLUTION INTRAVENOUS
Status: DISCONTINUED | OUTPATIENT
Start: 2024-11-18 | End: 2024-11-18 | Stop reason: SDUPTHER

## 2024-11-18 RX ORDER — ACETAMINOPHEN 325 MG/1
650 TABLET ORAL EVERY 6 HOURS
Status: DISCONTINUED | OUTPATIENT
Start: 2024-11-18 | End: 2024-11-19 | Stop reason: HOSPADM

## 2024-11-18 RX ORDER — FAMOTIDINE 20 MG/1
20 TABLET, FILM COATED ORAL 2 TIMES DAILY
Status: DISCONTINUED | OUTPATIENT
Start: 2024-11-18 | End: 2024-11-19 | Stop reason: HOSPADM

## 2024-11-18 RX ORDER — LIDOCAINE HYDROCHLORIDE 10 MG/ML
1 INJECTION, SOLUTION INFILTRATION; PERINEURAL
Status: DISCONTINUED | OUTPATIENT
Start: 2024-11-18 | End: 2024-11-18 | Stop reason: HOSPADM

## 2024-11-18 RX ORDER — PROPOFOL 10 MG/ML
INJECTION, EMULSION INTRAVENOUS
Status: DISCONTINUED | OUTPATIENT
Start: 2024-11-18 | End: 2024-11-18 | Stop reason: SDUPTHER

## 2024-11-18 RX ORDER — AMLODIPINE BESYLATE 10 MG/1
10 TABLET ORAL NIGHTLY
Status: DISCONTINUED | OUTPATIENT
Start: 2024-11-18 | End: 2024-11-19 | Stop reason: HOSPADM

## 2024-11-18 RX ORDER — EPINEPHRINE 0.3 MG/.3ML
0.3 INJECTION SUBCUTANEOUS DAILY PRN
Status: DISCONTINUED | OUTPATIENT
Start: 2024-11-18 | End: 2024-11-18

## 2024-11-18 RX ORDER — NALOXONE HYDROCHLORIDE 0.4 MG/ML
INJECTION, SOLUTION INTRAMUSCULAR; INTRAVENOUS; SUBCUTANEOUS PRN
Status: DISCONTINUED | OUTPATIENT
Start: 2024-11-18 | End: 2024-11-18 | Stop reason: HOSPADM

## 2024-11-18 RX ORDER — DIPHENHYDRAMINE HCL 25 MG
25 CAPSULE ORAL EVERY 6 HOURS PRN
Status: DISCONTINUED | OUTPATIENT
Start: 2024-11-18 | End: 2024-11-19 | Stop reason: HOSPADM

## 2024-11-18 RX ORDER — OXYCODONE AND ACETAMINOPHEN 5; 325 MG/1; MG/1
1 TABLET ORAL EVERY 6 HOURS PRN
Qty: 28 TABLET | Refills: 0 | Status: SHIPPED | OUTPATIENT
Start: 2024-11-18 | End: 2024-11-25

## 2024-11-18 RX ORDER — EPINEPHRINE 1 MG/ML
0.3 INJECTION, SOLUTION, CONCENTRATE INTRAVENOUS
Status: DISCONTINUED | OUTPATIENT
Start: 2024-11-18 | End: 2024-11-19 | Stop reason: HOSPADM

## 2024-11-18 RX ORDER — LIDOCAINE HYDROCHLORIDE ANHYDROUS AND DEXTROSE MONOHYDRATE 5; 400 G/100ML; MG/100ML
1 INJECTION, SOLUTION INTRAVENOUS CONTINUOUS
Status: ACTIVE | OUTPATIENT
Start: 2024-11-18 | End: 2024-11-19

## 2024-11-18 RX ORDER — GLYCOPYRROLATE 0.2 MG/ML
INJECTION INTRAMUSCULAR; INTRAVENOUS
Status: DISCONTINUED | OUTPATIENT
Start: 2024-11-18 | End: 2024-11-18 | Stop reason: SDUPTHER

## 2024-11-18 RX ORDER — DIPHENHYDRAMINE HYDROCHLORIDE 50 MG/ML
12.5 INJECTION INTRAMUSCULAR; INTRAVENOUS
Status: DISCONTINUED | OUTPATIENT
Start: 2024-11-18 | End: 2024-11-18 | Stop reason: HOSPADM

## 2024-11-18 RX ORDER — SODIUM CHLORIDE 9 MG/ML
INJECTION, SOLUTION INTRAVENOUS PRN
Status: DISCONTINUED | OUTPATIENT
Start: 2024-11-18 | End: 2024-11-18 | Stop reason: HOSPADM

## 2024-11-18 RX ORDER — LIDOCAINE HYDROCHLORIDE 20 MG/ML
INJECTION, SOLUTION EPIDURAL; INFILTRATION; INTRACAUDAL; PERINEURAL
Status: DISCONTINUED | OUTPATIENT
Start: 2024-11-18 | End: 2024-11-18 | Stop reason: SDUPTHER

## 2024-11-18 RX ORDER — OXYCODONE HYDROCHLORIDE 5 MG/1
5 TABLET ORAL EVERY 4 HOURS PRN
Status: DISCONTINUED | OUTPATIENT
Start: 2024-11-18 | End: 2024-11-19 | Stop reason: HOSPADM

## 2024-11-18 RX ORDER — METFORMIN HYDROCHLORIDE 500 MG/1
1000 TABLET, EXTENDED RELEASE ORAL 2 TIMES DAILY WITH MEALS
Status: DISCONTINUED | OUTPATIENT
Start: 2024-11-18 | End: 2024-11-19 | Stop reason: HOSPADM

## 2024-11-18 RX ORDER — LISINOPRIL 20 MG/1
40 TABLET ORAL NIGHTLY
Status: DISCONTINUED | OUTPATIENT
Start: 2024-11-18 | End: 2024-11-19 | Stop reason: HOSPADM

## 2024-11-18 RX ORDER — VANCOMYCIN HYDROCHLORIDE 1 G/20ML
INJECTION, POWDER, LYOPHILIZED, FOR SOLUTION INTRAVENOUS PRN
Status: DISCONTINUED | OUTPATIENT
Start: 2024-11-18 | End: 2024-11-18 | Stop reason: HOSPADM

## 2024-11-18 RX ORDER — LIDOCAINE HYDROCHLORIDE ANHYDROUS AND DEXTROSE MONOHYDRATE 5; 400 G/100ML; MG/100ML
INJECTION, SOLUTION INTRAVENOUS
Status: DISCONTINUED | OUTPATIENT
Start: 2024-11-18 | End: 2024-11-18 | Stop reason: SDUPTHER

## 2024-11-18 RX ORDER — NEOSTIGMINE METHYLSULFATE 1 MG/ML
INJECTION INTRAVENOUS
Status: DISCONTINUED | OUTPATIENT
Start: 2024-11-18 | End: 2024-11-18 | Stop reason: SDUPTHER

## 2024-11-18 RX ORDER — HYDRALAZINE HYDROCHLORIDE 20 MG/ML
10 INJECTION INTRAMUSCULAR; INTRAVENOUS
Status: DISCONTINUED | OUTPATIENT
Start: 2024-11-18 | End: 2024-11-18 | Stop reason: HOSPADM

## 2024-11-18 RX ORDER — KETAMINE HCL IN NACL, ISO-OSM 20 MG/2 ML
SYRINGE (ML) INJECTION
Status: DISCONTINUED | OUTPATIENT
Start: 2024-11-18 | End: 2024-11-18 | Stop reason: SDUPTHER

## 2024-11-18 RX ORDER — GABAPENTIN 300 MG/1
300 CAPSULE ORAL 2 TIMES DAILY
Status: DISCONTINUED | OUTPATIENT
Start: 2024-11-18 | End: 2024-11-19 | Stop reason: HOSPADM

## 2024-11-18 RX ORDER — ACETAMINOPHEN 500 MG
1000 TABLET ORAL ONCE
Status: COMPLETED | OUTPATIENT
Start: 2024-11-18 | End: 2024-11-18

## 2024-11-18 RX ORDER — MIDAZOLAM HYDROCHLORIDE 2 MG/2ML
2 INJECTION, SOLUTION INTRAMUSCULAR; INTRAVENOUS
Status: DISCONTINUED | OUTPATIENT
Start: 2024-11-18 | End: 2024-11-18 | Stop reason: HOSPADM

## 2024-11-18 RX ORDER — LABETALOL HYDROCHLORIDE 5 MG/ML
10 INJECTION, SOLUTION INTRAVENOUS
Status: DISCONTINUED | OUTPATIENT
Start: 2024-11-18 | End: 2024-11-18 | Stop reason: HOSPADM

## 2024-11-18 RX ORDER — SENNA AND DOCUSATE SODIUM 50; 8.6 MG/1; MG/1
1 TABLET, FILM COATED ORAL 2 TIMES DAILY
Status: DISCONTINUED | OUTPATIENT
Start: 2024-11-18 | End: 2024-11-19 | Stop reason: HOSPADM

## 2024-11-18 RX ORDER — SODIUM CHLORIDE, SODIUM LACTATE, POTASSIUM CHLORIDE, CALCIUM CHLORIDE 600; 310; 30; 20 MG/100ML; MG/100ML; MG/100ML; MG/100ML
INJECTION, SOLUTION INTRAVENOUS
Status: DISCONTINUED | OUTPATIENT
Start: 2024-11-18 | End: 2024-11-18 | Stop reason: SDUPTHER

## 2024-11-18 RX ORDER — DIPHENHYDRAMINE HYDROCHLORIDE 50 MG/ML
25 INJECTION INTRAMUSCULAR; INTRAVENOUS EVERY 6 HOURS PRN
Status: DISCONTINUED | OUTPATIENT
Start: 2024-11-18 | End: 2024-11-19 | Stop reason: HOSPADM

## 2024-11-18 RX ORDER — ONDANSETRON 2 MG/ML
4 INJECTION INTRAMUSCULAR; INTRAVENOUS EVERY 6 HOURS PRN
Status: DISCONTINUED | OUTPATIENT
Start: 2024-11-18 | End: 2024-11-19 | Stop reason: HOSPADM

## 2024-11-18 RX ORDER — METHOCARBAMOL 750 MG/1
750 TABLET, FILM COATED ORAL EVERY 8 HOURS PRN
Status: DISCONTINUED | OUTPATIENT
Start: 2024-11-18 | End: 2024-11-19 | Stop reason: HOSPADM

## 2024-11-18 RX ORDER — SODIUM CHLORIDE 0.9 % (FLUSH) 0.9 %
5-40 SYRINGE (ML) INJECTION EVERY 12 HOURS SCHEDULED
Status: DISCONTINUED | OUTPATIENT
Start: 2024-11-18 | End: 2024-11-18 | Stop reason: HOSPADM

## 2024-11-18 RX ORDER — FUROSEMIDE 40 MG/1
40 TABLET ORAL DAILY
Status: DISCONTINUED | OUTPATIENT
Start: 2024-11-18 | End: 2024-11-19 | Stop reason: HOSPADM

## 2024-11-18 RX ORDER — LEVOTHYROXINE SODIUM 100 UG/1
100 TABLET ORAL
Status: DISCONTINUED | OUTPATIENT
Start: 2024-11-19 | End: 2024-11-19 | Stop reason: HOSPADM

## 2024-11-18 RX ORDER — 0.9 % SODIUM CHLORIDE 0.9 %
INTRAVENOUS SOLUTION INTRAVENOUS CONTINUOUS PRN
Status: DISCONTINUED | OUTPATIENT
Start: 2024-11-18 | End: 2024-11-18 | Stop reason: HOSPADM

## 2024-11-18 RX ORDER — POTASSIUM CHLORIDE 750 MG/1
10 TABLET, EXTENDED RELEASE ORAL DAILY
Status: DISCONTINUED | OUTPATIENT
Start: 2024-11-19 | End: 2024-11-19 | Stop reason: HOSPADM

## 2024-11-18 RX ORDER — ONDANSETRON 4 MG/1
4 TABLET, ORALLY DISINTEGRATING ORAL EVERY 8 HOURS PRN
Status: DISCONTINUED | OUTPATIENT
Start: 2024-11-18 | End: 2024-11-19 | Stop reason: HOSPADM

## 2024-11-18 RX ORDER — TAMSULOSIN HYDROCHLORIDE 0.4 MG/1
0.4 CAPSULE ORAL DAILY
Status: DISCONTINUED | OUTPATIENT
Start: 2024-11-18 | End: 2024-11-19 | Stop reason: HOSPADM

## 2024-11-18 RX ORDER — DEXAMETHASONE SODIUM PHOSPHATE 10 MG/ML
INJECTION INTRAMUSCULAR; INTRAVENOUS
Status: DISCONTINUED | OUTPATIENT
Start: 2024-11-18 | End: 2024-11-18 | Stop reason: SDUPTHER

## 2024-11-18 RX ORDER — OXYCODONE HYDROCHLORIDE 5 MG/1
5 TABLET ORAL PRN
Status: COMPLETED | OUTPATIENT
Start: 2024-11-18 | End: 2024-11-18

## 2024-11-18 RX ORDER — MONTELUKAST SODIUM 10 MG/1
10 TABLET ORAL NIGHTLY
Status: DISCONTINUED | OUTPATIENT
Start: 2024-11-18 | End: 2024-11-19 | Stop reason: HOSPADM

## 2024-11-18 RX ORDER — HYDROMORPHONE HYDROCHLORIDE 2 MG/ML
INJECTION, SOLUTION INTRAMUSCULAR; INTRAVENOUS; SUBCUTANEOUS
Status: DISCONTINUED | OUTPATIENT
Start: 2024-11-18 | End: 2024-11-18 | Stop reason: SDUPTHER

## 2024-11-18 RX ORDER — DAPAGLIFLOZIN 10 MG/1
10 TABLET, FILM COATED ORAL EVERY MORNING
Status: DISCONTINUED | OUTPATIENT
Start: 2024-11-19 | End: 2024-11-18

## 2024-11-18 RX ORDER — HYDROMORPHONE HYDROCHLORIDE 1 MG/ML
0.5 INJECTION, SOLUTION INTRAMUSCULAR; INTRAVENOUS; SUBCUTANEOUS
Status: DISCONTINUED | OUTPATIENT
Start: 2024-11-18 | End: 2024-11-19 | Stop reason: HOSPADM

## 2024-11-18 RX ORDER — ROCURONIUM BROMIDE 10 MG/ML
INJECTION, SOLUTION INTRAVENOUS
Status: DISCONTINUED | OUTPATIENT
Start: 2024-11-18 | End: 2024-11-18 | Stop reason: SDUPTHER

## 2024-11-18 RX ORDER — SODIUM CHLORIDE 0.9 % (FLUSH) 0.9 %
5-40 SYRINGE (ML) INJECTION PRN
Status: DISCONTINUED | OUTPATIENT
Start: 2024-11-18 | End: 2024-11-18 | Stop reason: HOSPADM

## 2024-11-18 RX ORDER — HALOPERIDOL 5 MG/ML
1 INJECTION INTRAMUSCULAR
Status: DISCONTINUED | OUTPATIENT
Start: 2024-11-18 | End: 2024-11-18 | Stop reason: HOSPADM

## 2024-11-18 RX ORDER — SODIUM CHLORIDE, SODIUM LACTATE, POTASSIUM CHLORIDE, CALCIUM CHLORIDE 600; 310; 30; 20 MG/100ML; MG/100ML; MG/100ML; MG/100ML
INJECTION, SOLUTION INTRAVENOUS CONTINUOUS
Status: DISCONTINUED | OUTPATIENT
Start: 2024-11-18 | End: 2024-11-18 | Stop reason: HOSPADM

## 2024-11-18 RX ORDER — OXYCODONE HYDROCHLORIDE 5 MG/1
10 TABLET ORAL EVERY 4 HOURS PRN
Status: DISCONTINUED | OUTPATIENT
Start: 2024-11-18 | End: 2024-11-19 | Stop reason: HOSPADM

## 2024-11-18 RX ORDER — FINASTERIDE 5 MG/1
5 TABLET, FILM COATED ORAL DAILY
Status: DISCONTINUED | OUTPATIENT
Start: 2024-11-18 | End: 2024-11-19 | Stop reason: HOSPADM

## 2024-11-18 RX ORDER — DOCUSATE SODIUM 100 MG/1
100 CAPSULE, LIQUID FILLED ORAL 2 TIMES DAILY
Qty: 60 CAPSULE | Refills: 0 | Status: SHIPPED | OUTPATIENT
Start: 2024-11-18 | End: 2024-12-18

## 2024-11-18 RX ORDER — PRAVASTATIN SODIUM 20 MG
80 TABLET ORAL DAILY
Status: DISCONTINUED | OUTPATIENT
Start: 2024-11-18 | End: 2024-11-19 | Stop reason: HOSPADM

## 2024-11-18 RX ORDER — EPHEDRINE SULFATE 5 MG/ML
INJECTION INTRAVENOUS
Status: DISCONTINUED | OUTPATIENT
Start: 2024-11-18 | End: 2024-11-18 | Stop reason: SDUPTHER

## 2024-11-18 RX ORDER — IPRATROPIUM BROMIDE AND ALBUTEROL SULFATE 2.5; .5 MG/3ML; MG/3ML
1 SOLUTION RESPIRATORY (INHALATION)
Status: DISCONTINUED | OUTPATIENT
Start: 2024-11-18 | End: 2024-11-18 | Stop reason: HOSPADM

## 2024-11-18 RX ORDER — BISACODYL 5 MG/1
5 TABLET, DELAYED RELEASE ORAL DAILY
Status: DISCONTINUED | OUTPATIENT
Start: 2024-11-18 | End: 2024-11-19 | Stop reason: HOSPADM

## 2024-11-18 RX ORDER — FLUTICASONE PROPIONATE 50 MCG
1 SPRAY, SUSPENSION (ML) NASAL NIGHTLY PRN
Status: DISCONTINUED | OUTPATIENT
Start: 2024-11-18 | End: 2024-11-19 | Stop reason: HOSPADM

## 2024-11-18 RX ORDER — HYDROMORPHONE HYDROCHLORIDE 1 MG/ML
0.25 INJECTION, SOLUTION INTRAMUSCULAR; INTRAVENOUS; SUBCUTANEOUS
Status: DISCONTINUED | OUTPATIENT
Start: 2024-11-18 | End: 2024-11-19 | Stop reason: HOSPADM

## 2024-11-18 RX ORDER — ONDANSETRON 2 MG/ML
INJECTION INTRAMUSCULAR; INTRAVENOUS
Status: DISCONTINUED | OUTPATIENT
Start: 2024-11-18 | End: 2024-11-18 | Stop reason: SDUPTHER

## 2024-11-18 RX ORDER — FENTANYL CITRATE 50 UG/ML
100 INJECTION, SOLUTION INTRAMUSCULAR; INTRAVENOUS
Status: DISCONTINUED | OUTPATIENT
Start: 2024-11-18 | End: 2024-11-18 | Stop reason: HOSPADM

## 2024-11-18 RX ORDER — LORAZEPAM 1 MG/1
1 TABLET ORAL 4 TIMES DAILY PRN
Status: DISCONTINUED | OUTPATIENT
Start: 2024-11-18 | End: 2024-11-19 | Stop reason: HOSPADM

## 2024-11-18 RX ADMIN — CEFAZOLIN 2000 MG: 2 INJECTION, POWDER, FOR SOLUTION INTRAMUSCULAR; INTRAVENOUS at 18:30

## 2024-11-18 RX ADMIN — EPHEDRINE SULFATE 5 MG: 5 INJECTION INTRAVENOUS at 08:16

## 2024-11-18 RX ADMIN — TRANEXAMIC ACID 1000 MG: 100 INJECTION, SOLUTION INTRAVENOUS at 10:17

## 2024-11-18 RX ADMIN — LIDOCAINE HYDROCHLORIDE ANHYDROUS AND DEXTROSE MONOHYDRATE 1 MG/KG/HR: .4; 5 INJECTION, SOLUTION INTRAVENOUS at 11:33

## 2024-11-18 RX ADMIN — GABAPENTIN 300 MG: 300 CAPSULE ORAL at 20:30

## 2024-11-18 RX ADMIN — PHENYLEPHRINE HYDROCHLORIDE 100 MCG: 0.1 INJECTION, SOLUTION INTRAVENOUS at 08:31

## 2024-11-18 RX ADMIN — LISINOPRIL 40 MG: 20 TABLET ORAL at 20:30

## 2024-11-18 RX ADMIN — EPHEDRINE SULFATE 10 MG: 5 INJECTION INTRAVENOUS at 08:08

## 2024-11-18 RX ADMIN — DEXAMETHASONE SODIUM PHOSPHATE 10 MG: 10 INJECTION INTRAMUSCULAR; INTRAVENOUS at 08:21

## 2024-11-18 RX ADMIN — BISACODYL 5 MG: 5 TABLET, COATED ORAL at 18:32

## 2024-11-18 RX ADMIN — ROCURONIUM BROMIDE 20 MG: 10 INJECTION, SOLUTION INTRAVENOUS at 09:30

## 2024-11-18 RX ADMIN — ACETAMINOPHEN 1000 MG: 500 TABLET, FILM COATED ORAL at 06:11

## 2024-11-18 RX ADMIN — FUROSEMIDE 40 MG: 40 TABLET ORAL at 18:29

## 2024-11-18 RX ADMIN — Medication 10 MG: at 08:50

## 2024-11-18 RX ADMIN — HYDROMORPHONE HYDROCHLORIDE 0.4 MG: 2 INJECTION INTRAMUSCULAR; INTRAVENOUS; SUBCUTANEOUS at 07:35

## 2024-11-18 RX ADMIN — CEFAZOLIN 2000 MG: 2 INJECTION, POWDER, FOR SOLUTION INTRAMUSCULAR; INTRAVENOUS at 08:05

## 2024-11-18 RX ADMIN — DOCUSATE SODIUM 50 MG AND SENNOSIDES 8.6 MG 1 TABLET: 8.6; 5 TABLET, FILM COATED ORAL at 20:30

## 2024-11-18 RX ADMIN — HYDROMORPHONE HYDROCHLORIDE 0.5 MG: 1 INJECTION, SOLUTION INTRAMUSCULAR; INTRAVENOUS; SUBCUTANEOUS at 20:33

## 2024-11-18 RX ADMIN — EPHEDRINE SULFATE 5 MG: 5 INJECTION INTRAVENOUS at 08:31

## 2024-11-18 RX ADMIN — GLYCOPYRROLATE 0.6 MG: 0.2 INJECTION INTRAMUSCULAR; INTRAVENOUS at 10:34

## 2024-11-18 RX ADMIN — HYDROMORPHONE HYDROCHLORIDE 0.5 MG: 2 INJECTION INTRAMUSCULAR; INTRAVENOUS; SUBCUTANEOUS at 13:24

## 2024-11-18 RX ADMIN — BROMOCRIPTINE MESYLATE 5 MG: 2.5 TABLET ORAL at 20:30

## 2024-11-18 RX ADMIN — TAMSULOSIN HYDROCHLORIDE 0.4 MG: 0.4 CAPSULE ORAL at 18:30

## 2024-11-18 RX ADMIN — HYDROMORPHONE HYDROCHLORIDE 0.5 MG: 2 INJECTION INTRAMUSCULAR; INTRAVENOUS; SUBCUTANEOUS at 11:13

## 2024-11-18 RX ADMIN — HYDROMORPHONE HYDROCHLORIDE 0.4 MG: 2 INJECTION INTRAMUSCULAR; INTRAVENOUS; SUBCUTANEOUS at 10:58

## 2024-11-18 RX ADMIN — OXYCODONE 10 MG: 5 TABLET ORAL at 16:08

## 2024-11-18 RX ADMIN — MONTELUKAST 10 MG: 10 TABLET, FILM COATED ORAL at 20:30

## 2024-11-18 RX ADMIN — AMLODIPINE BESYLATE 10 MG: 10 TABLET ORAL at 20:30

## 2024-11-18 RX ADMIN — ROCURONIUM BROMIDE 30 MG: 10 INJECTION, SOLUTION INTRAVENOUS at 08:09

## 2024-11-18 RX ADMIN — PRAVASTATIN SODIUM 80 MG: 20 TABLET ORAL at 18:29

## 2024-11-18 RX ADMIN — PHENYLEPHRINE HYDROCHLORIDE 150 MCG: 0.1 INJECTION, SOLUTION INTRAVENOUS at 08:24

## 2024-11-18 RX ADMIN — EPHEDRINE SULFATE 10 MG: 5 INJECTION INTRAVENOUS at 08:06

## 2024-11-18 RX ADMIN — METHOCARBAMOL TABLETS 750 MG: 750 TABLET, COATED ORAL at 23:29

## 2024-11-18 RX ADMIN — ACETAMINOPHEN 650 MG: 325 TABLET ORAL at 18:29

## 2024-11-18 RX ADMIN — ROCURONIUM BROMIDE 20 MG: 10 INJECTION, SOLUTION INTRAVENOUS at 08:52

## 2024-11-18 RX ADMIN — SODIUM CHLORIDE 150 MG: 9 INJECTION, SOLUTION INTRAVENOUS at 06:25

## 2024-11-18 RX ADMIN — PHENYLEPHRINE HYDROCHLORIDE 150 MCG: 0.1 INJECTION, SOLUTION INTRAVENOUS at 08:16

## 2024-11-18 RX ADMIN — PHENYLEPHRINE HYDROCHLORIDE 200 MCG: 0.1 INJECTION, SOLUTION INTRAVENOUS at 08:05

## 2024-11-18 RX ADMIN — FINASTERIDE 5 MG: 5 TABLET, FILM COATED ORAL at 18:30

## 2024-11-18 RX ADMIN — Medication 20 MG: at 10:57

## 2024-11-18 RX ADMIN — EPHEDRINE SULFATE 5 MG: 5 INJECTION INTRAVENOUS at 10:49

## 2024-11-18 RX ADMIN — EPHEDRINE SULFATE 5 MG: 5 INJECTION INTRAVENOUS at 08:24

## 2024-11-18 RX ADMIN — SODIUM CHLORIDE, SODIUM LACTATE, POTASSIUM CHLORIDE, AND CALCIUM CHLORIDE: 600; 310; 30; 20 INJECTION, SOLUTION INTRAVENOUS at 07:35

## 2024-11-18 RX ADMIN — ONDANSETRON 4 MG: 2 INJECTION INTRAMUSCULAR; INTRAVENOUS at 10:17

## 2024-11-18 RX ADMIN — PHENYLEPHRINE HYDROCHLORIDE 200 MCG: 0.1 INJECTION, SOLUTION INTRAVENOUS at 10:49

## 2024-11-18 RX ADMIN — ROCURONIUM BROMIDE 40 MG: 10 INJECTION, SOLUTION INTRAVENOUS at 07:45

## 2024-11-18 RX ADMIN — PHENYLEPHRINE HYDROCHLORIDE 200 MCG: 0.1 INJECTION, SOLUTION INTRAVENOUS at 08:07

## 2024-11-18 RX ADMIN — PROPOFOL 30 MG: 10 INJECTION, EMULSION INTRAVENOUS at 10:10

## 2024-11-18 RX ADMIN — PROPOFOL 170 MG: 10 INJECTION, EMULSION INTRAVENOUS at 07:44

## 2024-11-18 RX ADMIN — HYDROMORPHONE HYDROCHLORIDE 0.4 MG: 2 INJECTION INTRAMUSCULAR; INTRAVENOUS; SUBCUTANEOUS at 08:18

## 2024-11-18 RX ADMIN — FAMOTIDINE 20 MG: 20 TABLET, FILM COATED ORAL at 20:30

## 2024-11-18 RX ADMIN — HYDROMORPHONE HYDROCHLORIDE 0.4 MG: 2 INJECTION INTRAMUSCULAR; INTRAVENOUS; SUBCUTANEOUS at 10:42

## 2024-11-18 RX ADMIN — LIDOCAINE HYDROCHLORIDE 100 MG: 20 INJECTION, SOLUTION EPIDURAL; INFILTRATION; INTRACAUDAL; PERINEURAL at 07:44

## 2024-11-18 RX ADMIN — ACETAMINOPHEN 650 MG: 325 TABLET ORAL at 23:29

## 2024-11-18 RX ADMIN — PHENYLEPHRINE HYDROCHLORIDE 40 MCG/MIN: 10 INJECTION INTRAVENOUS at 08:51

## 2024-11-18 RX ADMIN — ROCURONIUM BROMIDE 10 MG: 10 INJECTION, SOLUTION INTRAVENOUS at 08:37

## 2024-11-18 RX ADMIN — PHENYLEPHRINE HYDROCHLORIDE 100 MCG: 0.1 INJECTION, SOLUTION INTRAVENOUS at 08:00

## 2024-11-18 RX ADMIN — Medication 3 AMPULE: at 06:24

## 2024-11-18 RX ADMIN — ROCURONIUM BROMIDE 10 MG: 10 INJECTION, SOLUTION INTRAVENOUS at 10:06

## 2024-11-18 RX ADMIN — PROPOFOL 30 MG: 10 INJECTION, EMULSION INTRAVENOUS at 10:38

## 2024-11-18 RX ADMIN — HYDROMORPHONE HYDROCHLORIDE 0.4 MG: 2 INJECTION INTRAMUSCULAR; INTRAVENOUS; SUBCUTANEOUS at 10:55

## 2024-11-18 RX ADMIN — Medication 20 MG: at 07:44

## 2024-11-18 RX ADMIN — PROPOFOL 100 MG: 10 INJECTION, EMULSION INTRAVENOUS at 07:47

## 2024-11-18 RX ADMIN — TRANEXAMIC ACID 1000 MG: 100 INJECTION, SOLUTION INTRAVENOUS at 08:09

## 2024-11-18 RX ADMIN — METFORMIN HYDROCHLORIDE 1000 MG: 500 TABLET, EXTENDED RELEASE ORAL at 18:29

## 2024-11-18 RX ADMIN — LIDOCAINE HYDROCHLORIDE 1.5 MG/KG/HR: 4 INJECTION, SOLUTION INTRAVENOUS at 08:00

## 2024-11-18 RX ADMIN — ARFORMOTEROL TARTRATE: 15 SOLUTION RESPIRATORY (INHALATION) at 19:40

## 2024-11-18 RX ADMIN — Medication 10 MG: at 09:50

## 2024-11-18 RX ADMIN — NEOSTIGMINE METHYLSULFATE 5 MG: 1 INJECTION, SOLUTION INTRAVENOUS at 10:34

## 2024-11-18 RX ADMIN — EPHEDRINE SULFATE 10 MG: 5 INJECTION INTRAVENOUS at 08:49

## 2024-11-18 ASSESSMENT — PAIN SCALES - GENERAL
PAINLEVEL_OUTOF10: 0
PAINLEVEL_OUTOF10: 7
PAINLEVEL_OUTOF10: 6
PAINLEVEL_OUTOF10: 6
PAINLEVEL_OUTOF10: 8

## 2024-11-18 ASSESSMENT — PAIN DESCRIPTION - FREQUENCY: FREQUENCY: INTERMITTENT

## 2024-11-18 ASSESSMENT — PAIN - FUNCTIONAL ASSESSMENT
PAIN_FUNCTIONAL_ASSESSMENT: 0-10
PAIN_FUNCTIONAL_ASSESSMENT: PREVENTS OR INTERFERES SOME ACTIVE ACTIVITIES AND ADLS
PAIN_FUNCTIONAL_ASSESSMENT: 0-10

## 2024-11-18 ASSESSMENT — PAIN DESCRIPTION - ONSET: ONSET: GRADUAL

## 2024-11-18 ASSESSMENT — PAIN DESCRIPTION - LOCATION
LOCATION: BACK;INCISION

## 2024-11-18 ASSESSMENT — PAIN DESCRIPTION - PAIN TYPE: TYPE: SURGICAL PAIN

## 2024-11-18 ASSESSMENT — PAIN DESCRIPTION - DESCRIPTORS: DESCRIPTORS: THROBBING;SHARP

## 2024-11-18 ASSESSMENT — PAIN DESCRIPTION - ORIENTATION: ORIENTATION: LOWER

## 2024-11-18 NOTE — PROGRESS NOTES
4 Eyes Skin Assessment     NAME:  Forrest Elias  YOB: 1950  MEDICAL RECORD NUMBER:  [id number]    The patient is being assessed for  Admission    I agree that at least one RN has performed a thorough Head to Toe Skin Assessment on the patient. ALL assessment sites listed below have been assessed.      Areas assessed by both nurses:    Head, Face, Ears, Shoulders, Back, Chest, Arms, Elbows, Hands, Sacrum. Buttock, Coccyx, Ischium, Legs. Feet and Heels, and Under Medical Devices         Does the Patient have a Wound? No noted wound(s)       [place] Prevention initiated by RN: No  Wound Care Orders initiated by RN: No    Pressure Injury (Stage 3,4, Unstageable, DTI, NWPT, and Complex wounds) if present, place Wound referral order by RN under : No    New Ostomies, if present place, Ostomy referral order under : No     Nurse 1 eSignature: Electronically signed by AUSTIN GROSS RN on 11/18/24 at 6:02 PM EST    **SHARE this note so that the co-signing nurse can place an eSignature**    Nurse 2 eSignature: {Esignature:[id number]}

## 2024-11-18 NOTE — PERIOP NOTE
TRANSFER - OUT REPORT:    Verbal report given to Sofiya HERNANDEZ on Forrest Elias  being transferred to [facility] for routine progression of patient care       Report consisted of patient’s Situation, Background, Assessment and   Recommendations(SBAR).     Information from the following report(s) Nurse Handoff Report, Adult Overview, Surgery Report, Intake/Output, MAR, Recent Results, Med Rec Status, Cardiac Rhythm Sinus Tachy, Procedure Verification, Quality Measures, Neuro Assessment, and Event Log was reviewed with the receiving nurse.    Lines:   Peripheral IV 11/18/24 Posterior;Right Hand (Active)   Site Assessment Clean, dry & intact 11/18/24 1415   Line Status Flushed;Infusing 11/18/24 1415   Line Care Connections checked and tightened 11/18/24 1415   Phlebitis Assessment No symptoms 11/18/24 1415   Infiltration Assessment 0 11/18/24 1415   Dressing Status Clean, dry & intact 11/18/24 1415   Dressing Type Transparent 11/18/24 1415       Peripheral IV 11/18/24 Left;Posterior Hand (Active)   Site Assessment Clean, dry & intact 11/18/24 1415   Line Status Capped 11/18/24 1415   Line Care Connections checked and tightened 11/18/24 1415   Phlebitis Assessment No symptoms 11/18/24 1415   Infiltration Assessment 0 11/18/24 1415   Dressing Status Clean, dry & intact 11/18/24 1415   Dressing Type Transparent 11/18/24 1415        Opportunity for questions and clarification was provided.      Patient transported with:   O2 @ 2 liters  Tech    VTE prophylaxis orders have been written for Forrest Elias.    Patient and family given floor number and nurses name.  Family updated re: pt status after security code verified.

## 2024-11-18 NOTE — OP NOTE
[facility] [id number]   [phone]    OPERATIVE REPORT    Patient ID:Forrest Elias  [id number]  1950  74 y.o.    DATE OF SURGERY: 11/18/2024    SURGEON: Flip Menezes MD    PREOP DIAGNOSIS:     1. Lumbar spondylolisthesis   2. Lumbar stenosis     POSTOP DIAGNOSIS:     1. Lumbar spondylolisthesis   2. Lumbar stenosis     PROCEDURE:  1. Posterolateral lumbar fusion L4-L5 (CPT 34467)  2. Lumbar laminectomy L4 and L5 with foraminotomies.  (CPT 32163, 63048 X 1)  3. Instrumentation  L4-L5. (CPT 16062)  4. Allograft (CPT 32974)       ANESTHESIA: General    ESTIMATED BLOOD LOSS:  50 ml    INTRAOPERATIVE COMPLICATIONS: none.    POSTOP CONDITION: Stable.    IMPLANTS:   Implant Name Type Inv. Item Serial No.  Lot No. LRB No. Used Action   PUTTY BIO DBM 10 ML W CHIPS - [id number]  PUTTY BIO DBM 10 ML W CHIPS  [employer] [facility] [id number] N/A 1 Implanted   ALLOGRAFT BNE CHIP 1-4 MM 15 CC CRUSH CANC - [id number]  ALLOGRAFT BNE CHIP 1-4 MM 15 CC CRUSH CANC [id number] [employer] [facility]  N/A 1 Implanted   GRAFT BNE MED - [id number]  GRAFT BNE MED [id number] [employer]  N/A 1 Implanted   BLOCKER SPNL L50MM DIA6MM TI 1 LEV ARNOLDO 3 - [id number]  BLOCKER SPNL L50MM DIA6MM TI 1 LEV ARNOLDO 3  [employer] [facility] [id number] N/A 4 Implanted   SCREW SPNL L45MM DIA6.5MM POLYAX CAGE - [id number]  SCREW SPNL L45MM DIA6.5MM POLYAX CAGE  [employer]WD [id number] N/A 4 Implanted   REGULO SPNL L40MM DIA6MM ANT CANC POST PEDCL TI RAD SMOOTH ARNOLDO - [id number]  REGULO SPNL L40MM DIA6MM ANT CANC POST PEDCL TI RAD SMOOTH ARNOLDO  [employer] [facility]- [id number] N/A 2 Implanted       INDICATIONS FOR PROCEDURE: Patient has had low back pain with radiation to the buttocks and lower extremities for an extended period of time. The symptoms and exam findings were felt to be consistent with neurogenic claudication. The preoperative radiographs and other

## 2024-11-18 NOTE — PROGRESS NOTES
TRANSFER - IN REPORT:    Verbal report received from DAVID Roberts on Forrest Elias  being received from PACU for routine progression of patient care      Report consisted of patient's Situation, Background, Assessment and   Recommendations(SBAR).     Information from the following report(s) Nurse Handoff Report was reviewed with the receiving nurse.    Opportunity for questions and clarification was provided.      Assessment completed upon patient's arrival to unit and care assumed.

## 2024-11-18 NOTE — ANESTHESIA POSTPROCEDURE EVALUATION
Department of Anesthesiology  Postprocedure Note    Patient: Forrest Elias  MRN: [id number]  YOB: 1950  Date of evaluation: 11/18/2024    Procedure Summary       Date: 11/18/24 Room / Location: [facility] MAIN OR [address] [place] [address] MAIN OR    Anesthesia Start: 0730 Anesthesia Stop: 1103    Procedure: ERAS/L4-5 laminectomy and fusion with allograft and instrumentation (Back) Diagnosis:       Spinal stenosis of lumbar region with neurogenic claudication      Spondylolisthesis, lumbar region      (Spinal stenosis of lumbar region with neurogenic claudication [M48.062])      (Spondylolisthesis, lumbar region [M43.16])    Providers: Flip Menezes MD Responsible Provider: Kavon Crawford Jr., MD    Anesthesia Type: general ASA Status: 3            Anesthesia Type: No value filed.    Maggie Phase I: Maggie Score: 8    Maggie Phase II:      Anesthesia Post Evaluation    Patient location during evaluation: PACU  Patient participation: complete - patient participated  Level of consciousness: awake  Pain score: 0  Airway patency: patent  Nausea & Vomiting: no nausea and no vomiting  Cardiovascular status: blood pressure returned to baseline and hemodynamically stable  Respiratory status: acceptable, spontaneous ventilation and nonlabored ventilation  Hydration status: euvolemic  Multimodal analgesia pain management approach  Pain management: adequate    No notable events documented.

## 2024-11-18 NOTE — H&P
Name: Forrest Elias  YOB: 1950  Gender: male  MRN: [id number]  Age: 74 y.o.      Chief Complaint: Back and bilateral leg pain    History of Present Illness:      This is a very pleasant 74 y.o. male who presents with a history of spinal stenosis and neurogenic claudication.  He is here to discuss surgery.  He has tried injections which not provide any meaningful relief.  He has done physical therapy as well.       Medications:     No current outpatient medications on file.    Allergies:    Allergies   Allergen Reactions    Bee Venom Swelling    Cephalexin Hives    Misc. Sulfonamide Containing Compounds     Nefazodone      syncope    Penicillins Other (See Comments)     States causes him to pass out    Sulfa Antibiotics Rash         Physical Exam:     This is a well developed well nourished male adult in no acute distress.     Mood and affect are appropriate.    Oriented to person, place, and time.    Respirations are unlabored and there is no evidence of cyanosis  Regular rate and rhythm     Pulses in the lower extremities are palpable and equal  Sensory testing:     L2 L3 L4 L5 S1 S2   Right 2 2 2 2 2 2   Left 2 2 2 2 2 2     0=absent; 1=altered; 2=normal; NT= not tested  Reflexes    Reflexes   Right Left   Quadriceps (L4) 2 2   Achilles (S1) 2 2     Strength testing in the lower extremity reveals the following based on the 5 point grading scale:     HF  (L2) KE (L3/4) ADF (L4) EHL (L5) APF (S1)   Right 5 5 5 5 5   Left 5 5 5 5 5   0=total paralysis; 1=palpable or visible contraction; 2= active movement with gravity eliminated; 3= active movement against gravity; 4= active movement against moderate resistance; 5= active movement against full resistance     Straight leg raise testing is negative    Radiographic Studies:     X-rays including AP and lateral views of the lumbar spine were reviewed and interpreted:     There is noted to be a L4-5 spondylolisthesis. Also, there are multiple

## 2024-11-18 NOTE — ANESTHESIA PROCEDURE NOTES
Airway  Date/Time: 11/18/2024 7:45 AM  Urgency: elective    Airway not difficult    General Information and Staff    Patient location during procedure: OR  Resident/CRNA: Ruth James APRN - CRNA  Performed: resident/CRNA   Performed by: Ruth James APRN - CRNA  Authorized by: Kavon Crawford Jr., MD      Indications and Patient Condition  Indications for airway management: anesthesia  Spontaneous Ventilation: absent  Sedation level: deep  Preoxygenated: yes  MILS maintained throughout (neck neutral)  Mask difficulty assessment: vent by bag mask    Final Airway Details  Final airway type: endotracheal airway      Successful airway: ETT  Cuffed: yes   Successful intubation technique: video laryngoscopy  Facilitating devices/methods: intubating stylet  Endotracheal tube insertion site: oral  Blade type: Glidescope.  Blade size: #4  ETT size (mm): 8.0  Cormack-Lehane Classification: grade I - full view of glottis  Placement verified by: chest auscultation and capnometry   Measured from: lips  ETT to lips (cm): 24  Number of attempts at approach: 2  Ventilation between attempts: bag mask  Number of other approaches attempted: 1    Other Attempts  Unsuccessful attempted endotracheal techniques: direct laryngoscopy    Additional Comments  DL attempted x1 by SRNA unsuccessful. Glidescope x1 successful attempt. Masked with OPA between attempts   no

## 2024-11-19 VITALS
BODY MASS INDEX: 29.78 KG/M2 | SYSTOLIC BLOOD PRESSURE: 121 MMHG | HEIGHT: 70 IN | RESPIRATION RATE: 18 BRPM | WEIGHT: 208 LBS | OXYGEN SATURATION: 96 % | TEMPERATURE: 98.3 F | HEART RATE: 102 BPM | DIASTOLIC BLOOD PRESSURE: 58 MMHG

## 2024-11-19 PROCEDURE — 97165 OT EVAL LOW COMPLEX 30 MIN: CPT

## 2024-11-19 PROCEDURE — 94761 N-INVAS EAR/PLS OXIMETRY MLT: CPT

## 2024-11-19 PROCEDURE — 2500000003 HC RX 250 WO HCPCS: Performed by: ORTHOPAEDIC SURGERY

## 2024-11-19 PROCEDURE — 97530 THERAPEUTIC ACTIVITIES: CPT

## 2024-11-19 PROCEDURE — 94664 DEMO&/EVAL PT USE INHALER: CPT

## 2024-11-19 PROCEDURE — 97161 PT EVAL LOW COMPLEX 20 MIN: CPT

## 2024-11-19 PROCEDURE — 6360000002 HC RX W HCPCS: Performed by: ORTHOPAEDIC SURGERY

## 2024-11-19 PROCEDURE — 6370000000 HC RX 637 (ALT 250 FOR IP): Performed by: ORTHOPAEDIC SURGERY

## 2024-11-19 PROCEDURE — 94640 AIRWAY INHALATION TREATMENT: CPT

## 2024-11-19 PROCEDURE — 2580000003 HC RX 258: Performed by: ORTHOPAEDIC SURGERY

## 2024-11-19 PROCEDURE — 97535 SELF CARE MNGMENT TRAINING: CPT

## 2024-11-19 RX ORDER — TRANEXAMIC ACID 650 MG/1
1300 TABLET ORAL ONCE
Status: COMPLETED | OUTPATIENT
Start: 2024-11-19 | End: 2024-11-19

## 2024-11-19 RX ADMIN — FINASTERIDE 5 MG: 5 TABLET, FILM COATED ORAL at 08:07

## 2024-11-19 RX ADMIN — FUROSEMIDE 40 MG: 40 TABLET ORAL at 08:06

## 2024-11-19 RX ADMIN — HYDROMORPHONE HYDROCHLORIDE 0.5 MG: 1 INJECTION, SOLUTION INTRAMUSCULAR; INTRAVENOUS; SUBCUTANEOUS at 02:44

## 2024-11-19 RX ADMIN — BISACODYL 5 MG: 5 TABLET, COATED ORAL at 08:05

## 2024-11-19 RX ADMIN — POTASSIUM CHLORIDE 10 MEQ: 750 TABLET, EXTENDED RELEASE ORAL at 08:06

## 2024-11-19 RX ADMIN — GABAPENTIN 300 MG: 300 CAPSULE ORAL at 08:07

## 2024-11-19 RX ADMIN — FAMOTIDINE 20 MG: 20 TABLET, FILM COATED ORAL at 08:07

## 2024-11-19 RX ADMIN — ACETAMINOPHEN 650 MG: 325 TABLET ORAL at 05:57

## 2024-11-19 RX ADMIN — ARFORMOTEROL TARTRATE: 15 SOLUTION RESPIRATORY (INHALATION) at 07:22

## 2024-11-19 RX ADMIN — DOCUSATE SODIUM 50 MG AND SENNOSIDES 8.6 MG 1 TABLET: 8.6; 5 TABLET, FILM COATED ORAL at 08:05

## 2024-11-19 RX ADMIN — ACETAMINOPHEN 650 MG: 325 TABLET ORAL at 12:43

## 2024-11-19 RX ADMIN — METFORMIN HYDROCHLORIDE 1000 MG: 500 TABLET, EXTENDED RELEASE ORAL at 08:05

## 2024-11-19 RX ADMIN — PRAVASTATIN SODIUM 80 MG: 20 TABLET ORAL at 08:05

## 2024-11-19 RX ADMIN — OXYCODONE 10 MG: 5 TABLET ORAL at 08:07

## 2024-11-19 RX ADMIN — BROMOCRIPTINE MESYLATE 5 MG: 2.5 TABLET ORAL at 08:06

## 2024-11-19 RX ADMIN — LEVOTHYROXINE SODIUM 100 MCG: 0.1 TABLET ORAL at 05:57

## 2024-11-19 RX ADMIN — TAMSULOSIN HYDROCHLORIDE 0.4 MG: 0.4 CAPSULE ORAL at 08:06

## 2024-11-19 RX ADMIN — TRANEXAMIC ACID 1300 MG: 650 TABLET ORAL at 13:00

## 2024-11-19 RX ADMIN — CEFAZOLIN 2000 MG: 2 INJECTION, POWDER, FOR SOLUTION INTRAMUSCULAR; INTRAVENOUS at 02:42

## 2024-11-19 ASSESSMENT — PAIN DESCRIPTION - DESCRIPTORS
DESCRIPTORS: ACHING
DESCRIPTORS: TIGHTNESS;THROBBING

## 2024-11-19 ASSESSMENT — PAIN - FUNCTIONAL ASSESSMENT
PAIN_FUNCTIONAL_ASSESSMENT: PREVENTS OR INTERFERES SOME ACTIVE ACTIVITIES AND ADLS
PAIN_FUNCTIONAL_ASSESSMENT: ACTIVITIES ARE NOT PREVENTED

## 2024-11-19 ASSESSMENT — PAIN DESCRIPTION - FREQUENCY
FREQUENCY: CONTINUOUS
FREQUENCY: INTERMITTENT

## 2024-11-19 ASSESSMENT — PAIN DESCRIPTION - LOCATION
LOCATION: BACK;INCISION
LOCATION: BACK
LOCATION: BACK

## 2024-11-19 ASSESSMENT — PAIN SCALES - GENERAL
PAINLEVEL_OUTOF10: 0
PAINLEVEL_OUTOF10: 7
PAINLEVEL_OUTOF10: 2
PAINLEVEL_OUTOF10: 7
PAINLEVEL_OUTOF10: 1

## 2024-11-19 ASSESSMENT — PAIN DESCRIPTION - ONSET
ONSET: ON-GOING
ONSET: GRADUAL

## 2024-11-19 ASSESSMENT — PAIN DESCRIPTION - ORIENTATION
ORIENTATION: MID
ORIENTATION: MID
ORIENTATION: LOWER

## 2024-11-19 ASSESSMENT — PAIN DESCRIPTION - PAIN TYPE
TYPE: SURGICAL PAIN
TYPE: SURGICAL PAIN

## 2024-11-19 NOTE — PLAN OF CARE
Problem: Respiratory - Adult  Goal: Achieves optimal ventilation and oxygenation  Outcome: Progressing   SpO2 94% on room air.

## 2024-11-19 NOTE — PROGRESS NOTES
ACUTE PHYSICAL THERAPY GOALS:   (Developed with and agreed upon by patient and/or caregiver.)      (1.) Forrest Elias  will move from supine to sit and sit to supine , scoot up and down, and roll side to side with INDEPENDENT within 7 treatment day(s).    (2.) Forrest Elias will transfer from bed to chair and chair to bed with INDEPENDENT using the least restrictive device within 7 treatment day(s).    (3.) Forrest Elias will ambulate with INDEPENDENT for 500 feet with the least restrictive device within 7 treatment day(s).   (4.) Forrest Elias will perform standing static and dynamic balance activities x 5 minutes with INDEPENDENT to improve safety within 7 treatment day(s).  (5.) Forrest Elias will ascend and descend 1 step using no hand rail(s) with SUPERVISION to improve functional mobility and safety within 7 treatment day(s).  (6.) Forrest Elias will perform therapeutic exercises x 10 min for HEP with INDEPENDENT to improve strength, endurance, and functional mobility within 7 treatment day(s).       PHYSICAL THERAPY Initial Assessment, Daily Note, and AM  (Link to Caseload Tracking: PT Visit Days : 1  Acknowledge Orders  Time In/Out  PT Charge Capture  Rehab Caseload Tracker    Forrest Elias is a 74 y.o. male   PRIMARY DIAGNOSIS: S/P lumbar fusion  Spinal stenosis of lumbar region with neurogenic claudication [M48.062]  Spondylolisthesis, lumbar region [M43.16]  S/P lumbar fusion [Z98.1]  Procedure(s) (LRB):  ERAS/L4-5 laminectomy and fusion with allograft and instrumentation (N/A)  1 Day Post-Op  Reason for Referral: Generalized Muscle Weakness (M62.81)  Difficulty in walking, Not elsewhere classified (R26.2)  Inpatient: Payor: MEDICARE / Plan: MEDICARE PART A AND B / Product Type: *No Product type* /     ASSESSMENT:     REHAB RECOMMENDATIONS:   Recommendation to date pending progress:  Setting:  Home Health Therapy    Equipment:    To Be Determined

## 2024-11-19 NOTE — PROGRESS NOTES
Pt discharged to home with family. Educated on medication changes and follow-up appointments. IV and drain removed; all questions answered.

## 2024-11-19 NOTE — CARE COORDINATION
Patient medically ready to discharge per attending.  CM screened chart for potential transitions of care needs; no CM consult received.  Patient admitted for elective surgery with Dr. Menezes.  Patient is post-op day one and PT/OT are recommending HH at discharge.  Orders placed and [facility] HH added to [facility] as surgeon's office sent to referral to them PTA.  Patient has all recommended DME at home.  No other CM needs noted at this time. See service assessment below.        11/19/24 5226   Service Assessment   Patient Orientation Alert and Oriented   Cognition Alert   History Provided By Medical Record   Primary Caregiver Self   Accompanied By/Relationship N?A   Support Systems Spouse/Significant Other   Patient's Healthcare Decision Maker is: Named in Scanned ACP Document   PCP Verified by CM Yes   Last Visit to PCP Within last 3 months   Prior Functional Level Independent in ADLs/IADLs   Current Functional Level Assistance with the following:;Cooking;Housework;Shopping;Mobility   Can patient return to prior living arrangement Yes   Ability to make needs known: Good   Family able to assist with home care needs: Yes   Would you like for me to discuss the discharge plan with any other family members/significant others, and if so, who? No   Financial Resources Medicare;Other (Comment)  ([facility] Supplement)   Community Resources None   CM/SW Referral Other (see comment)  (No CM consult)   Social/Functional History   Lives With Spouse   Type of Home House   Home Layout One level   Home Access Stairs to enter without rails   Entrance Stairs - Number of Steps 1   Bathroom Shower/Tub Tub/Shower unit   Bathroom Equipment Grab bars in shower   Home Equipment Cane;Walker - Rolling   Active  Yes   Discharge Planning   Type of Residence House   Living Arrangements Spouse/Significant Other   Current Services Prior To Admission Durable Medical Equipment   Current DME Prior to Arrival Cane;Walker   Potential Assistance

## 2024-11-19 NOTE — PROGRESS NOTES
ORTHO PROGRESS NOTE    2024    Admit Date: 2024  Post Op day: 1 Day Post-Op      Subjective:     Forrest Elias is a patient who is now 1 Day Post-Op  and has no complaints.       Objective:     PT/OT:    Independent in room    Vital Signs:    Patient Vitals for the past 8 hrs:   BP Temp Temp src Pulse Resp SpO2   24 0800 (!) 121/58 98.3 °F (36.8 °C) Oral (!) 102 18 96 %   24 0754 (!) 97/50 98.2 °F (36.8 °C) Oral 90 16 94 %   24 0722 -- -- -- 91 16 94 %   24 0613 110/65 98.1 °F (36.7 °C) Oral 93 16 91 %     Temp (24hrs), Av.1 °F (36.7 °C), Min:97.8 °F (36.6 °C), Max:98.4 °F (36.9 °C)      LAB:    No results for input(s): \"HGB\", \"WBC\", \"PLT\" in the last 72 hours.    Physical Exam:    Awake and in no acute distress.  Mood and affect appropriate.  Respirations unlabored and no evidence cyanosis.  Calves nontender.  Abdomen soft and nontender.  Dressing clean/dry  No new neurologic deficit.    Assessment:      1 Day Post-Op STATUS POST Procedure(s):  ERAS/L4-5 laminectomy and fusion with allograft and instrumentation      Plan:     TXA  Drain out  Anticipate discharge to: HOME today      Signed By: FRANKIE ESTRADA MD

## 2024-11-19 NOTE — THERAPY EVALUATION
ACUTE OCCUPATIONAL THERAPY GOALS:   (Developed with and agreed upon by patient and/or caregiver.)  1. Patient will verbalize and demonstrate understanding of spinal precautions with 100% accuracy during ADLs.   2. Patient will complete lower body bathing and dressing with MODIFIED INDEPENDENCE and adaptive equipment as needed.   3. Patient will complete functional transfers with MODIFIED INDEPENDENCE and adaptive equipment as needed.   4. Patient will complete toileting and toilet transfer with MODIFIED INDEPENDENCE.   5. Patient will complete functional mobility of household distances with MODIFIED INDEPENDENCE and adaptive equipment as needed.   6. Patient will demonstrate ability to log roll in bed with INDEPENDENCE and no verbal cues from therapist.   7. Patient will complete self-grooming ADL tasks at standing level with MODIFIED INDEPENDENCE and adaptive equipment as needed.    Timeframe: 7 visits        OCCUPATIONAL THERAPY Initial Assessment, Daily Note, and AM       OT Visit Days: 1  Acknowledge Orders  Time  OT Charge Capture  Rehab Caseload Tracker      Spinal Precautions (No Bending, No Lifting, No Twisting)    Forrest Elias is a 74 y.o. male   PRIMARY DIAGNOSIS: S/P lumbar fusion  Spinal stenosis of lumbar region with neurogenic claudication [M48.062]  Spondylolisthesis, lumbar region [M43.16]  S/P lumbar fusion [Z98.1]  Procedure(s) (LRB):  ERAS/L4-5 laminectomy and fusion with allograft and instrumentation (N/A)  1 Day Post-Op  Reason for Referral: Generalized Muscle Weakness (M62.81)  Other lack of cordination (R27.8)  Low Back Pain (M54.5)  Inpatient: Payor: MEDICARE / Plan: MEDICARE PART A AND B / Product Type: *No Product type* /     ASSESSMENT:     REHAB RECOMMENDATIONS:   Recommendation to date pending progress:  Setting:  Home Health Therapy    Equipment:   Tub Transfer Bench (discussed where to purchase/ resources)  Patient has a cane and walker at home  Long handled sponge provided  [] [] [] [x] [x] [] [] [] [] [] No AD   I=Independent, Mod I=Modified Independent, S=Supervision/Setup, SBA=Standby Assistance, CGA=Contact Guard Assistance, Min=Minimal Assistance, Mod=Moderate Assistance, Max=Maximal Assistance, Total=Total Assistance, NT=Not Tested    PLAN:   FREQUENCY/DURATION   OT Plan of Care: 3 times/week for duration of hospital stay or until stated goals are met, whichever comes first.    PROBLEM LIST:   (Skilled intervention is medically necessary to address:)  Decreased ADL/Functional Activities  Decreased Activity Tolerance  Decreased Balance  Decreased Safety Awareness  Decreased Strength  Decreased Transfer Abilities  Increased Pain   INTERVENTIONS PLANNED:  (Benefits and precautions of occupational therapy have been discussed with the patient.)  Self Care Training  Therapeutic Activity  Therapeutic Exercise/HEP  Neuromuscular Re-education  Manual Therapy  Education         TREATMENT:     EVALUATION: LOW COMPLEXITY: (Untimed Charge)  The initial evaluation charge encompasses clinical chart review, objective assessment, interpretation of assessment, and skilled monitoring of the patient's response to treatment in order to develop a plan of care.     TREATMENT:   Self Care (8 minutes): Patient participated in lower body dressing and grooming ADLs in unsupported sitting and standing with minimal verbal cueing to increase independence, increase activity tolerance, and increase safety awareness. Patient also participated in functional mobility and functional transfer training to increase independence, increase activity tolerance, and increase safety awareness. The patient was educated on role of occupational therapy, strategies to improve safety, recommended equipment, transfer training and safety, and surgical precautions and patient verbalized understanding and demonstrated understanding.     TREATMENT GRID:  N/A    AFTER TREATMENT PRECAUTIONS: Bed/Chair Locked, Call light within reach,

## 2024-11-19 NOTE — PROGRESS NOTES
ACUTE PHYSICAL THERAPY GOALS:   (Developed with and agreed upon by patient and/or caregiver.)    (1.) Forrest Elias  will move from supine to sit and sit to supine , scoot up and down, and roll side to side with INDEPENDENT within 7 treatment day(s).    (2.) Forrest Elias will transfer from bed to chair and chair to bed with INDEPENDENT using the least restrictive device within 7 treatment day(s).    (3.) Forrest Elias will ambulate with INDEPENDENT for 500 feet with the least restrictive device within 7 treatment day(s).   (4.) Forrest Elias will perform standing static and dynamic balance activities x 5 minutes with INDEPENDENT to improve safety within 7 treatment day(s).  (5.) Forrest Elias will ascend and descend 1 step using no hand rail(s) with SUPERVISION to improve functional mobility and safety within 7 treatment day(s).  (6.) Forrest Elias will perform therapeutic exercises x 10 min for HEP with INDEPENDENT to improve strength, endurance, and functional mobility within 7 treatment day(s).     PHYSICAL THERAPY: Daily Note PM   (Link to Caseload Tracking: PT Visit Days : 1  Time In/Out PT Charge Capture  Rehab Caseload Tracker  Orders    Forrest Elias is a 74 y.o. male   PRIMARY DIAGNOSIS: S/P lumbar fusion  Spinal stenosis of lumbar region with neurogenic claudication [M48.062]  Spondylolisthesis, lumbar region [M43.16]  S/P lumbar fusion [Z98.1]  Procedure(s) (LRB):  ERAS/L4-5 laminectomy and fusion with allograft and instrumentation (N/A)  1 Day Post-Op  Inpatient: Payor: MEDICARE / Plan: MEDICARE PART A AND B / Product Type: *No Product type* /     ASSESSMENT:     REHAB RECOMMENDATIONS:   Recommendation to date pending progress:  Setting:  Home Health Therapy    Equipment:    None     ASSESSMENT:  Mr. Elias is a 74 year old male admitted to the hospital on 11/18/24 now POD #1 L4-L5 laminectomy and posterolateral lumbar fusion.    During today's

## 2024-11-26 DIAGNOSIS — Z98.1 S/P LAMINECTOMY WITH SPINAL FUSION: Primary | ICD-10-CM

## 2024-11-26 RX ORDER — OXYCODONE AND ACETAMINOPHEN 5; 325 MG/1; MG/1
1 TABLET ORAL EVERY 6 HOURS PRN
Qty: 28 TABLET | Refills: 0 | Status: SHIPPED | OUTPATIENT
Start: 2024-11-26 | End: 2024-12-03

## 2024-12-02 DIAGNOSIS — R80.9 PROTEINURIA, UNSPECIFIED TYPE: Primary | ICD-10-CM

## 2024-12-03 ENCOUNTER — OFFICE VISIT (OUTPATIENT)
Age: 74
End: 2024-12-03

## 2024-12-03 DIAGNOSIS — Z98.1 S/P LAMINECTOMY WITH SPINAL FUSION: Primary | ICD-10-CM

## 2024-12-03 PROCEDURE — 99024 POSTOP FOLLOW-UP VISIT: CPT | Performed by: ORTHOPAEDIC SURGERY

## 2024-12-04 NOTE — PROGRESS NOTES
Name: Forrest Elias  YOB: 1950  Gender: male  MRN: [id number]  Age: 74 y.o.      Chief Complaint: 2-week postop    History of Present Illness:      This is a very pleasant 74 y.o. male who presents with a history of an L4-5 laminectomy and fusion done on 11/18/2024.  Doing well overall  Medications:       Current Outpatient Medications:     docusate sodium (COLACE) 100 MG capsule, Take 1 capsule by mouth 2 times daily, Disp: 60 capsule, Rfl: 0    vitamin C (ASCORBIC ACID) 500 MG tablet, Take 2 tablets by mouth daily, Disp: , Rfl:     furosemide (LASIX) 40 MG tablet, , Disp: , Rfl:     FARXIGA 10 MG tablet, , Disp: , Rfl:     finasteride (PROSCAR) 5 MG tablet, , Disp: , Rfl:     potassium chloride (KLOR-CON) 10 MEQ extended release tablet, , Disp: , Rfl:     montelukast (SINGULAIR) 10 MG tablet, Take 1 tablet by mouth nightly as needed, Disp: , Rfl:     gabapentin (NEURONTIN) 300 MG capsule, Take 1 capsule by mouth in the morning and at bedtime., Disp: , Rfl:     fluticasone (FLONASE) 50 MCG/ACT nasal spray, 1 spray by Each Nostril route as needed for Rhinitis, Disp: , Rfl:     promethazine (PHENERGAN) 12.5 MG tablet, Take 1 tablet by mouth 4 times daily as needed for Nausea, Disp: 20 tablet, Rfl: 2    methocarbamol (ROBAXIN-750) 750 MG tablet, Take 1 tablet by mouth 3 times daily as needed (muscle spasm or cramps), Disp: 40 tablet, Rfl: 1    amLODIPine (NORVASC) 10 MG tablet, Take 1 tablet by mouth nightly (Patient not taking: Reported on 11/7/2024), Disp: , Rfl:     lisinopril (PRINIVIL;ZESTRIL) 10 MG tablet, Take 4 tablets by mouth nightly, Disp: , Rfl:     metFORMIN (GLUCOPHAGE-XR) 500 MG extended release tablet, Take 2 tablets by mouth in the morning and at bedtime, Disp: , Rfl:     bromocriptine (PARLODEL) 5 MG capsule, Take 1 capsule by mouth in the morning and at bedtime Patient states he takes 4 capsules of 2.5 mg tablets BID, Disp: , Rfl:     tamsulosin (FLOMAX) 0.4 mg capsule, Take

## 2024-12-11 ENCOUNTER — HOSPITAL ENCOUNTER (OUTPATIENT)
Dept: CT IMAGING | Age: 74
Discharge: HOME | End: 2024-12-14
Payer: MEDICARE

## 2024-12-11 VITALS
DIASTOLIC BLOOD PRESSURE: 66 MMHG | OXYGEN SATURATION: 93 % | HEART RATE: 89 BPM | TEMPERATURE: 98.2 F | SYSTOLIC BLOOD PRESSURE: 120 MMHG | RESPIRATION RATE: 16 BRPM

## 2024-12-11 DIAGNOSIS — R80.9 PROTEINURIA, UNSPECIFIED TYPE: ICD-10-CM

## 2024-12-11 LAB
GLUCOSE BLD STRIP.AUTO-MCNC: 126 MG/DL (ref 65–100)
SERVICE CMNT-IMP: ABNORMAL

## 2024-12-11 PROCEDURE — 2709999900 CT BIOPSY RENAL

## 2024-12-11 PROCEDURE — 99152 MOD SED SAME PHYS/QHP 5/>YRS: CPT

## 2024-12-11 PROCEDURE — 88305 TISSUE EXAM BY PATHOLOGIST: CPT

## 2024-12-11 PROCEDURE — 6360000002 HC RX W HCPCS: Performed by: RADIOLOGY

## 2024-12-11 PROCEDURE — 82962 GLUCOSE BLOOD TEST: CPT

## 2024-12-11 RX ORDER — MIDAZOLAM HYDROCHLORIDE 2 MG/2ML
INJECTION, SOLUTION INTRAMUSCULAR; INTRAVENOUS PRN
Status: COMPLETED | OUTPATIENT
Start: 2024-12-11 | End: 2024-12-11

## 2024-12-11 RX ORDER — FENTANYL CITRATE 50 UG/ML
INJECTION, SOLUTION INTRAMUSCULAR; INTRAVENOUS PRN
Status: COMPLETED | OUTPATIENT
Start: 2024-12-11 | End: 2024-12-11

## 2024-12-11 RX ORDER — LIDOCAINE HYDROCHLORIDE 20 MG/ML
INJECTION, SOLUTION INFILTRATION; PERINEURAL PRN
Status: COMPLETED | OUTPATIENT
Start: 2024-12-11 | End: 2024-12-11

## 2024-12-11 RX ADMIN — MIDAZOLAM HYDROCHLORIDE 1 MG: 1 INJECTION, SOLUTION INTRAMUSCULAR; INTRAVENOUS at 10:10

## 2024-12-11 RX ADMIN — FENTANYL CITRATE 50 MCG: 50 INJECTION, SOLUTION INTRAMUSCULAR; INTRAVENOUS at 10:05

## 2024-12-11 RX ADMIN — MIDAZOLAM HYDROCHLORIDE 1 MG: 1 INJECTION, SOLUTION INTRAMUSCULAR; INTRAVENOUS at 10:05

## 2024-12-11 RX ADMIN — FENTANYL CITRATE 50 MCG: 50 INJECTION, SOLUTION INTRAMUSCULAR; INTRAVENOUS at 10:10

## 2024-12-11 RX ADMIN — LIDOCAINE HYDROCHLORIDE 5 ML: 20 INJECTION, SOLUTION INFILTRATION; PERINEURAL at 10:11

## 2024-12-11 ASSESSMENT — PAIN SCALES - GENERAL
PAINLEVEL_OUTOF10: 0

## 2024-12-11 ASSESSMENT — PAIN - FUNCTIONAL ASSESSMENT: PAIN_FUNCTIONAL_ASSESSMENT: 0-10

## 2024-12-11 NOTE — H&P
Department of Interventional Radiology  [phone]    History and Physical    Patient:  Forrest Elias MRN:  [id number]  SSN:  [id number]    YOB: 1950  Age:  74 y.o.  Sex:  male      Primary Care Provider:  Joaquin Woo Jr., MD  Referring Physician:  Mary Latham APRN*    Subjective:     Chief Complaint: Kidney dysfunction    History of the Present Illness:  The patient is a 74 y.o. male with hx of CKD, COPD, anxiety, GERD who presents for nontargeted kidney biopsy. He has no acute complaints. He is NPO.       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 Baca several years ago; cardiac cath:  4/20/11    Cervical neck pain with evidence of disc disease     Chronic renal insufficiency 7/20/2016    Controlled diabetes mellitus ([facility]) 7/20/2016 7/2024 A1c 6.9; daily fasting sqbs:   ave    COPD (chronic obstructive pulmonary disease) ([facility]) 7/20/2016    Symbicort BID-no PRN inhaler    COVID-19 vaccine series completed 03/22/2021    Moderna    Depressive disorder 7/20/2016    clinical depression    Dumping syndrome     Dyspepsia and other specified disorders of function of stomach 8/29/2012    ED (erectile dysfunction) 7/20/2016    Edema     Essential hypertension, benign 8/29/2012    GERD (gastroesophageal reflux disease)     managed with medication    Hematuria     Hyperlipidemia     Hyperprolactinemia ([facility])     followed by endocrinologist    Hypogonadism in male     Hypothyroidism     takes levothyroxine    Knee effusion     Knee pain     Neuralgia     Numbness and

## 2024-12-11 NOTE — BRIEF OP NOTE
Brief Postoperative Note      Patient: Forrest Elias  YOB: 1950  MRN: [id number]    Date of Procedure: 12/11/2024    Pre-Op Diagnosis: CKD    Post-Op Diagnosis: Same       : [place]    Anesthesia: Moderate sedation    CT guided nontargeted renal core bx. Four 18G core samples obtained from the lower pole of the left kidney. Gelfoam slurry for hemostasis. No complications evident.     Estimated Blood Loss (mL): Minimal    Complications: None    Specimens: As above    Implants:  * No implants in log *      Drains:   [REMOVED] Closed/Suction Drain Bulb (Removed)   Site Description Clean, dry & intact 11/19/24 0800   Dressing Status Other (comment) 11/19/24 0800   Drainage Appearance Bloody 11/19/24 0800   Drain Status To bulb suction;Compressed 11/19/24 0800   Output (ml) 40 ml 11/19/24 0559       [REMOVED] Urinary Catheter 11/30/23 [place] (Removed)       [REMOVED] Urinary Catheter 11/18/24 2 Way (Removed)   $ Urethral catheter insertion Inserted for procedure 11/18/24 1105   Catheter Indications Perioperative use for selected surgical procedures 11/18/24 1730   Site Assessment No urethral drainage 11/18/24 1730   Urine Color Yellow 11/18/24 2040   Urine Appearance Clear 11/18/24 2040   Urine Odor Other (Comment) 11/18/24 2040   Collection Container Standard 11/18/24 1730   Securement Method Securing device (Describe) 11/18/24 1730   Catheter Care  Perineal wipes 11/18/24 1730   Catheter Best Practices  Drainage tube clipped to bed;Catheter secured to thigh;Tamper seal intact;Bag below bladder;Bag not on floor;Lack of dependent loop in tubing;Drainage bag less than half full 11/18/24 1730   Status Draining;Patent 11/18/24 1730   Output (mL) 1000 mL 11/19/24 0246       Findings:  As above    Electronically signed by Flip Butt MD on 12/11/2024 at 10:28 AM

## 2024-12-11 NOTE — DISCHARGE INSTRUCTIONS
If you have any questions about your procedure, please call the Interventional Radiology department at [phone].   After business hours (5pm) and weekends, call the answering service at [phone] and ask for the Radiologist on call to be paged.

## 2024-12-11 NOTE — PRE SEDATION
systemic disease that limits activity but is not incapacitating    Sedation/ Anesthesia Plan:   intravenous sedation    Medications Planned:   midazolam (Versed) intravenously and fentanyl intravenously    Patient is an appropriate candidate for plan of sedation: yes    Electronically signed by Flip Butt MD on 12/11/2024 at 9:38 AM

## 2024-12-31 ENCOUNTER — OFFICE VISIT (OUTPATIENT)
Age: 74
End: 2024-12-31

## 2024-12-31 DIAGNOSIS — Z98.1 S/P LAMINECTOMY WITH SPINAL FUSION: Primary | ICD-10-CM

## 2024-12-31 PROCEDURE — 99024 POSTOP FOLLOW-UP VISIT: CPT | Performed by: ORTHOPAEDIC SURGERY

## 2024-12-31 NOTE — PROGRESS NOTES
2     Strength testing in the lower extremity reveals the following based on the 5 point grading scale:     HF  (L2) KE (L3/4) ADF (L4) EHL (L5) APF (S1)   Right 5 5 5 5 5   Left 5 5 5 5 5   0=total paralysis; 1=palpable or visible contraction; 2= active movement with gravity eliminated; 3= active movement against gravity; 4= active movement against moderate resistance; 5= active movement against full resistance       Radiographic Studies:     X-rays including AP and lateral views of the lumbar spine were reviewed and interpreted:     Postoperative changes are noted status post lumbar fusion with instrumentation.  The hardware appears to be well-placed and intact.  There is no evidence of significant osteolysis.  No significant adjacent level decompensation.  Alignment is maintained.    Radiographic Impression:    Appropriate postoperative changes status post lumbar laminectomy and fusion without evidence for hardware failure or decompensation.      Diagnosis:      ICD-10-CM    1. S/P laminectomy with spinal fusion  Z98.1 XR LUMBAR SPINE (2-3 VIEWS)          Assessment/Plan:      -I discussed with the patient the diagnosis as well as the imaging findings.  At this time I recommend gradual return to activity as tolerated.  After his 3-month follow-up he will be able to do any activity as tolerated..  Patient was in agreement with the plan.        -Follow-up as needed    Flip Menezes MD  Orthopaedic Spine Surgery     12/31/24  1:19 PM    All or part of this note was transcribed using dictation software.  Although the note was proofread, grammatical and spelling errors may occur.

## 2025-02-11 ENCOUNTER — OFFICE VISIT (OUTPATIENT)
Age: 75
End: 2025-02-11

## 2025-02-11 DIAGNOSIS — Z98.1 S/P LAMINECTOMY WITH SPINAL FUSION: Primary | ICD-10-CM

## 2025-02-11 PROCEDURE — 99024 POSTOP FOLLOW-UP VISIT: CPT | Performed by: ORTHOPAEDIC SURGERY

## 2025-02-11 NOTE — PROGRESS NOTES
Name: Forrest Elias  YOB: 1950  Gender: male  MRN: [id number]  Age: 74 y.o.    Chief Complaint: Lumbar spine surgery follow up    History of Present Illness:      Forrest Elias  is here for 3-month follow up of his   L4-5 laminectomy and fusion  surgery.  He reports complete relief of preoperative lower extremity pain, weakness and parasthesias. There is the expected residual back stiffness.     Medications:       Current Outpatient Medications:     vitamin C (ASCORBIC ACID) 500 MG tablet, Take 2 tablets by mouth daily, Disp: , Rfl:     furosemide (LASIX) 40 MG tablet, , Disp: , Rfl:     FARXIGA 10 MG tablet, , Disp: , Rfl:     finasteride (PROSCAR) 5 MG tablet, , Disp: , Rfl:     potassium chloride (KLOR-CON) 10 MEQ extended release tablet, , Disp: , Rfl:     montelukast (SINGULAIR) 10 MG tablet, Take 1 tablet by mouth nightly as needed, Disp: , Rfl:     gabapentin (NEURONTIN) 300 MG capsule, Take 1 capsule by mouth in the morning and at bedtime., Disp: , Rfl:     fluticasone (FLONASE) 50 MCG/ACT nasal spray, 1 spray by Each Nostril route as needed for Rhinitis, Disp: , Rfl:     promethazine (PHENERGAN) 12.5 MG tablet, Take 1 tablet by mouth 4 times daily as needed for Nausea, Disp: 20 tablet, Rfl: 2    methocarbamol (ROBAXIN-750) 750 MG tablet, Take 1 tablet by mouth 3 times daily as needed (muscle spasm or cramps), Disp: 40 tablet, Rfl: 1    amLODIPine (NORVASC) 10 MG tablet, Take 1 tablet by mouth nightly (Patient not taking: Reported on 11/7/2024), Disp: , Rfl:     lisinopril (PRINIVIL;ZESTRIL) 10 MG tablet, Take 4 tablets by mouth nightly, Disp: , Rfl:     metFORMIN (GLUCOPHAGE-XR) 500 MG extended release tablet, Take 2 tablets by mouth in the morning and at bedtime, Disp: , Rfl:     bromocriptine (PARLODEL) 5 MG capsule, Take 1 capsule by mouth in the morning and at bedtime Patient states he takes 4 capsules of 2.5 mg tablets BID, Disp: , Rfl:     tamsulosin (FLOMAX) 0.4 mg

## 2025-04-15 ENCOUNTER — TELEPHONE (OUTPATIENT)
Dept: UROLOGY | Age: 75
End: 2025-04-15

## 2025-04-15 NOTE — TELEPHONE ENCOUNTER
Pt LVM requesting to reschedule missed appt on 04/14/2025 stating that he was sick and was throwing up. Called pt. Reschedule appt to 05/09/2025.

## 2025-05-09 ENCOUNTER — TELEPHONE (OUTPATIENT)
Dept: UROLOGY | Age: 75
End: 2025-05-09

## 2025-05-09 ENCOUNTER — OFFICE VISIT (OUTPATIENT)
Dept: UROLOGY | Age: 75
End: 2025-05-09

## 2025-05-09 DIAGNOSIS — E29.1 HYPOGONADISM IN MALE: ICD-10-CM

## 2025-05-09 DIAGNOSIS — I10 ESSENTIAL HYPERTENSION WITH GOAL BLOOD PRESSURE LESS THAN 130/85: Primary | Chronic | ICD-10-CM

## 2025-05-09 DIAGNOSIS — N40.1 BPH WITH OBSTRUCTION/LOWER URINARY TRACT SYMPTOMS: ICD-10-CM

## 2025-05-09 DIAGNOSIS — N13.8 BPH WITH OBSTRUCTION/LOWER URINARY TRACT SYMPTOMS: ICD-10-CM

## 2025-05-09 DIAGNOSIS — D49.7 PITUITARY TUMOR: ICD-10-CM

## 2025-05-09 LAB
BILIRUBIN, URINE, POC: NEGATIVE
BLOOD URINE, POC: NEGATIVE
GLUCOSE URINE, POC: 500
KETONES, URINE, POC: NEGATIVE
LEUKOCYTE ESTERASE, URINE, POC: NEGATIVE
NITRITE, URINE, POC: NEGATIVE
PH, URINE, POC: 6 (ref 4.6–8)
PROTEIN,URINE, POC: 100
SPECIFIC GRAVITY, URINE, POC: 1 (ref 1–1.03)
URINALYSIS CLARITY, POC: NORMAL
URINALYSIS COLOR, POC: NORMAL
UROBILINOGEN, POC: NORMAL

## 2025-05-09 NOTE — PROGRESS NOTES
[facility]  [address] [address]  [phone]    Forrest Elias  : 1950    Chief Complaint   Patient presents with    Other    Benign Prostatic Hypertrophy        HPI     Forrest Elias is a 74 y.o. male referred by Dr.John Woo for evaluation and treatment of ED. Hx of type II diabetes. No better with Viagra.  Has hx of pituitary tumor, inoperable, takes bromocriptine.., managed by Dr.Sandra Enciso. Takes testosterone shots every 2 weeks. PSA 0.26 in . Takes tamsulosin 0.4 mg daily.   He has used a OLI but is not happy with it.   Started on Trimix in .  He returns today, is seeing GI for frequent N/V.  He is having sxs of urgency, urge incontinence, nocturia x 2.   PSA 0.2 in .   He states that his PCP did a PVR on him and that it was elevated, but he doesn't know the amount.   He had back surgery in  and had a catheter, LUTS started after that.   Past Medical History:   Diagnosis Date    Abnormality of cortisol-binding globulin     Actinic keratosis     Anaphylactic reaction to bee sting     Anxiety disorder     Arthritis of left shoulder region     Asthma 2016    followed by pulmonary; uses inhaler    Backache 2016    Bilateral otitis media     BPH (benign prostatic hyperplasia) 2016    CAD (coronary artery disease) 2011    He had mild nonobstructive CAD at cath by Dr. Baca several years ago; cardiac cath:  11    Cervical neck pain with evidence of disc disease     Chronic renal insufficiency 2016    Controlled diabetes mellitus (HCC) 2016 A1c 6.9; daily fasting sqbs:   ave    COPD (chronic obstructive pulmonary disease) (HCC) 2016    Symbicort BID-no PRN inhaler    COVID-19 vaccine series completed 2021    Moderna    Depressive disorder 2016    clinical depression    Dumping syndrome     Dyspepsia and other specified disorders of function of stomach 2012

## 2025-05-09 NOTE — TELEPHONE ENCOUNTER
Return in 2 weeks (on 5/23/2025) for cysto, PVR.    Check out comments: Use 0800 slot on 5-23 for cysto

## 2025-05-16 ENCOUNTER — OFFICE VISIT (OUTPATIENT)
Age: 75
End: 2025-05-16

## 2025-05-16 DIAGNOSIS — Z98.1 S/P LAMINECTOMY WITH SPINAL FUSION: Primary | ICD-10-CM

## 2025-05-16 NOTE — PROGRESS NOTES
Name: Forrest Elias  YOB: 1950  Gender: male  MRN: [id number]  Age: 74 y.o.    Chief Complaint: Lumbar spine surgery follow up    History of Present Illness:      Forrest Elias  is here for 6-month follow-up follow up of his L4-5 laminectomy  and lumbar posteolateral fusion surgery.  He reports complete relief of preoperative lower extremity pain, weakness and parasthesias. There is the expected residual back stiffness.           Medications:       Current Outpatient Medications:     vitamin C (ASCORBIC ACID) 500 MG tablet, Take 2 tablets by mouth daily, Disp: , Rfl:     furosemide (LASIX) 40 MG tablet, , Disp: , Rfl:     FARXIGA 10 MG tablet, , Disp: , Rfl:     finasteride (PROSCAR) 5 MG tablet, , Disp: , Rfl:     potassium chloride (KLOR-CON) 10 MEQ extended release tablet, , Disp: , Rfl:     montelukast (SINGULAIR) 10 MG tablet, Take 1 tablet by mouth nightly as needed, Disp: , Rfl:     gabapentin (NEURONTIN) 300 MG capsule, Take 1 capsule by mouth in the morning and at bedtime., Disp: , Rfl:     fluticasone (FLONASE) 50 MCG/ACT nasal spray, 1 spray by Each Nostril route as needed for Rhinitis, Disp: , Rfl:     promethazine (PHENERGAN) 12.5 MG tablet, Take 1 tablet by mouth 4 times daily as needed for Nausea, Disp: 20 tablet, Rfl: 2    methocarbamol (ROBAXIN-750) 750 MG tablet, Take 1 tablet by mouth 3 times daily as needed (muscle spasm or cramps), Disp: 40 tablet, Rfl: 1    amLODIPine (NORVASC) 10 MG tablet, Take 1 tablet by mouth nightly (Patient not taking: Reported on 11/7/2024), Disp: , Rfl:     lisinopril (PRINIVIL;ZESTRIL) 10 MG tablet, Take 4 tablets by mouth nightly, Disp: , Rfl:     metFORMIN (GLUCOPHAGE-XR) 500 MG extended release tablet, Take 2 tablets by mouth in the morning and at bedtime, Disp: , Rfl:     bromocriptine (PARLODEL) 5 MG capsule, Take 1 capsule by mouth in the morning and at bedtime Patient states he takes 4 capsules of 2.5 mg tablets BID, Disp: ,

## 2025-05-23 ENCOUNTER — PROCEDURE VISIT (OUTPATIENT)
Dept: UROLOGY | Age: 75
End: 2025-05-23

## 2025-05-23 DIAGNOSIS — N13.8 BPH WITH OBSTRUCTION/LOWER URINARY TRACT SYMPTOMS: Primary | ICD-10-CM

## 2025-05-23 DIAGNOSIS — N40.1 BPH WITH OBSTRUCTION/LOWER URINARY TRACT SYMPTOMS: Primary | ICD-10-CM

## 2025-05-23 LAB
BILIRUBIN, URINE, POC: NEGATIVE
BLOOD URINE, POC: NEGATIVE
GLUCOSE URINE, POC: 100 MG/DL
KETONES, URINE, POC: NEGATIVE MG/DL
LEUKOCYTE ESTERASE, URINE, POC: NEGATIVE
NITRITE, URINE, POC: NEGATIVE
PH, URINE, POC: 5.5 (ref 4.6–8)
PROTEIN,URINE, POC: NEGATIVE MG/DL
PVR, POC: 308 CC
SPECIFIC GRAVITY, URINE, POC: 1 (ref 1–1.03)
URINALYSIS CLARITY, POC: NORMAL
URINALYSIS COLOR, POC: NORMAL
UROBILINOGEN, POC: NORMAL MG/DL

## 2025-05-27 ENCOUNTER — CLINICAL SUPPORT (OUTPATIENT)
Dept: UROLOGY | Age: 75
End: 2025-05-27
Payer: MEDICARE

## 2025-05-27 DIAGNOSIS — N40.1 BPH WITH OBSTRUCTION/LOWER URINARY TRACT SYMPTOMS: Primary | ICD-10-CM

## 2025-05-27 DIAGNOSIS — N13.8 BPH WITH OBSTRUCTION/LOWER URINARY TRACT SYMPTOMS: Primary | ICD-10-CM

## 2025-05-27 LAB — PVR, POC: 356 CC

## 2025-05-27 PROCEDURE — 51784 ANAL/URINARY MUSCLE STUDY: CPT | Performed by: UROLOGY

## 2025-05-27 PROCEDURE — 51798 US URINE CAPACITY MEASURE: CPT | Performed by: UROLOGY

## 2025-05-27 PROCEDURE — 51726 COMPLEX CYSTOMETROGRAM: CPT | Performed by: UROLOGY

## 2025-05-27 NOTE — PROGRESS NOTES
Patient came for cystometrogram and anal/bladder muscle study. Urocuff procedure completed.  PVR = 356mL

## 2025-07-22 ENCOUNTER — OFFICE VISIT (OUTPATIENT)
Dept: ORTHOPEDIC SURGERY | Age: 75
End: 2025-07-22

## 2025-07-22 DIAGNOSIS — M16.12 PRIMARY OSTEOARTHRITIS OF LEFT HIP: ICD-10-CM

## 2025-07-22 DIAGNOSIS — Z96.652 STATUS POST TOTAL LEFT KNEE REPLACEMENT: Primary | ICD-10-CM

## 2025-07-22 DIAGNOSIS — Z96.651 STATUS POST RIGHT KNEE REPLACEMENT: ICD-10-CM

## 2025-07-22 DIAGNOSIS — M25.552 LEFT HIP PAIN: ICD-10-CM

## 2025-07-22 RX ORDER — METHYLPREDNISOLONE ACETATE 40 MG/ML
80 INJECTION, SUSPENSION INTRA-ARTICULAR; INTRALESIONAL; INTRAMUSCULAR; SOFT TISSUE ONCE
Status: COMPLETED | OUTPATIENT
Start: 2025-07-22 | End: 2025-07-22

## 2025-07-22 RX ADMIN — METHYLPREDNISOLONE ACETATE 80 MG: 40 INJECTION, SUSPENSION INTRA-ARTICULAR; INTRALESIONAL; INTRAMUSCULAR; SOFT TISSUE at 10:51

## 2025-07-22 NOTE — PROGRESS NOTES
Post-op TKA Visit      07/22/25     Allergies   Allergen Reactions    Bee Venom Swelling    Cephalexin Hives    Misc. Sulfonamide Containing Compounds     Nefazodone      syncope    Penicillins Other (See Comments)     States causes him to pass out    Sulfa Antibiotics Rash     Current Outpatient Medications on File Prior to Visit   Medication Sig Dispense Refill    vitamin C (ASCORBIC ACID) 500 MG tablet Take 2 tablets by mouth daily      furosemide (LASIX) 40 MG tablet       FARXIGA 10 MG tablet       finasteride (PROSCAR) 5 MG tablet       potassium chloride (KLOR-CON) 10 MEQ extended release tablet       montelukast (SINGULAIR) 10 MG tablet Take 1 tablet by mouth nightly as needed      gabapentin (NEURONTIN) 300 MG capsule Take 1 capsule by mouth in the morning and at bedtime.      fluticasone (FLONASE) 50 MCG/ACT nasal spray 1 spray by Each Nostril route as needed for Rhinitis      promethazine (PHENERGAN) 12.5 MG tablet Take 1 tablet by mouth 4 times daily as needed for Nausea 20 tablet 2    methocarbamol (ROBAXIN-750) 750 MG tablet Take 1 tablet by mouth 3 times daily as needed (muscle spasm or cramps) 40 tablet 1    amLODIPine (NORVASC) 10 MG tablet Take 1 tablet by mouth nightly (Patient not taking: Reported on 11/7/2024)      lisinopril (PRINIVIL;ZESTRIL) 10 MG tablet Take 4 tablets by mouth nightly      metFORMIN (GLUCOPHAGE-XR) 500 MG extended release tablet Take 2 tablets by mouth in the morning and at bedtime      bromocriptine (PARLODEL) 5 MG capsule Take 1 capsule by mouth in the morning and at bedtime Patient states he takes 4 capsules of 2.5 mg tablets BID      tamsulosin (FLOMAX) 0.4 mg capsule Take 1 capsule by mouth daily      pravastatin (PRAVACHOL) 80 MG tablet Take 1 tablet by mouth daily reports taking every morning.      TESTOSTERONE CYPIONATE IM Inject 50 mg into the muscle every 14 days administer every other Friday evening.      acetaminophen (TYLENOL) 500 MG tablet Take 1 tablet by 
Yes
moderate complexity

## 2025-08-10 ASSESSMENT — SLEEP AND FATIGUE QUESTIONNAIRES
HOW LIKELY ARE YOU TO NOD OFF OR FALL ASLEEP WHEN YOU ARE A PASSENGER IN A CAR FOR AN HOUR WITHOUT A BREAK: WOULD NEVER DOZE
HOW LIKELY ARE YOU TO NOD OFF OR FALL ASLEEP IN A CAR, WHILE STOPPED FOR A FEW MINUTES IN TRAFFIC: WOULD NEVER DOZE
HOW LIKELY ARE YOU TO NOD OFF OR FALL ASLEEP WHILE WATCHING TV: MODERATE CHANCE OF DOZING
HOW LIKELY ARE YOU TO NOD OFF OR FALL ASLEEP WHILE SITTING AND TALKING TO SOMEONE: WOULD NEVER DOZE
HOW LIKELY ARE YOU TO NOD OFF OR FALL ASLEEP WHILE SITTING AND TALKING TO SOMEONE: WOULD NEVER DOZE
HOW LIKELY ARE YOU TO NOD OFF OR FALL ASLEEP WHILE SITTING QUIETLY AFTER LUNCH WITHOUT ALCOHOL: SLIGHT CHANCE OF DOZING
HOW LIKELY ARE YOU TO NOD OFF OR FALL ASLEEP WHILE LYING DOWN TO REST IN THE AFTERNOON WHEN CIRCUMSTANCES PERMIT: MODERATE CHANCE OF DOZING
HOW LIKELY ARE YOU TO NOD OFF OR FALL ASLEEP WHILE SITTING INACTIVE IN A PUBLIC PLACE: WOULD NEVER DOZE
HOW LIKELY ARE YOU TO NOD OFF OR FALL ASLEEP WHILE LYING DOWN TO REST IN THE AFTERNOON WHEN CIRCUMSTANCES PERMIT: MODERATE CHANCE OF DOZING
HOW LIKELY ARE YOU TO NOD OFF OR FALL ASLEEP WHILE SITTING INACTIVE IN A PUBLIC PLACE: WOULD NEVER DOZE
HOW LIKELY ARE YOU TO NOD OFF OR FALL ASLEEP WHILE SITTING AND READING: SLIGHT CHANCE OF DOZING
HOW LIKELY ARE YOU TO NOD OFF OR FALL ASLEEP IN A CAR, WHILE STOPPED FOR A FEW MINUTES IN TRAFFIC: WOULD NEVER DOZE
HOW LIKELY ARE YOU TO NOD OFF OR FALL ASLEEP WHEN YOU ARE A PASSENGER IN A CAR FOR AN HOUR WITHOUT A BREAK: WOULD NEVER DOZE
ESS TOTAL SCORE: 6
HOW LIKELY ARE YOU TO NOD OFF OR FALL ASLEEP WHILE WATCHING TV: MODERATE CHANCE OF DOZING
HOW LIKELY ARE YOU TO NOD OFF OR FALL ASLEEP WHILE SITTING QUIETLY AFTER LUNCH WITHOUT ALCOHOL: SLIGHT CHANCE OF DOZING
HOW LIKELY ARE YOU TO NOD OFF OR FALL ASLEEP WHILE SITTING AND READING: SLIGHT CHANCE OF DOZING

## 2025-08-11 ENCOUNTER — TELEMEDICINE (OUTPATIENT)
Dept: SLEEP MEDICINE | Age: 75
End: 2025-08-11
Payer: MEDICARE

## 2025-08-11 DIAGNOSIS — G47.33 OSA ON CPAP: Primary | ICD-10-CM

## 2025-08-11 PROCEDURE — 1123F ACP DISCUSS/DSCN MKR DOCD: CPT | Performed by: NURSE PRACTITIONER

## 2025-08-11 PROCEDURE — 1160F RVW MEDS BY RX/DR IN RCRD: CPT | Performed by: NURSE PRACTITIONER

## 2025-08-11 PROCEDURE — 3017F COLORECTAL CA SCREEN DOC REV: CPT | Performed by: NURSE PRACTITIONER

## 2025-08-11 PROCEDURE — 99213 OFFICE O/P EST LOW 20 MIN: CPT | Performed by: NURSE PRACTITIONER

## 2025-08-11 PROCEDURE — 1159F MED LIST DOCD IN RCRD: CPT | Performed by: NURSE PRACTITIONER

## 2025-08-11 PROCEDURE — G8427 DOCREV CUR MEDS BY ELIG CLIN: HCPCS | Performed by: NURSE PRACTITIONER

## (undated) DEVICE — SOLUTION IRRIG 3000ML 0.9% SOD CHL USP UROMATIC PLAS CONT

## (undated) DEVICE — KIT PROC KNE TRACKING PK/1 -- VIZADISC MAKO

## (undated) DEVICE — ADHESIVE SKIN CLOSURE WND 8.661X1.5 IN 22 CM LIQUIBAND SECUR

## (undated) DEVICE — TRAY PREP DRY W/ PREM GLV 2 APPL 6 SPNG 2 UNDPD 1 OVERWRAP

## (undated) DEVICE — GUIDEPIN ORTHOPEDIC NAVIGATION 4X110 MM 2P SCREW STRL

## (undated) DEVICE — CURETTE SURG FOR BNE CEM REM QUIK USE

## (undated) DEVICE — CARBIDE ROUND

## (undated) DEVICE — BAG WND LAV 1L CLR ETH ACET ACID SOD ACETT BENZALKONIUM CHL

## (undated) DEVICE — BLADE SAW W12.5XL70MM THK0.8MM CUT THK1.12MM S STL RECIP

## (undated) DEVICE — SPONGE,NEURO,0.5"X1",XR,STRL,LF,10/PK: Brand: MEDLINE

## (undated) DEVICE — SUTURE ABSRB X-1 REV CUT 1/2 CIR 22MM UD BRAID 27IN SZ 3-0 J458H

## (undated) DEVICE — SYR 50ML LR LCK 1ML GRAD NSAF --

## (undated) DEVICE — SUTURE PROL SZ 5-0 L36IN NONABSORBABLE BLU L17MM RB-1 1/2 8556H

## (undated) DEVICE — CATHETER F BLLN 5CC 18FR 2 W HYDRGEL COAT LESS TRAUM LUB

## (undated) DEVICE — STERILE PVP: Brand: MEDLINE INDUSTRIES, INC.

## (undated) DEVICE — SUTURE ABS ANTIBACT 1-0 CTX 24IN STRATAFIX PDS+ SXPP1A445

## (undated) DEVICE — SUTURE STRATAFIX SYMMETRIC PDS + SZ 2-0 L18IN ABSRB VLT SXPP1A403

## (undated) DEVICE — SPLINT KNEE UNIV FOR LESS THAN 36IN L20IN FOAM LAM E CNTCT

## (undated) DEVICE — YANKAUER,BULB TIP,W/O VENT,RIGID,STERILE: Brand: MEDLINE

## (undated) DEVICE — SUTURE PERMAHAND SZ 3-0 L30IN NONABSORBABLE BLK SILK BRAID A304H

## (undated) DEVICE — PRECISION THIN (9.0 X 0.38 X 31.0MM)

## (undated) DEVICE — SUTURE ETHBND EXCEL SZ 2 L30IN NONABSORBABLE GRN L75MM LR X496T

## (undated) DEVICE — GLOVE SURG SZ 85 L12IN FNGR THK79MIL GRN LTX FREE

## (undated) DEVICE — HANDPIECE SET WITH COAXIAL HIGH FLOW TIP AND SUCTION TUBE: Brand: INTERPULSE

## (undated) DEVICE — KIT INT FIX FEM TIB CKPT MAKOPLASTY

## (undated) DEVICE — SUT ETHBND 2 30IN LR DA GRN --

## (undated) DEVICE — 450 ML BOTTLE OF 0.05% CHLORHEXIDINE GLUCONATE IN 99.95% STERILE WATER FOR IRRIGATION, USP AND APPLICATOR.: Brand: IRRISEPT ANTIMICROBIAL WOUND LAVAGE

## (undated) DEVICE — ABSORBENT, WATERPROOF, BACTERIA PROOF FILM DRESSING: Brand: OPSITE POST OP 20X10CM CTN 20

## (undated) DEVICE — CORD RETRCT SIL

## (undated) DEVICE — Device

## (undated) DEVICE — SUTURE PDS II SZ 1 L96IN ABSRB VLT TP-1 L65MM 1/2 CIR Z880G

## (undated) DEVICE — BIPOLAR SEALER 23-112-1 AQM 6.0: Brand: AQUAMANTYS ®

## (undated) DEVICE — BLADE SAW PAT RMR PILT H 46MM --

## (undated) DEVICE — GOWN,REINF,POLY,ECL,PP SLV,XL: Brand: MEDLINE

## (undated) DEVICE — KIT DRP FOR RIO ROBOTIC ARM ASST SYS

## (undated) DEVICE — SUTURE PERMAHAND SZ 4-0 L18IN NONABSORBABLE BLK L26MM SH C014D

## (undated) DEVICE — DRAIN SURG 10FR L1/8IN RND CHN FULL FLUT HEAT POLISHED PERF

## (undated) DEVICE — BLADE SURG SAW STD S STL OSC W/ SERR EDGE DISP

## (undated) DEVICE — SOLUTION IRRIG 1000ML 0.9% SOD CHL USP POUR PLAS BTL

## (undated) DEVICE — TIBURON EXTREMITY SHEET: Brand: CONVERTORS

## (undated) DEVICE — KIT ARMOR C DRP COLLAPSIBLE AND SELF EXP TOP CVR FOR FLUOROSCOPIC

## (undated) DEVICE — GLOVE ORANGE PI 8   MSG9080

## (undated) DEVICE — 3M™ STERI-DRAPE™ INSTRUMENT POUCH 1018: Brand: STERI-DRAPE™

## (undated) DEVICE — APPLICATOR MEDICATED 26 CC SOLUTION HI LT ORNG CHLORAPREP

## (undated) DEVICE — TOTAL KNEE DR JENNINGS: Brand: MEDLINE INDUSTRIES, INC.

## (undated) DEVICE — SOLUTION IRRIG 1000ML STRL H2O USP PLAS POUR BTL

## (undated) DEVICE — DRESSING HYDROFIBER AQUACEL AG ADVANTAGE 3.5X12 IN

## (undated) DEVICE — YANKAUER,FLEXIBLE HANDLE,REGLR CAPACITY: Brand: MEDLINE INDUSTRIES, INC.

## (undated) DEVICE — SHEET,DRAPE,53X77,STERILE: Brand: MEDLINE

## (undated) DEVICE — GLOVE SURG SZ 7 L11.33IN FNGR THK9.8MIL STRW LTX POLYMER

## (undated) DEVICE — SYRINGE MED 30ML STD CLR PLAS LUERLOCK TIP N CTRL DISP

## (undated) DEVICE — ZIMMER® STERILE DISPOSABLE TOURNIQUET CUFF WITH PROTECTIVE SLEEVE, DUAL PORT, SINGLE BLADDER, 34 IN. (86 CM)

## (undated) DEVICE — GLOVE ORANGE PI 8 1/2   MSG9085

## (undated) DEVICE — SUTURE VCRL SZ 2-0 L27IN ABSRB UD L36MM CP-1 1/2 CIR REV J266H

## (undated) DEVICE — BANDAGE COBAN 4 IN COMPR W4INXL5YD FOAM COHESIVE QUIK STK SELF ADH SFT

## (undated) DEVICE — BLADE ES ELASTOMERIC COAT INSUL DURABLE BEND UPTO 90DEG

## (undated) DEVICE — RESERVOIR,SUCTION,100CC,SILICONE: Brand: MEDLINE

## (undated) DEVICE — Device: Brand: STABLECUT®

## (undated) DEVICE — GLOVE SURG SZ 65 THK91MIL LTX FREE SYN POLYISOPRENE

## (undated) DEVICE — KIT TRK KNEE PROC VIZADISC

## (undated) DEVICE — 1010 S-DRAPE TOWEL DRAPE 10/BX: Brand: STERI-DRAPE™

## (undated) DEVICE — CARDINAL HEALTH FLEXIBLE LIGHT HANDLE COVER: Brand: CARDINAL HEALTH

## (undated) DEVICE — AGENT HEMOSTATIC SURGIFLOW MATRIX KIT W/THROMBIN

## (undated) DEVICE — SUTURE 2 0 STRATAFIX SYMMETRIC PDS + 60CM CT 1 SXPP1A439

## (undated) DEVICE — SUTURE VCRL SZ 1 L27IN ABSRB UD L36MM CP-1 1/2 CIR REV CUT J268H

## (undated) DEVICE — SUTURE ETHBND EXCEL SZ 5 L30IN NONABSORBABLE GRN L40MM V-37 MB66G

## (undated) DEVICE — SOLUTION IV 250ML 0.9% SOD CHL CLR INJ FLX BG CONT PRT CLSR

## (undated) DEVICE — SPONGE LAPAROTOMY W18XL18IN WHITE STRUNG RADIOPAQUE STERILE

## (undated) DEVICE — SPONGE,NEURO,1"X3",XR,STRL,LF,10/PK: Brand: MEDLINE

## (undated) DEVICE — DRAPE,TOP,102X53,STERILE: Brand: MEDLINE

## (undated) DEVICE — OSCILLATING TIP SAW CARTRIDGE: Brand: STRYKER PRECISION

## (undated) DEVICE — STERILE POLYISOPRENE POWDER-FREE SURGICAL GLOVES WITH EMOLLIENT COATING: Brand: PROTEXIS

## (undated) DEVICE — DRESSING HYDROFIBER AQUACEL AG ADVANTAGE 3.5X14 IN

## (undated) DEVICE — HOOD: Brand: FLYTE

## (undated) DEVICE — DISPOSABLE DRAPE, STERILE, FOR A CDS-3060 5 FOOT TABLE: Brand: PEDIGO PRODUCTS, INC.

## (undated) DEVICE — SOLUTION IV 250ML 0.9% SOD CHL PH 5 INJ USP VIAFLX PLAS

## (undated) DEVICE — STERILE PRESSURE PROTECTOR PAD® FOR DE MAYO UNIVERSAL DISTRACTOR® (10/CASE): Brand: DE MAYO UNIVERSAL DISTRACTOR®

## (undated) DEVICE — SOLUTION IV DEXTROSE/SALINE 5%-0.9% 500ML - 500ML

## (undated) DEVICE — TRAY,URINE METER,100% SILICONE,16FR10ML: Brand: MEDLINE

## (undated) DEVICE — TUBING, SUCTION, 1/4" X 10', STRAIGHT: Brand: MEDLINE

## (undated) DEVICE — STERILE PACKAGE WITH CANNULA: Brand: LITE BIO DELIVERY SYSTEM

## (undated) DEVICE — FORCEPS BPLR L210MM TIP L1.5 WRK L105MM STR BAYNT INSUL DISP

## (undated) DEVICE — SUTURE MCRYL SZ 2-0 L27IN ABSRB UD SH L26MM TAPERPOINT NDL Y417H

## (undated) DEVICE — Z DISCONTINUED USE 2744636  DRESSING AQUACEL 14 IN ALG W3.5XL14IN POLYUR FLM CVR W/ HYDRCOLL

## (undated) DEVICE — ABSORBENT, WATERPROOF, BACTERIA PROOF FILM DRESSING: Brand: OPSITE POST OP 9.5X8.5CM CTN 20

## (undated) DEVICE — SUTURE ABS MF 2-0 CT1 27IN STRATAFIX PDS+ SXPP1B412

## (undated) DEVICE — SOLUTION IV 1000ML 0.9% SOD CHL

## (undated) DEVICE — SUTURE PERMAHAND SZ 2-0 L30IN NONABSORBABLE BLK SILK W/O A305H

## (undated) DEVICE — SUTURE VCRL SZ 2-0 L18IN ABSRB UD CT-1 L36MM 1/2 CIR J839D

## (undated) DEVICE — PACK PROCEDURE SURG TOT KNEE

## (undated) DEVICE — BLADE RMR L46MM PAT PILOT H

## (undated) DEVICE — PENCIL ES L3M BTTN SWCH HOLSTER W/ BLDE ELECTRD EDGE

## (undated) DEVICE — GLOVE SURG SZ 8 L12IN FNGR THK79MIL GRN LTX FREE

## (undated) DEVICE — POSTERIOR LAMI VANPLT-LUCAS: Brand: MEDLINE INDUSTRIES, INC.

## (undated) DEVICE — T4 HOOD

## (undated) DEVICE — GUIDEPIN ORTHOPEDIC NAVIGATION 4X140 MM 2P SCREW STRL

## (undated) DEVICE — SYRINGE MED 50ML LUERLOCK TIP

## (undated) DEVICE — (D)PREP SKN CHLRAPRP APPL 26ML -- CONVERT TO ITEM 371833

## (undated) DEVICE — SUTURE VICRYL SZ 1 L27IN ABSRB UD L36MM CP-1 1/2 CIR REV CUT J268H

## (undated) DEVICE — SOLUTION IRRIG 3000ML 0.9% SOD CHL FLX CONT 0797208] ICU MEDICAL INC]

## (undated) DEVICE — UNIVERSAL DRAPES: Brand: MEDLINE INDUSTRIES, INC.

## (undated) DEVICE — CONTAINER,SPECIMEN,O.R.STRL,4.5OZ: Brand: MEDLINE

## (undated) DEVICE — SUTURE VCRL SZ 1 L36IN ABSRB UD L36MM CT-1 1/2 CIR J947H

## (undated) DEVICE — DRAPE TWL SURG 16X26IN BLU ORB04] ALLCARE INC]

## (undated) DEVICE — X-LARGE COTTON GLOVE: Brand: DEROYAL